# Patient Record
Sex: FEMALE | Race: BLACK OR AFRICAN AMERICAN | NOT HISPANIC OR LATINO | Employment: FULL TIME | URBAN - METROPOLITAN AREA
[De-identification: names, ages, dates, MRNs, and addresses within clinical notes are randomized per-mention and may not be internally consistent; named-entity substitution may affect disease eponyms.]

---

## 2017-02-13 ENCOUNTER — TELEPHONE (OUTPATIENT)
Dept: BARIATRICS | Facility: CLINIC | Age: 47
End: 2017-02-13

## 2017-02-13 NOTE — TELEPHONE ENCOUNTER
Left VM that we need to cancel Teresa's appt in [place] as booked in wrong dept.  The work number listed is disconnected per message.   I sent her an email to her email address as well.  Praveen StoneJhonatan salas had to cancel your appointment with Dr. Moore as its booked into the wrong clinic. Im sorry that there isnt an option on the website to book into the correct clinic but I hope to speak to you soon to get you here.   Please call me at [phone] so I can get you set up in the correct clinic.    To begin the program youll need to watch the bariatric seminar online or come to one in person which is next Thursday evening.  You can schedule the in person seminar by calling [phone].  To view the online seminar you should go to [email or web address]  Once you view online when you get to the last page of the seminar there is a code word which is numbers and letters and youll need to jot it down because youll need it to complete the form you need to fill in. On that same page is the link for the form as well.    Please call me tomorrow so I can assist you.  Thanks,  Diane

## 2017-03-10 DIAGNOSIS — N18.2 CHRONIC KIDNEY DISEASE, STAGE II (MILD): Primary | ICD-10-CM

## 2017-03-10 NOTE — TELEPHONE ENCOUNTER
----- Message from Evelyn Salazar sent at 3/7/2017  2:47 PM CST -----  Contact: pt  Pt called    Needs    Blood work for f/u sully  On 3/15       thanks    Labs are in computer for the 3/15/2017

## 2017-03-15 ENCOUNTER — LAB VISIT (OUTPATIENT)
Dept: LAB | Facility: HOSPITAL | Age: 47
End: 2017-03-15
Attending: INTERNAL MEDICINE
Payer: COMMERCIAL

## 2017-03-15 DIAGNOSIS — N18.30 CHRONIC KIDNEY DISEASE, STAGE III (MODERATE): ICD-10-CM

## 2017-03-15 LAB
BACTERIA #/AREA URNS AUTO: NORMAL /HPF
BILIRUB UR QL STRIP: NEGATIVE
CLARITY UR REFRACT.AUTO: ABNORMAL
COLOR UR AUTO: YELLOW
CREAT UR-MCNC: 110 MG/DL
GLUCOSE UR QL STRIP: NEGATIVE
HGB UR QL STRIP: NEGATIVE
HYALINE CASTS UR QL AUTO: 0 /LPF
KETONES UR QL STRIP: NEGATIVE
LEUKOCYTE ESTERASE UR QL STRIP: NEGATIVE
MICROSCOPIC COMMENT: NORMAL
NITRITE UR QL STRIP: NEGATIVE
PH UR STRIP: 5 [PH] (ref 5–8)
PROT UR QL STRIP: ABNORMAL
PROT UR-MCNC: 103 MG/DL
PROT/CREAT RATIO, UR: 0.94
RBC #/AREA URNS AUTO: 1 /HPF (ref 0–4)
SP GR UR STRIP: 1.01 (ref 1–1.03)
SQUAMOUS #/AREA URNS AUTO: 2 /HPF
URN SPEC COLLECT METH UR: ABNORMAL
UROBILINOGEN UR STRIP-ACNC: NEGATIVE EU/DL
WBC #/AREA URNS AUTO: 2 /HPF (ref 0–5)

## 2017-03-15 PROCEDURE — 82570 ASSAY OF URINE CREATININE: CPT

## 2017-03-15 PROCEDURE — 81001 URINALYSIS AUTO W/SCOPE: CPT

## 2017-05-30 ENCOUNTER — CLINICAL SUPPORT (OUTPATIENT)
Dept: FAMILY MEDICINE | Facility: CLINIC | Age: 47
End: 2017-05-30

## 2017-05-30 DIAGNOSIS — Z00.00 ROUTINE GENERAL MEDICAL EXAMINATION AT A HEALTH CARE FACILITY: Primary | ICD-10-CM

## 2017-05-30 PROCEDURE — 80305 DRUG TEST PRSMV DIR OPT OBS: CPT | Mod: S$GLB,,, | Performed by: FAMILY MEDICINE

## 2017-05-30 NOTE — PROGRESS NOTES
Demetri has presented today on behalf of [facility]. Demetri Fernandez has completed Non-DOT Drug Screen .    Total charge $35    Brendan Pavon

## 2017-07-07 DIAGNOSIS — N18.30 CHRONIC KIDNEY DISEASE, STAGE III (MODERATE): ICD-10-CM

## 2017-07-08 DIAGNOSIS — N18.30 CHRONIC KIDNEY DISEASE, STAGE III (MODERATE): ICD-10-CM

## 2017-07-10 RX ORDER — LOSARTAN POTASSIUM 100 MG/1
TABLET ORAL
Qty: 30 TABLET | Refills: 0 | Status: SHIPPED | OUTPATIENT
Start: 2017-07-10 | End: 2018-07-05 | Stop reason: SDUPTHER

## 2017-07-10 RX ORDER — TORSEMIDE 10 MG/1
TABLET ORAL
Qty: 30 TABLET | Refills: 0 | Status: SHIPPED | OUTPATIENT
Start: 2017-07-10 | End: 2017-12-29 | Stop reason: SDUPTHER

## 2017-10-27 DIAGNOSIS — N18.2 CHRONIC KIDNEY DISEASE, STAGE II (MILD): Primary | ICD-10-CM

## 2017-11-18 DIAGNOSIS — N18.30 CHRONIC KIDNEY DISEASE, STAGE III (MODERATE): ICD-10-CM

## 2017-11-20 ENCOUNTER — LAB VISIT (OUTPATIENT)
Dept: LAB | Facility: HOSPITAL | Age: 47
End: 2017-11-20
Attending: INTERNAL MEDICINE
Payer: COMMERCIAL

## 2017-11-20 DIAGNOSIS — Z00.00 ANNUAL PHYSICAL EXAM: Primary | ICD-10-CM

## 2017-11-20 DIAGNOSIS — N18.2 CHRONIC KIDNEY DISEASE, STAGE II (MILD): ICD-10-CM

## 2017-11-20 DIAGNOSIS — R06.2 WHEEZING: ICD-10-CM

## 2017-11-20 LAB
ALBUMIN SERPL BCP-MCNC: 3.4 G/DL
ANION GAP SERPL CALC-SCNC: 7 MMOL/L
BUN SERPL-MCNC: 18 MG/DL
CALCIUM SERPL-MCNC: 9.3 MG/DL
CHLORIDE SERPL-SCNC: 106 MMOL/L
CO2 SERPL-SCNC: 24 MMOL/L
CREAT SERPL-MCNC: 1.5 MG/DL
EST. GFR  (AFRICAN AMERICAN): 47.8 ML/MIN/1.73 M^2
EST. GFR  (NON AFRICAN AMERICAN): 41.5 ML/MIN/1.73 M^2
GLUCOSE SERPL-MCNC: 90 MG/DL
PHOSPHATE SERPL-MCNC: 2.5 MG/DL
POTASSIUM SERPL-SCNC: 3.8 MMOL/L
SODIUM SERPL-SCNC: 137 MMOL/L

## 2017-11-20 PROCEDURE — 80069 RENAL FUNCTION PANEL: CPT

## 2017-11-20 PROCEDURE — 36415 COLL VENOUS BLD VENIPUNCTURE: CPT | Mod: PO

## 2017-11-20 RX ORDER — CITALOPRAM 20 MG/1
20 TABLET, FILM COATED ORAL DAILY
Qty: 30 TABLET | Refills: 1 | Status: SHIPPED | OUTPATIENT
Start: 2017-11-20 | End: 2018-01-09

## 2017-11-20 RX ORDER — ALBUTEROL SULFATE 90 UG/1
2 AEROSOL, METERED RESPIRATORY (INHALATION) EVERY 4 HOURS PRN
Qty: 8.5 G | Refills: 0 | Status: SHIPPED | OUTPATIENT
Start: 2017-11-20 | End: 2018-03-08 | Stop reason: SDUPTHER

## 2017-11-20 RX ORDER — TRIAMCINOLONE ACETONIDE 1 MG/G
CREAM TOPICAL
Qty: 30 G | Refills: 0 | Status: SHIPPED | OUTPATIENT
Start: 2017-11-20 | End: 2018-01-04

## 2017-11-21 RX ORDER — CARVEDILOL 12.5 MG/1
TABLET ORAL
Qty: 60 TABLET | Refills: 11 | Status: SHIPPED | OUTPATIENT
Start: 2017-11-21 | End: 2019-06-11 | Stop reason: SDUPTHER

## 2017-11-28 ENCOUNTER — OFFICE VISIT (OUTPATIENT)
Dept: NEPHROLOGY | Facility: CLINIC | Age: 47
End: 2017-11-28
Payer: COMMERCIAL

## 2017-11-28 VITALS
WEIGHT: 224.44 LBS | SYSTOLIC BLOOD PRESSURE: 140 MMHG | HEART RATE: 66 BPM | BODY MASS INDEX: 33.24 KG/M2 | DIASTOLIC BLOOD PRESSURE: 98 MMHG | OXYGEN SATURATION: 98 % | HEIGHT: 69 IN

## 2017-11-28 DIAGNOSIS — I12.9 HYPERTENSIVE KIDNEY DISEASE WITH CHRONIC KIDNEY DISEASE, STAGE 1-4 OR UNSPECIFIED CHRONIC KIDNEY DISEASE: ICD-10-CM

## 2017-11-28 DIAGNOSIS — N18.30 CHRONIC KIDNEY DISEASE, STAGE III (MODERATE): Primary | ICD-10-CM

## 2017-11-28 DIAGNOSIS — N25.81 HYPERPARATHYROIDISM DUE TO RENAL INSUFFICIENCY: ICD-10-CM

## 2017-11-28 PROCEDURE — 99213 OFFICE O/P EST LOW 20 MIN: CPT | Mod: S$GLB,,, | Performed by: INTERNAL MEDICINE

## 2017-11-28 PROCEDURE — 99999 PR PBB SHADOW E&M-EST. PATIENT-LVL II: CPT | Mod: PBBFAC,,, | Performed by: INTERNAL MEDICINE

## 2017-12-01 NOTE — PROGRESS NOTES
Patient ID: Demetri Fernandez is a 47 y.o. Black or  female who presents for follow-up evaluation of Chronic Kidney Disease    HPI     Ms. Fernandez is a 47 year old woman with past medical history of hypertension, nephrotic syndrome presenting for follow up of antihypertensive management. Patient reports blood pressure have been variable.  She reports only moderate daily fluid intake, denies any NSAID use.  She otherwise denies any fever, chest pain, shortness of breath, abdominal pain, dysuria/hematuria.     Review of Systems   Constitutional: Negative for appetite change, fatigue and fever.   Respiratory: Negative for chest tightness and shortness of breath.    Cardiovascular: Negative for chest pain and leg swelling.   Gastrointestinal: Negative for abdominal pain, constipation, diarrhea, nausea and vomiting.   Genitourinary: Negative for difficulty urinating, dysuria, flank pain, frequency, hematuria and urgency.   Musculoskeletal: Negative for arthralgias, joint swelling and myalgias.   Skin: Negative for rash and wound.   Neurological: Negative for dizziness, weakness and light-headedness.   All other systems reviewed and are negative.      Objective:      Physical Exam   Constitutional: She appears well-developed and well-nourished.   Cardiovascular: Normal rate, regular rhythm and normal heart sounds.  Exam reveals no gallop and no friction rub.    No murmur heard.  Pulmonary/Chest: Effort normal and breath sounds normal. No respiratory distress. She has no wheezes. She has no rales.   Abdominal: Soft. Bowel sounds are normal. There is no tenderness.   Musculoskeletal: She exhibits no edema.   Neurological: She is alert.   Skin: Skin is warm and dry. No rash noted. No erythema.   Psychiatric: She has a normal mood and affect.       Assessment:       1. Chronic kidney disease, stage III (moderate)    2. Hypertensive kidney disease with chronic kidney disease, stage 1-4 or unspecified chronic  kidney disease    3. Hyperparathyroidism due to renal insufficiency        Plan:     Ms. Fernandez is a 47 year old woman with past medical history of hypertension, nephrotic syndrome presenting for follow up of antihypertensive management. Creatinine within baseline range, however, encouraged fluid intake, will continue to trend (along with proteinuria, currently elevated on ARB).  Encouraged salt restriction, excercise daily, and continued attempt to lose weight, properly and responsibly (h/o eating disorder), in order to help control blood pressure better, she voiced understanding.   - Hypertension: blood pressure labile, continue current meds as prescribed, patient to continue to record BP diary and bring into clinic next visit. Discussed low sodium diet and weight loss as above.  - Bone/mineral metabolism: patient with secondary hyperparathyroidism, VitD deficiency, on ergocalciferol, will continue to monitor     Return to clinic 4-6 months pending CMP 1.2.17, then with renal panel, urinalysis, urine protein/creatinine ratio, PTH/VitD prior to next visit

## 2017-12-29 DIAGNOSIS — N18.30 CHRONIC KIDNEY DISEASE, STAGE III (MODERATE): Primary | ICD-10-CM

## 2017-12-29 RX ORDER — TORSEMIDE 10 MG/1
10 TABLET ORAL DAILY
Qty: 30 TABLET | Refills: 3 | Status: SHIPPED | OUTPATIENT
Start: 2017-12-29 | End: 2018-05-27 | Stop reason: SDUPTHER

## 2018-01-02 ENCOUNTER — PATIENT MESSAGE (OUTPATIENT)
Dept: ADMINISTRATIVE | Facility: OTHER | Age: 48
End: 2018-01-02

## 2018-01-02 ENCOUNTER — LAB VISIT (OUTPATIENT)
Dept: LAB | Facility: HOSPITAL | Age: 48
End: 2018-01-02
Attending: INTERNAL MEDICINE
Payer: COMMERCIAL

## 2018-01-02 DIAGNOSIS — Z00.00 ANNUAL PHYSICAL EXAM: ICD-10-CM

## 2018-01-02 LAB
25(OH)D3+25(OH)D2 SERPL-MCNC: 12 NG/ML
ALBUMIN SERPL BCP-MCNC: 3.4 G/DL
ALP SERPL-CCNC: 72 U/L
ALT SERPL W/O P-5'-P-CCNC: 19 U/L
ANION GAP SERPL CALC-SCNC: 9 MMOL/L
AST SERPL-CCNC: 20 U/L
BASOPHILS # BLD AUTO: 0.04 K/UL
BASOPHILS NFR BLD: 0.4 %
BILIRUB SERPL-MCNC: 0.8 MG/DL
BUN SERPL-MCNC: 17 MG/DL
CALCIUM SERPL-MCNC: 9.2 MG/DL
CHLORIDE SERPL-SCNC: 106 MMOL/L
CHOLEST SERPL-MCNC: 184 MG/DL
CHOLEST/HDLC SERPL: 5.1 {RATIO}
CO2 SERPL-SCNC: 23 MMOL/L
CREAT SERPL-MCNC: 1.6 MG/DL
DIFFERENTIAL METHOD: ABNORMAL
EOSINOPHIL # BLD AUTO: 0.5 K/UL
EOSINOPHIL NFR BLD: 5.5 %
ERYTHROCYTE [DISTWIDTH] IN BLOOD BY AUTOMATED COUNT: 12.1 %
EST. GFR  (AFRICAN AMERICAN): 44.2 ML/MIN/1.73 M^2
EST. GFR  (NON AFRICAN AMERICAN): 38.4 ML/MIN/1.73 M^2
GLUCOSE SERPL-MCNC: 104 MG/DL
HCT VFR BLD AUTO: 40.9 %
HDLC SERPL-MCNC: 36 MG/DL
HDLC SERPL: 19.6 %
HGB BLD-MCNC: 13.6 G/DL
IMM GRANULOCYTES # BLD AUTO: 0.06 K/UL
IMM GRANULOCYTES NFR BLD AUTO: 0.6 %
LDLC SERPL CALC-MCNC: 116 MG/DL
LYMPHOCYTES # BLD AUTO: 3.4 K/UL
LYMPHOCYTES NFR BLD: 35.2 %
MCH RBC QN AUTO: 31.9 PG
MCHC RBC AUTO-ENTMCNC: 33.3 G/DL
MCV RBC AUTO: 96 FL
MONOCYTES # BLD AUTO: 0.5 K/UL
MONOCYTES NFR BLD: 5.5 %
NEUTROPHILS # BLD AUTO: 5 K/UL
NEUTROPHILS NFR BLD: 52.8 %
NONHDLC SERPL-MCNC: 148 MG/DL
NRBC BLD-RTO: 0 /100 WBC
PLATELET # BLD AUTO: 236 K/UL
PMV BLD AUTO: 10.8 FL
POTASSIUM SERPL-SCNC: 4.1 MMOL/L
PROT SERPL-MCNC: 7.5 G/DL
PTH-INTACT SERPL-MCNC: 117 PG/ML
RBC # BLD AUTO: 4.26 M/UL
SODIUM SERPL-SCNC: 138 MMOL/L
TRIGL SERPL-MCNC: 160 MG/DL
WBC # BLD AUTO: 9.51 K/UL

## 2018-01-02 PROCEDURE — 82306 VITAMIN D 25 HYDROXY: CPT

## 2018-01-02 PROCEDURE — 80053 COMPREHEN METABOLIC PANEL: CPT

## 2018-01-02 PROCEDURE — 85025 COMPLETE CBC W/AUTO DIFF WBC: CPT

## 2018-01-02 PROCEDURE — 80061 LIPID PANEL: CPT

## 2018-01-02 PROCEDURE — 36415 COLL VENOUS BLD VENIPUNCTURE: CPT | Mod: PO

## 2018-01-02 PROCEDURE — 83970 ASSAY OF PARATHORMONE: CPT

## 2018-01-04 ENCOUNTER — HOSPITAL ENCOUNTER (OUTPATIENT)
Dept: RADIOLOGY | Facility: HOSPITAL | Age: 48
Discharge: HOME | End: 2018-01-04
Attending: NURSE PRACTITIONER
Payer: COMMERCIAL

## 2018-01-04 ENCOUNTER — OFFICE VISIT (OUTPATIENT)
Dept: OBSTETRICS AND GYNECOLOGY | Facility: CLINIC | Age: 48
End: 2018-01-04
Payer: COMMERCIAL

## 2018-01-04 VITALS
WEIGHT: 227.94 LBS | HEIGHT: 65 IN | BODY MASS INDEX: 37.98 KG/M2 | DIASTOLIC BLOOD PRESSURE: 72 MMHG | SYSTOLIC BLOOD PRESSURE: 128 MMHG

## 2018-01-04 DIAGNOSIS — Z12.39 BREAST CANCER SCREENING: ICD-10-CM

## 2018-01-04 DIAGNOSIS — Z90.710 POST HYSTERECTOMY MENOPAUSE: ICD-10-CM

## 2018-01-04 DIAGNOSIS — N39.3 SUI (STRESS URINARY INCONTINENCE, FEMALE): ICD-10-CM

## 2018-01-04 DIAGNOSIS — N89.8 VAGINAL DISCHARGE: ICD-10-CM

## 2018-01-04 DIAGNOSIS — Z01.419 VISIT FOR GYNECOLOGIC EXAMINATION: Primary | ICD-10-CM

## 2018-01-04 DIAGNOSIS — E89.40 POST HYSTERECTOMY MENOPAUSE: ICD-10-CM

## 2018-01-04 PROCEDURE — 77067 SCR MAMMO BI INCL CAD: CPT | Mod: TC

## 2018-01-04 PROCEDURE — 77067 SCR MAMMO BI INCL CAD: CPT | Mod: 26,,, | Performed by: RADIOLOGY

## 2018-01-04 PROCEDURE — 99999 PR PBB SHADOW E&M-EST. PATIENT-LVL III: CPT | Mod: PBBFAC,,, | Performed by: NURSE PRACTITIONER

## 2018-01-04 PROCEDURE — 99396 PREV VISIT EST AGE 40-64: CPT | Mod: S$GLB,,, | Performed by: NURSE PRACTITIONER

## 2018-01-04 PROCEDURE — 87480 CANDIDA DNA DIR PROBE: CPT

## 2018-01-04 PROCEDURE — 77063 BREAST TOMOSYNTHESIS BI: CPT | Mod: 26,,, | Performed by: RADIOLOGY

## 2018-01-04 RX ORDER — TERCONAZOLE 8 MG/G
1 CREAM VAGINAL NIGHTLY
Qty: 20 G | Refills: 1 | Status: SHIPPED | OUTPATIENT
Start: 2018-01-04 | End: 2018-10-05

## 2018-01-04 NOTE — PROGRESS NOTES
HISTORY OF PRESENT ILLNESS:    Demetri Fernandez is a 47 y.o. female,   presents today for her routine visit andc/o vulvovaginal irritation.  -Unsure if from elevated phosphorus (is itching all over) or vaginal infection from shaving.  -Denies associated fever, pelvic pain, odor, UTI sx or bleeding and denies use of vulvovaginal irritants.  -Also over past year has developed LUÍS and pad use, without dysuria, frequency, urgency, hematuria, nocturia.  -Thinks it's from weight gain.     Past Medical History:   Diagnosis Date    Chronic kidney disease, stage II (mild) 2012    H/O nephrotic syndrome, . 2015    Hypertension     Obesity     Secondary hyperparathyroidism     Vitamin D deficiency disease        Past Surgical History:   Procedure Laterality Date    DILATION AND CURETTAGE OF UTERUS      ENDOMETRIAL ABLATION      HYSTERECTOMY      DLH/BS (AUB/Fibroids) KB    TUBAL LIGATION         MEDICATIONS AND ALLERGIES:      Current Outpatient Prescriptions:     albuterol 90 mcg/actuation inhaler, Inhale 2 puffs into the lungs every 4 (four) hours as needed for Wheezing., Disp: 8.5 g, Rfl: 0    carvedilol (COREG) 12.5 MG tablet, TAKE 1 TABLET BY MOUTH TWICE DAILY, Disp: 60 tablet, Rfl: 11    losartan (COZAAR) 100 MG tablet, TAKE 1 TABLET BY MOUTH EVERY DAY, Disp: 30 tablet, Rfl: 0    torsemide (DEMADEX) 10 MG Tab, Take 1 tablet (10 mg total) by mouth once daily., Disp: 30 tablet, Rfl: 3    VITAMIN D2 50,000 unit capsule, TAKE 1 CAPSULE BY MOUTH EVERY 30 DAYS, Disp: 3 capsule, Rfl: 3    azelastine (ASTELIN) 137 mcg nasal spray, 1 spray (137 mcg total) by Nasal route 2 (two) times daily., Disp: 30 mL, Rfl: 11    citalopram (CELEXA) 20 MG tablet, Take 1 tablet (20 mg total) by mouth once daily., Disp: 30 tablet, Rfl: 1    fluocinolone (SYNALAR) 0.025 % cream, Apply topically 2 (two) times daily., Disp: 60 g, Rfl: 3    terconazole (TERAZOL 3) 0.8 % vaginal cream, Place 1 applicator  vaginally every evening., Disp: 20 g, Rfl: 1    Review of patient's allergies indicates:   Allergen Reactions    No known allergies        Family History   Problem Relation Age of Onset    Diabetes Mother     Colon cancer Father     Diabetes Maternal Aunt     Diabetes Maternal Grandmother     Breast cancer Neg Hx     Ovarian cancer Neg Hx        Social History     Social History    Marital status:      Spouse name: N/A    Number of children: N/A    Years of education: N/A     Occupational History     [employer]     Social History Main Topics    Smoking status: Never Smoker    Smokeless tobacco: Never Used    Alcohol use Yes      Comment: Occasional    Drug use: No    Sexual activity: Yes     Partners: Male     Birth control/ protection: Surgical     Other Topics Concern    Not on file     Social History Narrative    No narrative on file       OBSTETRIC HISTORY: Number of vaginal deliveries: 2 and Number of spontaneous abortions: 1    COMPREHENSIVE GYN HISTORY:  PAP HISTORY: Denies abnormal Paps.  INFECTION HISTORY: Denies STDs. Denies PID.  BENIGN HISTORY: Reports uterine fibroids. Denies ovarian cysts. Denies endometriosis. Denies other conditions.  CANCER HISTORY: Denies cervical cancer. Denies uterine cancer or hyperplasia. Denies ovarian cancer. Denies vulvar cancer or pre-cancer. Denies vaginal cancer or pre-cancer. Denies breast cancer. Denies colon cancer.  SEXUAL ACTIVITY HISTORY: Reports being sexually active.  MENSTRUAL HISTORY: Denies menses. Pt has surgical menopause not on ERT.    ROS:  GENERAL: No weight changes. No swelling. No fatigue. No fever.  CARDIOVASCULAR: No chest pain. No shortness of breath. No leg cramps.   NEUROLOGICAL: No headaches. No vision changes.  BREASTS: No pain. No lumps. No discharge.  ABDOMEN: No pain. No nausea. No vomiting. No diarrhea. No constipation.  REPRODUCTIVE: No abnormal bleeding.  VULVA: No pain. No lesions.+ ITCHING,  "IRRITATION.  VAGINA: No relaxation. No itching. No odor. No discharge. No lesions.  URINARY: + LUÍS, PAD USE. No nocturia. No frequency. No dysuria.    /72   Ht 5' 5" (1.651 m)   Wt 103.4 kg (227 lb 15.3 oz)   BMI 37.93 kg/m²     PE:  APPEARANCE: Well nourished, well developed, in no acute distress.  AFFECT: WNL, alert and oriented x 3.  SKIN: No acne or hirsutism.  NECK: Neck symmetric without masses or thyromegaly.  NODES: No inguinal, cervical, axillary or femoral lymph node enlargement.  CHEST: Good respiratory effort.   ABDOMEN: Soft. No tenderness or masses. OBESE.  BREASTS: Symmetrical, no skin changes or visible lesions. No palpable masses, nipple discharge bilaterally.  PELVIC: ATROPHIC EXTERNAL FEMALE GENITALIA without lesions. Normal hair distribution. Adequate perineal body, normal urethral meatus. VAGINA with WHITE THICK ODORLESS D/C without lesions. No significant cystocele or rectocele. Bimanual exam shows CERVIX and UTERUS to be SURGICALLY ABSENT. Adnexa without masses or tenderness. EXAM DIFFICULT DUE TO BODY HABITUS.  EXTREMITIES: No edema.    DIAGNOSIS:  1. Visit for gynecologic examination    2. Post hysterectomy menopause    3. Vaginal discharge    4. LUÍS (stress urinary incontinence, female)    5. Breast cancer screening        Orders Placed This Encounter    Vaginosis Screen by DNA Probe    Mammo Digital Screening Bilat with Tomosynthesis_CAD    terconazole (TERAZOL 3) 0.8 % vaginal cream   Up to date on colonoscopy    COUNSELING:  The patient was counseled today on:  -Vaginitis prevention including :  a. avoiding feminine products such as deoderant soaps, body wash, bubble bath, douches, scented toilet paper, deoderant tampons or pads, baby or feminine wipes, chronic pad use, etc. and       b. avoiding other vulvovaginal irritants such as long hot baths, humidity, tight, synthetic clothing, chlorine and sitting around in wet bathing suits and   c. wearing cotton underwear, avoiding " thong underwear and no underwear to bed and      d. taking showers instead of baths and use a hair dryer on cool setting afterwards to dry and  e.wearing cotton to exercise and shower immediately after exercise and change clothes and  f. using polyurethane condoms without spermicide if sexually active and symptoms are triggered by intercourse;  -Terazol cream use and potential side effects;  -types of incontinence and management options including bladder training, timed voiding, Kegel exercises, and the avoidance of bladder irritants in managing mild symptoms conservatively as well as surgical options for correction of anatomic defects, the potential need for urodynamic testing by a Uro-gynecologist, which she declined;  -osteoporosis prevention, calcium supplementation, regular weight bearing exercise;  -ACS PAP guidelines (no paps), with recommendations for yearly pelvic exams as her uterus and cervix were removed for benign reasons and ovaries remain;  -recommendation for yearly mammogram;  -to see her primary care physician for all other health maintenance.    FOLLOW-UP with me for next routine visit.

## 2018-01-05 LAB
CANDIDA RRNA VAG QL PROBE: NEGATIVE
G VAGINALIS RRNA GENITAL QL PROBE: POSITIVE
T VAGINALIS RRNA GENITAL QL PROBE: NEGATIVE

## 2018-01-07 ENCOUNTER — PATIENT MESSAGE (OUTPATIENT)
Dept: OBSTETRICS AND GYNECOLOGY | Facility: CLINIC | Age: 48
End: 2018-01-07

## 2018-01-08 DIAGNOSIS — N76.0 BV (BACTERIAL VAGINOSIS): Primary | ICD-10-CM

## 2018-01-08 DIAGNOSIS — B96.89 BV (BACTERIAL VAGINOSIS): Primary | ICD-10-CM

## 2018-01-08 RX ORDER — METRONIDAZOLE 500 MG/1
500 TABLET ORAL 2 TIMES DAILY
Qty: 14 TABLET | Refills: 1 | Status: SHIPPED | OUTPATIENT
Start: 2018-01-08 | End: 2018-09-03 | Stop reason: SDUPTHER

## 2018-01-09 ENCOUNTER — OFFICE VISIT (OUTPATIENT)
Dept: INTERNAL MEDICINE | Facility: CLINIC | Age: 48
End: 2018-01-09
Payer: COMMERCIAL

## 2018-01-09 VITALS
BODY MASS INDEX: 33.74 KG/M2 | OXYGEN SATURATION: 99 % | DIASTOLIC BLOOD PRESSURE: 82 MMHG | HEIGHT: 69 IN | HEART RATE: 78 BPM | TEMPERATURE: 97 F | WEIGHT: 227.81 LBS | SYSTOLIC BLOOD PRESSURE: 128 MMHG

## 2018-01-09 DIAGNOSIS — G47.00 INSOMNIA, UNSPECIFIED TYPE: ICD-10-CM

## 2018-01-09 DIAGNOSIS — F32.9 REACTIVE DEPRESSION: ICD-10-CM

## 2018-01-09 DIAGNOSIS — E55.9 VITAMIN D DEFICIENCY: ICD-10-CM

## 2018-01-09 DIAGNOSIS — J31.0 OTHER CHRONIC RHINITIS: ICD-10-CM

## 2018-01-09 DIAGNOSIS — I15.9 SECONDARY HYPERTENSION: ICD-10-CM

## 2018-01-09 DIAGNOSIS — K59.00 CONSTIPATION, UNSPECIFIED CONSTIPATION TYPE: ICD-10-CM

## 2018-01-09 DIAGNOSIS — Z00.00 ANNUAL PHYSICAL EXAM: Primary | ICD-10-CM

## 2018-01-09 DIAGNOSIS — N18.2 CHRONIC KIDNEY DISEASE, STAGE II (MILD): ICD-10-CM

## 2018-01-09 PROCEDURE — 99396 PREV VISIT EST AGE 40-64: CPT | Mod: S$GLB,,, | Performed by: INTERNAL MEDICINE

## 2018-01-09 PROCEDURE — 99999 PR PBB SHADOW E&M-EST. PATIENT-LVL IV: CPT | Mod: PBBFAC,,, | Performed by: INTERNAL MEDICINE

## 2018-01-09 RX ORDER — BUPROPION HYDROCHLORIDE 150 MG/1
150 TABLET ORAL DAILY
Qty: 30 TABLET | Refills: 5 | Status: SHIPPED | OUTPATIENT
Start: 2018-01-09 | End: 2018-12-30 | Stop reason: SDUPTHER

## 2018-01-09 RX ORDER — FLUTICASONE PROPIONATE 50 MCG
2 SPRAY, SUSPENSION (ML) NASAL DAILY
Qty: 16 G | Refills: 11 | Status: SHIPPED | OUTPATIENT
Start: 2018-01-09 | End: 2018-02-08

## 2018-01-09 RX ORDER — TRAZODONE HYDROCHLORIDE 50 MG/1
TABLET ORAL
Qty: 60 TABLET | Refills: 5 | Status: SHIPPED | OUTPATIENT
Start: 2018-01-09 | End: 2018-10-05

## 2018-01-09 RX ORDER — CETIRIZINE HYDROCHLORIDE 10 MG/1
10 TABLET ORAL DAILY
Qty: 30 TABLET | Refills: 11 | COMMUNITY
Start: 2018-01-09 | End: 2018-12-31 | Stop reason: SDUPTHER

## 2018-01-09 NOTE — PROGRESS NOTES
Subjective:       Patient ID: Demetri Fernandez is a 47 y.o. female.    Chief Complaint: Annual Exam    HPI   Last nov 2016 I started celexa as made her very hungry and gained a lot of weight.     Sad most of the time.  Depressed most of the time.     Depr in response to death of 21 yo son from a seizure in her home.  She has never had therapy and has a lot of unresolved feelings.         She has just restarted Ergo calciferol.    Recently saw Dr Tobar.  Chronic rhinitis.  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  2nd job - [employer] work at a [employer].    Chronic constipation, - all her life.    Laxative dependendent.       Review of Systems   Constitutional: Positive for activity change and unexpected weight change.   HENT: Negative for hearing loss, rhinorrhea and trouble swallowing.    Eyes: Negative for discharge and visual disturbance.   Respiratory: Positive for shortness of breath (with walking). Negative for chest tightness and wheezing.    Cardiovascular: Negative for chest pain and palpitations.   Gastrointestinal: Positive for constipation. Negative for blood in stool, diarrhea and vomiting.   Endocrine: Positive for polydipsia. Negative for polyuria.   Genitourinary: Negative for difficulty urinating, dysuria, hematuria and menstrual problem.   Musculoskeletal: Negative for arthralgias, joint swelling and neck pain.   Neurological: Negative for weakness and headaches.   Psychiatric/Behavioral: Positive for confusion and dysphoric mood.       Objective:      Physical Exam   Constitutional: She is oriented to person, place, and time. She appears well-developed and well-nourished. No distress.   HENT:   Head: Normocephalic and atraumatic.   Right Ear: External ear normal.   Left Ear: External ear normal.   Nose: Nose normal.   Mouth/Throat: Oropharynx is clear and moist.   Eyes: Conjunctivae and EOM are normal. Pupils are equal, round, and reactive to light. Right eye exhibits no  discharge. Left eye exhibits no discharge. No scleral icterus.   Neck: Normal range of motion. Neck supple. No JVD present. No thyromegaly present.   Cardiovascular: Normal rate, regular rhythm and normal heart sounds.  Exam reveals no gallop.    No murmur heard.  Pulmonary/Chest: Effort normal and breath sounds normal. No respiratory distress. She has no wheezes. She has no rales.   Abdominal: Soft. Bowel sounds are normal. She exhibits no distension and no mass. There is no tenderness. There is no rebound and no guarding.   Musculoskeletal: Normal range of motion. She exhibits no edema or tenderness.   Lymphadenopathy:     She has no cervical adenopathy.   Neurological: She is alert and oriented to person, place, and time. No cranial nerve deficit. Coordination normal.   Skin: Skin is warm and dry. No rash noted.   Psychiatric: She has a normal mood and affect. Her behavior is normal. Judgment and thought content normal.       Lab Results   Component Value Date    WBC 9.51 01/02/2018    HGB 13.6 01/02/2018    HCT 40.9 01/02/2018     01/02/2018    CHOL 184 01/02/2018    TRIG 160 (H) 01/02/2018    HDL 36 (L) 01/02/2018    ALT 19 01/02/2018    AST 20 01/02/2018     01/02/2018    K 4.1 01/02/2018     01/02/2018    CREATININE 1.6 (H) 01/02/2018    BUN 17 01/02/2018    CO2 23 01/02/2018    TSH 1.5 03/15/2008     Results for orders placed or performed in visit on 01/04/18   Vaginosis Screen by DNA Probe   Result Value Ref Range    Trichomonas vaginalis Negative Negative    Gardnerella vaginalis Positive (A) Negative    Candida sp Negative Negative     Assessment:       1. Annual physical exam    2. Secondary hypertension    3. Reactive depression    4. Insomnia, unspecified type    5. Other chronic rhinitis    6. Chronic kidney disease, stage II (mild)    7. Vitamin D deficiency    8. Constipation, unspecified constipation type        Plan:       Demetri was seen today for annual exam.    Diagnoses and  all orders for this visit:    Annual physical exam    Secondary hypertension    Reactive depression    Insomnia, unspecified type    Other chronic rhinitis    Chronic kidney disease, stage II (mild)    Vitamin D deficiency    Other orders  -     RESTART cetirizine (ZYRTEC) 10 MG tablet; Take 1 tablet (10 mg total) by mouth once daily.  -     RESTART fluticasone (FLONASE) 50 mcg/actuation nasal spray; 2 sprays (100 mcg total) by Each Nare route once daily.  -  START   buPROPion (WELLBUTRIN XL) 150 MG TB24 tablet; Take 1 tablet (150 mg total) by mouth once daily.  -    START traZODone (DESYREL) 50 MG tablet; 1-2 tabs po q hs for sleep       See pt instructions  Exercise regularly.  Weight loss diet reviewed.    rtc 4-6 weeks    counseling

## 2018-01-09 NOTE — PATIENT INSTRUCTIONS
Ask work if Employee Assistance Program.     If not call psychiatry here for an appointment.    Call psychiatry to make an appointment [phone]    Try miralax for constipation.      Wellbutrin for depression - 1 a day    Trazadone - 50 mg 1-2 tabs at bedtime for sleep    Constipation (Adult)  Constipation means that you have bowel movements that are less frequent than usual. Stools often become very hard and difficult to pass.  Constipation is very common. At some point in life it affects almost everyone. Since everyone's bowel habits are different, what is constipation to one person may not be to another. Your healthcare provider may do tests to diagnose constipation. It depends on what he or she finds when evaluating you.    Symptoms of constipation include:  · Abdominal pain  · Bloating  · Vomiting  · Painful bowel movements  · Itching, swelling, bleeding, or pain around the anus  Causes  Constipation can have many causes. These include:  · Diet low in fiber  · Too much dairy  · Not drinking enough liquids  · Lack of exercise or physical activity. This is especially true for older adults.  · Changes in lifestyle or daily routine, including pregnancy, aging, work, and travel  · Frequent use or misuse of laxatives  · Ignoring the urge to have a bowel movement or delaying it until later  · Medicines, such as certain prescription pain medicines, iron supplements, antacids, certain antidepressants, and calcium supplements  · Diseases like irritable bowel syndrome, bowel obstructions, stroke, diabetes, thyroid disease, Parkinson disease, hemorrhoids, and colon cancer  Complications  Potential complications of constipation can include:  · Hemorrhoids  · Rectal bleeding from hemorrhoids or anal fissures (skin tears)  · Hernias  · Dependency on laxatives  · Chronic constipation  · Fecal impaction  · Bowel obstruction or perforation  Home care  All treatment should be done after talking with your healthcare provider. This  is especially true if you have another medical problems, are taking prescription medicines, or are an older adult. Treatment most often involves lifestyle changes. You may also need medicines. Your healthcare provider will tell you which will work best for you. Follow the advice below to help avoid this problem in the future.  Lifestyle changes  These lifestyle changes can help prevent constipation:  · Diet. Eat a high-fiber diet, with fresh fruit and vegetables, and reduce dairy intake, meats, and processed foods  · Fluids. It's important to get enough fluids each day. Drink plenty of water when you eat more fiber. If you are on diet that limits the amount of fluid you can have, talk about this with your healthcare provider.  · Regular exercise. Check with your healthcare provider first.  Medications  Take any medicines as directed. Some laxatives are safe to use only every now and then. Others can be taken on a regular basis. Talk with your doctor or pharmacist if you have questions.  Prescription pain medicines can cause constipation. If you are taking this kind of medicine, ask your healthcare provider if you should also take a stool softener.  Medicines you may take to treat constipation include:  · Fiber supplements  · Stool softeners  · Laxatives  · Enemas  · Rectal suppositories  Follow-up care  Follow up with your healthcare provider if symptoms don't get better in the next few days. You may need to have more tests or see a specialist.  Call 911  Call 911 if any of these occur:  · Trouble breathing  · Stiff, rigid abdomen that is severely painful to touch  · Confusion  · Fainting or loss of consciousness  · Rapid heart rate  · Chest pain  When to seek medical advice  Call your healthcare provider right away if any of these occur:  · Fever over 100.4°F (38°C)  · Failure to resume normal bowel movements  · Pain in your abdomen or back gets worse  · Nausea or vomiting  · Swelling in your abdomen  · Blood in  the stool  · Black, tarry stool  · Involuntary weight loss  · Weakness  Date Last Reviewed: 12/30/2015  © 0065-2747 [employer]. [address] [place] [address], [place] [address]. All rights reserved. This information is not intended as a substitute for professional medical care. Always follow your healthcare professional's instructions.

## 2018-02-04 RX ORDER — CITALOPRAM 20 MG/1
TABLET, FILM COATED ORAL
Qty: 30 TABLET | Refills: 11 | Status: SHIPPED | OUTPATIENT
Start: 2018-02-04 | End: 2018-10-05

## 2018-02-26 DIAGNOSIS — N18.30 CHRONIC KIDNEY DISEASE, STAGE III (MODERATE): ICD-10-CM

## 2018-02-26 DIAGNOSIS — N25.81 HYPERPARATHYROIDISM DUE TO RENAL INSUFFICIENCY: ICD-10-CM

## 2018-02-26 RX ORDER — CETIRIZINE HYDROCHLORIDE 10 MG/1
10 TABLET ORAL DAILY
Qty: 30 TABLET | Refills: 11 | Status: CANCELLED | COMMUNITY
Start: 2018-02-26 | End: 2019-02-26

## 2018-02-26 RX ORDER — ERGOCALCIFEROL 1.25 MG/1
CAPSULE ORAL
Qty: 3 CAPSULE | Refills: 3 | Status: CANCELLED | OUTPATIENT
Start: 2018-02-26

## 2018-03-08 DIAGNOSIS — R06.2 WHEEZING: ICD-10-CM

## 2018-03-08 RX ORDER — ALBUTEROL SULFATE 90 UG/1
2 AEROSOL, METERED RESPIRATORY (INHALATION) EVERY 4 HOURS PRN
Qty: 18 G | Refills: 0 | Status: SHIPPED | OUTPATIENT
Start: 2018-03-08 | End: 2018-03-08 | Stop reason: SDUPTHER

## 2018-03-08 RX ORDER — ALBUTEROL SULFATE 90 UG/1
2 AEROSOL, METERED RESPIRATORY (INHALATION) EVERY 4 HOURS PRN
Qty: 18 G | Refills: 5 | Status: SHIPPED | OUTPATIENT
Start: 2018-03-08 | End: 2018-03-12 | Stop reason: SDUPTHER

## 2018-03-12 DIAGNOSIS — R06.2 WHEEZING: ICD-10-CM

## 2018-03-12 RX ORDER — ALBUTEROL SULFATE 90 UG/1
2 AEROSOL, METERED RESPIRATORY (INHALATION) EVERY 4 HOURS PRN
Qty: 18 G | Refills: 5 | Status: SHIPPED | OUTPATIENT
Start: 2018-03-12 | End: 2019-09-11

## 2018-03-12 NOTE — TELEPHONE ENCOUNTER
Pharmacy states this drug is not covered by patients plans. Alternative: Aubrie Schaefer ventolinhfa

## 2018-05-24 ENCOUNTER — CLINICAL SUPPORT (OUTPATIENT)
Dept: FAMILY MEDICINE | Facility: CLINIC | Age: 48
End: 2018-05-24

## 2018-05-24 DIAGNOSIS — Z00.00 ROUTINE GENERAL MEDICAL EXAMINATION AT A HEALTH CARE FACILITY: ICD-10-CM

## 2018-05-24 PROCEDURE — 80305 DRUG TEST PRSMV DIR OPT OBS: CPT | Mod: S$GLB,,, | Performed by: FAMILY MEDICINE

## 2018-05-24 NOTE — PROGRESS NOTES
Demetri has presented today for Pre-Hire screening on behalf of [facility]. Demetri Fernandez has completed Non-DOT Drug Screen . $55.00    Tanna Canchola

## 2018-05-25 ENCOUNTER — TELEPHONE (OUTPATIENT)
Dept: NEPHROLOGY | Facility: CLINIC | Age: 48
End: 2018-05-25

## 2018-05-27 DIAGNOSIS — N18.30 CHRONIC KIDNEY DISEASE, STAGE III (MODERATE): ICD-10-CM

## 2018-05-29 RX ORDER — TORSEMIDE 10 MG/1
TABLET ORAL
Qty: 30 TABLET | Refills: 3 | Status: SHIPPED | OUTPATIENT
Start: 2018-05-29 | End: 2018-07-05 | Stop reason: SDUPTHER

## 2018-06-28 DIAGNOSIS — N25.81 HYPERPARATHYROIDISM DUE TO RENAL INSUFFICIENCY: ICD-10-CM

## 2018-06-28 DIAGNOSIS — N18.30 CHRONIC KIDNEY DISEASE, STAGE III (MODERATE): ICD-10-CM

## 2018-06-28 RX ORDER — TORSEMIDE 10 MG/1
TABLET ORAL
Qty: 30 TABLET | Refills: 3 | Status: CANCELLED | OUTPATIENT
Start: 2018-06-28

## 2018-06-28 RX ORDER — BUPROPION HYDROCHLORIDE 150 MG/1
150 TABLET ORAL DAILY
Qty: 30 TABLET | Refills: 5 | Status: CANCELLED | OUTPATIENT
Start: 2018-06-28 | End: 2019-06-28

## 2018-06-28 RX ORDER — ERGOCALCIFEROL 1.25 MG/1
CAPSULE ORAL
Qty: 3 CAPSULE | Refills: 3 | Status: CANCELLED | OUTPATIENT
Start: 2018-06-28

## 2018-07-05 DIAGNOSIS — N25.81 HYPERPARATHYROIDISM DUE TO RENAL INSUFFICIENCY: ICD-10-CM

## 2018-07-05 DIAGNOSIS — N18.30 CHRONIC KIDNEY DISEASE, STAGE III (MODERATE): ICD-10-CM

## 2018-07-06 RX ORDER — LOSARTAN POTASSIUM 100 MG/1
100 TABLET ORAL DAILY
Qty: 30 TABLET | Refills: 3 | Status: SHIPPED | OUTPATIENT
Start: 2018-07-06 | End: 2019-06-12 | Stop reason: SDUPTHER

## 2018-07-06 RX ORDER — TORSEMIDE 10 MG/1
TABLET ORAL
Qty: 30 TABLET | Refills: 3 | Status: SHIPPED | OUTPATIENT
Start: 2018-07-06 | End: 2019-06-11 | Stop reason: SDUPTHER

## 2018-07-06 RX ORDER — ERGOCALCIFEROL 1.25 MG/1
CAPSULE ORAL
Qty: 3 CAPSULE | Refills: 0 | Status: SHIPPED | OUTPATIENT
Start: 2018-07-06 | End: 2018-09-03 | Stop reason: SDUPTHER

## 2018-09-03 DIAGNOSIS — N76.0 BV (BACTERIAL VAGINOSIS): ICD-10-CM

## 2018-09-03 DIAGNOSIS — N18.30 CHRONIC KIDNEY DISEASE, STAGE III (MODERATE): ICD-10-CM

## 2018-09-03 DIAGNOSIS — N25.81 HYPERPARATHYROIDISM DUE TO RENAL INSUFFICIENCY: ICD-10-CM

## 2018-09-03 DIAGNOSIS — B96.89 BV (BACTERIAL VAGINOSIS): ICD-10-CM

## 2018-09-03 RX ORDER — TRIAMCINOLONE ACETONIDE 1 MG/G
CREAM TOPICAL
Qty: 30 G | Refills: 0 | Status: SHIPPED | OUTPATIENT
Start: 2018-09-03 | End: 2019-01-03 | Stop reason: SDUPTHER

## 2018-09-04 RX ORDER — METRONIDAZOLE 500 MG/1
TABLET ORAL
Qty: 14 TABLET | Refills: 0 | Status: SHIPPED | OUTPATIENT
Start: 2018-09-04 | End: 2018-11-28 | Stop reason: ALTCHOICE

## 2018-09-04 RX ORDER — ERGOCALCIFEROL 1.25 MG/1
CAPSULE ORAL
Qty: 3 CAPSULE | Refills: 0 | Status: SHIPPED | OUTPATIENT
Start: 2018-09-04 | End: 2019-10-21 | Stop reason: SDUPTHER

## 2018-10-05 ENCOUNTER — OFFICE VISIT (OUTPATIENT)
Dept: INTERNAL MEDICINE | Facility: CLINIC | Age: 48
End: 2018-10-05
Payer: COMMERCIAL

## 2018-10-05 VITALS
TEMPERATURE: 98 F | RESPIRATION RATE: 12 BRPM | SYSTOLIC BLOOD PRESSURE: 156 MMHG | HEART RATE: 64 BPM | WEIGHT: 224 LBS | DIASTOLIC BLOOD PRESSURE: 100 MMHG | HEIGHT: 69 IN | BODY MASS INDEX: 33.18 KG/M2

## 2018-10-05 DIAGNOSIS — J01.40 ACUTE PANSINUSITIS, RECURRENCE NOT SPECIFIED: Primary | ICD-10-CM

## 2018-10-05 PROCEDURE — 3077F SYST BP >= 140 MM HG: CPT | Mod: CPTII,S$GLB,, | Performed by: FAMILY MEDICINE

## 2018-10-05 PROCEDURE — 3080F DIAST BP >= 90 MM HG: CPT | Mod: CPTII,S$GLB,, | Performed by: FAMILY MEDICINE

## 2018-10-05 PROCEDURE — 99214 OFFICE O/P EST MOD 30 MIN: CPT | Mod: 25,S$GLB,, | Performed by: FAMILY MEDICINE

## 2018-10-05 PROCEDURE — 3008F BODY MASS INDEX DOCD: CPT | Mod: CPTII,S$GLB,, | Performed by: FAMILY MEDICINE

## 2018-10-05 PROCEDURE — 96372 THER/PROPH/DIAG INJ SC/IM: CPT | Mod: S$GLB,,, | Performed by: FAMILY MEDICINE

## 2018-10-05 PROCEDURE — 99999 PR PBB SHADOW E&M-EST. PATIENT-LVL III: CPT | Mod: PBBFAC,,, | Performed by: FAMILY MEDICINE

## 2018-10-05 RX ORDER — BENZONATATE 200 MG/1
200 CAPSULE ORAL 3 TIMES DAILY PRN
Qty: 30 CAPSULE | Refills: 0 | Status: SHIPPED | OUTPATIENT
Start: 2018-10-05 | End: 2019-01-03 | Stop reason: SDUPTHER

## 2018-10-05 RX ORDER — AMOXICILLIN AND CLAVULANATE POTASSIUM 875; 125 MG/1; MG/1
1 TABLET, FILM COATED ORAL EVERY 12 HOURS
Qty: 20 TABLET | Refills: 0 | Status: SHIPPED | OUTPATIENT
Start: 2018-10-05 | End: 2018-10-15

## 2018-10-05 RX ORDER — TRIAMCINOLONE ACETONIDE 40 MG/ML
40 INJECTION, SUSPENSION INTRA-ARTICULAR; INTRAMUSCULAR
Status: COMPLETED | OUTPATIENT
Start: 2018-10-05 | End: 2018-10-05

## 2018-10-05 RX ORDER — FLUTICASONE PROPIONATE 50 MCG
2 SPRAY, SUSPENSION (ML) NASAL DAILY
Qty: 16 G | Refills: 11 | Status: SHIPPED | OUTPATIENT
Start: 2018-10-05 | End: 2019-11-13

## 2018-10-05 RX ADMIN — TRIAMCINOLONE ACETONIDE 40 MG: 40 INJECTION, SUSPENSION INTRA-ARTICULAR; INTRAMUSCULAR at 04:10

## 2018-10-05 NOTE — PROGRESS NOTES
Subjective:       Patient ID: Demetri Fernandez is a 48 y.o. female.    Chief Complaint: Nasal Congestion; Sinusitis; and Headache    HPI 48-year-old  female presents to clinic today secondary to a complaint of worsening nasal congestion, postnasal drip, runny nose, sinus pressure, sore throat, hoarseness, chest congestion, cough productive of yellow sputum, and headaches worsening for the past 4 days.  She has been using Benadryl without relief.  Review of Systems   Constitutional: Negative for appetite change, chills, fatigue and fever.   HENT: Positive for congestion, postnasal drip, rhinorrhea, sinus pressure, sore throat and voice change (Hoarseness). Negative for ear pain, hearing loss and tinnitus.    Eyes: Negative for redness, itching and visual disturbance.   Respiratory: Positive for cough (Yellowish sputum) and chest tightness. Negative for shortness of breath.    Cardiovascular: Negative for chest pain and palpitations.   Gastrointestinal: Negative for abdominal pain, constipation, diarrhea, nausea and vomiting.   Genitourinary: Negative for decreased urine volume, difficulty urinating, dysuria, frequency, hematuria and urgency.   Musculoskeletal: Negative for back pain, myalgias, neck pain and neck stiffness.   Skin: Negative for rash.   Neurological: Positive for headaches. Negative for dizziness and light-headedness.   Psychiatric/Behavioral: Negative.        Objective:      Physical Exam   Constitutional: She is oriented to person, place, and time. She appears well-developed and well-nourished. No distress.   HENT:   Head: Normocephalic and atraumatic.   Right Ear: External ear normal. A middle ear effusion is present.   Left Ear: External ear normal. A middle ear effusion is present.   Nose: Mucosal edema and rhinorrhea present. No nose lacerations, sinus tenderness, nasal deformity, septal deviation or nasal septal hematoma. No epistaxis.  No foreign bodies. Right sinus exhibits  maxillary sinus tenderness and frontal sinus tenderness. Left sinus exhibits maxillary sinus tenderness and frontal sinus tenderness.   Mouth/Throat: Oropharynx is clear and moist. No oropharyngeal exudate.   Eyes: Conjunctivae and EOM are normal. Pupils are equal, round, and reactive to light. Right eye exhibits no discharge. Left eye exhibits no discharge. No scleral icterus.   Neck: Normal range of motion. Neck supple. No JVD present. No tracheal deviation present. No thyromegaly present.   Cardiovascular: Normal rate, regular rhythm, normal heart sounds and intact distal pulses. Exam reveals no gallop and no friction rub.   No murmur heard.  Pulmonary/Chest: Effort normal and breath sounds normal. No stridor. No respiratory distress. She has no wheezes. She has no rales.   Abdominal: Soft. Bowel sounds are normal. She exhibits no distension and no mass. There is no tenderness. There is no rebound and no guarding.   Musculoskeletal: Normal range of motion. She exhibits no edema or tenderness.   Lymphadenopathy:     She has no cervical adenopathy.   Neurological: She is alert and oriented to person, place, and time.   Skin: Skin is warm and dry. No rash noted. She is not diaphoretic. No erythema. No pallor.   Psychiatric: She has a normal mood and affect. Her behavior is normal. Judgment and thought content normal.   Nursing note and vitals reviewed.      Assessment:       1. Acute pansinusitis, recurrence not specified        Plan:       Acute pansinusitis, recurrence not specified  -     amoxicillin-clavulanate 875-125mg (AUGMENTIN) 875-125 mg per tablet; Take 1 tablet by mouth every 12 (twelve) hours. for 10 days  Dispense: 20 tablet; Refill: 0  -     benzonatate (TESSALON) 200 MG capsule; Take 1 capsule (200 mg total) by mouth 3 (three) times daily as needed for Cough.  Dispense: 30 capsule; Refill: 0  -     fluticasone (FLONASE) 50 mcg/actuation nasal spray; 2 sprays (100 mcg total) by Each Nare route once  daily.  Dispense: 16 g; Refill: 11  -     triamcinolone acetonide injection 40 mg; Inject 1 mL (40 mg total) into the muscle one time.      Tylenol and ibuprofen as needed for fever or pain.  Saltwater or Listerine gargle as needed for sore throat.  Zyrtec daily.  Return to clinic as needed if symptoms persist or worsen.

## 2018-11-25 ENCOUNTER — PATIENT MESSAGE (OUTPATIENT)
Dept: NEPHROLOGY | Facility: CLINIC | Age: 48
End: 2018-11-25

## 2018-11-27 ENCOUNTER — PATIENT MESSAGE (OUTPATIENT)
Dept: NEPHROLOGY | Facility: CLINIC | Age: 48
End: 2018-11-27

## 2018-11-28 ENCOUNTER — OFFICE VISIT (OUTPATIENT)
Dept: INTERNAL MEDICINE | Facility: CLINIC | Age: 48
End: 2018-11-28
Payer: COMMERCIAL

## 2018-11-28 VITALS
TEMPERATURE: 98 F | BODY MASS INDEX: 33.4 KG/M2 | HEIGHT: 69 IN | DIASTOLIC BLOOD PRESSURE: 86 MMHG | SYSTOLIC BLOOD PRESSURE: 138 MMHG | HEART RATE: 68 BPM | WEIGHT: 225.5 LBS

## 2018-11-28 DIAGNOSIS — I15.0 RENOVASCULAR HYPERTENSION: Primary | ICD-10-CM

## 2018-11-28 PROCEDURE — 3075F SYST BP GE 130 - 139MM HG: CPT | Mod: CPTII,S$GLB,, | Performed by: FAMILY MEDICINE

## 2018-11-28 PROCEDURE — 99999 PR PBB SHADOW E&M-EST. PATIENT-LVL III: CPT | Mod: PBBFAC,,, | Performed by: FAMILY MEDICINE

## 2018-11-28 PROCEDURE — 3079F DIAST BP 80-89 MM HG: CPT | Mod: CPTII,S$GLB,, | Performed by: FAMILY MEDICINE

## 2018-11-28 PROCEDURE — 99213 OFFICE O/P EST LOW 20 MIN: CPT | Mod: S$GLB,,, | Performed by: FAMILY MEDICINE

## 2018-11-28 PROCEDURE — 3008F BODY MASS INDEX DOCD: CPT | Mod: CPTII,S$GLB,, | Performed by: FAMILY MEDICINE

## 2018-11-28 NOTE — PATIENT INSTRUCTIONS
Message   Blood work looks okay  Verified Results  (1) CBC/PLT/DIFF [id number] 11:41AM Alverna Layer Order Number: [id number]     Test Name Result Flag Reference   WBC COUNT 7 56 Thousand/uL  4 31-10 16   RBC COUNT 4 77 Million/uL  3 88-5 62   HEMOGLOBIN 15 6 g/dL  12 0-17 0   HEMATOCRIT 45 4 %  36 5-49 3   MCV 95 fL  82-98   MCH 32 7 pg  26 8-34 3   MCHC 34 4 g/dL  31 4-37 4   RDW 13 3 %  11 6-15 1   MPV 10 2 fL  8 9-12 7   PLATELET COUNT 950 Thousands/uL  149-390   nRBC AUTOMATED 0 /100 WBCs     NEUTROPHILS RELATIVE PERCENT 66 %  43-75   LYMPHOCYTES RELATIVE PERCENT 24 %  14-44   MONOCYTES RELATIVE PERCENT 9 %  4-12   EOSINOPHILS RELATIVE PERCENT 1 %  0-6   BASOPHILS RELATIVE PERCENT 0 %  0-1   NEUTROPHILS ABSOLUTE COUNT 4 96 Thousands/?L  1 85-7 62   LYMPHOCYTES ABSOLUTE COUNT 1 82 Thousands/?L  0 60-4 47   MONOCYTES ABSOLUTE COUNT 0 65 Thousand/?L  0 17-1 22   EOSINOPHILS ABSOLUTE COUNT 0 09 Thousand/?L  0 00-0 61   BASOPHILS ABSOLUTE COUNT 0 02 Thousands/?L  0 00-0 10   - Patient Instructions: This bloodwork is non-fasting  Please drink two glasses of water morning of bloodwork  (1) COMPREHENSIVE METABOLIC PANEL [id number] 75:23RW Alverna Layer Order Number: [id number]     Test Name Result Flag Reference   GLUCOSE,RANDM 98 mg/dL     If the patient is fasting, the ADA then defines impaired fasting glucose as > 100 mg/dL and diabetes as > or equal to 123 mg/dL     SODIUM 138 mmol/L  136-145   POTASSIUM 4 7 mmol/L  3 5-5 3   CHLORIDE 101 mmol/L  100-108   CARBON DIOXIDE 29 mmol/L  21-32   ANION GAP (CALC) 8 mmol/L  4-13   BLOOD UREA NITROGEN 16 mg/dL  5-25   CREATININE 0 96 mg/dL  0 60-1 30   Standardized to IDMS reference method   CALCIUM 9 3 mg/dL  8 3-10 1   BILI, TOTAL 2 06 mg/dL H 0 20-1 00   ALK PHOSPHATAS 92 U/L     ALT (SGPT) 16 U/L  12-78   AST(SGOT) 20 U/L  5-45   ALBUMIN 4 3 g/dL  3 5-5 0   TOTAL PROTEIN 7 9 g/dL  6 4-8 2   eGFR Non- Restart Losartan.  Tylenol 500 mg (2 tablets) every 8 hours as needed for headache or pain.    >60 0 ml/min/1 73sq [facility] [employer] Disease Education Program recommendations are as follows:  GFR calculation is accurate only with a steady state creatinine  Chronic Kidney disease less than 60 ml/min/1 73 sq  meters  Kidney failure less than 15 ml/min/1 73 sq  meters  (1) LIPID PANEL, FASTING [id number] 11:41AM Ana M Canvita Order Number: [id number]     Test Name Result Flag Reference   CHOLESTEROL 201 mg/dL H    HDL,DIRECT 57 mg/dL  40-60   Specimen collection should occur prior to Metamizole administration due to the potential for falsely depressed results  LDL CHOLESTEROL CALCULATED 113 mg/dL H 0-100   - Patient Instructions: This is a fasting blood test  Water,black tea or black  coffee only after 9:00pm the night before test   Drink 2 glasses of water the morning of test       Triglyceride:         Normal              <150 mg/dl       Borderline High    150-199 mg/dl       High               200-499 mg/dl       Very High          >499 mg/dl  Cholesterol:         Desirable        <200 mg/dl      Borderline High  200-239 mg/dl      High             >239 mg/dl  HDL Cholesterol:        High    >59 mg/dL      Low     <41 mg/dL  LDL CALCULATED:    This screening LDL is a calculated result  It does not have the accuracy of the Direct Measured LDL in the monitoring of patients with hyperlipidemia and/or statin therapy  Direct Measure LDL (JOK013) must be ordered separately in these patients  TRIGLYCERIDES 155 mg/dL H <=150   Specimen collection should occur prior to N-Acetylcysteine or Metamizole administration due to the potential for falsely depressed results       (1) PSA (SCREEN) (Dx V76 44 Screen for Prostate Cancer) [id number] 11:41AM Ana M Shayne Order Number: [id number]     Test Name Result Flag Reference   PROSTATE SPECIFIC ANTIGEN 1 6 ng/mL  0 0-4 0   Amish Accepted  American Urological Association Guidelines define biochemical recurrence of prostate cancer as a detectable or rising PSA value post-radical prostatectomy that is greater than or equal to 0 2 ng/mL with a second confirmatory level of greater than or equal to 0 2 ng/mL  - Patient Instructions: This test is non-fasting  Please drink two glasses of water morning of bloodwork

## 2018-11-28 NOTE — PROGRESS NOTES
Subjective:       Patient ID: Demetri Fernandez is a 48 y.o. female.    Chief Complaint: Hypertension and Headache    HPI 48-year-old  female with hypertension and nephrotic syndrome followed by Nephrology presents to clinic today secondary to concerns of elevated blood pressure and headaches.  She reports that she has not been taking her losartan for approximately 2 weeks secondary to concern of medication recall.  She has attempted to contact her nephrologist but has not received a response.  She has begun to have frequent headaches and reports increased anxiety secondary to fear of the medication and fear of her blood pressure rising secondary to being off the medication.  She has been doubling the dose of carvedilol but still notes home blood pressure readings as high as 160/100.  Review of Systems   Constitutional: Negative for appetite change, chills, fatigue and fever.   HENT: Negative for congestion, ear pain, hearing loss, postnasal drip, rhinorrhea, sinus pressure, sore throat and tinnitus.    Eyes: Negative for redness, itching and visual disturbance.   Respiratory: Negative for cough, chest tightness and shortness of breath.    Cardiovascular: Negative for chest pain and palpitations.   Gastrointestinal: Negative for abdominal pain, constipation, diarrhea, nausea and vomiting.   Genitourinary: Negative for decreased urine volume, difficulty urinating, dysuria, frequency, hematuria and urgency.   Musculoskeletal: Negative for back pain, myalgias, neck pain and neck stiffness.   Skin: Negative for rash.   Neurological: Positive for headaches. Negative for dizziness and light-headedness.   Psychiatric/Behavioral: Negative.        Objective:     repeat manual blood pressure 132/78.  Physical Exam   Constitutional: She is oriented to person, place, and time. She appears well-developed and well-nourished. No distress.   HENT:   Head: Normocephalic and atraumatic.   Right Ear: External ear  normal.   Left Ear: External ear normal.   Nose: Nose normal.   Mouth/Throat: Oropharynx is clear and moist. No oropharyngeal exudate.   Eyes: Conjunctivae and EOM are normal. Pupils are equal, round, and reactive to light. Right eye exhibits no discharge. Left eye exhibits no discharge. No scleral icterus.   Neck: Normal range of motion. Neck supple. No JVD present. No tracheal deviation present. No thyromegaly present.   Cardiovascular: Normal rate, regular rhythm, normal heart sounds and intact distal pulses. Exam reveals no gallop and no friction rub.   No murmur heard.  Pulmonary/Chest: Effort normal and breath sounds normal. No stridor. No respiratory distress. She has no wheezes. She has no rales.   Abdominal: Soft. Bowel sounds are normal. She exhibits no distension and no mass. There is no tenderness. There is no rebound and no guarding.   Musculoskeletal: Normal range of motion. She exhibits no edema or tenderness.   Lymphadenopathy:     She has no cervical adenopathy.   Neurological: She is alert and oriented to person, place, and time.   Skin: Skin is warm and dry. No rash noted. She is not diaphoretic. No erythema. No pallor.   Psychiatric: She has a normal mood and affect. Her behavior is normal. Judgment and thought content normal.   Nursing note and vitals reviewed.      Assessment:       1. Renovascular hypertension        Plan:       1.  I have recommended that the patient restart losartan 100 mg daily as the losartan recall only affected losartan-hydrochlorothiazide and not the patient's current medication.  2.  Tylenol as needed for headaches.  3.  Return to clinic as needed or follow up with PCP and Nephrology as scheduled.

## 2018-12-30 DIAGNOSIS — N18.30 CHRONIC KIDNEY DISEASE, STAGE III (MODERATE): ICD-10-CM

## 2018-12-30 DIAGNOSIS — Z00.00 ANNUAL PHYSICAL EXAM: Primary | ICD-10-CM

## 2018-12-30 DIAGNOSIS — J01.40 ACUTE PANSINUSITIS, RECURRENCE NOT SPECIFIED: ICD-10-CM

## 2018-12-30 DIAGNOSIS — N25.81 HYPERPARATHYROIDISM DUE TO RENAL INSUFFICIENCY: ICD-10-CM

## 2018-12-30 RX ORDER — AMOXICILLIN AND CLAVULANATE POTASSIUM 875; 125 MG/1; MG/1
TABLET, FILM COATED ORAL
Qty: 20 TABLET | Refills: 0 | OUTPATIENT
Start: 2018-12-30

## 2018-12-31 RX ORDER — BUPROPION HYDROCHLORIDE 150 MG/1
TABLET ORAL
Qty: 30 TABLET | Refills: 0 | Status: SHIPPED | OUTPATIENT
Start: 2018-12-31 | End: 2019-02-02 | Stop reason: SDUPTHER

## 2018-12-31 RX ORDER — CETIRIZINE HYDROCHLORIDE 10 MG/1
10 TABLET ORAL DAILY
Qty: 90 TABLET | Refills: 3 | Status: SHIPPED | OUTPATIENT
Start: 2018-12-31 | End: 2020-03-17

## 2019-01-02 DIAGNOSIS — N18.30 CHRONIC KIDNEY DISEASE, STAGE III (MODERATE): ICD-10-CM

## 2019-01-02 DIAGNOSIS — N25.81 HYPERPARATHYROIDISM DUE TO RENAL INSUFFICIENCY: ICD-10-CM

## 2019-01-02 RX ORDER — ERGOCALCIFEROL 1.25 MG/1
CAPSULE ORAL
Qty: 3 CAPSULE | Refills: 0 | OUTPATIENT
Start: 2019-01-02

## 2019-01-03 DIAGNOSIS — B96.89 BV (BACTERIAL VAGINOSIS): ICD-10-CM

## 2019-01-03 DIAGNOSIS — J01.40 ACUTE PANSINUSITIS, RECURRENCE NOT SPECIFIED: ICD-10-CM

## 2019-01-03 DIAGNOSIS — N76.0 BV (BACTERIAL VAGINOSIS): ICD-10-CM

## 2019-01-03 RX ORDER — BENZONATATE 200 MG/1
CAPSULE ORAL
Qty: 30 CAPSULE | Refills: 0 | Status: SHIPPED | OUTPATIENT
Start: 2019-01-03 | End: 2019-10-21

## 2019-01-03 RX ORDER — TRIAMCINOLONE ACETONIDE 1 MG/G
CREAM TOPICAL
Qty: 30 G | Refills: 0 | Status: SHIPPED | OUTPATIENT
Start: 2019-01-03 | End: 2022-01-27

## 2019-01-07 DIAGNOSIS — B96.89 BV (BACTERIAL VAGINOSIS): ICD-10-CM

## 2019-01-07 DIAGNOSIS — N76.0 BV (BACTERIAL VAGINOSIS): ICD-10-CM

## 2019-01-11 DIAGNOSIS — N76.0 BV (BACTERIAL VAGINOSIS): ICD-10-CM

## 2019-01-11 DIAGNOSIS — B96.89 BV (BACTERIAL VAGINOSIS): ICD-10-CM

## 2019-02-02 DIAGNOSIS — Z00.00 ANNUAL PHYSICAL EXAM: ICD-10-CM

## 2019-02-03 RX ORDER — BUPROPION HYDROCHLORIDE 150 MG/1
TABLET ORAL
Qty: 30 TABLET | Refills: 0 | Status: SHIPPED | OUTPATIENT
Start: 2019-02-03 | End: 2019-10-21 | Stop reason: SDUPTHER

## 2019-02-10 RX ORDER — TERCONAZOLE 8 MG/G
CREAM VAGINAL
Qty: 20 G | Refills: 0 | Status: SHIPPED | OUTPATIENT
Start: 2019-02-10 | End: 2019-11-13

## 2019-02-10 RX ORDER — TERCONAZOLE 8 MG/G
CREAM VAGINAL
Qty: 20 G | Refills: 0 | OUTPATIENT
Start: 2019-02-10

## 2019-02-10 RX ORDER — METRONIDAZOLE 500 MG/1
TABLET ORAL
Qty: 14 TABLET | Refills: 0 | OUTPATIENT
Start: 2019-02-10

## 2019-03-27 DIAGNOSIS — N18.30 CHRONIC KIDNEY DISEASE, STAGE III (MODERATE): ICD-10-CM

## 2019-03-27 RX ORDER — TORSEMIDE 10 MG/1
TABLET ORAL
Qty: 30 TABLET | Refills: 0 | OUTPATIENT
Start: 2019-03-27

## 2019-05-28 ENCOUNTER — CLINICAL SUPPORT (OUTPATIENT)
Dept: FAMILY MEDICINE | Facility: CLINIC | Age: 49
End: 2019-05-28

## 2019-05-28 DIAGNOSIS — Z00.00 ROUTINE GENERAL MEDICAL EXAMINATION AT A HEALTH CARE FACILITY: ICD-10-CM

## 2019-05-28 PROCEDURE — 80305 DRUG TEST PRSMV DIR OPT OBS: CPT | Mod: S$GLB,,, | Performed by: FAMILY MEDICINE

## 2019-05-28 PROCEDURE — 80305 PR NON-DOT DRUG SCREENS: ICD-10-PCS | Mod: S$GLB,,, | Performed by: FAMILY MEDICINE

## 2019-05-28 NOTE — PROGRESS NOTES
Demetri has presented today on behalf of [facility]. Demetri Fernandez has completed Non-DOT Drug Screen .    Rosalia Deal

## 2019-06-11 DIAGNOSIS — N18.30 CHRONIC KIDNEY DISEASE, STAGE III (MODERATE): ICD-10-CM

## 2019-06-12 DIAGNOSIS — N18.30 CHRONIC KIDNEY DISEASE, STAGE III (MODERATE): ICD-10-CM

## 2019-06-14 RX ORDER — TORSEMIDE 10 MG/1
TABLET ORAL
Qty: 30 TABLET | Refills: 1 | Status: SHIPPED | OUTPATIENT
Start: 2019-06-14 | End: 2019-08-06 | Stop reason: SDUPTHER

## 2019-06-14 RX ORDER — LOSARTAN POTASSIUM 100 MG/1
100 TABLET ORAL DAILY
Qty: 30 TABLET | Refills: 3 | OUTPATIENT
Start: 2019-06-14

## 2019-06-14 RX ORDER — CARVEDILOL 12.5 MG/1
12.5 TABLET ORAL 2 TIMES DAILY
Qty: 60 TABLET | Refills: 0 | Status: SHIPPED | OUTPATIENT
Start: 2019-06-14 | End: 2019-07-06 | Stop reason: SDUPTHER

## 2019-06-14 RX ORDER — LOSARTAN POTASSIUM 100 MG/1
TABLET ORAL
Qty: 30 TABLET | Refills: 0 | Status: SHIPPED | OUTPATIENT
Start: 2019-06-14 | End: 2019-07-06 | Stop reason: SDUPTHER

## 2019-06-14 NOTE — TELEPHONE ENCOUNTER
Over due for PE with labs - pls schedule  Labs were ordered 12/31  I can't keep refilling meds if she doesn't make appt

## 2019-06-21 DIAGNOSIS — N18.30 CHRONIC KIDNEY DISEASE, STAGE III (MODERATE): ICD-10-CM

## 2019-07-06 DIAGNOSIS — N18.30 CHRONIC KIDNEY DISEASE, STAGE III (MODERATE): ICD-10-CM

## 2019-07-07 RX ORDER — CARVEDILOL 12.5 MG/1
TABLET ORAL
Qty: 60 TABLET | Refills: 0 | Status: SHIPPED | OUTPATIENT
Start: 2019-07-07 | End: 2019-08-06 | Stop reason: SDUPTHER

## 2019-07-07 RX ORDER — LOSARTAN POTASSIUM 100 MG/1
TABLET ORAL
Qty: 30 TABLET | Refills: 0 | Status: SHIPPED | OUTPATIENT
Start: 2019-07-07 | End: 2019-08-06 | Stop reason: SDUPTHER

## 2019-08-06 ENCOUNTER — TELEPHONE (OUTPATIENT)
Dept: INTERNAL MEDICINE | Facility: CLINIC | Age: 49
End: 2019-08-06

## 2019-08-06 DIAGNOSIS — N18.30 CHRONIC KIDNEY DISEASE, STAGE III (MODERATE): ICD-10-CM

## 2019-08-06 NOTE — TELEPHONE ENCOUNTER
She is asking for refills - she missed scheduled appts 2/8 and 7/30 -     When does she plan on coming in?     Once she makes appt I will refill prescriptions

## 2019-08-07 RX ORDER — CARVEDILOL 12.5 MG/1
TABLET ORAL
Qty: 60 TABLET | Refills: 1 | Status: SHIPPED | OUTPATIENT
Start: 2019-08-07 | End: 2019-10-21 | Stop reason: SDUPTHER

## 2019-08-07 RX ORDER — LOSARTAN POTASSIUM 100 MG/1
TABLET ORAL
Qty: 30 TABLET | Refills: 1 | Status: SHIPPED | OUTPATIENT
Start: 2019-08-07 | End: 2019-10-21

## 2019-08-07 RX ORDER — TORSEMIDE 10 MG/1
TABLET ORAL
Qty: 30 TABLET | Refills: 1 | Status: SHIPPED | OUTPATIENT
Start: 2019-08-07 | End: 2019-12-15 | Stop reason: SDUPTHER

## 2019-09-10 DIAGNOSIS — R06.2 WHEEZING: ICD-10-CM

## 2019-09-11 RX ORDER — ALBUTEROL SULFATE 90 UG/1
AEROSOL, METERED RESPIRATORY (INHALATION)
Qty: 18 G | Refills: 0 | Status: SHIPPED | OUTPATIENT
Start: 2019-09-11 | End: 2020-03-17 | Stop reason: SDUPTHER

## 2019-09-24 ENCOUNTER — TELEPHONE (OUTPATIENT)
Dept: INTERNAL MEDICINE | Facility: CLINIC | Age: 49
End: 2019-09-24

## 2019-09-24 DIAGNOSIS — Z12.31 ENCOUNTER FOR SCREENING MAMMOGRAM FOR MALIGNANT NEOPLASM OF BREAST: Primary | ICD-10-CM

## 2019-09-24 NOTE — TELEPHONE ENCOUNTER
----- Message from Daly Walker sent at 9/24/2019  6:25 AM CDT -----  Contact: Pt request via [facility]   Message     Appointment Request From: Demetri Fernandez    With Provider: Estela Crawford MD [[facility] - Internal Medicine]    Preferred Date Range: 10/21/2019 - 10/22/2019    Preferred Times: Any time    Reason for visit: Check up    Comments:  Check up

## 2019-10-17 ENCOUNTER — LAB VISIT (OUTPATIENT)
Dept: LAB | Facility: HOSPITAL | Age: 49
End: 2019-10-17
Attending: INTERNAL MEDICINE
Payer: COMMERCIAL

## 2019-10-17 DIAGNOSIS — Z00.00 ANNUAL PHYSICAL EXAM: ICD-10-CM

## 2019-10-17 LAB
25(OH)D3+25(OH)D2 SERPL-MCNC: 18 NG/ML (ref 30–96)
ALBUMIN SERPL BCP-MCNC: 3.7 G/DL (ref 3.5–5.2)
ALP SERPL-CCNC: 61 U/L (ref 55–135)
ALT SERPL W/O P-5'-P-CCNC: 21 U/L (ref 10–44)
ANION GAP SERPL CALC-SCNC: 9 MMOL/L (ref 8–16)
AST SERPL-CCNC: 22 U/L (ref 10–40)
BASOPHILS # BLD AUTO: 0.04 K/UL (ref 0–0.2)
BASOPHILS NFR BLD: 0.6 % (ref 0–1.9)
BILIRUB SERPL-MCNC: 0.8 MG/DL (ref 0.1–1)
BUN SERPL-MCNC: 19 MG/DL (ref 6–20)
CALCIUM SERPL-MCNC: 9.4 MG/DL (ref 8.7–10.5)
CHLORIDE SERPL-SCNC: 107 MMOL/L (ref 95–110)
CHOLEST SERPL-MCNC: 169 MG/DL (ref 120–199)
CHOLEST/HDLC SERPL: 4.4 {RATIO} (ref 2–5)
CO2 SERPL-SCNC: 25 MMOL/L (ref 23–29)
CREAT SERPL-MCNC: 1.6 MG/DL (ref 0.5–1.4)
DIFFERENTIAL METHOD: ABNORMAL
EOSINOPHIL # BLD AUTO: 0.4 K/UL (ref 0–0.5)
EOSINOPHIL NFR BLD: 5.6 % (ref 0–8)
ERYTHROCYTE [DISTWIDTH] IN BLOOD BY AUTOMATED COUNT: 11.9 % (ref 11.5–14.5)
EST. GFR  (AFRICAN AMERICAN): 43.6 ML/MIN/1.73 M^2
EST. GFR  (NON AFRICAN AMERICAN): 37.8 ML/MIN/1.73 M^2
GLUCOSE SERPL-MCNC: 96 MG/DL (ref 70–110)
HCT VFR BLD AUTO: 39.4 % (ref 37–48.5)
HDLC SERPL-MCNC: 38 MG/DL (ref 40–75)
HDLC SERPL: 22.5 % (ref 20–50)
HGB BLD-MCNC: 12.9 G/DL (ref 12–16)
IMM GRANULOCYTES # BLD AUTO: 0.01 K/UL (ref 0–0.04)
IMM GRANULOCYTES NFR BLD AUTO: 0.2 % (ref 0–0.5)
LDLC SERPL CALC-MCNC: 107.6 MG/DL (ref 63–159)
LYMPHOCYTES # BLD AUTO: 2.4 K/UL (ref 1–4.8)
LYMPHOCYTES NFR BLD: 37 % (ref 18–48)
MCH RBC QN AUTO: 32 PG (ref 27–31)
MCHC RBC AUTO-ENTMCNC: 32.7 G/DL (ref 32–36)
MCV RBC AUTO: 98 FL (ref 82–98)
MONOCYTES # BLD AUTO: 0.3 K/UL (ref 0.3–1)
MONOCYTES NFR BLD: 4.6 % (ref 4–15)
NEUTROPHILS # BLD AUTO: 3.4 K/UL (ref 1.8–7.7)
NEUTROPHILS NFR BLD: 52 % (ref 38–73)
NONHDLC SERPL-MCNC: 131 MG/DL
NRBC BLD-RTO: 0 /100 WBC
PLATELET # BLD AUTO: 187 K/UL (ref 150–350)
PMV BLD AUTO: 11.5 FL (ref 9.2–12.9)
POTASSIUM SERPL-SCNC: 3.9 MMOL/L (ref 3.5–5.1)
PROT SERPL-MCNC: 7.2 G/DL (ref 6–8.4)
PTH-INTACT SERPL-MCNC: 109 PG/ML (ref 9–77)
RBC # BLD AUTO: 4.03 M/UL (ref 4–5.4)
SODIUM SERPL-SCNC: 141 MMOL/L (ref 136–145)
TRIGL SERPL-MCNC: 117 MG/DL (ref 30–150)
WBC # BLD AUTO: 6.48 K/UL (ref 3.9–12.7)

## 2019-10-17 PROCEDURE — 83970 ASSAY OF PARATHORMONE: CPT

## 2019-10-17 PROCEDURE — 82306 VITAMIN D 25 HYDROXY: CPT

## 2019-10-17 PROCEDURE — 80053 COMPREHEN METABOLIC PANEL: CPT

## 2019-10-17 PROCEDURE — 80061 LIPID PANEL: CPT

## 2019-10-17 PROCEDURE — 36415 COLL VENOUS BLD VENIPUNCTURE: CPT | Mod: PO

## 2019-10-17 PROCEDURE — 85025 COMPLETE CBC W/AUTO DIFF WBC: CPT

## 2019-10-21 ENCOUNTER — OFFICE VISIT (OUTPATIENT)
Dept: INTERNAL MEDICINE | Facility: CLINIC | Age: 49
End: 2019-10-21
Payer: COMMERCIAL

## 2019-10-21 ENCOUNTER — HOSPITAL ENCOUNTER (OUTPATIENT)
Dept: RADIOLOGY | Facility: HOSPITAL | Age: 49
Discharge: HOME | End: 2019-10-21
Attending: INTERNAL MEDICINE
Payer: COMMERCIAL

## 2019-10-21 VITALS
SYSTOLIC BLOOD PRESSURE: 160 MMHG | TEMPERATURE: 98 F | HEART RATE: 65 BPM | DIASTOLIC BLOOD PRESSURE: 100 MMHG | HEIGHT: 69 IN | BODY MASS INDEX: 32.95 KG/M2 | WEIGHT: 222.44 LBS | OXYGEN SATURATION: 98 %

## 2019-10-21 VITALS — BODY MASS INDEX: 33.23 KG/M2 | WEIGHT: 225 LBS

## 2019-10-21 DIAGNOSIS — E66.9 CLASS 1 OBESITY WITH SERIOUS COMORBIDITY AND BODY MASS INDEX (BMI) OF 32.0 TO 32.9 IN ADULT, UNSPECIFIED OBESITY TYPE: ICD-10-CM

## 2019-10-21 DIAGNOSIS — E55.9 VITAMIN D DEFICIENCY: ICD-10-CM

## 2019-10-21 DIAGNOSIS — Z00.00 ANNUAL PHYSICAL EXAM: Primary | ICD-10-CM

## 2019-10-21 DIAGNOSIS — N25.81 HYPERPARATHYROIDISM DUE TO RENAL INSUFFICIENCY: ICD-10-CM

## 2019-10-21 DIAGNOSIS — N18.30 CHRONIC KIDNEY DISEASE, STAGE III (MODERATE): ICD-10-CM

## 2019-10-21 DIAGNOSIS — N04.9 NEPHROTIC SYNDROME: ICD-10-CM

## 2019-10-21 DIAGNOSIS — Z80.0 FAMILY HISTORY OF COLON CANCER: ICD-10-CM

## 2019-10-21 DIAGNOSIS — Z12.31 ENCOUNTER FOR SCREENING MAMMOGRAM FOR MALIGNANT NEOPLASM OF BREAST: ICD-10-CM

## 2019-10-21 DIAGNOSIS — F32.9 REACTIVE DEPRESSION: ICD-10-CM

## 2019-10-21 PROBLEM — E66.811 CLASS 1 OBESITY WITH SERIOUS COMORBIDITY AND BODY MASS INDEX (BMI) OF 32.0 TO 32.9 IN ADULT: Status: ACTIVE | Noted: 2019-10-21

## 2019-10-21 LAB
BACTERIA #/AREA URNS AUTO: ABNORMAL /HPF
BILIRUB UR QL STRIP: NEGATIVE
CLARITY UR REFRACT.AUTO: CLEAR
COLOR UR AUTO: YELLOW
CREAT UR-MCNC: 135 MG/DL (ref 15–325)
GLUCOSE UR QL STRIP: NEGATIVE
HGB UR QL STRIP: NEGATIVE
HYALINE CASTS UR QL AUTO: 5 /LPF
KETONES UR QL STRIP: NEGATIVE
LEUKOCYTE ESTERASE UR QL STRIP: NEGATIVE
MICROSCOPIC COMMENT: ABNORMAL
NITRITE UR QL STRIP: NEGATIVE
PH UR STRIP: 5 [PH] (ref 5–8)
PROT UR QL STRIP: ABNORMAL
PROT UR-MCNC: 124 MG/DL (ref 0–15)
PROT/CREAT UR: 0.92 MG/G{CREAT} (ref 0–0.2)
RBC #/AREA URNS AUTO: 0 /HPF (ref 0–4)
SP GR UR STRIP: 1.01 (ref 1–1.03)
SQUAMOUS #/AREA URNS AUTO: 1 /HPF
URN SPEC COLLECT METH UR: ABNORMAL
WBC #/AREA URNS AUTO: 0 /HPF (ref 0–5)

## 2019-10-21 PROCEDURE — 3077F SYST BP >= 140 MM HG: CPT | Mod: CPTII,S$GLB,, | Performed by: INTERNAL MEDICINE

## 2019-10-21 PROCEDURE — 99999 PR PBB SHADOW E&M-EST. PATIENT-LVL IV: ICD-10-PCS | Mod: PBBFAC,,, | Performed by: INTERNAL MEDICINE

## 2019-10-21 PROCEDURE — 3077F PR MOST RECENT SYSTOLIC BLOOD PRESSURE >= 140 MM HG: ICD-10-PCS | Mod: CPTII,S$GLB,, | Performed by: INTERNAL MEDICINE

## 2019-10-21 PROCEDURE — 3080F DIAST BP >= 90 MM HG: CPT | Mod: CPTII,S$GLB,, | Performed by: INTERNAL MEDICINE

## 2019-10-21 PROCEDURE — 77063 MAMMO DIGITAL SCREENING BILAT WITH TOMOSYNTHESIS_CAD: ICD-10-PCS | Mod: 26,,, | Performed by: RADIOLOGY

## 2019-10-21 PROCEDURE — 3080F PR MOST RECENT DIASTOLIC BLOOD PRESSURE >= 90 MM HG: ICD-10-PCS | Mod: CPTII,S$GLB,, | Performed by: INTERNAL MEDICINE

## 2019-10-21 PROCEDURE — 82570 ASSAY OF URINE CREATININE: CPT

## 2019-10-21 PROCEDURE — 81001 URINALYSIS AUTO W/SCOPE: CPT

## 2019-10-21 PROCEDURE — 77067 MAMMO DIGITAL SCREENING BILAT WITH TOMOSYNTHESIS_CAD: ICD-10-PCS | Mod: 26,,, | Performed by: RADIOLOGY

## 2019-10-21 PROCEDURE — 77067 SCR MAMMO BI INCL CAD: CPT | Mod: 26,,, | Performed by: RADIOLOGY

## 2019-10-21 PROCEDURE — 77063 BREAST TOMOSYNTHESIS BI: CPT | Mod: 26,,, | Performed by: RADIOLOGY

## 2019-10-21 PROCEDURE — 99396 PREV VISIT EST AGE 40-64: CPT | Mod: S$GLB,,, | Performed by: INTERNAL MEDICINE

## 2019-10-21 PROCEDURE — 99999 PR PBB SHADOW E&M-EST. PATIENT-LVL IV: CPT | Mod: PBBFAC,,, | Performed by: INTERNAL MEDICINE

## 2019-10-21 PROCEDURE — 99396 PR PREVENTIVE VISIT,EST,40-64: ICD-10-PCS | Mod: S$GLB,,, | Performed by: INTERNAL MEDICINE

## 2019-10-21 PROCEDURE — 77067 SCR MAMMO BI INCL CAD: CPT | Mod: TC

## 2019-10-21 RX ORDER — BUPROPION HYDROCHLORIDE 150 MG/1
300 TABLET ORAL DAILY
Qty: 30 TABLET | Refills: 11 | Status: SHIPPED | OUTPATIENT
Start: 2019-10-21 | End: 2020-05-13

## 2019-10-21 RX ORDER — CARVEDILOL 25 MG/1
25 TABLET ORAL 2 TIMES DAILY WITH MEALS
Qty: 60 TABLET | Refills: 11 | Status: SHIPPED | OUTPATIENT
Start: 2019-10-21 | End: 2021-01-24

## 2019-10-21 RX ORDER — ERGOCALCIFEROL 1.25 MG/1
CAPSULE ORAL
Qty: 3 CAPSULE | Refills: 0 | Status: SHIPPED | OUTPATIENT
Start: 2019-10-21 | End: 2020-01-21

## 2019-10-21 RX ORDER — VALSARTAN 320 MG/1
320 TABLET ORAL DAILY
Qty: 30 TABLET | Refills: 11 | Status: SHIPPED | OUTPATIENT
Start: 2019-10-21 | End: 2020-05-01

## 2019-10-21 NOTE — PROGRESS NOTES
Subjective:       Patient ID: Toi Fernandez is a 49 y.o. female.    Chief Complaint: Annual Exam    HPI   Returns after long absence.  Last seen 1/18.        Long h/o htn.  Didn't take bp meds today    BP at home runs 160-180/120-140.    Remains depressed.  Niece drowned.     She is taking wellbutrin daily, but mood not better.      Sleeping better at night.    She is out of vit D.    BMI 32.  Difficulty losing wt.      Vit d defic.  Out of Ergo for a while.    Review of Systems   Constitutional: Negative for fever and unexpected weight change.   HENT: Negative for congestion and postnasal drip.    Respiratory: Positive for shortness of breath (mild, when active.). Negative for chest tightness and wheezing.    Cardiovascular: Negative for chest pain and leg swelling.   Gastrointestinal: Negative for abdominal pain, anal bleeding, constipation, diarrhea, nausea and vomiting.   Genitourinary: Negative for dysuria and urgency.   Skin: Negative for rash.   Neurological: Negative for headaches.   Psychiatric/Behavioral: Positive for dysphoric mood. Negative for sleep disturbance. The patient is nervous/anxious.        Objective:      Physical Exam   Constitutional: She is oriented to person, place, and time. She appears well-developed and well-nourished. No distress.   HENT:   Head: Normocephalic and atraumatic.   Right Ear: External ear normal.   Left Ear: External ear normal.   Nose: Nose normal.   Mouth/Throat: Oropharynx is clear and moist.   Eyes: Pupils are equal, round, and reactive to light. Conjunctivae and EOM are normal. Right eye exhibits no discharge. Left eye exhibits no discharge. No scleral icterus.   Neck: Normal range of motion. Neck supple. No JVD present. No thyromegaly present.   Cardiovascular: Normal rate, regular rhythm and normal heart sounds. Exam reveals no gallop.   No murmur heard.  Pulmonary/Chest: Effort normal and breath sounds normal. No respiratory distress. She has no wheezes. She  has no rales. No breast tenderness, discharge or bleeding.   Abdominal: Soft. Bowel sounds are normal. She exhibits no distension and no mass. There is no tenderness. There is no rebound and no guarding.   Genitourinary: No breast tenderness, discharge or bleeding.   Musculoskeletal: Normal range of motion. She exhibits no edema or tenderness.   Lymphadenopathy:     She has no cervical adenopathy.   Neurological: She is alert and oriented to person, place, and time. No cranial nerve deficit. Coordination normal.   Skin: Skin is warm and dry. No rash noted.   Psychiatric: She has a normal mood and affect. Her behavior is normal. Judgment and thought content normal.       Results for orders placed or performed in visit on 10/17/19   CBC auto differential   Result Value Ref Range    WBC 6.48 3.90 - 12.70 K/uL    RBC 4.03 4.00 - 5.40 M/uL    Hemoglobin 12.9 12.0 - 16.0 g/dL    Hematocrit 39.4 37.0 - 48.5 %    Mean Corpuscular Volume 98 82 - 98 fL    Mean Corpuscular Hemoglobin 32.0 (H) 27.0 - 31.0 pg    Mean Corpuscular Hemoglobin Conc 32.7 32.0 - 36.0 g/dL    RDW 11.9 11.5 - 14.5 %    Platelets 187 150 - 350 K/uL    MPV 11.5 9.2 - 12.9 fL    Immature Granulocytes 0.2 0.0 - 0.5 %    Gran # (ANC) 3.4 1.8 - 7.7 K/uL    Immature Grans (Abs) 0.01 0.00 - 0.04 K/uL    Lymph # 2.4 1.0 - 4.8 K/uL    Mono # 0.3 0.3 - 1.0 K/uL    Eos # 0.4 0.0 - 0.5 K/uL    Baso # 0.04 0.00 - 0.20 K/uL    nRBC 0 0 /100 WBC    Gran% 52.0 38.0 - 73.0 %    Lymph% 37.0 18.0 - 48.0 %    Mono% 4.6 4.0 - 15.0 %    Eosinophil% 5.6 0.0 - 8.0 %    Basophil% 0.6 0.0 - 1.9 %    Differential Method Automated    Comprehensive metabolic panel   Result Value Ref Range    Sodium 141 136 - 145 mmol/L    Potassium 3.9 3.5 - 5.1 mmol/L    Chloride 107 95 - 110 mmol/L    CO2 25 23 - 29 mmol/L    Glucose 96 70 - 110 mg/dL    BUN, Bld 19 6 - 20 mg/dL    Creatinine 1.6 (H) 0.5 - 1.4 mg/dL    Calcium 9.4 8.7 - 10.5 mg/dL    Total Protein 7.2 6.0 - 8.4 g/dL    Albumin 3.7  3.5 - 5.2 g/dL    Total Bilirubin 0.8 0.1 - 1.0 mg/dL    Alkaline Phosphatase 61 55 - 135 U/L    AST 22 10 - 40 U/L    ALT 21 10 - 44 U/L    Anion Gap 9 8 - 16 mmol/L    eGFR if African American 43.6 (A) >60 mL/min/1.73 m^2    eGFR if non  37.8 (A) >60 mL/min/1.73 m^2   Lipid panel   Result Value Ref Range    Cholesterol 169 120 - 199 mg/dL    Triglycerides 117 30 - 150 mg/dL    HDL 38 (L) 40 - 75 mg/dL    LDL Cholesterol 107.6 63.0 - 159.0 mg/dL    Hdl/Cholesterol Ratio 22.5 20.0 - 50.0 %    Total Cholesterol/HDL Ratio 4.4 2.0 - 5.0    Non-HDL Cholesterol 131 mg/dL   Vitamin D   Result Value Ref Range    Vit D, 25-Hydroxy 18 (L) 30 - 96 ng/mL   PTH, intact   Result Value Ref Range    PTH, Intact 109.0 (H) 9.0 - 77.0 pg/mL     Assessment:       1. Annual physical exam    2. Chronic kidney disease, stage III (moderate)    3. Reactive depression    4. Hyperparathyroidism due to renal insufficiency    5. Class 1 obesity with serious comorbidity and body mass index (BMI) of 32.0 to 32.9 in adult, unspecified obesity type    6. Vitamin D deficiency    7. Family history of colon cancer        Plan:       Toi was seen today for annual exam.    Diagnoses and all orders for this visit:    Annual physical exam  -     buPROPion (WELLBUTRIN XL) 150 MG TB24 tablet; Take 2 tablets (300 mg total) by mouth once daily.    Chronic kidney disease, stage III (moderate)  -     carvedilol (COREG) 25 MG tablet; Take 1 tablet (25 mg total) by mouth 2 (two) times daily with meals.    Reactive depression    Hyperparathyroidism due to renal insufficiency    Class 1 obesity with serious comorbidity and body mass index (BMI) of 32.0 to 32.9 in adult, unspecified obesity type  -     ergocalciferol (ERGOCALCIFEROL) 50,000 unit Cap; TAKE ONE CAPSULE BY MOUTH EVERY 30 DAYS  -     Ambulatory consult to Bariatric Surgery    Vitamin D deficiency  -     ergocalciferol (ERGOCALCIFEROL) 50,000 unit Cap; TAKE ONE CAPSULE BY MOUTH EVERY  30 DAYS    Family history of colon cancer  -     Case request GI: COLONOSCOPY    Other orders  -     valsartan (DIOVAN) 320 MG tablet; Take 1 tablet (320 mg total) by mouth once daily.       see pt instructions    She declines flu vax

## 2019-10-21 NOTE — PATIENT INSTRUCTIONS
Stop Losartan.  Start Valsartan 320 mg daily.    Increase Carvedilol to 25 mg twice a day (take 2 tabs of 12.5 mg twice a day until you run out)    Increase Wellbutrin to 300 mg daily.

## 2019-10-24 ENCOUNTER — TELEPHONE (OUTPATIENT)
Dept: BARIATRICS | Facility: CLINIC | Age: 49
End: 2019-10-24

## 2019-10-24 NOTE — TELEPHONE ENCOUNTER
----- Message from Sherry Hylton sent at 10/23/2019 11:47 AM CDT -----  Contact: self @ [phone]  Pt says she is returning your call.

## 2019-10-29 ENCOUNTER — DOCUMENTATION ONLY (OUTPATIENT)
Dept: BARIATRICS | Facility: CLINIC | Age: 49
End: 2019-10-29

## 2019-10-29 NOTE — PROGRESS NOTES
Bariatric Surgery Online Course Form Submission  Someone has submitted a Bariatric Surgery Online Course Form Submission on this page.  Date: 10- 21:45:44  Patient's Name: Toi Fernandez   YOB: 1970 Email: [email or web address]  Phone: [phone]   Patient Address: [address] [place] [address]  Preferred Surgical Location: [facility] [place]   I certify that I am 18 years of age or older: Yes   Confirmation Code: [id number]  Verification of Bariatric Seminar: yes  Insurance Information  Insurance or Self Pay? Insurance - Fill out fields below  Insurance Company Name: [employer]   Type of Coverage/Coverage Plan (i.e. PPO, HMO):   Group Name: [id number]   Subscriber Name: Javan Fernandez   Member Name (patient's name): Toi Rebecca   Member ID/Policy #:[id number]   Plan Effective Date:   Card Issuance date:

## 2019-11-11 ENCOUNTER — PATIENT OUTREACH (OUTPATIENT)
Dept: ADMINISTRATIVE | Facility: HOSPITAL | Age: 49
End: 2019-11-11

## 2019-11-12 ENCOUNTER — PATIENT OUTREACH (OUTPATIENT)
Dept: ADMINISTRATIVE | Facility: OTHER | Age: 49
End: 2019-11-12

## 2019-11-13 ENCOUNTER — CLINICAL SUPPORT (OUTPATIENT)
Dept: BARIATRICS | Facility: CLINIC | Age: 49
End: 2019-11-13
Payer: COMMERCIAL

## 2019-11-13 ENCOUNTER — INITIAL CONSULT (OUTPATIENT)
Dept: BARIATRICS | Facility: CLINIC | Age: 49
End: 2019-11-13
Payer: COMMERCIAL

## 2019-11-13 VITALS
DIASTOLIC BLOOD PRESSURE: 90 MMHG | HEIGHT: 68 IN | WEIGHT: 227.31 LBS | BODY MASS INDEX: 34.45 KG/M2 | SYSTOLIC BLOOD PRESSURE: 183 MMHG | HEART RATE: 62 BPM

## 2019-11-13 DIAGNOSIS — Z87.441 H/O NEPHROTIC SYNDROME: ICD-10-CM

## 2019-11-13 DIAGNOSIS — R06.09 DOE (DYSPNEA ON EXERTION): ICD-10-CM

## 2019-11-13 DIAGNOSIS — E66.9 CLASS 1 OBESITY WITH SERIOUS COMORBIDITY AND BODY MASS INDEX (BMI) OF 32.0 TO 32.9 IN ADULT, UNSPECIFIED OBESITY TYPE: Primary | ICD-10-CM

## 2019-11-13 DIAGNOSIS — N18.2 CHRONIC KIDNEY DISEASE, STAGE II (MILD): ICD-10-CM

## 2019-11-13 DIAGNOSIS — F32.9 REACTIVE DEPRESSION: ICD-10-CM

## 2019-11-13 DIAGNOSIS — E55.9 VITAMIN D DEFICIENCY: ICD-10-CM

## 2019-11-13 DIAGNOSIS — I15.0 RENOVASCULAR HYPERTENSION: ICD-10-CM

## 2019-11-13 PROCEDURE — 3008F BODY MASS INDEX DOCD: CPT | Mod: CPTII,S$GLB,, | Performed by: NURSE PRACTITIONER

## 2019-11-13 PROCEDURE — 3008F PR BODY MASS INDEX (BMI) DOCUMENTED: ICD-10-PCS | Mod: CPTII,S$GLB,, | Performed by: NURSE PRACTITIONER

## 2019-11-13 PROCEDURE — 99499 NO LOS: ICD-10-PCS | Mod: S$GLB,,, | Performed by: DIETITIAN, REGISTERED

## 2019-11-13 PROCEDURE — 99499 UNLISTED E&M SERVICE: CPT | Mod: S$GLB,,, | Performed by: DIETITIAN, REGISTERED

## 2019-11-13 PROCEDURE — 99205 PR OFFICE/OUTPT VISIT, NEW, LEVL V, 60-74 MIN: ICD-10-PCS | Mod: S$GLB,,, | Performed by: NURSE PRACTITIONER

## 2019-11-13 PROCEDURE — 99999 PR PBB SHADOW E&M-EST. PATIENT-LVL IV: CPT | Mod: PBBFAC,,, | Performed by: NURSE PRACTITIONER

## 2019-11-13 PROCEDURE — 3080F DIAST BP >= 90 MM HG: CPT | Mod: CPTII,S$GLB,, | Performed by: NURSE PRACTITIONER

## 2019-11-13 PROCEDURE — 3077F SYST BP >= 140 MM HG: CPT | Mod: CPTII,S$GLB,, | Performed by: NURSE PRACTITIONER

## 2019-11-13 PROCEDURE — 99999 PR PBB SHADOW E&M-EST. PATIENT-LVL II: CPT | Mod: PBBFAC,,, | Performed by: DIETITIAN, REGISTERED

## 2019-11-13 PROCEDURE — 3077F PR MOST RECENT SYSTOLIC BLOOD PRESSURE >= 140 MM HG: ICD-10-PCS | Mod: CPTII,S$GLB,, | Performed by: NURSE PRACTITIONER

## 2019-11-13 PROCEDURE — 3080F PR MOST RECENT DIASTOLIC BLOOD PRESSURE >= 90 MM HG: ICD-10-PCS | Mod: CPTII,S$GLB,, | Performed by: NURSE PRACTITIONER

## 2019-11-13 PROCEDURE — 99999 PR PBB SHADOW E&M-EST. PATIENT-LVL II: ICD-10-PCS | Mod: PBBFAC,,, | Performed by: DIETITIAN, REGISTERED

## 2019-11-13 PROCEDURE — 99205 OFFICE O/P NEW HI 60 MIN: CPT | Mod: S$GLB,,, | Performed by: NURSE PRACTITIONER

## 2019-11-13 PROCEDURE — 99999 PR PBB SHADOW E&M-EST. PATIENT-LVL IV: ICD-10-PCS | Mod: PBBFAC,,, | Performed by: NURSE PRACTITIONER

## 2019-11-13 NOTE — PROGRESS NOTES
BARIATRIC NEW PATIENT EVALUATION    CHIEF COMPLAINT:   Morbid Obesity Body mass index is 35.07 kg/m². and difficulty with weight loss.     HISTORY OF PRESENT ILLNESS:  Toi Fernandez  is a 49 y.o. female presenting for morbid obesity Body mass index is 35.07 kg/m². and difficulty with weight loss. The patient has tried Slim Fast, Adipex, and the gym. Lost a couple pounds with Adipex, then regained. Wt difficulty stated with childbearing. Highest wt is 235-240 pounds.     States she was referred for uncontrolled BP by PCP. Bp is elevated with medications on board. She is home monitoring. Has a HA, no other symptoms at this time.  BP at home runs 160-180/120-140.    Psych- depression controlled. Denies additional history.      PAST MEDICAL HISTORY:  Past Medical History:   Diagnosis Date    Chronic kidney disease, stage II (mild) 9/21/2012    Depression     H/O nephrotic syndrome, 2001. 4/21/2015    Hypertension     Obesity     Secondary hyperparathyroidism     Vitamin D deficiency disease          PAST SURGICAL HISTORY:  Past Surgical History:   Procedure Laterality Date    DILATION AND CURETTAGE OF UTERUS      ENDOMETRIAL ABLATION  2004    HYSTERECTOMY  2011    DLH/BS (AUB/Fibroids) KB    TUBAL LIGATION         FAMILY HISTORY:  Family History   Problem Relation Age of Onset    Diabetes Mother         None    Hypertension Mother     Colon cancer Father     Hypertension Father     Diabetes Maternal Aunt         None    Diabetes Maternal Grandmother         None    Epilepsy Son     Hypertension Sister     Hypertension Sister     Breast cancer Neg Hx     Ovarian cancer Neg Hx        SOCIAL HISTORY:  Social History     Socioeconomic History    Marital status:      Spouse name: Not on file    Number of children: Not on file    Years of education: Not on file    Highest education level: Not on file   Occupational History    Occupation: [place]     Employer: [employer]  Needs    Financial resource strain: Somewhat hard    Food insecurity:     Worry: Often true     Inability: Sometimes true    Transportation needs:     Medical: Yes     Non-medical: Yes   Tobacco Use    Smoking status: Never Smoker    Smokeless tobacco: Never Used   Substance and Sexual Activity    Alcohol use: Not Currently     Frequency: Monthly or less     Drinks per session: 1 or 2     Binge frequency: Never     Comment: less than weekly.   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        Exercise:  [employer] after work twice a week.       MEDICATIONS:  Current Outpatient Medications   Medication Sig Dispense Refill    albuterol (PROVENTIL/VENTOLIN HFA) 90 mcg/actuation inhaler INHALE 2 PUFFS INTO THE LUNGS EVERY 4 HOURS AS NEEDED FOR WHEEZING 18 g 0    buPROPion (WELLBUTRIN XL) 150 MG TB24 tablet Take 2 tablets (300 mg total) by mouth once daily. 30 tablet 11    carvedilol (COREG) 25 MG tablet Take 1 tablet (25 mg total) by mouth 2 (two) times daily with meals. 60 tablet 11    cetirizine (ZYRTEC) 10 MG tablet Take 1 tablet (10 mg total) by mouth once daily. 90 tablet 3    ergocalciferol (ERGOCALCIFEROL) 50,000 unit Cap TAKE ONE CAPSULE BY MOUTH EVERY 30 DAYS 3 capsule 0    torsemide (DEMADEX) 10 MG Tab TAKE 1 TABLET(10 MG) BY MOUTH EVERY DAY 30 tablet 1    triamcinolone acetonide 0.1% (KENALOG) 0.1 % cream APPLY  "EXTERNALLY TO THE AFFECTED AREA TWICE DAILY 30 g 0    valsartan (DIOVAN) 320 MG tablet Take 1 tablet (320 mg total) by mouth once daily. 30 tablet 11     No current facility-administered medications for this visit.        ALLERGIES:  Review of patient's allergies indicates:   Allergen Reactions    No known allergies        ROS:  Review of Systems   Constitutional: Negative for chills, fever and malaise/fatigue.   Eyes: Negative for blurred vision and double vision.   Respiratory: Positive for shortness of breath (with stairs). Negative for cough and wheezing.    Cardiovascular: Negative for chest pain, palpitations and leg swelling.   Gastrointestinal: Negative for abdominal pain, blood in stool, constipation, diarrhea, heartburn, melena, nausea and vomiting.   Genitourinary: Negative for dysuria, frequency and urgency.   Musculoskeletal: Negative for back pain, joint pain, myalgias and neck pain.   Neurological: Positive for headaches. Negative for dizziness and tingling.   Psychiatric/Behavioral: Negative for depression, substance abuse and suicidal ideas. The patient is not nervous/anxious.        PE:  Vitals:    11/13/19 1458   BP: (!) 183/90   Pulse: 62   Weight: 103.1 kg (227 lb 4.7 oz)   Height: 5' 7.5" (1.715 m)       Physical Exam   Constitutional: She is oriented to person, place, and time. She appears well-developed and well-nourished. No distress.   Morbidly obese   HENT:   Head: Normocephalic and atraumatic.   Eyes: Conjunctivae are normal. Right eye exhibits no discharge. Left eye exhibits no discharge. No scleral icterus.   Neck: Neck supple.   Cardiovascular: Normal rate, regular rhythm, normal heart sounds and intact distal pulses. Exam reveals no gallop and no friction rub.   No murmur heard.  Pulmonary/Chest: Effort normal and breath sounds normal. No respiratory distress. She has no wheezes. She has no rales.   Abdominal: Soft. Bowel sounds are normal. She exhibits no distension and no mass. " There is no tenderness. There is no rebound and no guarding. No hernia.   Musculoskeletal: Normal range of motion. She exhibits no edema.   Neurological: She is alert and oriented to person, place, and time.   Skin: Skin is warm, dry and intact. Capillary refill takes less than 2 seconds. No rash noted. She is not diaphoretic. No erythema. No pallor.   Psychiatric: She has a normal mood and affect. Her speech is normal and behavior is normal. Judgment and thought content normal.   Nursing note and vitals reviewed.       DIAGNOSIS:  1. Morbid Obesity with Body mass index is 35.07 kg/m². and difficulty with weight loss.  2. Co-morbidities:   Patient Active Problem List   Diagnosis    Chronic kidney disease, stage II (mild)    H/O nephrotic syndrome, 2001.    HTN (hypertension)    Chronic rhinitis    Insomnia    Reactive depression    Vitamin D deficiency    Family history of colon cancer    Class 1 obesity with serious comorbidity and body mass index (BMI) of 32.0 to 32.9 in adult    Hyperparathyroidism due to renal insufficiency         PLAN:  The patient is a potential candidate for Bariatric Surgery. she is interested in sleeve gastrectomy with Dr. Burk. The surgery and post-op care were discussed in detail with the patient. All questions were answered.    she understands that bariatric surgery is a tool to aid in weight loss and that she needs to be committed to the diet and exercise post-operatively for successful weight loss.  Discussed expected weight loss outcomes after surgery which is 50% of the excess weight on her frame. Discussed with patient that bariatric surgery is not the easy way out and that it will take plenty of dedication on the patient's part to be successful. Also discussed the possibility of weight regain if the patient strays from the diet guidelines or exercise requirements. Patient verbalized understanding and wishes to proceed with the work-up.    ORDERS:  1. Chest X-Ray  and EKG and stress test  2. Psychological Consult, Bariatric Dietician Consult and PCP Clearance   Regarding PCP clearance, if BP remains uncontrolled please add Nephrology  Clearance.  3. Bariatric Labs:   4. Short MMPI.    Discussed symptoms of HTN emergency and when to present to ER. Continue home BP monitoring per PCP.    Primary Physician: Estela Crawford MD  RTC: As scheduled.    60 minute visit, over 50% of time spent counseling patient face to face on surgical options, risks, benefits, expected diet, recommended exercise regimen, and expected weight loss.

## 2019-11-13 NOTE — PATIENT INSTRUCTIONS
Prior to surgery you will need to complete:  - Dietitian consult and follow up appointments as needed  - PCP clearance, possibly nephrology  - Labs  - Chest X-ray  - EKG  - Psychological evaluation, Please call psychiatry [phone] to schedule  - Stress test    In preparation for bariatric surgery, please complete the following:   · Discuss your current medications with your primary care provider, remember your medications will need to be crushed, chewable, or in liquid form for the first 2-4 weeks after a gastric bypass or sleeve.  For a gastric band, your medications will need to be crushed indefinitely.    · If you take a blood thinner such as: Coumadin (warfarin), Pradaxa (dabigatran), or Plavix (clopidogrel), you will need to speak with your prescribing provider on how or if this medication can be stopped before surgery.   · If you take a medication for depression or anxiety, you will need to begin crushing or opening the capsule 1-3 months prior to surgery.  Remember to discuss this with the psychologist or psychiatrist that you see.   · If you take medication for arthritis on a daily basis that is considered a non-steroidal anti-inflammatory (NSAID), please discuss with your prescribing physician an alternative medication.  After having gastric bypass or gastric sleeve, this group of medications is not appropriate to take due to increased risk of bleeding stomach ulcers.      DEFINITIONS  Appointments: Pre-scheduled meetings or consultations with any physician, advanced practice provider, dietitian, or psychologist, and labs, imaging studies, sleep studies, etc.   Late cancellation: Cancelling an appointment 24-48 hours prior to scheduled time.  No-Show appointment:  is when    You do NOT arrive to your appointment at the time its scheduled.   You call to cancel or cancel via [facility] less than 24 hours in advance of your scheduled appointment   You show up 15 minutes AFTER your scheduled appointment  time without any notification of being late.     POLICY  1. You are allowed up to 3 cancellations for appointments.    After 3 cancellations your case will be placed on hold for 2 months and after that time you can resume the program.   2. You are allowed only 1 no-show for an appointment.    You will be re-scheduled one time and if there is a 2nd no-show at any point, your case will be placed on hold for 3 months.  After 3 months you can resume the program.     3. Upon resuming the program after being placed on hold for either above mentioned reasons, if you have a late cancel or no show for any appointment, the bariatric team will review if youre an appropriate candidate for surgery at the monthly meeting.

## 2019-11-13 NOTE — LETTER
[facility] - B[facility]ric [facility]  [address] [facility] [address] [facility]  [facility] [address]  Phone: [phone]  Fax: [phone] November 13, 2019      Estela Crawford MD  [address]  [facility] [address]    Patient: Toi Fernandez   MR Number: [id number]   YOB: 1970   Date of Visit: 11/13/2019     Dear Dr. Estela Crawford:    Thank you for referring Toi Fernandez to me for evaluation. Attached you will find relevant portions of my assessment and plan of care.    DIAGNOSIS:  1. Morbid Obesity with Body mass index is 35.07 kg/m². and difficulty with weight loss.  2. Co-morbidities:    Chronic kidney disease, stage II (mild)    H/O nephrotic syndrome, 2001.    HTN (hypertension)    Chronic rhinitis    Insomnia    Reactive depression    Vitamin D deficiency    Family history of colon cancer    Class 1 obesity with serious comorbidity and body mass index (BMI) of 32.0 to 32.9 in adult    Hyperparathyroidism due to renal insufficiency        PLAN:  The patient is a potential candidate for Bariatric Surgery. She is interested in sleeve gastrectomy with Dr. Burk. The surgery and post-op care were discussed in detail with the patient. All questions were answered.     She understands that bariatric surgery is a tool to aid in weight loss and that she needs to be committed to the diet and exercise post-operatively for successful weight loss.  We discussed expected weight loss outcomes after surgery which is 50% of the excess weight on her frame. It was discussed with the patient that bariatric surgery is not the easy way out and that it will take plenty of dedication on the patient's part to be successful. We also discussed the possibility of weight regain if the patient strays from the diet guidelines or exercise requirements. The patient verbalized understanding and wishes to proceed with the work-up.     ORDERS:  1. Chest X-Ray and EKG and stress test  2. Psychological Consult, Bariatric  Dietician Consult and PCP Clearance   Regarding PCP clearance, if BP remains uncontrolled please add Nephrology  Clearance.  3. Bariatric Labs:   4. Short MMPI.     Discussed symptoms of HTN emergency and when to present to ER. Continue home BP monitoring per PCP.     60 minute visit, over 50% of time spent counseling patient face to face on surgical options, risks, benefits, expected diet, recommended exercise regimen, and expected weight loss.    If you have questions, please do not hesitate to call me. I look forward to following Toi Fernandez along with you.    Sincerely,    DIANA Velasco  Section of Bariatric Surgery  Department of Surgery  [facility]    NF/afw

## 2019-11-13 NOTE — PROGRESS NOTES
"NUTRITIONAL CONSULT    Referring Physician: Leodan Burk M.D.  Reason for MNT Referral: Initial assessment for sleeve gastrectomy work-up    PAST MEDICAL HISTORY:   49 y.o. female  There is no height or weight on file to calculate BMI..    The patient has tried Slim Fast, Adipex, and the gym. Lost a couple pounds with Adipex, then regained. Wt difficulty stated with childbearing. Highest wt is 235-240 pounds.     Past Medical History:   Diagnosis Date    Chronic kidney disease, stage II (mild) 2012    Depression     H/O nephrotic syndrome, . 2015    Hypertension     Obesity     Secondary hyperparathyroidism     Vitamin D deficiency disease        CLINICAL DATA:  49 y.o.-year-old Black or  female.  Height: 5'7.5"  Weight: 227 lbs  IBW: 147 lbs  BMI: 35.07  The patient's goal weight (50% EBW): 187 lbs  Personal goal weight: < 200 lbs    Goal for Bariatric Surgery: to improve health, to improve quality of life and to lose weight    NUTRITIONAL NEEDS:  3148-3168 Calories (Per bariatric sx candidate )   Grams Protein (Per bariatric sx candidate )    NUTRITION & HEALTH HISTORY:  Greatest challenge: starchy CHO    Current diet recall:   Breakfast: Muffin or oatmeal,  Frosted flakes with 2 % milk  Lunch: skips  Dinner: pork chop or fish, red beans and rice    Current Diet:  Meal pattern: 2  Protein supplements: has tried slim fast shakes  Snackin / day  Vegetables: Likes a variety. Eats almost daily.  Fruits: Likes a variety. Eats daily.  Beverages: water, sugary beverages, sugar-free beverages, 2% milk and lemonade  Dining out: Monthly. Mostly fast food, restaurants and take-out.  Cooking at home: Daily. Mostly baked, grilled and air fryer, pressure cooker meat, fish, starchy CHO and vegetables.    Exercise:  Past exercise: Fair    Current exercise:  jo[employer]  Restrictions to exercise: none    Vitamins / Minerals / Herbs:   Vit D      Labs:    no recent, " none available at this time    Food Allergies:   none    Social:  Works regular daytime shifts.  Lives with 17 year old  Child,  and 26 year year old nephew .  Grocery shopping and food prep  self.  Patient believes the household will be supportive after surgery.  Alcohol: Socially.  Smoking: None.    ASSESSMENT:  · Patient reports attempts at weight loss, only to regain lost weight.  · Patient demonstrated knowledge of healthy eating behaviors and exercise patterns; admits to not eating healthy and not exercising at this point.  · Patient states willingness and demonstrates willingness to change lifestyle and make behavior modifications as evidenced by dietary changes and joining gym     Insurance  Does not require medically supervised diet prior to consideration for bariatric surgery.    Barriers to Education: none    Stage of change: determination    NUTRITION DIAGNOSIS:    Obesity related to Excessive carbohydrate intake as evidence by BMI.    BARIATRIC DIET DISCUSSION/PLAN:  Discussed diet after surgery and related to patient's food record.  Reviewed nutrition guidelines for before and after surgery.  Answered all questions.  Resume work-up for surgery.  Continue to review Bariatric Nutrition Guidebook at home and call with any questions.  Work on Bariatric Nutrition Checklist.  Work on expanding variety of vegetables.  Work on gradually cutting back on starchy CHO in the diet.  Start including protein supplements in the diet plan daily.  More grocery shopping and meal preparation at home.  Increase exercise.  Start shopping for bariatric vitamins & minerals.  Eliminate sugary drinks - change to sugar free drinks or water  Change 2 % milk to 1 % or skim  No skipping lunch  High protein  Breakfast     RECOMMENDATIONS:  Patient is a potential candidate for bariatric surgery.    Needs additional visit(s) with RD to work on dietary and lifestyle changes  In preparation for bariatric surgery.    Patient  verbalized understanding.    Expect fair  compliance after surgery at this time.    Communicated nutrition plan with bariatric team.    SESSION TIME:  60 minutes

## 2019-11-13 NOTE — PATIENT INSTRUCTIONS
1200- 1500 calorie Sample meal plan  80-120g protein per day.   Protein drinks and bars: 0-4 grams sugar  Drink lots of water throughout the day and exercise!  MENU # 1  Breakfast: 2 eggs, 1 turkey sausage garth, 1 apple  Snack: Atkins bar  Lunch: 2 roll-ups (sliced turkey, low-fat sliced cheese, mustard), 12 baby carrots dipped in 1 Tbsp natural peanut butter  Mid-Day Snack: ¼ cup unsalted almonds, ½ cup fruit  Dinner: 1 chicken thigh simmered in tomato sauce + 2 Tbsp mozzarella cheese, ½ cup black beans, 1/2 cup steamed carrots  Evening Snack: 1/2 cup grapes with 4 cubes low-fat cheddar cheese   ___________________________________________________  MENU # 2  Breakfast: 200 calorie protein drink  Mid-morning snack : ¼ cup unsalted nuts, medium banana  Lunch: 3oz tuna or chicken salad made with 2 tbsp light maurer, over salad with tomatoes and cucumbers.   Snack: low-fat cheese stick  Dinner: 3oz hamburger garth, slice of low-fat cheese, 1 cup boiled yellow squash and zucchini.   Snack: 6oz light yogurt  _______________________________________________________  Menu #3  Breakfast: 6oz plain Greek yogurt + fruit (½ banana, ½ cup fruit - pineapple, blueberries, strawberries, peach), may add Splenda to renetta.  Lunch: Grilled  chicken breast w/ slice low-fat pepper roberto cheese, 1/2 cup grilled/baked asparagus and small salad with Salad Spritzer.    Mid-Day snack: 200 calorie protein drink   Dinner: 4oz Grilled fish, ½ cup white beans, ½ cup cooked spinach  Evening Snack: fudgsicle no-sugar-added    Menu # 4  Breakfast: 1 scoop vanilla protein powder + 4oz skim milk + 4oz coffee   Snack: Pure protein bar  Lunch: 2 Lettuce tacos: 3oz seasoned ground turkey wrapped in a Cresencio lettuce leaves with 1 Tbsp shredded cheese and dollop low-fat sour cream  Snack: ½ cup cottage cheese, ½ cup pineapple chunks  Dinner: Shrimp omelet: 2 eggs, ½ cup shrimp, green onions, and shredded  cheese  ______________________________________________________  Menu #5  Breakfast: 1 cup low-fat cottage cheese, ½ cup peaches (no sugar added)  Snack: 1 apple, 4 cubes cheese  Lunch: 3oz baked pork chop, 1 cup okra and tomato stew  Snack: 1/2 cup black beans + salsa + dollop light sour cream  Dinner: Caprese chicken salad: 3 oz chicken breast, 1oz fresh mozzarella, sliced tomato, 1 Tbsp olive oil, basil  Snack: sugar-free popsicle    Menu #6  Breakfast: ½ cup part-skim ricotta cheese, 2 Tbsp sugar-free strawberry preserves, sprinkle of slivered almonds  Snack: 1 orange  Lunch: 3 oz canned chicken, 1oz shredded cheddar cheese, ½  cup black beans, 2 Tbsp salsa  Snack: 200 calorie Protein drink  Dinner: 4 oz grilled salmon steak, over mixed green salad with 2 tbsp light dressing    Bariatric Diet Grocery List      High in Protein:   ? Canned tuna or chicken (packed in water)  ? Lean ground turkey breast or ground round  ? Turkey or chicken (no skin)  ? Lean pork or beef   ? Scrambled, poached, or boiled eggs  ? Baked or broiled fish and seafood (not fried!)  ? Beans, edamame and lentils  ? Low fat deli meats: turkey, chicken, ham, roast beef  ? 1% or Skim Milk, Lactaid, or Soymilk  ? Low-fat cheese, cottage cheese, mozzarella string cheese, ricotta  ? Light yogurt, FF/SF frozen yogurt, custard, SF pudding  ? Protein drinks and protein bars with 0-4 grams sugar     Fruits, Vegetables and Snacks   - Green beans, broccoli, cauliflower, spinach, asparagus, carrots, lima beans, yellow squash, zucchini, etc.  - Apple, pineapple, peach, grapes, banana, watermelon, oranges, etc.  - Fruit canned in its own juice or in water (not in syrup)  - Raw veggies  - Lettuce: dark greens like spinach and Cresencio  - Unsalted Nuts  - Jackson Links beef jerky     Fluids:   Skim/1% milk, Lactaid, Soymilk  Sugar-free beverages  (decaf and non-carbonated)  Decaf coffee & decaf tea   Water     Snacks: (100-200 calories; >5g protein)    - 1  "low-fat cheese stick with 8 cherry tomatoes or 1 serving fresh fruit  - 4 thin slices fat-free turkey breast and 1 slice low-fat cheese  - 4 thin slices fat-free honey ham with wedge of melon  - 2 slices of turkey medina  - Boiled eggs (can buy at [employer] already boiled w/ shell removed)  - for convenience,  [place] read, snack, go (deli meat and cheese rolls)  - P3 packets (Protein packs w/ cheese, nuts, lean deli meat)  - MHP Fit and Lean Protein Pudding (find at [employer] - per 1 cup serving = 100 calories, 15 g protein, 0 g sugar)  - 6-8 edamame pods (equivalent to about 1/4 cup edamame without pods).   - 1/4 cup unsalted nuts with ½ cup fruit  - 6-oz container Dannon Light n Fit vanilla yogurt, topped with 1oz unsalted nuts         - apple, celery or baby carrots spread with 2 Tbsp PB2  - apple slices with 1 oz slice low-fat cheese  - Apple slices dipped in 2 Tbsp of PB2  - 2 Tbsp PB2 mixed in light or greek yogurt or sugar-free pudding  - celery, cucumber, bell pepper or baby carrots dipped in ¼ cup hummus bean spread   - celery, cucumber, baby carrots dipped in high protein greek yogurt (Mix 16 oz plain greek yogurt + 1 packet of hidden valley ranch dip mix)  - Jackson Links Beef Steak - 14g protein! (similar to beef jerky but very lean)  - 2 wedges Laughing Cow - Light Herb & Garlic Cheese with sliced cucumber or green bell pepper  - 1/2 cup low-fat cottage cheese with ¼ cup fruit or ¼ cup salsa  - 1/2 cup low fat cottage cheese with 10-15 cherry tomatoes  - 8 oz glass of FAIRLIFE fat free milk (13 g protein)  - 8 oz glass of FAIRLIFE fat free milk + 1 packet of sugar-free hot cocoa  - Add Atkins advantage Cafe Caramel shake to decaf coffee. Serve hot or blend with ice for "frappaccino" like drink  - RTD Protein drinks: Atkins, Low Carb Slim Fast, EAS light, Muscle Milk Light, etc.  - Homemade Protein drinks: GNC Soy95, Isopure, Nectar, UNJURY, Whey Gourmet, etc. Mix 1 scoop powder with 8oz skim/1% " milk or light soymilk.  - Protein bars: Atkins, EAS, Pure Protein,  Quest, Think Thin, Detour, etc. Must have 0-4 grams sugar - Read the label.    ** Be CREATIVE. You can always snack on bites of grilled chicken or tuna salad made with low fat maurer, if needed!

## 2019-11-15 ENCOUNTER — HOSPITAL ENCOUNTER (OUTPATIENT)
Dept: CARDIOLOGY | Facility: CLINIC | Age: 49
Discharge: HOME | End: 2019-11-15
Payer: COMMERCIAL

## 2019-11-15 ENCOUNTER — HOSPITAL ENCOUNTER (OUTPATIENT)
Dept: CARDIOLOGY | Facility: CLINIC | Age: 49
Discharge: HOME | End: 2019-11-15
Attending: NURSE PRACTITIONER
Payer: COMMERCIAL

## 2019-11-15 VITALS — HEIGHT: 67 IN | WEIGHT: 227 LBS | BODY MASS INDEX: 35.63 KG/M2

## 2019-11-15 DIAGNOSIS — I15.0 RENOVASCULAR HYPERTENSION: ICD-10-CM

## 2019-11-15 DIAGNOSIS — Z87.441 H/O NEPHROTIC SYNDROME: ICD-10-CM

## 2019-11-15 DIAGNOSIS — F32.9 REACTIVE DEPRESSION: ICD-10-CM

## 2019-11-15 DIAGNOSIS — E55.9 VITAMIN D DEFICIENCY: ICD-10-CM

## 2019-11-15 DIAGNOSIS — N18.2 CHRONIC KIDNEY DISEASE, STAGE II (MILD): ICD-10-CM

## 2019-11-15 DIAGNOSIS — E66.9 CLASS 1 OBESITY WITH SERIOUS COMORBIDITY AND BODY MASS INDEX (BMI) OF 32.0 TO 32.9 IN ADULT, UNSPECIFIED OBESITY TYPE: ICD-10-CM

## 2019-11-15 DIAGNOSIS — R06.09 DOE (DYSPNEA ON EXERTION): ICD-10-CM

## 2019-11-15 LAB
ASCENDING AORTA: 2.99 CM
BSA FOR ECHO PROCEDURE: 2.21 M2
CV ECHO LV RWT: 0.32 CM
CV STRESS BASE HR: 55 BPM
DIASTOLIC BLOOD PRESSURE: 89 MMHG
DOP CALC LVOT AREA: 3.5 CM2
DOP CALC LVOT DIAMETER: 2.12 CM
DOP CALC LVOT PEAK VEL: 0.92 M/S
DOP CALC LVOT STROKE VOLUME: 83.19 CM3
DOP CALCLVOT PEAK VEL VTI: 23.58 CM
E WAVE DECELERATION TIME: 185.28 MSEC
E/A RATIO: 1.35
E/E' RATIO: 10.94 M/S
ECHO LV POSTERIOR WALL: 0.83 CM (ref 0.6–1.1)
FRACTIONAL SHORTENING: 35 % (ref 28–44)
INTERVENTRICULAR SEPTUM: 0.68 CM (ref 0.6–1.1)
IVRT: 0.12 MSEC
LA MAJOR: 5.91 CM
LA MINOR: 5.93 CM
LA WIDTH: 4.44 CM
LEFT ATRIUM SIZE: 3.61 CM
LEFT ATRIUM VOLUME INDEX: 37.8 ML/M2
LEFT ATRIUM VOLUME: 80.65 CM3
LEFT INTERNAL DIMENSION IN SYSTOLE: 3.34 CM (ref 2.1–4)
LEFT VENTRICLE DIASTOLIC VOLUME INDEX: 59.1 ML/M2
LEFT VENTRICLE DIASTOLIC VOLUME: 126.12 ML
LEFT VENTRICLE MASS INDEX: 62 G/M2
LEFT VENTRICLE SYSTOLIC VOLUME INDEX: 21.3 ML/M2
LEFT VENTRICLE SYSTOLIC VOLUME: 45.54 ML
LEFT VENTRICULAR INTERNAL DIMENSION IN DIASTOLE: 5.14 CM (ref 3.5–6)
LEFT VENTRICULAR MASS: 132.29 G
LV LATERAL E/E' RATIO: 9.3 M/S
LV SEPTAL E/E' RATIO: 13.29 M/S
MV PEAK A VEL: 0.69 M/S
MV PEAK E VEL: 0.93 M/S
OHS CV CPX 1 MINUTE RECOVERY HEART RATE: 99 BPM
OHS CV CPX 85 PERCENT MAX PREDICTED HEART RATE MALE: 139
OHS CV CPX ESTIMATED METS: 12
OHS CV CPX MAX PREDICTED HEART RATE: 164
OHS CV CPX PATIENT IS FEMALE: 1
OHS CV CPX PATIENT IS MALE: 0
OHS CV CPX PEAK DIASTOLIC BLOOD PRESSURE: 85 MMHG
OHS CV CPX PEAK HEAR RATE: 126 BPM
OHS CV CPX PEAK RATE PRESSURE PRODUCT: NORMAL
OHS CV CPX PEAK SYSTOLIC BLOOD PRESSURE: 171 MMHG
OHS CV CPX PERCENT MAX PREDICTED HEART RATE ACHIEVED: 77
OHS CV CPX RATE PRESSURE PRODUCT PRESENTING: 8635
PULM VEIN S/D RATIO: 1.61
PV PEAK D VEL: 0.28 M/S
PV PEAK S VEL: 0.45 M/S
RA MAJOR: 5.3 CM
RA PRESSURE: 3 MMHG
RA WIDTH: 4.2 CM
RIGHT VENTRICULAR END-DIASTOLIC DIMENSION: 3.58 CM
RV TISSUE DOPPLER FREE WALL SYSTOLIC VELOCITY 1 (APICAL 4 CHAMBER VIEW): 9.92 CM/S
SINUS: 3.01 CM
STJ: 2.52 CM
STRESS ECHO POST EXERCISE DUR MIN: 7 MINUTES
STRESS ECHO POST EXERCISE DUR SEC: 14 SECONDS
SYSTOLIC BLOOD PRESSURE: 157 MMHG
TDI LATERAL: 0.1 M/S
TDI SEPTAL: 0.07 M/S
TDI: 0.09 M/S
TRICUSPID ANNULAR PLANE SYSTOLIC EXCURSION: 2.5 CM

## 2019-11-15 PROCEDURE — 93351 STRESS ECHO (CUPID ONLY): ICD-10-PCS | Mod: 26,,, | Performed by: INTERNAL MEDICINE

## 2019-11-15 PROCEDURE — 93351 STRESS TTE COMPLETE: CPT | Mod: 26,,, | Performed by: INTERNAL MEDICINE

## 2019-11-15 PROCEDURE — 93351 STRESS TTE COMPLETE: CPT

## 2019-11-15 PROCEDURE — 93000 ELECTROCARDIOGRAM COMPLETE: CPT | Mod: S$GLB,ICN,, | Performed by: INTERNAL MEDICINE

## 2019-11-15 PROCEDURE — 93000 EKG 12-LEAD: ICD-10-PCS | Mod: S$GLB,ICN,, | Performed by: INTERNAL MEDICINE

## 2019-11-18 ENCOUNTER — DOCUMENTATION ONLY (OUTPATIENT)
Dept: BARIATRICS | Facility: CLINIC | Age: 49
End: 2019-11-18

## 2019-11-25 ENCOUNTER — OFFICE VISIT (OUTPATIENT)
Dept: INTERNAL MEDICINE | Facility: CLINIC | Age: 49
End: 2019-11-25
Payer: COMMERCIAL

## 2019-11-25 ENCOUNTER — HOSPITAL ENCOUNTER (OUTPATIENT)
Dept: RADIOLOGY | Facility: HOSPITAL | Age: 49
Discharge: HOME | End: 2019-11-25
Attending: NURSE PRACTITIONER
Payer: COMMERCIAL

## 2019-11-25 VITALS
TEMPERATURE: 98 F | HEART RATE: 66 BPM | HEIGHT: 67 IN | OXYGEN SATURATION: 99 % | BODY MASS INDEX: 35.39 KG/M2 | DIASTOLIC BLOOD PRESSURE: 82 MMHG | WEIGHT: 225.5 LBS | SYSTOLIC BLOOD PRESSURE: 126 MMHG

## 2019-11-25 DIAGNOSIS — J31.0 RHINITIS, UNSPECIFIED TYPE: ICD-10-CM

## 2019-11-25 DIAGNOSIS — F32.9 REACTIVE DEPRESSION: ICD-10-CM

## 2019-11-25 DIAGNOSIS — E66.9 CLASS 1 OBESITY WITH SERIOUS COMORBIDITY AND BODY MASS INDEX (BMI) OF 32.0 TO 32.9 IN ADULT, UNSPECIFIED OBESITY TYPE: ICD-10-CM

## 2019-11-25 DIAGNOSIS — Z87.441 H/O NEPHROTIC SYNDROME: ICD-10-CM

## 2019-11-25 DIAGNOSIS — N18.2 CHRONIC KIDNEY DISEASE, STAGE II (MILD): ICD-10-CM

## 2019-11-25 DIAGNOSIS — I15.0 RENOVASCULAR HYPERTENSION: Primary | ICD-10-CM

## 2019-11-25 DIAGNOSIS — J30.89 ALLERGIC RHINITIS DUE TO OTHER ALLERGIC TRIGGER, UNSPECIFIED SEASONALITY: ICD-10-CM

## 2019-11-25 DIAGNOSIS — I15.0 RENOVASCULAR HYPERTENSION: ICD-10-CM

## 2019-11-25 DIAGNOSIS — E55.9 VITAMIN D DEFICIENCY: ICD-10-CM

## 2019-11-25 PROCEDURE — 71046 X-RAY EXAM CHEST 2 VIEWS: CPT | Mod: TC

## 2019-11-25 PROCEDURE — 99999 PR PBB SHADOW E&M-EST. PATIENT-LVL III: ICD-10-PCS | Mod: PBBFAC,,, | Performed by: INTERNAL MEDICINE

## 2019-11-25 PROCEDURE — 3008F PR BODY MASS INDEX (BMI) DOCUMENTED: ICD-10-PCS | Mod: CPTII,S$GLB,, | Performed by: INTERNAL MEDICINE

## 2019-11-25 PROCEDURE — 99214 PR OFFICE/OUTPT VISIT, EST, LEVL IV, 30-39 MIN: ICD-10-PCS | Mod: 25,S$GLB,, | Performed by: INTERNAL MEDICINE

## 2019-11-25 PROCEDURE — 3008F BODY MASS INDEX DOCD: CPT | Mod: CPTII,S$GLB,, | Performed by: INTERNAL MEDICINE

## 2019-11-25 PROCEDURE — 71046 X-RAY EXAM CHEST 2 VIEWS: CPT | Mod: 26,,, | Performed by: RADIOLOGY

## 2019-11-25 PROCEDURE — 3074F PR MOST RECENT SYSTOLIC BLOOD PRESSURE < 130 MM HG: ICD-10-PCS | Mod: CPTII,S$GLB,, | Performed by: INTERNAL MEDICINE

## 2019-11-25 PROCEDURE — 71046 XR CHEST PA AND LATERAL: ICD-10-PCS | Mod: 26,,, | Performed by: RADIOLOGY

## 2019-11-25 PROCEDURE — 96372 THER/PROPH/DIAG INJ SC/IM: CPT | Mod: S$GLB,,, | Performed by: INTERNAL MEDICINE

## 2019-11-25 PROCEDURE — 99214 OFFICE O/P EST MOD 30 MIN: CPT | Mod: 25,S$GLB,, | Performed by: INTERNAL MEDICINE

## 2019-11-25 PROCEDURE — 99999 PR PBB SHADOW E&M-EST. PATIENT-LVL III: CPT | Mod: PBBFAC,,, | Performed by: INTERNAL MEDICINE

## 2019-11-25 PROCEDURE — 96372 PR INJECTION,THERAP/PROPH/DIAG2ST, IM OR SUBCUT: ICD-10-PCS | Mod: S$GLB,,, | Performed by: INTERNAL MEDICINE

## 2019-11-25 PROCEDURE — 3079F PR MOST RECENT DIASTOLIC BLOOD PRESSURE 80-89 MM HG: ICD-10-PCS | Mod: CPTII,S$GLB,, | Performed by: INTERNAL MEDICINE

## 2019-11-25 PROCEDURE — 3079F DIAST BP 80-89 MM HG: CPT | Mod: CPTII,S$GLB,, | Performed by: INTERNAL MEDICINE

## 2019-11-25 PROCEDURE — 3074F SYST BP LT 130 MM HG: CPT | Mod: CPTII,S$GLB,, | Performed by: INTERNAL MEDICINE

## 2019-11-25 RX ORDER — FLUTICASONE PROPIONATE 50 MCG
2 SPRAY, SUSPENSION (ML) NASAL DAILY
Qty: 16 G | Refills: 12 | Status: SHIPPED | OUTPATIENT
Start: 2019-11-25 | End: 2019-12-25

## 2019-11-25 RX ORDER — BETAMETHASONE SODIUM PHOSPHATE AND BETAMETHASONE ACETATE 3; 3 MG/ML; MG/ML
6 INJECTION, SUSPENSION INTRA-ARTICULAR; INTRALESIONAL; INTRAMUSCULAR; SOFT TISSUE
Status: COMPLETED | OUTPATIENT
Start: 2019-11-25 | End: 2019-11-25

## 2019-11-25 RX ADMIN — BETAMETHASONE SODIUM PHOSPHATE AND BETAMETHASONE ACETATE 6 MG: 3; 3 INJECTION, SUSPENSION INTRA-ARTICULAR; INTRALESIONAL; INTRAMUSCULAR; SOFT TISSUE at 10:11

## 2019-11-25 NOTE — PROGRESS NOTES
Subjective:       Patient ID: Toi Fernandez is a 49 y.o. female.    Chief Complaint: Follow-up             Htn, depression  HPI     Depression better and BP controlled with changes we made last visit.      She is considering gastric sleeve.        C/o nasal congestion about 2 weeks ago.  Clear nasal congestion.  Ears itch.  This is a recurrent problem.  She is out of flonase.  Son had flu last week.  Review of Systems   Constitutional: Positive for chills. Negative for fever.   HENT: Positive for ear pain, postnasal drip and rhinorrhea.    Respiratory: Positive for cough. Negative for shortness of breath.    Cardiovascular: Negative for chest pain.   Allergic/Immunologic: Negative for environmental allergies.       Objective:      Physical Exam   Constitutional: She is oriented to person, place, and time. She appears well-developed and well-nourished. No distress.   HENT:   Head: Normocephalic and atraumatic.   Mouth/Throat: Oropharynx is clear and moist. No oropharyngeal exudate.   Marked nasal congestion   Neck: Neck supple.   Cardiovascular: Normal rate and regular rhythm.   Pulmonary/Chest: Effort normal and breath sounds normal. No respiratory distress. She has no wheezes. She has no rales.   Lymphadenopathy:     She has no cervical adenopathy.   Neurological: She is alert and oriented to person, place, and time.   Psychiatric: She has a normal mood and affect. Her behavior is normal.       Assessment:       1. Renovascular hypertension    2. Allergic rhinitis due to other allergic trigger, unspecified seasonality    3. Rhinitis, unspecified type        Plan:       Toi was seen today for follow-up.    Diagnoses and all orders for this visit:    Renovascular hypertension    Allergic rhinitis due to other allergic trigger, unspecified seasonality    Rhinitis, unspecified type  -     betamethasone acetate-betamethasone sodium phosphate injection 6 mg       Addendum: she is cleared for anesthesia and  surgery

## 2019-12-11 ENCOUNTER — PATIENT OUTREACH (OUTPATIENT)
Dept: ADMINISTRATIVE | Facility: OTHER | Age: 49
End: 2019-12-11

## 2019-12-15 DIAGNOSIS — N18.30 CHRONIC KIDNEY DISEASE, STAGE III (MODERATE): ICD-10-CM

## 2019-12-16 ENCOUNTER — CLINICAL SUPPORT (OUTPATIENT)
Dept: BARIATRICS | Facility: CLINIC | Age: 49
End: 2019-12-16
Payer: COMMERCIAL

## 2019-12-16 ENCOUNTER — OFFICE VISIT (OUTPATIENT)
Dept: PSYCHIATRY | Facility: CLINIC | Age: 49
End: 2019-12-16
Payer: COMMERCIAL

## 2019-12-16 VITALS — WEIGHT: 224.63 LBS | BODY MASS INDEX: 35.26 KG/M2 | HEIGHT: 67 IN

## 2019-12-16 DIAGNOSIS — I10 HYPERTENSION, UNSPECIFIED TYPE: ICD-10-CM

## 2019-12-16 DIAGNOSIS — E66.9 OBESITY (BMI 30-39.9): ICD-10-CM

## 2019-12-16 DIAGNOSIS — Z01.818 PREOPERATIVE EVALUATION TO RULE OUT SURGICAL CONTRAINDICATION: Primary | ICD-10-CM

## 2019-12-16 DIAGNOSIS — Z71.3 DIETARY COUNSELING: ICD-10-CM

## 2019-12-16 DIAGNOSIS — N18.2 CHRONIC KIDNEY DISEASE, STAGE II (MILD): ICD-10-CM

## 2019-12-16 DIAGNOSIS — E66.09 CLASS 1 OBESITY DUE TO EXCESS CALORIES WITH SERIOUS COMORBIDITY AND BODY MASS INDEX (BMI) OF 32.0 TO 32.9 IN ADULT: ICD-10-CM

## 2019-12-16 DIAGNOSIS — I10 ESSENTIAL HYPERTENSION: ICD-10-CM

## 2019-12-16 PROCEDURE — 97803 PR MED NUTR THER, SUBSQ, INDIV, EA 15 MIN: ICD-10-PCS | Mod: S$GLB,,, | Performed by: DIETITIAN, REGISTERED

## 2019-12-16 PROCEDURE — 90791 PR PSYCHIATRIC DIAGNOSTIC EVALUATION: ICD-10-PCS | Mod: S$GLB,,, | Performed by: PSYCHOLOGIST

## 2019-12-16 PROCEDURE — 99999 PR PBB SHADOW E&M-EST. PATIENT-LVL I: CPT | Mod: PBBFAC,,, | Performed by: DIETITIAN, REGISTERED

## 2019-12-16 PROCEDURE — 97803 MED NUTRITION INDIV SUBSEQ: CPT | Mod: S$GLB,,, | Performed by: DIETITIAN, REGISTERED

## 2019-12-16 PROCEDURE — 99999 PR PBB SHADOW E&M-EST. PATIENT-LVL I: ICD-10-PCS | Mod: PBBFAC,,, | Performed by: DIETITIAN, REGISTERED

## 2019-12-16 PROCEDURE — 90791 PSYCH DIAGNOSTIC EVALUATION: CPT | Mod: S$GLB,,, | Performed by: PSYCHOLOGIST

## 2019-12-16 NOTE — PROGRESS NOTES
Psychiatry Initial Visit (PhD/LCSW)   Diagnostic Interview - CPT 14690     Date: 12/16/2019    Site: [facility]     Referral source: Leodan Burk M.D.     Clinical status of patient: Outpatient     Toi Fernandez, a 49 y.o. female, presented for initial evaluation visit. Before this evaluation was initiated, the purposes and process of the assessment and the limits of confidentiality were discussed with the patient who expressed understanding of these issues and orally consented to proceed with the evaluation.     Chief complaint/reason for encounter: Routine psychological evaluation prior to bariatric surgery.     Type of surgery sought: Sleeve    History of present illness: Ms. Fernandez is a 49-year-old  female who is pursuing bariatric surgery to improve her health and quality of life. She has no history of significant psychological difficulties, though she has been taking antidepressant medication since the death of her son 4 years ago. She has never been hospitalized for psychiatric reasons and denied any history of suicidality. She has begun making positive lifestyle changes in anticipation for surgery. She has a Body Mass Index of 35 as documented by the referring provider.    Ms. Fernandez has struggled with weight since the birth of her children, stating that her weight would fluctuate over the years and it became harder to lose weight as she got older. Factors that have contributed to her weight gain over the years include: eating larger than average quantities of starches and fattening foods for dinner, snacking on crackers and popcorn, and drinking sugary juice especially late at night. In addition, Ms. Fernandez acknowledges that she is an emotional eater, with stress (especially regarding taking care of her son and household) as her primary emotional trigger to overeat. She has tried many weight loss methods on her own including Weight Watchers, Slimfast, and Keto diets with  "little success, stating she would stay on them for several months but would barely lose 5 pounds so she would give up. Her motivation for seeking surgery now is to treat/control her blood pressure in order to prevent future stroke or heart attack. Her postsurgical goals include: being more active, living a long life to be present for her remaining child.      Ms. Fernandez has met with Ms. Luz Pfeiffer RD, bariatric dietician, and reports that she has made the following lifestyle changes since beginning the bariatric program: she is drinking more water, she has substituted Crystal Light for fruit punch, and she is adding in protein shakes (either in the morning or as a snack).  She must continue meeting with Ms. Pfeiffer to demonstrate the implementation of lifestyle changes prior to clearance for bariatric surgery.    Co-morbidities: HTN     Knowledge of surgery information:  - Basics of procedure: Y - "they will take out a portion of my stomach".  - Risks: Y - "bleeding".  - Basics of diet: Y - "focus on proteins and vitamins, staying away from butter rich foods, carbonation, and alcohol".    Pain: 0/10    Psychiatric Symptoms:   Depression - denied significant symptoms of depression; reports continued grieving of  son especially as holidays approach, but denies any problems with adjustment or functioning.   Kelle/Hypomania - denied significant symptoms of kelle/hypomania.  Anxiety - denied significant symptoms of anxiety.   Thoughts - denied delusions, hallucinations.  Suicidal thoughts/behaviors - denied.  Substance abuse - denied abuse or dependence.   Sleep - 5-6 hours per night.  Self-injury - denied.    Current psychiatric treatment:  Medications: Wellbutrin - reports to take occasionally ("probably 2-3 times per week nowadays") as it relates to her sadness/grief around loss of son.    Psychotherapy: None.    Treating clinicians: Dr. Crawford (PCP) prescribes Wellbutrin.    Health behaviors: Reported " "adherence to pharmacologic regimen.      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 She denies any history of psychiatric problems prior to her son's death. No reported history of mood, anxiety, or thought disorder.    Previous hospitalizations: Denies.     History of outpatient treatment: She received a prescription for Wellbutrin after her son's death by her PCP and has been taking it "off and on" since then. She has no other history of mental health treatment and has never seen a mental health professional.    Previous suicide attempt: Denies.      Trauma history:  - Her son  4 years ago at the age of 22 of a major seizure. He had epilepsy and was apparently not taking his medication consistently. He  at home while Ms. Fernandez was at work.      History of eating disorders:  History of bulimia: Denies.    History of binge-eating episodes: Denies.      Family history of psychiatric illness: None reported.     Social history (marriage, employment, etc.): Ms. Fernandez was born and raised in [place] by her biological parents. She is one of 6 daughters. Her parents  when she was 14 years old, and she moved in with her sisters and mother to her grandmother's home, but remained close with her father as well. She describes her childhood as "good" and denies a history of childhood abuse or trauma. Ms. 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 She has been  to her  for 20 years with no prior marriages. She has 2 sons, her living son is 18 and her  son was 22 when he  4 years ago. She lives in [place] with her  and son. She identifies as Sikh.     Current psychosocial stressors: The loss of her son. She reports that the " "acuity/intensity of the grief has faded but she continues to see this as a the only stress in her life. "Nothing else even compares".    Report of coping skills: Prayer, shopping (but without spending too much money), spending time with her family, watching sports games.    Support system:  and son are both very supportive of her having surgery. She states that her , who is a diabetic, is also invested in making their household a healthier one. Ms. Fernandez has also told one of her sisters, as her  had the sleeve surgery, and both her sister and brother in law have been supportive and helpful.    Substance use:   Alcohol: Denied current use; denied history of abuse or dependency.   Drugs: Denied current use; denied history of abuse or dependency.  Tobacco: None.   Caffeine: Working on cutting back in anticipation for surgery - 1 cup of coffee per day but switching to decaf.    Current medications and drug reactions (include OTC, herbal): see medication list     Strengths and liabilities: Strength: Patient accepts guidance/feedback, Strength: Patient is expressive/articulate., Strength: Patient is intelligent., Strength: Patient is motivated for change., Strength: Patient has positive support network., Strength: Patient has reasonable judgment., Strength: Patient is stable.     Current Evaluation:    Mental Status Exam:   General Appearance:  age appropriate, well dressed, neatly groomed, overweight    Speech:  normal tone, normal rate, normal pitch, normal volume    Level of Cooperation:  cooperative    Thought Processes:  normal and logical    Mood:  euthymic    Thought Content:  normal, no suicidality, no homicidality, delusions, or paranoia    Affect:  congruent and appropriate    Orientation:  oriented x3    Memory:  recent memory intact; immediate and delayed word recall 3/3  remote memory intact; able to recall remote personal events   Attention Span & Concentration:  spelled "WORLD" " forwards and backwards without errors   Fund of General Knowledge:  appropriate for education    Abstract Reasoning:  interpretation of proverbs was abstract; interpretation of similarities was abstract   Judgment & Insight:  good    Language  intact        Diagnostic Impression - Plan:      Diagnostic Impression:  Z01.818 Pre-operative examination   E66.9     Obesity  I10          Hypertension  Z68.35    Body Mass Index of 35    Summary/Conclusion:   There are no overt psychological contraindications for proceeding with bariatric surgery. Ms. Fernandez has no significant psychiatric history, and reports no current psychiatric problems or major adjustment issues. She is of course still grieving the death of her eldest child from a seizure 4 years ago; however, she has sought appropriate support from her doctor and her family and she denies any major psychiatric or functional problems at this time related to her grief.  Ms. Fernandez has reasonable expectations for surgery, good social support, and has already begun making appropriate lifestyle changes in anticipation for surgery. She has verbalized appropriate awareness and commitment to the necessary behavioral changes associated with bariatric surgery and appears willing to comply with long-term lifestyle changes.     Recommendations:  -This patient is psychologically cleared to proceed with bariatric surgery.  -There are no recommendations for psychological treatment at this time. Encouraged/advised her to stay on her current psychotropic medication regimen if it is working well for her. The patient is aware of resources available should her needs change in the future.  -Spoke with Ms. Fernandez about the importance of retraining her brain to cope with stress without overeating, and discussed ideas for how this could be worked on. Encouraged her to contact the bariatric team if she is struggling with this after surgery.

## 2019-12-16 NOTE — PROGRESS NOTES
NUTRITION NOTE    Referring Physician: Leodan Burk M.D.  Reason for MNT Referral: Follow up  Pending Gastric Sleeve    Patient presents for 2nd visit  with weight loss  Of 3 lbs over the past month; total weight loss by making numerous dietary and lifestyle changes. Patient has cut out carbs, included protein shakes daily and is no longer skipping lunch. Also  Has cut out all sugary drinks  And consuming adequate protein  by eating 4  Mini meals/ day.     CLINICAL DATA:  49 y.o. female.    Current Weight: 224 lbs  Initial Weight: 227 lbs  Weight Change Since Initial Visit: - 3 lbs  Ideal Body Weight: 147 lbs  BMI: 35.18    NUTRITIONAL NEEDS:  2004-2889 Calories (Per bariatric sx candidate )   Grams Protein (Per bariatric sx candidate )     NUTRITION & HEALTH HISTORY:  Greatest challenge: starchy CHO     Current diet recall: IMPROVED   Breakfast: premier or slim fast high protein  Lunch: salad with grilled chicken   Dinner: pork chop or fish, vegetable  Snacks: cashews      Current Diet:  Meal pattern: 3  Protein supplements: has tried slim fast shakes  Snackin / day  Vegetables: Likes a variety. Eats almost daily.  Fruits: Likes a variety. Eats daily.  Beverages: water,SF bevs, CUT OUT ALL SUGARY DRINKS   Dining out: Monthly. Mostly fast food, restaurants and take-out.  Cooking at home: Daily. Mostly baked, grilled and air fryer, pressure cooker meat, fish, starchy CHO and vegetables.     Exercise:     Current exercise:  joined [employer]  fitness -  hasn't gone much  Restrictions to exercise: none     Vitamins / Minerals / Herbs:   Vit D      Labs:    reviewed      Food Allergies:   none     Social:  Works regular daytime shifts.  Lives with 17 year old  Child,  and 26 year year old nephew .  Grocery shopping and food prep  self.  Patient believes the household will be supportive after surgery.  Alcohol: Socially.  Smoking: None.    ASSESSMENT:  Patient demonstrates all willingness to change  lifestyle habits as evidenced by weight loss, dietary changes, including protein drinks and cutting out sugary drinks and carbs .    Doing well with working on greatest challenges (starchy CHO).    Barriers to Education:  none  Stage of Change:  action    NUTRITION DIAGNOSIS:  Obesity related to Excessive carbohydrate intake as evidence by BMI.  Status: Improved    PLAN:    Pt is good candidate for surgery.    Diet: Maintain diet plan.  Continue to review Bariatric Nutrition Guidebook at home and call with any questions.  Work on Bariatric Nutrition Checklist.  Increase exercise.  Start shopping for bariatric vitamins & minerals.    Exercise: Increase.    Behavior Modification: Continue to document food & activity logs daily.          Communicated nutrition plan with bariatric team.    SESSION TIME:  30 minutes

## 2019-12-17 RX ORDER — TORSEMIDE 10 MG/1
TABLET ORAL
Qty: 90 TABLET | Refills: 1 | Status: SHIPPED | OUTPATIENT
Start: 2019-12-17 | End: 2020-08-30

## 2019-12-17 NOTE — PROGRESS NOTES
Refill Authorization Note     is requesting a refill authorization.    Brief assessment and rationale for refill: REVIEW: abnormal labs          Medication Therapy Plan: SCr>1.4        Pharmacist Review Requested: Yes                     Comments: .  Requested Prescriptions   Pending Prescriptions Disp Refills    torsemide (DEMADEX) 10 MG Tab [Pharmacy Med Name: TORSEMIDE 10MG TABLETS] 90 tablet 0     Sig: TAKE 1 TABLET(10 MG) BY MOUTH EVERY DAY     Blood Pressure Readings: <139/89mmHg 12 months   BP Readings from Last 3 Encounters:   11/25/19 126/82   11/13/19 (!) 183/90   10/21/19 (!) 160/100         Last Kidney  Lab Results   Component Value Date    CREATININE 1.6 (H) 10/17/2019    CREATININE 1.6 (H) 01/02/2018    CREATININE 1.5 (H) 11/20/2017     Lab Results   Component Value Date    K 3.9 10/17/2019    K 4.1 01/02/2018    K 3.8 11/20/2017     Lab Results   Component Value Date     10/17/2019     01/02/2018     11/20/2017      Lab Results   Component Value Date    BUN 19 10/17/2019    BUN 17 01/02/2018    BUN 18 11/20/2017     Lab Results   Component Value Date    CO2 25 10/17/2019    CO2 23 01/02/2018    CO2 24 11/20/2017

## 2020-01-07 ENCOUNTER — DOCUMENTATION ONLY (OUTPATIENT)
Dept: BARIATRICS | Facility: CLINIC | Age: 50
End: 2020-01-07

## 2020-01-07 RX ORDER — CARVEDILOL 12.5 MG/1
TABLET ORAL
Qty: 60 TABLET | Refills: 0 | OUTPATIENT
Start: 2020-01-07

## 2020-01-07 RX ORDER — TORSEMIDE 10 MG/1
TABLET ORAL
Qty: 30 TABLET | Refills: 1 | OUTPATIENT
Start: 2020-01-07

## 2020-01-07 RX ORDER — ERGOCALCIFEROL 1.25 MG/1
CAPSULE ORAL
Qty: 3 CAPSULE | Refills: 0 | OUTPATIENT
Start: 2020-01-07

## 2020-01-08 ENCOUNTER — TELEPHONE (OUTPATIENT)
Dept: INTERNAL MEDICINE | Facility: CLINIC | Age: 50
End: 2020-01-08

## 2020-01-08 NOTE — TELEPHONE ENCOUNTER
Yes the main campus and she said she does not have a form to be completed. The staff just told her that you need to clear her for surgery

## 2020-01-08 NOTE — TELEPHONE ENCOUNTER
----- Message from Sarkis West sent at 1/8/2020  2:48 PM CST -----  Contact: self   Patient called in regards to getting medical clearance for bariatric surgery she is requesting from dr Crawford please advise

## 2020-01-16 ENCOUNTER — TELEPHONE (OUTPATIENT)
Dept: BARIATRICS | Facility: CLINIC | Age: 50
End: 2020-01-16

## 2020-01-19 DIAGNOSIS — E66.9 CLASS 1 OBESITY WITH SERIOUS COMORBIDITY AND BODY MASS INDEX (BMI) OF 32.0 TO 32.9 IN ADULT, UNSPECIFIED OBESITY TYPE: ICD-10-CM

## 2020-01-19 DIAGNOSIS — E55.9 VITAMIN D DEFICIENCY: ICD-10-CM

## 2020-01-21 ENCOUNTER — TELEPHONE (OUTPATIENT)
Dept: BARIATRICS | Facility: CLINIC | Age: 50
End: 2020-01-21

## 2020-01-21 DIAGNOSIS — N18.2 CHRONIC KIDNEY DISEASE, STAGE II (MILD): ICD-10-CM

## 2020-01-21 DIAGNOSIS — Z71.3 DIETARY COUNSELING: ICD-10-CM

## 2020-01-21 DIAGNOSIS — E66.9 OBESITY WITH SERIOUS COMORBIDITY, UNSPECIFIED CLASSIFICATION, UNSPECIFIED OBESITY TYPE: Primary | ICD-10-CM

## 2020-01-21 DIAGNOSIS — E55.9 VITAMIN D DEFICIENCY: ICD-10-CM

## 2020-01-21 DIAGNOSIS — I10 ESSENTIAL HYPERTENSION: ICD-10-CM

## 2020-01-21 DIAGNOSIS — R06.09 DOE (DYSPNEA ON EXERTION): ICD-10-CM

## 2020-01-21 DIAGNOSIS — Z87.441 H/O NEPHROTIC SYNDROME: ICD-10-CM

## 2020-01-21 DIAGNOSIS — I15.0 RENOVASCULAR HYPERTENSION: ICD-10-CM

## 2020-01-21 RX ORDER — ERGOCALCIFEROL 1.25 MG/1
CAPSULE ORAL
Qty: 3 CAPSULE | Refills: 3 | Status: SHIPPED | OUTPATIENT
Start: 2020-01-21 | End: 2021-01-24

## 2020-01-21 NOTE — PROGRESS NOTES
Refill Authorization Note     is requesting a refill authorization.    Brief assessment and rationale for refill: APPROVE: prr          Medication Therapy Plan: approve 12 more    Medication reconciliation completed: No                         Comments:   Requested Prescriptions   Pending Prescriptions Disp Refills    ergocalciferol (ERGOCALCIFEROL) 50,000 unit Cap [Pharmacy Med Name: VITAMIN D2 50,000IU (ERGO) CAP RX] 3 capsule 3     Sig: TAKE ONE CAPSULE BY MOUTH EVERY 30 DAYS       Endocrinology:  Vitamins - Vitamin D Supplementation Failed - 1/19/2020  5:33 PM        Failed - Vit D is 20 or above and within 360 days     Vit D, 25-Hydroxy   Date Value Ref Range Status   10/17/2019 18 (L) 30 - 96 ng/mL Final     Comment:     Vitamin D deficiency.........<10 ng/mL                              Vitamin D insufficiency......10-29 ng/mL       Vitamin D sufficiency........> or equal to 30 ng/mL  Vitamin D toxicity............>100 ng/mL     01/02/2018 12 (L) 30 - 96 ng/mL Final     Comment:     Vitamin D deficiency.........<10 ng/mL                              Vitamin D insufficiency......10-29 ng/mL       Vitamin D sufficiency........> or equal to 30 ng/mL  Vitamin D toxicity............>100 ng/mL     06/10/2016 20 (L) 30 - 96 ng/mL Final     Comment:     Vitamin D deficiency.........<10 ng/mL                              Vitamin D insufficiency......10-29 ng/mL       Vitamin D sufficiency........> or equal to 30 ng/mL  Vitamin D toxicity............>100 ng/mL                Passed - Patient is at least 18 years old        Passed - Negative Pregnancy Status Check        Passed - Hypercalcemia is not present on problem list        Passed - Office visit in past 12 months or future 90 days     Recent Outpatient Visits            1 month ago Preoperative evaluation to rule out surgical contraindication    [facility] Sharon Samson, PhD    1 month ago Renovascular hypertension    [facility] Internal  Medicine Estela Crawford MD    3 months ago Annual physical exam    [facility] - Internal Medicine Estela Crawford MD    1 year ago Renovascular hypertension    [facility] - Internal Medicine                                                                                                                                                      Jerrell Michael MD    1 year ago Acute pansinusitis, recurrence not specified    [facility] - Internal Medicine                                                                                                                                                      Jerrell Michael MD          Future Appointments              In 1 month Luz Pfeiffer RD [facility] - Bariatric Surgery, [address]    In 3 months LAB, APPOINTMENT [place] [facility]    In 3 months Leodan Burk MD [facility] - Bariatric Surgery, [address]    In 4 months LAB, APPOINTMENT [place] [facility]    In 4 months Armida Buckley RD [facility] - Bariatric Surgery, [address]    In 6 months LAB, APPOINTMENT [place] [facility]    In 6 months Armida Buckley RD [facility] - Bariatric Surgery, [address]                Passed - Ca in normal range and within 360 days     Calcium   Date Value Ref Range Status   10/17/2019 9.4 8.7 - 10.5 mg/dL Final   01/02/2018 9.2 8.7 - 10.5 mg/dL Final   11/20/2017 9.3 8.7 - 10.5 mg/dL Final               Appointments  past 12m or future 3m with PCP    Date Provider   Last Visit   11/25/2019 Estela Crawford MD   Next Visit   Visit date not found Estela Crawford MD

## 2020-01-21 NOTE — TELEPHONE ENCOUNTER
LVM with direct call back to schedule Sleeve with Dr. Burk. Next available dates would be 2/26/2020 or 2/28/2020.

## 2020-01-21 NOTE — TELEPHONE ENCOUNTER
Spoke with patient and scheduled preop appts/ surgery date/ post op appts. All dates and times agreed upon. Pt aware that they must have PCP clearance within 60 days of surgery- it should be dated, signed and in chart for preop appointment. All medications have been reviewed regarding the necessity to be crushed or broken into pieces smaller that the tip of a pencil eraser for the required period of time following surgical procedures.  Pt aware that protein liquid diet start date is 5/20/20.  Screened patient for history of UTI per protocol.   Discussed with patient to avoid antibiotics and elective procedures involving sedation/anesthesia within 30 days of surgery.  Patient instructed to call the bariatric clinic post op for any s/s of UTI.  Patient's mailing address confirmed with patient.  Pt aware that all appts can be seen in my [facility] patient portal at this time and appt reminders mailed to patient's mailing address today by RN.  Office phone and fax number given to patient for any future questions/concerns.

## 2020-01-21 NOTE — TELEPHONE ENCOUNTER
----- Message from Penelope Thurston sent at 1/21/2020 10:29 AM CST -----  Contact: self  Pt is calling stating that she had a miss call shell Rossi and would like a phone call back [phone]

## 2020-02-19 ENCOUNTER — PATIENT OUTREACH (OUTPATIENT)
Dept: ADMINISTRATIVE | Facility: OTHER | Age: 50
End: 2020-02-19

## 2020-02-24 ENCOUNTER — CLINICAL SUPPORT (OUTPATIENT)
Dept: BARIATRICS | Facility: CLINIC | Age: 50
End: 2020-02-24
Payer: COMMERCIAL

## 2020-02-24 VITALS — HEIGHT: 67 IN | BODY MASS INDEX: 35.16 KG/M2 | WEIGHT: 224 LBS

## 2020-02-24 DIAGNOSIS — N18.2 CHRONIC KIDNEY DISEASE, STAGE II (MILD): ICD-10-CM

## 2020-02-24 DIAGNOSIS — E66.09 CLASS 1 OBESITY DUE TO EXCESS CALORIES WITH SERIOUS COMORBIDITY AND BODY MASS INDEX (BMI) OF 32.0 TO 32.9 IN ADULT: ICD-10-CM

## 2020-02-24 DIAGNOSIS — Z71.3 DIETARY COUNSELING: ICD-10-CM

## 2020-02-24 DIAGNOSIS — I10 ESSENTIAL HYPERTENSION: ICD-10-CM

## 2020-02-24 PROCEDURE — 97803 PR MED NUTR THER, SUBSQ, INDIV, EA 15 MIN: ICD-10-PCS | Mod: S$GLB,,, | Performed by: DIETITIAN, REGISTERED

## 2020-02-24 PROCEDURE — 99999 PR PBB SHADOW E&M-EST. PATIENT-LVL I: ICD-10-PCS | Mod: PBBFAC,,, | Performed by: DIETITIAN, REGISTERED

## 2020-02-24 PROCEDURE — 99999 PR PBB SHADOW E&M-EST. PATIENT-LVL I: CPT | Mod: PBBFAC,,, | Performed by: DIETITIAN, REGISTERED

## 2020-02-24 PROCEDURE — 97803 MED NUTRITION INDIV SUBSEQ: CPT | Mod: S$GLB,,, | Performed by: DIETITIAN, REGISTERED

## 2020-02-24 NOTE — PROGRESS NOTES
NUTRITION NOTE     Referring Physician: Leodan Burk M.D.  Reason for MNT Referral: Follow up  Pending Gastric Sleeve     Patient presents for 3rd visit  with stable weight over the past month. Total weight loss of 3 lbs. Patient has cut out carbs, included protein shakes daily and is no longer skipping lunch. Also  Has cut out all sugary drinks  And consuming adequate protein  by eating 4  Mini meals/ day.      CLINICAL DATA:  49 y.o. female.     Current Weight:   Last Weight: 224 lbs  Initial Weight: 227 lbs  Weight Change Since Initial Visit: - 3 lbs  Ideal Body Weight: 147 lbs  BMI: 35.18     NUTRITIONAL NEEDS:  8565-4916 Calories (Per bariatric sx candidate )   Grams Protein (Per bariatric sx candidate )     NUTRITION & HEALTH HISTORY:  Greatest challenge: starchy CHO     Current diet recall: IMPROVED   Breakfast: premier or slim fast high protein  Lunch: salad with grilled chicken   Dinner: pork chop or fish, vegetable   Snacks: cashews      Current Diet:  Meal pattern: 3  Protein supplements: has tried slim fast shakes  Snackin / day  Vegetables: Likes a variety. Eats almost daily.  Fruits: Likes a variety. Eats daily.  Beverages: water,SF bevs, CUT OUT ALL SUGARY DRINKS   Dining out: Monthly. Mostly fast food, restaurants and take-out.  Cooking at home: Daily. Mostly baked, grilled and air fryer, pressure cooker meat, fish, starchy CHO and vegetables.     Exercise:     Current exercise:  joined [employer]  and goes  2 - 3 X/ week   Restrictions to exercise: none     Vitamins / Minerals / Herbs:   Vit D     Labs:    reviewed      Food Allergies:   none     Social:  Works regular daytime shifts.  Lives with 17 year old  Child,  and 26 year year old nephew .  Grocery shopping and food prep  self.  Patient believes the household will be supportive after surgery.  Alcohol: Socially.  Smoking: None.     ASSESSMENT:  Patient demonstrates all willingness to change lifestyle habits as  evidenced by weight loss, dietary changes, including protein drinks and cutting out sugary drinks and carbs .     Doing well with working on greatest challenges (starchy CHO).     Barriers to Education:  none  Stage of Change:  action     NUTRITION DIAGNOSIS:  Obesity related to Excessive carbohydrate intake as evidence by BMI.  Status: Improved     PLAN:     Pt is good candidate for surgery.     Diet: Maintain diet plan.  Continue to review Bariatric Nutrition Guidebook at home and call with any questions.  Work on Bariatric Nutrition Checklist.  Increase exercise.  Start shopping for bariatric vitamins & minerals.     Exercise: Maintain     Behavior Modification: Continue to document food & activity logs daily.      Will call  Monthly to make sure patient stays on track with diet plan.         Communicated nutrition plan with bariatric team.     SESSION TIME:  30 minutes

## 2020-03-10 ENCOUNTER — TELEPHONE (OUTPATIENT)
Dept: INTERNAL MEDICINE | Facility: CLINIC | Age: 50
End: 2020-03-10

## 2020-03-10 NOTE — TELEPHONE ENCOUNTER
----- Message from Ezekiel Anguiano sent at 3/10/2020  3:08 PM CDT -----  Contact: Haim STANLEY [phone]  Haim with [facility] will like request the last office visit  Note which include height, weight, and B/P, fax number [phone]

## 2020-03-14 DIAGNOSIS — J31.0 CHRONIC RHINITIS: Primary | ICD-10-CM

## 2020-03-14 DIAGNOSIS — R06.2 WHEEZING: ICD-10-CM

## 2020-03-15 ENCOUNTER — PATIENT MESSAGE (OUTPATIENT)
Dept: INTERNAL MEDICINE | Facility: CLINIC | Age: 50
End: 2020-03-15

## 2020-03-16 ENCOUNTER — PATIENT MESSAGE (OUTPATIENT)
Dept: INTERNAL MEDICINE | Facility: CLINIC | Age: 50
End: 2020-03-16

## 2020-03-17 RX ORDER — ALBUTEROL SULFATE 90 UG/1
2 AEROSOL, METERED RESPIRATORY (INHALATION) EVERY 4 HOURS PRN
Qty: 54 G | Refills: 2 | Status: SHIPPED | OUTPATIENT
Start: 2020-03-17 | End: 2020-05-07 | Stop reason: SDUPTHER

## 2020-03-17 RX ORDER — CETIRIZINE HYDROCHLORIDE 10 MG/1
TABLET ORAL
Qty: 90 TABLET | Refills: 2 | Status: SHIPPED | OUTPATIENT
Start: 2020-03-17 | End: 2020-05-07 | Stop reason: DRUGHIGH

## 2020-03-17 RX ORDER — ALBUTEROL SULFATE 90 UG/1
AEROSOL, METERED RESPIRATORY (INHALATION)
Qty: 18 G | Refills: 0 | OUTPATIENT
Start: 2020-03-17

## 2020-03-17 NOTE — PROGRESS NOTES
Refill Authorization Note     is requesting a refill authorization.    Brief assessment and rationale for refill: APPROVE: prr          Medication Therapy Plan: pt has chronic rhinitis; approve 9 more each    Medication reconciliation completed: No                         Comments:   Refill Center Care Gap Closure protocols temporarily suspended.   Requested Prescriptions   Pending Prescriptions Disp Refills    cetirizine (ZYRTEC) 10 MG tablet [Pharmacy Med Name: CETIRIZINE 10MG TABLETS] 90 tablet 2     Sig: TAKE 1 TABLET(10 MG) BY MOUTH EVERY DAY       Ear, Nose, and Throat:  Antihistamines Failed - 3/17/2020 12:28 PM        Failed - An appropriate indication is on the problem list     Allergic Rhinitis  Sinusitis  Seasonal Allergies              Passed - Patient is at least 18 years old        Passed - Negative Pregnancy Status Check        Passed - Office visit in past 12 months or future 90 days.     Recent Outpatient Visits            3 months ago Preoperative evaluation to rule out surgical contraindication    [facility] Sharon Samson, PhD    3 months ago Renovascular hypertension    [facility] Estela Crawford MD    4 months ago Annual physical exam    [facility] Estela Crawford MD    1 year ago Renovascular hypertension    [place] - Internal Medicine                                                                                                                                                      Jerrell Michael MD    1 year ago Acute pansinusitis, recurrence not specified    [place] - Internal Medicine                                                                                                                                                      Jerrell Michael MD          Future Appointments              In 1 month LAB, APPOINT[place] [facility]    In 1 month MD [facility]  - Bariatric Surgery, [facility]    In 2 months LAB, APPOINT[facility] [place] [facility]    In 2 months [facility] - [facility], [facility]    In 4 months LAB, [facility] [place] [facility]    In 4 months [facility] - [facility], [facility]               albuterol (PROVENTIL/VENTOLIN HFA) 90 mcg/actuation inhaler 54 g 2     Sig: Inhale 2 puffs into the lungs every 4 (four) hours as needed. Rescue       Pulmonology:  Beta Agonists Failed - 3/17/2020 12:28 PM        Failed - Manual Review: If patient with asthma requests more than 1 inhaler every 3 months, assess for uncontrolled symptoms and/or notify provider.        Passed - Patient is at least 18 years old        Passed - Negative Pregnancy Status Check        Passed - Last Heart Rate in normal range within 360 days.     Pulse Readings from Last 3 Encounters:   11/25/19 66   11/13/19 62   10/21/19 65             Passed - Office visit in past 12 months or future 90 days.     Recent Outpatient Visits            3 months ago Preoperative evaluation to rule out surgical contraindication    [facility] Sharon Samson, PhD    3 months ago Renovascular hypertension    [facility] Estela Crawford MD    4 months ago Annual physical exam    [facility] Estela Crawford MD    1 year ago Renovascular hypertension    [place] - Internal Medicine                                                                                                                                                      Jerrell Michael MD    1 year ago Acute pansinusitis, recurrence not specified    [place] - Internal Medicine                                                                                                                                                      Jerrell Michael MD          Future Appointments              In 1  month LAB, [facility]POINT[facility] [place] [facility]    In 1 month MD [facility] - [facility]ric Surgery, [facility]    In 2 months LAB, [facility]POINT[facility] [place] [facility]    In 2 months [facility] - Bariatric Surgery, [facility]    In 4 months LAB, APPOINTMENT [place] [facility]    In 4 months Armida Buckley RD [facility] - [facility], [facility]                 Appointments  past 12m or future 3m with PCP    Date Provider   Last Visit   11/25/2019 Estela Crawford MD   Next Visit   Visit date not found Estela Crawford MD   .  ED visits in past 90 days: 0       Note composed:12:35 PM 03/17/2020

## 2020-03-17 NOTE — PROGRESS NOTES
Quick DC. Duplicate Request    Refill Authorization Note     is requesting a refill authorization.    Brief assessment and rationale for refill: QUICK DC: Duplicate                                         Comments:       Duplicate refill request created, medication is already pended in previous encounter, will add any additional notes to previous encounter and close out this duplicate request.       Pended Medication(s)   Requested Prescriptions     Pending Prescriptions Disp Refills    albuterol (PROVENTIL/VENTOLIN HFA) 90 mcg/actuation inhaler [Pharmacy Med Name: ALBUTEROL HFA INH (200 PUFFS) 18GM] 18 g 0     Sig: INHALE 2 PUFFS INTO THE LUNGS EVERY 4 HOURS AS NEEDED FOR WHEEZING          Duplicate Pended Encounter(s) Details:    Associated Reports   View Encounter          Note composed:12:29 PM 03/17/2020

## 2020-04-03 ENCOUNTER — PATIENT MESSAGE (OUTPATIENT)
Dept: BARIATRICS | Facility: CLINIC | Age: 50
End: 2020-04-03

## 2020-04-28 ENCOUNTER — PATIENT MESSAGE (OUTPATIENT)
Dept: BARIATRICS | Facility: CLINIC | Age: 50
End: 2020-04-28

## 2020-04-28 DIAGNOSIS — N18.30 CHRONIC KIDNEY DISEASE, STAGE III (MODERATE): ICD-10-CM

## 2020-05-01 ENCOUNTER — PATIENT MESSAGE (OUTPATIENT)
Dept: INTERNAL MEDICINE | Facility: CLINIC | Age: 50
End: 2020-05-01

## 2020-05-01 RX ORDER — LOSARTAN POTASSIUM 100 MG/1
TABLET ORAL
Qty: 30 TABLET | Refills: 1 | OUTPATIENT
Start: 2020-05-01

## 2020-05-01 RX ORDER — IRBESARTAN 300 MG/1
300 TABLET ORAL NIGHTLY
Qty: 90 TABLET | Refills: 3 | Status: SHIPPED | OUTPATIENT
Start: 2020-05-01 | End: 2020-05-03 | Stop reason: SDUPTHER

## 2020-05-01 NOTE — TELEPHONE ENCOUNTER
Refill Authorization Note     is requesting a refill authorization.    Brief assessment and rationale for refill: QUICK DC: last commented as no longer taking          Medication Therapy Plan: Losartan discontinued in favor of valsartan in 10/19 by Dr. Crawford; Tea ROLLE    Medication reconciliation completed: No                         Comments:     Pended Medication(s)   Requested Prescriptions     Refused Prescriptions Disp Refills    losartan (COZAAR) 100 MG tablet [Pharmacy Med Name: LOSARTAN 100MG TABLETS] 30 tablet 1     Sig: TAKE 1 TABLET(100 MG) BY MOUTH EVERY DAY     Refused By: BRETT MITTAL     Reason for Refusal: Refill not appropriate          Duplicate Pended Encounter(s)/ Last Prescribed Details:    losartan (COZAAR) 100 MG tablet [[id number]]  DISCONTINUED     Order Details   Dose, Route, Frequency: As Directed    Dispense Quantity: 30 tablet Refills: 1 Fills remaining: --           Sig: TAKE 1 TABLET(100 MG) BY MOUTH EVERY DAY          Discontinue Date:  10/21/2019 15:30 Discontinue User:  Estela Crawford MD Discontinue Reason:  --   Written Date: 08/07/19 Expiration Date: 08/06/20     Start Date: 08/07/19 End Date: 10/21/19

## 2020-05-01 NOTE — TELEPHONE ENCOUNTER
Let her know I sent in irbesartan in replacement of valsartan. Let me know if a problem getting it.

## 2020-05-02 RX ORDER — IRBESARTAN 300 MG/1
300 TABLET ORAL NIGHTLY
Qty: 90 TABLET | Refills: 3 | Status: CANCELLED | OUTPATIENT
Start: 2020-05-02 | End: 2021-05-02

## 2020-05-03 RX ORDER — IRBESARTAN 300 MG/1
300 TABLET ORAL NIGHTLY
Qty: 90 TABLET | Refills: 3 | Status: SHIPPED | OUTPATIENT
Start: 2020-05-03 | End: 2021-08-17

## 2020-05-04 ENCOUNTER — PATIENT MESSAGE (OUTPATIENT)
Dept: INTERNAL MEDICINE | Facility: CLINIC | Age: 50
End: 2020-05-04

## 2020-05-05 ENCOUNTER — TELEPHONE (OUTPATIENT)
Dept: BARIATRICS | Facility: CLINIC | Age: 50
End: 2020-05-05

## 2020-05-05 ENCOUNTER — PATIENT MESSAGE (OUTPATIENT)
Dept: BARIATRICS | Facility: CLINIC | Age: 50
End: 2020-05-05

## 2020-05-05 NOTE — TELEPHONE ENCOUNTER
----- Message from Deloris Bolanos sent at 5/5/2020 12:07 PM CDT -----  Contact: pt called [phone]  Calling to speak with Marissa in regards to upcoming surgery.

## 2020-05-07 ENCOUNTER — OFFICE VISIT (OUTPATIENT)
Dept: ORTHOPEDICS | Facility: CLINIC | Age: 50
End: 2020-05-07
Payer: COMMERCIAL

## 2020-05-07 ENCOUNTER — TELEPHONE (OUTPATIENT)
Dept: ORTHOPEDICS | Facility: CLINIC | Age: 50
End: 2020-05-07

## 2020-05-07 ENCOUNTER — HOSPITAL ENCOUNTER (OUTPATIENT)
Dept: RADIOLOGY | Facility: HOSPITAL | Age: 50
Discharge: HOME | End: 2020-05-07
Attending: INTERNAL MEDICINE
Payer: COMMERCIAL

## 2020-05-07 ENCOUNTER — OFFICE VISIT (OUTPATIENT)
Dept: INTERNAL MEDICINE | Facility: CLINIC | Age: 50
End: 2020-05-07
Payer: COMMERCIAL

## 2020-05-07 ENCOUNTER — OFFICE VISIT (OUTPATIENT)
Dept: ALLERGY | Facility: CLINIC | Age: 50
End: 2020-05-07
Payer: COMMERCIAL

## 2020-05-07 ENCOUNTER — HOSPITAL ENCOUNTER (OUTPATIENT)
Dept: RADIOLOGY | Facility: HOSPITAL | Age: 50
Discharge: HOME | End: 2020-05-07
Attending: ORTHOPAEDIC SURGERY
Payer: COMMERCIAL

## 2020-05-07 VITALS
BODY MASS INDEX: 36.27 KG/M2 | SYSTOLIC BLOOD PRESSURE: 132 MMHG | WEIGHT: 231.06 LBS | HEART RATE: 77 BPM | OXYGEN SATURATION: 99 % | HEIGHT: 67 IN | DIASTOLIC BLOOD PRESSURE: 82 MMHG

## 2020-05-07 VITALS — HEIGHT: 67 IN | WEIGHT: 231 LBS | BODY MASS INDEX: 36.26 KG/M2

## 2020-05-07 DIAGNOSIS — M18.12 PRIMARY OSTEOARTHRITIS OF FIRST CARPOMETACARPAL JOINT OF LEFT HAND: ICD-10-CM

## 2020-05-07 DIAGNOSIS — J31.0 CHRONIC RHINITIS: ICD-10-CM

## 2020-05-07 DIAGNOSIS — M79.641 BILATERAL HAND PAIN: Primary | ICD-10-CM

## 2020-05-07 DIAGNOSIS — M79.641 BILATERAL HAND PAIN: ICD-10-CM

## 2020-05-07 DIAGNOSIS — M79.642 BILATERAL HAND PAIN: Primary | ICD-10-CM

## 2020-05-07 DIAGNOSIS — M79.642 BILATERAL HAND PAIN: ICD-10-CM

## 2020-05-07 DIAGNOSIS — L20.82 FLEXURAL ECZEMA: Primary | ICD-10-CM

## 2020-05-07 DIAGNOSIS — I10 ESSENTIAL HYPERTENSION: Primary | ICD-10-CM

## 2020-05-07 DIAGNOSIS — K59.00 CONSTIPATION, UNSPECIFIED CONSTIPATION TYPE: ICD-10-CM

## 2020-05-07 DIAGNOSIS — L85.8 KERATOSIS PILARIS: ICD-10-CM

## 2020-05-07 DIAGNOSIS — Z87.898 HX OF WHEEZING: ICD-10-CM

## 2020-05-07 DIAGNOSIS — L29.9 ITCHING: ICD-10-CM

## 2020-05-07 DIAGNOSIS — N18.30 CHRONIC KIDNEY DISEASE, STAGE III (MODERATE): ICD-10-CM

## 2020-05-07 DIAGNOSIS — R51.9 FACE PAIN: ICD-10-CM

## 2020-05-07 PROCEDURE — 3008F BODY MASS INDEX DOCD: CPT | Mod: CPTII,S$GLB,, | Performed by: INTERNAL MEDICINE

## 2020-05-07 PROCEDURE — 3075F SYST BP GE 130 - 139MM HG: CPT | Mod: CPTII,S$GLB,, | Performed by: INTERNAL MEDICINE

## 2020-05-07 PROCEDURE — 99204 OFFICE O/P NEW MOD 45 MIN: CPT | Mod: S$GLB,,, | Performed by: STUDENT IN AN ORGANIZED HEALTH CARE EDUCATION/TRAINING PROGRAM

## 2020-05-07 PROCEDURE — 99203 PR OFFICE/OUTPT VISIT, NEW, LEVL III, 30-44 MIN: ICD-10-PCS | Mod: 25,S$GLB,, | Performed by: ORTHOPAEDIC SURGERY

## 2020-05-07 PROCEDURE — 3075F PR MOST RECENT SYSTOLIC BLOOD PRESS GE 130-139MM HG: ICD-10-PCS | Mod: CPTII,S$GLB,, | Performed by: INTERNAL MEDICINE

## 2020-05-07 PROCEDURE — 73120 X-RAY EXAM OF HAND: CPT | Mod: 26,,, | Performed by: RADIOLOGY

## 2020-05-07 PROCEDURE — 3078F DIAST BP <80 MM HG: CPT | Mod: CPTII,S$GLB,, | Performed by: STUDENT IN AN ORGANIZED HEALTH CARE EDUCATION/TRAINING PROGRAM

## 2020-05-07 PROCEDURE — 3074F PR MOST RECENT SYSTOLIC BLOOD PRESSURE < 130 MM HG: ICD-10-PCS | Mod: CPTII,S$GLB,, | Performed by: STUDENT IN AN ORGANIZED HEALTH CARE EDUCATION/TRAINING PROGRAM

## 2020-05-07 PROCEDURE — 99999 PR PBB SHADOW E&M-EST. PATIENT-LVL III: CPT | Mod: PBBFAC,,, | Performed by: STUDENT IN AN ORGANIZED HEALTH CARE EDUCATION/TRAINING PROGRAM

## 2020-05-07 PROCEDURE — 3008F PR BODY MASS INDEX (BMI) DOCUMENTED: ICD-10-PCS | Mod: CPTII,S$GLB,, | Performed by: ORTHOPAEDIC SURGERY

## 2020-05-07 PROCEDURE — 3008F PR BODY MASS INDEX (BMI) DOCUMENTED: ICD-10-PCS | Mod: CPTII,S$GLB,, | Performed by: INTERNAL MEDICINE

## 2020-05-07 PROCEDURE — 99999 PR PBB SHADOW E&M-EST. PATIENT-LVL III: ICD-10-PCS | Mod: PBBFAC,,, | Performed by: ORTHOPAEDIC SURGERY

## 2020-05-07 PROCEDURE — 99999 PR PBB SHADOW E&M-EST. PATIENT-LVL III: CPT | Mod: PBBFAC,,, | Performed by: INTERNAL MEDICINE

## 2020-05-07 PROCEDURE — 99999 PR PBB SHADOW E&M-EST. PATIENT-LVL III: ICD-10-PCS | Mod: PBBFAC,,, | Performed by: INTERNAL MEDICINE

## 2020-05-07 PROCEDURE — 3078F DIAST BP <80 MM HG: CPT | Mod: CPTII,S$GLB,, | Performed by: ORTHOPAEDIC SURGERY

## 2020-05-07 PROCEDURE — 99214 PR OFFICE/OUTPT VISIT, EST, LEVL IV, 30-39 MIN: ICD-10-PCS | Mod: S$GLB,,, | Performed by: INTERNAL MEDICINE

## 2020-05-07 PROCEDURE — 20600 PR DRAIN/INJECT SMALL JOINT/BURSA: ICD-10-PCS | Mod: LT,S$GLB,, | Performed by: ORTHOPAEDIC SURGERY

## 2020-05-07 PROCEDURE — 73120 X-RAY EXAM OF HAND: CPT | Mod: TC,50,PN

## 2020-05-07 PROCEDURE — 3078F PR MOST RECENT DIASTOLIC BLOOD PRESSURE < 80 MM HG: ICD-10-PCS | Mod: CPTII,S$GLB,, | Performed by: ORTHOPAEDIC SURGERY

## 2020-05-07 PROCEDURE — 74018 RADEX ABDOMEN 1 VIEW: CPT | Mod: 26,,, | Performed by: RADIOLOGY

## 2020-05-07 PROCEDURE — 20600 DRAIN/INJ JOINT/BURSA W/O US: CPT | Mod: LT,S$GLB,, | Performed by: ORTHOPAEDIC SURGERY

## 2020-05-07 PROCEDURE — 3079F DIAST BP 80-89 MM HG: CPT | Mod: CPTII,S$GLB,, | Performed by: INTERNAL MEDICINE

## 2020-05-07 PROCEDURE — 99999 PR PBB SHADOW E&M-EST. PATIENT-LVL III: ICD-10-PCS | Mod: PBBFAC,,, | Performed by: STUDENT IN AN ORGANIZED HEALTH CARE EDUCATION/TRAINING PROGRAM

## 2020-05-07 PROCEDURE — 99203 OFFICE O/P NEW LOW 30 MIN: CPT | Mod: 25,S$GLB,, | Performed by: ORTHOPAEDIC SURGERY

## 2020-05-07 PROCEDURE — 74018 RADEX ABDOMEN 1 VIEW: CPT | Mod: TC

## 2020-05-07 PROCEDURE — 3008F BODY MASS INDEX DOCD: CPT | Mod: CPTII,S$GLB,, | Performed by: ORTHOPAEDIC SURGERY

## 2020-05-07 PROCEDURE — 3074F SYST BP LT 130 MM HG: CPT | Mod: CPTII,S$GLB,, | Performed by: STUDENT IN AN ORGANIZED HEALTH CARE EDUCATION/TRAINING PROGRAM

## 2020-05-07 PROCEDURE — 99999 PR PBB SHADOW E&M-EST. PATIENT-LVL III: CPT | Mod: PBBFAC,,, | Performed by: ORTHOPAEDIC SURGERY

## 2020-05-07 PROCEDURE — 99214 OFFICE O/P EST MOD 30 MIN: CPT | Mod: S$GLB,,, | Performed by: INTERNAL MEDICINE

## 2020-05-07 PROCEDURE — 3074F SYST BP LT 130 MM HG: CPT | Mod: CPTII,S$GLB,, | Performed by: ORTHOPAEDIC SURGERY

## 2020-05-07 PROCEDURE — 3079F PR MOST RECENT DIASTOLIC BLOOD PRESSURE 80-89 MM HG: ICD-10-PCS | Mod: CPTII,S$GLB,, | Performed by: INTERNAL MEDICINE

## 2020-05-07 PROCEDURE — 74018 XR ABDOMEN AP 1 VIEW: ICD-10-PCS | Mod: 26,,, | Performed by: RADIOLOGY

## 2020-05-07 PROCEDURE — 3078F PR MOST RECENT DIASTOLIC BLOOD PRESSURE < 80 MM HG: ICD-10-PCS | Mod: CPTII,S$GLB,, | Performed by: STUDENT IN AN ORGANIZED HEALTH CARE EDUCATION/TRAINING PROGRAM

## 2020-05-07 PROCEDURE — 73120 XR HAND 2 VIEW BILAT: ICD-10-PCS | Mod: 26,,, | Performed by: RADIOLOGY

## 2020-05-07 PROCEDURE — 3074F PR MOST RECENT SYSTOLIC BLOOD PRESSURE < 130 MM HG: ICD-10-PCS | Mod: CPTII,S$GLB,, | Performed by: ORTHOPAEDIC SURGERY

## 2020-05-07 PROCEDURE — 99204 PR OFFICE/OUTPT VISIT, NEW, LEVL IV, 45-59 MIN: ICD-10-PCS | Mod: S$GLB,,, | Performed by: STUDENT IN AN ORGANIZED HEALTH CARE EDUCATION/TRAINING PROGRAM

## 2020-05-07 RX ORDER — ALBUTEROL SULFATE 90 UG/1
2 AEROSOL, METERED RESPIRATORY (INHALATION) EVERY 4 HOURS PRN
Qty: 54 G | Refills: 2 | Status: SHIPPED | OUTPATIENT
Start: 2020-05-07 | End: 2022-03-08 | Stop reason: SDUPTHER

## 2020-05-07 RX ORDER — CYCLOBENZAPRINE HCL 10 MG
TABLET ORAL
Qty: 20 TABLET | Refills: 1 | Status: SHIPPED | OUTPATIENT
Start: 2020-05-07 | End: 2020-10-26

## 2020-05-07 RX ORDER — CETIRIZINE HYDROCHLORIDE 10 MG/1
TABLET ORAL
Qty: 100 TABLET | Refills: 1 | Status: SHIPPED | OUTPATIENT
Start: 2020-05-07 | End: 2021-08-17

## 2020-05-07 RX ORDER — FLUTICASONE PROPIONATE 50 MCG
2 SPRAY, SUSPENSION (ML) NASAL 2 TIMES DAILY
Qty: 31.6 ML | Refills: 5 | Status: SHIPPED | OUTPATIENT
Start: 2020-05-07 | End: 2021-09-13

## 2020-05-07 RX ORDER — HYDROCORTISONE 25 MG/G
CREAM TOPICAL
Qty: 453.6 G | Refills: 1 | Status: SHIPPED | OUTPATIENT
Start: 2020-05-07 | End: 2021-09-13

## 2020-05-07 RX ORDER — TRIAMCINOLONE ACETONIDE 40 MG/ML
20 INJECTION, SUSPENSION INTRA-ARTICULAR; INTRAMUSCULAR
Status: COMPLETED | OUTPATIENT
Start: 2020-05-07 | End: 2020-05-07

## 2020-05-07 RX ORDER — PREDNISONE 20 MG/1
40 TABLET ORAL DAILY
Qty: 8 TABLET | Refills: 0 | Status: SHIPPED | OUTPATIENT
Start: 2020-05-07 | End: 2020-05-11

## 2020-05-07 RX ADMIN — TRIAMCINOLONE ACETONIDE 20 MG: 40 INJECTION, SUSPENSION INTRA-ARTICULAR; INTRAMUSCULAR at 02:05

## 2020-05-07 NOTE — PROGRESS NOTES
Subjective:      Patient ID: Toi Fernandez is a 49 y.o. female.    Chief Complaint: Pain and Swelling of the Left Hand      HPI: Toi Fernandez is here with complaints of left hand swelling and pain.  Patient locates the pain to the base of the thumb.  Pain is achy and sharp in nature.  Pain radiates proximally into the forearm.  Associated symptoms include decreased  strength, dropping items, and clicking of the thumb.  She denies any locking. She denies any numbnes and tingling.  Symptoms have been present for approximately 1 year.  She has tried nothing for these symptoms.    Past Medical History:   Diagnosis Date    Chronic kidney disease, stage II (mild) 9/21/2012    Depression     H/O nephrotic syndrome, 2001. 4/21/2015    Hypertension     Obesity     Secondary hyperparathyroidism     Vitamin D deficiency disease        Current Outpatient Medications:     albuterol (PROVENTIL/VENTOLIN HFA) 90 mcg/actuation inhaler, Inhale 2 puffs into the lungs every 4 (four) hours as needed. Rescue, Disp: 54 g, Rfl: 2    buPROPion (WELLBUTRIN XL) 150 MG TB24 tablet, Take 2 tablets (300 mg total) by mouth once daily., Disp: 30 tablet, Rfl: 11    carvedilol (COREG) 25 MG tablet, Take 1 tablet (25 mg total) by mouth 2 (two) times daily with meals., Disp: 60 tablet, Rfl: 11    cetirizine (ZYRTEC) 10 MG tablet, 1-2 tablets daily as needed for itching, Disp: 100 tablet, Rfl: 1    cyclobenzaprine (FLEXERIL) 10 MG tablet, Half-1 tab po q bid for back pain, Disp: 20 tablet, Rfl: 1    ergocalciferol (ERGOCALCIFEROL) 50,000 unit Cap, TAKE ONE CAPSULE BY MOUTH EVERY 30 DAYS, Disp: 3 capsule, Rfl: 3    fluticasone propionate (FLONASE) 50 mcg/actuation nasal spray, 2 sprays (100 mcg total) by Each Nostril route 2 (two) times daily., Disp: 31.6 mL, Rfl: 5    hydrocortisone 2.5 % cream, Apply to affected areas twice a day when eczema is moderate., Disp: 453.6 g, Rfl: 1    irbesartan (AVAPRO) 300 MG tablet, Take 1  "tablet (300 mg total) by mouth every evening., Disp: 90 tablet, Rfl: 3    predniSONE (DELTASONE) 20 MG tablet, Take 2 tablets (40 mg total) by mouth once daily. for 4 doses, Disp: 8 tablet, Rfl: 0    torsemide (DEMADEX) 10 MG Tab, TAKE 1 TABLET(10 MG) BY MOUTH EVERY DAY, Disp: 90 tablet, Rfl: 1    triamcinolone acetonide 0.1% (KENALOG) 0.1 % cream, APPLY EXTERNALLY TO THE AFFECTED AREA TWICE DAILY, Disp: 30 g, Rfl: 0  No current facility-administered medications for this visit.   Review of patient's allergies indicates:   Allergen Reactions    No known allergies        Ht 5' 7" (1.702 m)   Wt 104.8 kg (231 lb)   BMI 36.18 kg/m²     Review of Systems   Constitution: Negative for chills and fever.   Cardiovascular: Negative for chest pain and palpitations.   Respiratory: Negative for shortness of breath and wheezing.    Skin: Negative for poor wound healing and rash.   Musculoskeletal: Positive for joint pain, joint swelling and stiffness.   Gastrointestinal: Negative for nausea and vomiting.   Genitourinary: Negative for dysuria and hematuria.   Neurological: Negative for numbness, paresthesias, seizures and tremors.   Psychiatric/Behavioral: Negative for altered mental status.   Allergic/Immunologic: Negative for environmental allergies and persistent infections.         Objective:    Ortho Exam       Left upper extremity significant for swelling and tenderness about the base of the left thumb.  Positive grind.  No significant tenderness to palpation over the A1 pulley.  Palpable clicking noted.  Range of motion wrist and fingers full.  Negative Finkelstein's.  Sensation intact.  Pulses present.  Cap refill brisk.  GEN: Well developed, well nourished female. AAOX3. No acute distress.   Normocephalic, atraumatic.   STARR  Breathing unlabored.  Mood and affect appropriate.   Assessment:     Imaging:  Hand radiographs from today significant for mild haziness about the trapezium no advanced degenerative changes " noted. No fractures.          1. Bilateral hand pain    2. Primary osteoarthritis of first carpometacarpal joint of left hand          Plan:       I explained my clinical impression to include trigger finger vs early CMC arthritis/inflammation.  Given her clinical exam, I feel her symptoms are likely secondary to CMC.  For this reason, I have recommended form CMC injection.  If trigger finger symptoms persists would recommend trigger finger injection in the future.  Patient is CKD - recommend topical anti-inflammatory.  Thumb wrap fitted and provided with usage instructions.    Orders Placed This Encounter    X-Ray Hand 2 View Bilat    triamcinolone acetonide injection 20 mg     Follow up in about 6 weeks (around 6/18/2020).

## 2020-05-07 NOTE — PROCEDURES
Procedures   PROCEDURE NOTE:  I have explained the risks, benefits, and alternatives of the procedure in detail.  The patient voices understanding and all questions have been answered.  The patient agrees to proceed as planned, consents to injection. Pause for timeout. After a sterile prep of the skin performed in the normal fashion, the left 1st carpometacarpal joint is injected from the lateral approach using a 25 gauge needle with a combination of 0.5cc 1% lidocaine and 20 mg of kenalog. The patient is cautioned and immediate relief of pain is secondary to the local anesthetic and will be temporary.  After the anesthetic wears off there may be a increase in pain that may last for a few hours or a few days and they should use ice to help alleviate this flair up of pain.

## 2020-05-07 NOTE — PROGRESS NOTES
Subjective:       Patient ID: Toi Fernandez is a 49 y.o. female.    Chief Complaint: Constipation.  HPI   C/o lower back pain x 1 week.    She blames it on constipation.  Using miralax, citrucel, smooth move tea, probiotics.  Diet hasn't changed.  Last bm was 1 week ago.  No abd pain.    She has applied otc salves to lower back.      Taking bupropion regularly for depression    She had trouble getting BP meds. Started Avapro few days ago.  Was unable to get her previous     ARB for several months. Takes demadex qod.    Review of Systems   Constitutional: Negative for fever.   Cardiovascular: Negative for chest pain.   Gastrointestinal: Negative for abdominal pain.   Genitourinary: Negative for dysuria, hematuria and pelvic pain.   Musculoskeletal: Positive for back pain.   Neurological: Negative for weakness, numbness and headaches.       Objective:      Physical Exam   Constitutional: She is oriented to person, place, and time. She appears well-developed and well-nourished. No distress.   Abdominal: Soft. Bowel sounds are normal. She exhibits no distension and no mass. There is no tenderness. There is no guarding.   Neurological: She is alert and oriented to person, place, and time.   Psychiatric: She has a normal mood and affect. Her behavior is normal.       Assessment:       1. Essential hypertension    2. Constipation, unspecified constipation type    3. Chronic kidney disease, stage III (moderate)        Plan:       Diagnoses and all orders for this visit:    Essential hypertension    Constipation, unspecified constipation type  -     X-Ray Abdomen AP 1 View; Future    Chronic kidney disease, stage III (moderate)    Other orders  -     cyclobenzaprine (FLEXERIL) 10 MG tablet; Half-1 tab po q bid for back pain       tylenol.     Call if back pain doesn't improve.  She is not able to take nsaids due to ckd.      Miralax, Citrucel.    Addendum:  She is cleared for anesthesia and surgery

## 2020-05-07 NOTE — TELEPHONE ENCOUNTER
----- Message from Bouchra Best sent at 5/7/2020  2:38 PM CDT -----  Contact: [phone]/Self   Patient is requesting to speak with nurse regarding medication. Please advise.

## 2020-05-07 NOTE — PATIENT INSTRUCTIONS
Testing  Blood work for allergy testing today       [facility] in one week for results or call [phone]       Contact me with questions or concerns       I will contact you if anything needs immediate attention.        Treatment    Pill steroids = prednisone 10m tablets a day until gone (4 days)    Flonase (= fluticasone) nasal spray:  2 squirts each nostril twice a day   Remember to aim out toward your ear.   Needs to be used regularly 5-14 days for full effect.    Cetirizine 1-2 tablets as needed for itching  Can also use before outdoor activities      Skin care:    Recommend body wash without soap, like Cetaphil.    Mosiurize all over twice a day.    Examples:  Moisturel, Lubriderm, Eucerin    Hydrocortisone 2.5% cream up to three times daily on affected areas.

## 2020-05-07 NOTE — PROGRESS NOTES
Allergy Clinic Note  [facility]    Subjective:      Patient ID: Toi Fernandez is a 49 y.o. female.    Chief Complaint: No chief complaint on file.      Referring Provider: none    History of Present Illness:  49-year-old female presents complaining of lifetime rhinitis and itchy rash.  She is here alone, and she is a fair historian.    related medications  Benadryl at HS    Her chief complaint is nasal congestion for her entire life.  It is severe and prevents her from sitting outside.  It also disturbs her sleep quality.  Associated symptoms include postnasal drip, sneezing, red/runny eyes, ear itching, and headache across her forehead.  She denies coughing, wheezing, shortness of breath, chest tightness, hives, or heartburn.  Her symptoms are perennial with late spring worsening.  Precipitants include being outside, dusting, cold weather and wind.  Currently she is taking Benadryl hs with inadequate relief.  She has been treated with cetirizine and p.r.n. Flonase in the past without significant benefit.    Her history is significant for sinusitis episodes 2 to 3 times a year.  She states these usually have in the allergic precipitant.    She also has occasional wheezing, but only during infections she has albuterol for p.r.n. use.  She does denies any exertional symptoms and states she could walk 2 flights of stairs without difficulty.    Additionally she has diffuse itching, with flare ups of a rash particularly on her flexor arms.    Client may be interested in allergen immunotherapy.    Additional History:   Past medical history is significant for hypertension and history of kidney disease.  No Hx of ENT surgery.  Family history is negative for allergies and asthma.  Family history includes colon cancer in her father and diabetes in multiple relatives.  Client  reports that she has never smoked. She has never used smokeless tobacco.  Exposures are unremarkable.  No exposure to smoke, pets,  mold, or unusual substances.  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  She would like to garden but is unable to due to her allergy symptoms.    Patient Active Problem List   Diagnosis    Chronic kidney disease, stage II (mild)    H/O nephrotic syndrome, 2001.    HTN (hypertension)    Chronic rhinitis    Insomnia    Reactive depression    Vitamin D deficiency    Family history of colon cancer    Class 1 obesity with serious comorbidity and body mass index (BMI) of 32.0 to 32.9 in adult    Hyperparathyroidism due to renal insufficiency     Current Outpatient Medications on File Prior to Visit   Medication Sig Dispense Refill    buPROPion (WELLBUTRIN XL) 150 MG TB24 tablet Take 2 tablets (300 mg total) by mouth once daily. 30 tablet 11    carvedilol (COREG) 25 MG tablet Take 1 tablet (25 mg total) by mouth 2 (two) times daily with meals. 60 tablet 11    cyclobenzaprine (FLEXERIL) 10 MG tablet Half-1 tab po q bid for back pain 20 tablet 1    ergocalciferol (ERGOCALCIFEROL) 50,000 unit Cap TAKE ONE CAPSULE BY MOUTH EVERY 30 DAYS 3 capsule 3    irbesartan (AVAPRO) 300 MG tablet Take 1 tablet (300 mg total) by mouth every evening. 90 tablet 3    torsemide (DEMADEX) 10 MG Tab TAKE 1 TABLET(10 MG) BY MOUTH EVERY DAY 90 tablet 1    triamcinolone acetonide 0.1% (KENALOG) 0.1 % cream APPLY EXTERNALLY TO THE AFFECTED AREA TWICE DAILY 30 g 0    [DISCONTINUED] albuterol (PROVENTIL/VENTOLIN HFA) 90 mcg/actuation inhaler Inhale 2 puffs into the lungs every 4 (four) hours as needed. Rescue 54 g 2    [DISCONTINUED] cetirizine (ZYRTEC) 10 MG tablet TAKE 1 TABLET(10 MG) BY MOUTH EVERY DAY 90 tablet 2     No current facility-administered medications on file prior to visit.          Review of Systems   Constitutional: Negative for chills and fever.   HENT: Positive for sinus pain. Negative for ear discharge, nosebleeds and sore throat.    Eyes: Negative for discharge and  redness.   Respiratory: Negative for cough, hemoptysis, sputum production, shortness of breath, wheezing and stridor.    Cardiovascular: Negative for chest pain and palpitations.   Gastrointestinal: Negative for blood in stool, heartburn, melena and vomiting.   Genitourinary: Negative for dysuria and hematuria.   Musculoskeletal: Negative for joint pain and myalgias.   Skin: Positive for itching and rash.   Neurological: Negative for seizures and loss of consciousness.       Objective:   There were no vitals taken for this visit.      Physical Exam   Constitutional: She is well-developed, well-nourished, and in no distress.   HENT:   Head: Normocephalic and atraumatic.   Nose: Nose normal.   Mouth/Throat: No oropharyngeal exudate.   Nares pale pink with moderate turbinates swelling on the right and severe on the left.  Oropharynx benign without exudate.  Tongue is not coated.   Eyes: Conjunctivae are normal. Right eye exhibits no discharge. Left eye exhibits no discharge.   Neck: Neck supple.   Cardiovascular: Normal rate, regular rhythm, normal heart sounds and intact distal pulses.   Pulmonary/Chest: Effort normal and breath sounds normal. No stridor. No respiratory distress. She has no wheezes.   Abdominal: Soft. She exhibits no distension. There is no tenderness.   Musculoskeletal: She exhibits no edema or deformity.   Lymphadenopathy:     She has no cervical adenopathy.   Neurological: She is alert. GCS score is 15.   Skin: No rash noted. No erythema.   Psychiatric: Memory and affect normal.       Data:       Assessment:     1. Flexural eczema    2. Chronic rhinitis    3. Itching    4. Keratosis pilaris    5. Face pain    6. Hx of wheezing (infection, sinusitis trigger)        Plan:     Medical decision making:  Patient is presenting with lifetime rhinitis which is probably allergic in nature.  There also appears to be a mild vasomotor component.  Additional symptoms include frequent sinusitis, occasional  wheezing (only during infections) and flexor itching/dermatitis.  Her rash looks like a blotchy form of atopic dermatitis superimposed on keratosis pilaris.  Diagnostically, I recommend screening allergy testing by the Immunocap method.  Since she is interested in immunotherapy, if her blood test results suggest she is a candidate, will follow-up with aeroallergen skin testing.  Therapeutically, I am recommending high dose nasal steroids as the mainstay of therapy.  To provide further relief we agreed to a brief course of oral steroids, following a discussion of the risks and benefits.  I recommend she continue her Zyrtec on a p.r.n. basis and also use it before outdoor activities.  For her history of wheezing I would continue albuterol as needed during infections.    Diagnoses and all orders for this visit:    Flexural eczema  -     hydrocortisone 2.5 % cream; Apply to affected areas twice a day when eczema is moderate.  -     predniSONE (DELTASONE) 20 MG tablet; Take 2 tablets (40 mg total) by mouth once daily. for 4 doses    Chronic rhinitis  -     IgE; Future  -     Dermatophagoides [place]; Future  -     Dermatophagoides Pteronyssinus; Future  -     [place]; Future  -     Mc; Future  -     [place]; Future  -     English Plantain; Future  -     Oak Pecan; Future  -     Pecan; Future  -     Marsh Elder; Future  -     Ragweed; Future  -     Alternaria; Future  -     Aspergillus; Future  -     Cat; Future  -     Cockroach; Future  -     Dog; Future  -     fluticasone propionate (FLONASE) 50 mcg/actuation nasal spray; 2 sprays (100 mcg total) by Each Nostril route 2 (two) times daily.  -     predniSONE (DELTASONE) 20 MG tablet; Take 2 tablets (40 mg total) by mouth once daily. for 4 doses    Itching  -     cetirizine (ZYRTEC) 10 MG tablet; 1-2 tablets daily as needed for itching    Keratosis pilaris    Face pain    Hx of wheezing (infection, sinusitis trigger)  -     albuterol (PROVENTIL/VENTOLIN HFA) 90  mcg/actuation inhaler; Inhale 2 puffs into the lungs every 4 (four) hours as needed. Rescue        Patient Instructions   Testing  Blood work for allergy testing today       Check [facility] in one week for results or call [phone]       Contact me with questions or concerns       I will contact you if anything needs immediate attention.        Treatment    Pill steroids = prednisone 10m tablets a day until gone (4 days)    Flonase (= fluticasone) nasal spray:  2 squirts each nostril twice a day   Remember to aim out toward your ear.   Needs to be used regularly 5-14 days for full effect.    Cetirizine 1-2 tablets as needed for itching  Can also use before outdoor activities      Skin care:    Recommend body wash without soap, like Cetaphil.    Mosiurize all over twice a day.    Examples:  Moisturel, Lubriderm, Eucerin    Hydrocortisone 2.5% cream up to three times daily on affected areas.            Follow up in about 2 weeks (around 2020).    Evelyn Hua MD

## 2020-05-11 ENCOUNTER — TELEPHONE (OUTPATIENT)
Dept: BARIATRICS | Facility: CLINIC | Age: 50
End: 2020-05-11

## 2020-05-11 ENCOUNTER — PATIENT OUTREACH (OUTPATIENT)
Dept: ADMINISTRATIVE | Facility: OTHER | Age: 50
End: 2020-05-11

## 2020-05-11 NOTE — TELEPHONE ENCOUNTER
Patient returned call. Confirmed pre op appointment for tomorrow. Patient aware of COVID protocols.

## 2020-05-11 NOTE — TELEPHONE ENCOUNTER
Phoned patient in regards to pre op appointment scheduled for tomorrow. No answer. LVM requesting call back.

## 2020-05-12 ENCOUNTER — OFFICE VISIT (OUTPATIENT)
Dept: BARIATRICS | Facility: CLINIC | Age: 50
End: 2020-05-12
Payer: COMMERCIAL

## 2020-05-12 ENCOUNTER — LAB VISIT (OUTPATIENT)
Dept: LAB | Facility: HOSPITAL | Age: 50
End: 2020-05-12
Payer: COMMERCIAL

## 2020-05-12 ENCOUNTER — DOCUMENTATION ONLY (OUTPATIENT)
Dept: BARIATRICS | Facility: CLINIC | Age: 50
End: 2020-05-12

## 2020-05-12 VITALS
SYSTOLIC BLOOD PRESSURE: 186 MMHG | HEART RATE: 69 BPM | WEIGHT: 231.5 LBS | RESPIRATION RATE: 18 BRPM | HEIGHT: 67 IN | BODY MASS INDEX: 36.34 KG/M2 | DIASTOLIC BLOOD PRESSURE: 99 MMHG

## 2020-05-12 DIAGNOSIS — Z71.3 DIETARY COUNSELING: ICD-10-CM

## 2020-05-12 DIAGNOSIS — Z01.818 PRE-OP TESTING: Primary | ICD-10-CM

## 2020-05-12 DIAGNOSIS — I15.0 RENOVASCULAR HYPERTENSION: ICD-10-CM

## 2020-05-12 DIAGNOSIS — R06.09 DOE (DYSPNEA ON EXERTION): ICD-10-CM

## 2020-05-12 DIAGNOSIS — E66.01 MORBID OBESITY: ICD-10-CM

## 2020-05-12 DIAGNOSIS — E55.9 VITAMIN D DEFICIENCY: ICD-10-CM

## 2020-05-12 DIAGNOSIS — Z87.441 H/O NEPHROTIC SYNDROME: ICD-10-CM

## 2020-05-12 DIAGNOSIS — I10 ESSENTIAL HYPERTENSION: ICD-10-CM

## 2020-05-12 DIAGNOSIS — E66.9 OBESITY WITH SERIOUS COMORBIDITY, UNSPECIFIED CLASSIFICATION, UNSPECIFIED OBESITY TYPE: ICD-10-CM

## 2020-05-12 DIAGNOSIS — N18.2 CHRONIC KIDNEY DISEASE, STAGE II (MILD): ICD-10-CM

## 2020-05-12 LAB
ALBUMIN SERPL BCP-MCNC: 3.6 G/DL (ref 3.5–5.2)
ALP SERPL-CCNC: 58 U/L (ref 55–135)
ALT SERPL W/O P-5'-P-CCNC: 10 U/L (ref 10–44)
ANION GAP SERPL CALC-SCNC: 7 MMOL/L (ref 8–16)
AST SERPL-CCNC: 11 U/L (ref 10–40)
BASOPHILS # BLD AUTO: 0.05 K/UL (ref 0–0.2)
BASOPHILS NFR BLD: 0.4 % (ref 0–1.9)
BILIRUB SERPL-MCNC: 0.7 MG/DL (ref 0.1–1)
BUN SERPL-MCNC: 19 MG/DL (ref 6–20)
CALCIUM SERPL-MCNC: 9 MG/DL (ref 8.7–10.5)
CHLORIDE SERPL-SCNC: 108 MMOL/L (ref 95–110)
CO2 SERPL-SCNC: 24 MMOL/L (ref 23–29)
CREAT SERPL-MCNC: 1.5 MG/DL (ref 0.5–1.4)
DIFFERENTIAL METHOD: ABNORMAL
EOSINOPHIL # BLD AUTO: 0.3 K/UL (ref 0–0.5)
EOSINOPHIL NFR BLD: 2.2 % (ref 0–8)
ERYTHROCYTE [DISTWIDTH] IN BLOOD BY AUTOMATED COUNT: 12.1 % (ref 11.5–14.5)
EST. GFR  (AFRICAN AMERICAN): 47.2 ML/MIN/1.73 M^2
EST. GFR  (NON AFRICAN AMERICAN): 40.9 ML/MIN/1.73 M^2
ESTIMATED AVG GLUCOSE: 100 MG/DL (ref 68–131)
GLUCOSE SERPL-MCNC: 93 MG/DL (ref 70–110)
HBA1C MFR BLD HPLC: 5.1 % (ref 4–5.6)
HCT VFR BLD AUTO: 39.8 % (ref 37–48.5)
HGB BLD-MCNC: 12.6 G/DL (ref 12–16)
IMM GRANULOCYTES # BLD AUTO: 0.1 K/UL (ref 0–0.04)
IMM GRANULOCYTES NFR BLD AUTO: 0.8 % (ref 0–0.5)
LYMPHOCYTES # BLD AUTO: 3.8 K/UL (ref 1–4.8)
LYMPHOCYTES NFR BLD: 28.5 % (ref 18–48)
MCH RBC QN AUTO: 32.3 PG (ref 27–31)
MCHC RBC AUTO-ENTMCNC: 31.7 G/DL (ref 32–36)
MCV RBC AUTO: 102 FL (ref 82–98)
MONOCYTES # BLD AUTO: 0.7 K/UL (ref 0.3–1)
MONOCYTES NFR BLD: 5.5 % (ref 4–15)
NEUTROPHILS # BLD AUTO: 8.2 K/UL (ref 1.8–7.7)
NEUTROPHILS NFR BLD: 62.6 % (ref 38–73)
NRBC BLD-RTO: 0 /100 WBC
PLATELET # BLD AUTO: 230 K/UL (ref 150–350)
PMV BLD AUTO: 10.8 FL (ref 9.2–12.9)
POTASSIUM SERPL-SCNC: 3.7 MMOL/L (ref 3.5–5.1)
PROT SERPL-MCNC: 7.2 G/DL (ref 6–8.4)
RBC # BLD AUTO: 3.9 M/UL (ref 4–5.4)
SODIUM SERPL-SCNC: 139 MMOL/L (ref 136–145)
WBC # BLD AUTO: 13.16 K/UL (ref 3.9–12.7)

## 2020-05-12 PROCEDURE — 3080F PR MOST RECENT DIASTOLIC BLOOD PRESSURE >= 90 MM HG: ICD-10-PCS | Mod: CPTII,S$GLB,, | Performed by: SURGERY

## 2020-05-12 PROCEDURE — 83036 HEMOGLOBIN GLYCOSYLATED A1C: CPT

## 2020-05-12 PROCEDURE — 3008F BODY MASS INDEX DOCD: CPT | Mod: CPTII,S$GLB,, | Performed by: SURGERY

## 2020-05-12 PROCEDURE — 3008F PR BODY MASS INDEX (BMI) DOCUMENTED: ICD-10-PCS | Mod: CPTII,S$GLB,, | Performed by: SURGERY

## 2020-05-12 PROCEDURE — 99203 PR OFFICE/OUTPT VISIT, NEW, LEVL III, 30-44 MIN: ICD-10-PCS | Mod: S$GLB,,, | Performed by: SURGERY

## 2020-05-12 PROCEDURE — 85025 COMPLETE CBC W/AUTO DIFF WBC: CPT

## 2020-05-12 PROCEDURE — 3077F SYST BP >= 140 MM HG: CPT | Mod: CPTII,S$GLB,, | Performed by: SURGERY

## 2020-05-12 PROCEDURE — 3080F DIAST BP >= 90 MM HG: CPT | Mod: CPTII,S$GLB,, | Performed by: SURGERY

## 2020-05-12 PROCEDURE — 99999 PR PBB SHADOW E&M-EST. PATIENT-LVL III: CPT | Mod: PBBFAC,,, | Performed by: SURGERY

## 2020-05-12 PROCEDURE — 80053 COMPREHEN METABOLIC PANEL: CPT

## 2020-05-12 PROCEDURE — 3077F PR MOST RECENT SYSTOLIC BLOOD PRESSURE >= 140 MM HG: ICD-10-PCS | Mod: CPTII,S$GLB,, | Performed by: SURGERY

## 2020-05-12 PROCEDURE — 36415 COLL VENOUS BLD VENIPUNCTURE: CPT

## 2020-05-12 PROCEDURE — 99999 PR PBB SHADOW E&M-EST. PATIENT-LVL III: ICD-10-PCS | Mod: PBBFAC,,, | Performed by: SURGERY

## 2020-05-12 PROCEDURE — 99203 OFFICE O/P NEW LOW 30 MIN: CPT | Mod: S$GLB,,, | Performed by: SURGERY

## 2020-05-12 RX ORDER — SODIUM CITRATE AND CITRIC ACID MONOHYDRATE 334; 500 MG/5ML; MG/5ML
15 SOLUTION ORAL ONCE
Status: CANCELLED | OUTPATIENT
Start: 2020-05-12 | End: 2020-05-12

## 2020-05-12 RX ORDER — HYDROCODONE BITARTRATE AND ACETAMINOPHEN 7.5; 325 MG/15ML; MG/15ML
15 SOLUTION ORAL 4 TIMES DAILY PRN
Qty: 473 ML | Refills: 0 | Status: ON HOLD | OUTPATIENT
Start: 2020-05-12 | End: 2020-05-28 | Stop reason: HOSPADM

## 2020-05-12 RX ORDER — FAMOTIDINE 10 MG/ML
20 INJECTION INTRAVENOUS ONCE
Status: CANCELLED | OUTPATIENT
Start: 2020-05-12 | End: 2020-05-12

## 2020-05-12 RX ORDER — OMEPRAZOLE 40 MG/1
40 CAPSULE, DELAYED RELEASE ORAL EVERY MORNING
Qty: 30 CAPSULE | Refills: 2 | Status: SHIPPED | OUTPATIENT
Start: 2020-05-12 | End: 2020-07-06

## 2020-05-12 RX ORDER — URSODIOL 500 MG/1
TABLET, FILM COATED ORAL
Qty: 90 TABLET | Refills: 1 | Status: SHIPPED | OUTPATIENT
Start: 2020-05-12 | End: 2022-01-27

## 2020-05-12 RX ORDER — METOCLOPRAMIDE HYDROCHLORIDE 5 MG/ML
10 INJECTION INTRAMUSCULAR; INTRAVENOUS ONCE
Status: CANCELLED | OUTPATIENT
Start: 2020-05-12 | End: 2020-05-12

## 2020-05-12 RX ORDER — HEPARIN SODIUM 5000 [USP'U]/ML
5000 INJECTION, SOLUTION INTRAVENOUS; SUBCUTANEOUS ONCE
Status: CANCELLED | OUTPATIENT
Start: 2020-05-12 | End: 2020-05-12

## 2020-05-12 RX ORDER — ONDANSETRON 8 MG/1
8 TABLET, ORALLY DISINTEGRATING ORAL EVERY 6 HOURS PRN
Qty: 30 TABLET | Refills: 0 | Status: SHIPPED | OUTPATIENT
Start: 2020-05-12 | End: 2022-12-01

## 2020-05-12 NOTE — H&P (VIEW-ONLY)
History & Physical    SUBJECTIVE:     History of Present Illness:  Toi Fernandez is a 49 y.o. female who presents for pre-operative exam for weight loss surgery.  she has completed her pre-operative evaluation.  she has failed medical treatment for obesity.  1. Morbid Obesity with Body mass index is 35.07 kg/m². and difficulty with weight loss.  2. Co-morbidities: Essential htn, ckd 3, low iron, low hld    Chief Complaint   Patient presents with    Pre-op Exam       Review of patient's allergies indicates:   Allergen Reactions    No known allergies        Current Outpatient Medications   Medication Sig    albuterol (PROVENTIL/VENTOLIN HFA) 90 mcg/actuation inhaler Inhale 2 puffs into the lungs every 4 (four) hours as needed. Rescue    buPROPion (WELLBUTRIN XL) 150 MG TB24 tablet Take 2 tablets (300 mg total) by mouth once daily.    carvedilol (COREG) 25 MG tablet Take 1 tablet (25 mg total) by mouth 2 (two) times daily with meals.    cetirizine (ZYRTEC) 10 MG tablet 1-2 tablets daily as needed for itching    cyclobenzaprine (FLEXERIL) 10 MG tablet Half-1 tab po q bid for back pain    ergocalciferol (ERGOCALCIFEROL) 50,000 unit Cap TAKE ONE CAPSULE BY MOUTH EVERY 30 DAYS    fluticasone propionate (FLONASE) 50 mcg/actuation nasal spray 2 sprays (100 mcg total) by Each Nostril route 2 (two) times daily.    hydrocortisone 2.5 % cream Apply to affected areas twice a day when eczema is moderate.    irbesartan (AVAPRO) 300 MG tablet Take 1 tablet (300 mg total) by mouth every evening.    torsemide (DEMADEX) 10 MG Tab TAKE 1 TABLET(10 MG) BY MOUTH EVERY DAY    triamcinolone acetonide 0.1% (KENALOG) 0.1 % cream APPLY EXTERNALLY TO THE AFFECTED AREA TWICE DAILY     No current facility-administered medications for this visit.        Past Medical History:   Diagnosis Date    Chronic kidney disease, stage II (mild) 9/21/2012    Depression     H/O nephrotic syndrome, 2001. 4/21/2015    Hypertension      "Obesity     Secondary hyperparathyroidism     Vitamin D deficiency disease        Past Surgical History:   Procedure Laterality Date    DILATION AND CURETTAGE OF UTERUS      ENDOMETRIAL ABLATION  2004    HYSTERECTOMY  2011    DLH/BS (AUB/Fibroids) KB    TUBAL LIGATION         ROS   Constitutional: Negative for fever.   Cardiovascular: Negative for chest pain.   Gastrointestinal: Negative for abdominal pain. Denies heartburn and reflux.  Genitourinary: Negative for dysuria, hematuria and pelvic pain.   Musculoskeletal: Positive for back pain. This continues and she is taking a muscle relaxer.  Neurological: Negative for weakness, numbness and headaches.     OBJECTIVE:     Vitals:    05/12/20 1446   Resp: 18   Weight: 105 kg (231 lb 7.7 oz)   Height: 5' 7" (1.702 m)       Physical Exam   Constitutional: She is oriented to person, place, and time and well-developed, well-nourished, and in no distress.   Abdominal:       Neurological: She is alert and oriented to person, place, and time.   Skin: Skin is warm and dry.   Psychiatric: Mood, memory, affect and judgment normal.   Vitals reviewed.       Laboratory  CBC: Reviewed  CMP: Reviewed    Diagnostic Results:  Stress test, cxr: Reviewed     Dietitian: Patient has participated in pre-operative nutritional program with understanding of necessary lifelong dietary changes required with surgery.    Psych: No overt contraindications to bariatric surgery, patient has completed psychological evaluation and is able to give informed consent.    Clearance: Me    ASSESSMENT/PLAN:     Morbid obesity with failure of medical conservative therapy.    Co Morbid Conditions:   Patient Active Problem List   Diagnosis    Chronic kidney disease, stage II (mild)    H/O nephrotic syndrome, 2001.    HTN (hypertension)    Chronic rhinitis    Insomnia    Reactive depression    Vitamin D deficiency    Family history of colon cancer    Class 1 obesity with serious comorbidity and " body mass index (BMI) of 32.0 to 32.9 in adult    Hyperparathyroidism due to renal insufficiency       Patient wishes to undergo sleeve gastrectomy.      The patient was informed that risks include, but are not limited to: death, leak, obstruction, bleeding, and sepsis. Any of these could require further surgery. Other risks include DVT, PE, pneumonia, wound dehiscence, hernia, wound infection, the need for dilatations and the inability to lose appropriate weight and keep it off.     We discussed that our goal is to ameliorate her medical problems and not to obtain a specific body mass index. she understands the risks and benefits and wishes to proceed with the procedure.  she has signed a consent form.     30 minutes more than half the time used for counseling the patient

## 2020-05-12 NOTE — PROGRESS NOTES
Pt here today for pre-op with Dr. Burk.  Pt had [place] B-12 sublingual 1200 mcg take every other day  [place] Super B Complex with 100 mg thiamin take every other day  Alive Womens' Over 50 contains no iron recommended Flintstones chewable and recommended   Nature's way Liquid Calcium citrate with vitamin D.  Pt v/u.

## 2020-05-13 ENCOUNTER — TELEPHONE (OUTPATIENT)
Dept: INTERNAL MEDICINE | Facility: CLINIC | Age: 50
End: 2020-05-13

## 2020-05-13 ENCOUNTER — TELEPHONE (OUTPATIENT)
Dept: BARIATRICS | Facility: CLINIC | Age: 50
End: 2020-05-13

## 2020-05-13 RX ORDER — BUPROPION HYDROCHLORIDE 100 MG/1
100 TABLET ORAL 3 TIMES DAILY
Qty: 90 TABLET | Refills: 11 | Status: ON HOLD | OUTPATIENT
Start: 2020-05-13 | End: 2020-05-27

## 2020-05-13 NOTE — TELEPHONE ENCOUNTER
pls call- I sent rx for a form of wellbutrin (buproprion) which cn be crushed - 100 m g 3 times a day - to take after gastric surgery..

## 2020-05-19 ENCOUNTER — TELEPHONE (OUTPATIENT)
Dept: ALLERGY | Facility: CLINIC | Age: 50
End: 2020-05-19

## 2020-05-19 NOTE — TELEPHONE ENCOUNTER
----- Message from Evelyn Hua MD sent at 5/15/2020  1:33 PM CDT -----  Regarding: results  Please relay:  Blood work shows that sure allergic mainly to dust mites.  Also allergic to dog and cockroach.    Dr. Hua will talk to you more about treatment options at her appointment May 21st.

## 2020-05-20 ENCOUNTER — PATIENT OUTREACH (OUTPATIENT)
Dept: ADMINISTRATIVE | Facility: OTHER | Age: 50
End: 2020-05-20

## 2020-05-20 ENCOUNTER — TELEPHONE (OUTPATIENT)
Dept: BARIATRICS | Facility: CLINIC | Age: 50
End: 2020-05-20

## 2020-05-20 NOTE — TELEPHONE ENCOUNTER
Called patient to discuss liquid diet and vitamins.    Pt using premier RTD and also has Ready Protein water (15 gms of protein) and a whey protein powder    Discussion:  -  gms of protein per day  - 600-800 calories per day   - Less than 4gm sugar per shake  - SF, Decaf, non-carbonated Fluids  - No Fruits, juices, yogurt or pudding on liquid diet  - No vitamins, fish oils or herbal supplements for 1 week prior to surgery  Has following vitamins and minerals for post-sx  [place] complete  Calcium citrate  Super B complex  Liquid B-12      Remind pt per nursing and medical team to inform our department if taking antibiotics within the 30 days post bariatric surgery or it any other surgeries/procedures are scheduled within 30 days after bariatric surgery.    Reminded pt of pre-op and surgery dates.    Pt to call back with any questions.  ----- Message from Marissa Purvis RN sent at 1/21/2020 11:19 AM CST -----  Regarding: liquid diet  1 week liquid diet starts 5/20/20  sleeve surgery scheduled 5/27/20  preop appt scheduled 5/12/20

## 2020-05-20 NOTE — PROGRESS NOTES
Allergy Clinic Note  [facility]    Subjective:      Patient ID: Toi Fernandez is a 49 y.o. female.    Chief Complaint: Follow-up (review labs)      Allergy problem list:   Flexural atopic dermatitis  Itching  Chronic rhinitis  Face pain  History of wheezing (infection, sinusitis trigger)    History of Present Illness:  49-year-old female presents complaining of lifetime rhinitis and itchy rash.  She is here alone, and she is a fair historian.    related medications  Status post Prednisone (40 mg daily for 5 days)  Flonase 2 squirts each nostril twice a day- using regularly  Zyrtec 10-20 mg daily as needed -- using one daily  Hydrocortisone 2.5% cream    Today she reports significant improvement in her nasal symptoms.  No side effects from any of the medications.  She denies fever, purulence, or need for her albuterol inhaler.  She still has a significant amount of nasal burning.    She also reports significant improvement in her flexor atopic dermatitis on hydrocortisone cream.    She expresses concern about the nasal (or nasopharyngeal) swab needed prior to surgery.  She expected to be very painful.    No other problems or complaints.    At initial visit she reported that her chief complaint was nasal congestion for her entire life.  It is severe and prevents her from sitting outside.  It also disturbs her sleep quality.  Associated symptoms include postnasal drip, sneezing, red/runny eyes, ear itching, and headache across her forehead.  She denies coughing, wheezing, shortness of breath, chest tightness, hives, or heartburn.  Her symptoms are perennial with late spring worsening.  Precipitants include being outside, dusting, cold weather and wind.  Currently she is taking Benadryl hs with inadequate relief.  She has been treated with cetirizine and p.r.n. Flonase in the past without significant benefit.    Her history is significant for sinusitis episodes 2 to 3 times a year.  She states these  usually have in the allergic precipitant.    She also has occasional wheezing, but only during infections she has albuterol for p.r.n. use.  She does denies any exertional symptoms and states she could walk 2 flights of stairs without difficulty.    Client may be interested in allergen immunotherapy.    Additional History:   Interval history is significant for being preop for bariatric surgery on May 27th, with plans for pre surgery COVID antigen testing.  Past medical history is significant for hypertension and history of kidney disease.  No Hx of ENT surgery.  Family history is negative for allergies and asthma.  Family history includes colon cancer in her father and diabetes in multiple relatives.  Client  reports that she has never smoked. She has never used smokeless tobacco.  Exposures are unremarkable.  No exposure to smoke, pets, mold, or unusual substances.  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  She would like to garden but is unable to due to her allergy symptoms.    Patient Active Problem List   Diagnosis    Chronic kidney disease, stage II (mild)    H/O nephrotic syndrome, 2001.    HTN (hypertension)    Chronic rhinitis    Insomnia    Reactive depression    Vitamin D deficiency    Family history of colon cancer    Class 1 obesity with serious comorbidity and body mass index (BMI) of 32.0 to 32.9 in adult    Hyperparathyroidism due to renal insufficiency     Current Outpatient Medications on File Prior to Visit   Medication Sig Dispense Refill    albuterol (PROVENTIL/VENTOLIN HFA) 90 mcg/actuation inhaler Inhale 2 puffs into the lungs every 4 (four) hours as needed. Rescue 54 g 2    carvedilol (COREG) 25 MG tablet Take 1 tablet (25 mg total) by mouth 2 (two) times daily with meals. 60 tablet 11    cetirizine (ZYRTEC) 10 MG tablet 1-2 tablets daily as needed for itching 100 tablet 1    cyclobenzaprine (FLEXERIL) 10 MG tablet Half-1 tab po q bid  for back pain 20 tablet 1    ergocalciferol (ERGOCALCIFEROL) 50,000 unit Cap TAKE ONE CAPSULE BY MOUTH EVERY 30 DAYS 3 capsule 3    fluticasone propionate (FLONASE) 50 mcg/actuation nasal spray 2 sprays (100 mcg total) by Each Nostril route 2 (two) times daily. 31.6 mL 5    hydrocortisone 2.5 % cream Apply to affected areas twice a day when eczema is moderate. 453.6 g 1    irbesartan (AVAPRO) 300 MG tablet Take 1 tablet (300 mg total) by mouth every evening. 90 tablet 3    torsemide (DEMADEX) 10 MG Tab TAKE 1 TABLET(10 MG) BY MOUTH EVERY DAY (Patient taking differently: every other day. TAKE 1 TABLET(10 MG) BY MOUTH EVERY DAY) 90 tablet 1    buPROPion (WELLBUTRIN) 100 MG tablet Take 1 tablet (100 mg total) by mouth 3 (three) times daily. (Patient not taking: Reported on 5/21/2020) 90 tablet 11    hydrocodone-acetaminophen (HYCET) solution 7.5-325 mg/15mL Take 15 mLs by mouth 4 (four) times daily as needed for Pain. (Patient not taking: Reported on 5/21/2020) 473 mL 0    omeprazole (PRILOSEC) 40 MG capsule Take 1 capsule (40 mg total) by mouth every morning. Open capsule and take with apple sauce (Patient not taking: Reported on 5/21/2020) 30 capsule 2    ondansetron (ZOFRAN-ODT) 8 MG TbDL Take 1 tablet (8 mg total) by mouth every 6 (six) hours as needed. (Patient not taking: Reported on 5/21/2020) 30 tablet 0    triamcinolone acetonide 0.1% (KENALOG) 0.1 % cream APPLY EXTERNALLY TO THE AFFECTED AREA TWICE DAILY (Patient not taking: Reported on 5/12/2020) 30 g 0    ursodioL (ЕЛЕНА FORTE) 500 MG tablet Crush one tablet daily for gall bladder. Please compound into liquid and supply for 90 days if insurance approves (Patient not taking: Reported on 5/21/2020) 90 tablet 1     No current facility-administered medications on file prior to visit.          Review of Systems   Constitutional: Negative for chills and fever.   HENT: Negative for ear discharge and nosebleeds.    Eyes: Negative for discharge and  "redness.   Respiratory: Negative for hemoptysis, sputum production and stridor.    Cardiovascular: Negative for chest pain and palpitations.   Gastrointestinal: Negative for blood in stool, melena and vomiting.   Genitourinary: Negative for dysuria and hematuria.   Skin: Negative for itching and rash.   Neurological: Negative for seizures and loss of consciousness.   Endo/Heme/Allergies: Positive for environmental allergies. Does not bruise/bleed easily.       Objective:   Ht 5' 7" (1.702 m)   Wt 104.8 kg (231 lb 0.7 oz)   BMI 36.19 kg/m²       Physical Exam   Constitutional: She is well-developed, well-nourished, and in no distress.   HENT:   Head: Normocephalic and atraumatic.   Nose: Nose normal.   Eyes: Conjunctivae are normal. Right eye exhibits no discharge. Left eye exhibits no discharge.   Neck: Neck supple.   Cardiovascular: Normal rate, regular rhythm and intact distal pulses.   Pulmonary/Chest: Effort normal. No stridor. No respiratory distress.   Abdominal: She exhibits no distension.   Musculoskeletal: She exhibits no edema or deformity.   Neurological: She is alert. GCS score is 15.   Skin: No rash noted. No erythema.   Psychiatric: Memory and affect normal.       Data:   Aeroallergen testing by the Immunocap method (05/07/2020) showed positive reactions for dust mite, cockroach, and dog.   Class IV  dust mite (Df)   Class II   Dust mite (Dp)   Cl;ass II  Dog, cockroach    Assessment:     1. Allergic rhinitis due to house dust mite    2. Allergic rhinitis due to dog hair and dander    3. Nasal burning        Plan:     Medical decision making:  Ms. Fernandez is presenting with chronic dust allergy manifest as rhinitis with a component of vasomotor rhinitis as well.  She appears to be responding fairly well to high dose nasal steroids, although her nose remains very sensitive.  I recommend she continue b.i.d. dosing and discussed dust avoidance measures in detail.        Toi was seen today for " follow-up.    Diagnoses and all orders for this visit:    Allergic rhinitis due to house dust mite    Allergic rhinitis due to dog hair and dander    Nasal burning        Patient Instructions   Your main allergy is to dust      Recommend these dust avoidance measures:  No stuffed animals on the bed at night.  No feathers or down on the bed  Dust proof covers on all pillows that stay on the bed at night.  Take a pillow cover (or your pillow) for vacations  Dust proof cover on mattress.    Continue Flonase 2 squirts twice a day    Continue Zyrtec as needed.        Follow up in about 1 year (around 5/21/2021).    Evelyn Hua MD       Sent this addendum via [employer] around 12 pm:    This morning I neglected to talk about allergy shots.  You are a good candidate if interested, as an alternative to long-term medications    Basically allergies can be treated any combination of 3 methods  1.  Avoiding what you are allergic to (the dust avoidance measures we talked about this morning)  2.  Medications to control symptoms  3.  Allergy shots to make you less allergic       Also available as daily under the tongue tablets for dust mite (not for dog or cockroach)    A full course of allergy shots takes 3-5 years.  Shots are given weekly for the first year, then spaced out to monthly.  The shots are made of what you are allergic to, and you get a little higher dose with each weekly shot during the first year.  At the end of the first year, most people no longer need daily medications.    The risks of allergy shots are two.  1.  Everyone gets an itchy lump at the shot site.  2.  There is an approximately 30% risk of having allergy symptoms after one or more injections.  Most of these reactions are mild, but there is small risk of severe reaction or death.    For the last couple years, there is a tablet form of dust mite on the market.  My initial impression is that they are not as effective as allergy shots, but certainly much  easier in terms of time and effort.

## 2020-05-21 ENCOUNTER — OFFICE VISIT (OUTPATIENT)
Dept: ALLERGY | Facility: CLINIC | Age: 50
End: 2020-05-21
Payer: COMMERCIAL

## 2020-05-21 ENCOUNTER — PATIENT MESSAGE (OUTPATIENT)
Dept: ALLERGY | Facility: CLINIC | Age: 50
End: 2020-05-21

## 2020-05-21 VITALS — WEIGHT: 231.06 LBS | BODY MASS INDEX: 36.27 KG/M2 | HEIGHT: 67 IN

## 2020-05-21 DIAGNOSIS — R20.8 NASAL BURNING: ICD-10-CM

## 2020-05-21 DIAGNOSIS — J30.89 ALLERGIC RHINITIS DUE TO HOUSE DUST MITE: Primary | ICD-10-CM

## 2020-05-21 DIAGNOSIS — J30.81 ALLERGIC RHINITIS DUE TO ANIMAL (CAT) (DOG) HAIR AND DANDER: ICD-10-CM

## 2020-05-21 PROCEDURE — 99999 PR PBB SHADOW E&M-EST. PATIENT-LVL III: ICD-10-PCS | Mod: PBBFAC,,, | Performed by: STUDENT IN AN ORGANIZED HEALTH CARE EDUCATION/TRAINING PROGRAM

## 2020-05-21 PROCEDURE — 99999 PR PBB SHADOW E&M-EST. PATIENT-LVL III: CPT | Mod: PBBFAC,,, | Performed by: STUDENT IN AN ORGANIZED HEALTH CARE EDUCATION/TRAINING PROGRAM

## 2020-05-21 PROCEDURE — 99214 PR OFFICE/OUTPT VISIT, EST, LEVL IV, 30-39 MIN: ICD-10-PCS | Mod: S$GLB,,, | Performed by: STUDENT IN AN ORGANIZED HEALTH CARE EDUCATION/TRAINING PROGRAM

## 2020-05-21 PROCEDURE — 99214 OFFICE O/P EST MOD 30 MIN: CPT | Mod: S$GLB,,, | Performed by: STUDENT IN AN ORGANIZED HEALTH CARE EDUCATION/TRAINING PROGRAM

## 2020-05-21 PROCEDURE — 3008F PR BODY MASS INDEX (BMI) DOCUMENTED: ICD-10-PCS | Mod: CPTII,S$GLB,, | Performed by: STUDENT IN AN ORGANIZED HEALTH CARE EDUCATION/TRAINING PROGRAM

## 2020-05-21 PROCEDURE — 3008F BODY MASS INDEX DOCD: CPT | Mod: CPTII,S$GLB,, | Performed by: STUDENT IN AN ORGANIZED HEALTH CARE EDUCATION/TRAINING PROGRAM

## 2020-05-21 NOTE — PATIENT INSTRUCTIONS
Your main allergy is to dust      Recommend these dust avoidance measures:  No stuffed animals on the bed at night.  No feathers or down on the bed  Dust proof covers on all pillows that stay on the bed at night.  Take a pillow cover (or your pillow) for vacations  Dust proof cover on mattress.    Continue Flonase 2 squirts twice a day    Continue Zyrtec as needed.

## 2020-05-25 ENCOUNTER — TELEPHONE (OUTPATIENT)
Dept: ALLERGY | Facility: CLINIC | Age: 50
End: 2020-05-25

## 2020-05-25 NOTE — TELEPHONE ENCOUNTER
----- Message from Evelyn Hua MD sent at 5/25/2020 12:47 PM CDT -----  Regarding: skin testing apt.  Please call to schedule 60 minute skin testing apt.  [facility], but she will probably be OK with [facility] if that's sooner.      Will need to be off Zyrtec for 7 days.    I sent her an email just now recommending skin testing prior to IT, but she may not have read it yet.

## 2020-05-26 ENCOUNTER — TELEPHONE (OUTPATIENT)
Dept: BARIATRICS | Facility: CLINIC | Age: 50
End: 2020-05-26

## 2020-05-26 ENCOUNTER — ANESTHESIA EVENT (OUTPATIENT)
Dept: SURGERY | Facility: HOSPITAL | Age: 50
DRG: 621 | End: 2020-05-26
Payer: COMMERCIAL

## 2020-05-26 ENCOUNTER — LAB VISIT (OUTPATIENT)
Dept: INTERNAL MEDICINE | Facility: CLINIC | Age: 50
DRG: 621 | End: 2020-05-26
Payer: COMMERCIAL

## 2020-05-26 DIAGNOSIS — Z01.818 PRE-OP TESTING: ICD-10-CM

## 2020-05-26 DIAGNOSIS — E66.01 MORBID OBESITY: ICD-10-CM

## 2020-05-26 LAB — SARS-COV-2 RNA RESP QL NAA+PROBE: NOT DETECTED

## 2020-05-26 PROCEDURE — U0003 INFECTIOUS AGENT DETECTION BY NUCLEIC ACID (DNA OR RNA); SEVERE ACUTE RESPIRATORY SYNDROME CORONAVIRUS 2 (SARS-COV-2) (CORONAVIRUS DISEASE [COVID-19]), AMPLIFIED PROBE TECHNIQUE, MAKING USE OF HIGH THROUGHPUT TECHNOLOGIES AS DESCRIBED BY CMS-2020-01-R: HCPCS

## 2020-05-26 NOTE — TELEPHONE ENCOUNTER
Notified patient of arrival time to the [facility] at 1100 with expected surgery start time 1310 on 5/27/2020.   Instructed patient regarding pre-op instructions including no protein shakes or sugar free clear liquids after midnight but can have a rare sip of water for comfort, showering and preop medications to hold/take per anesthesia/preop.  Instructed patient on the s/s of dehydration and for patient to call at the first sign of dehydration.  Informed patient that someone from bariatrics will call them 1 week post op to review diet/fluid intake and to ensure adequate hydration.    Reminded patient not to take antibiotics for 30 days following surgery or schedule elective procedures involving anesthesia/sedation for 30 days following surgery unless checking with the bariatric clinic first.  Pt verbalized understanding. Pt given office phone number for any additional questions/concerns.

## 2020-05-26 NOTE — PRE-PROCEDURE INSTRUCTIONS
PRE-OP INSTRUCTIONS:Instructed patient to follow dietary guidelines provided by  and/or his staff.It is ok to take AM medications with a few sips of water.Patient instructed to shower the night before surgery as well as the morning of surgery with an antibacterial soap like dial or hibiclens from the neck down.Encouraged patient to wear loose fitting, comfortable clothing .Medication instructions for pm prior to and am of surgery reviewed.Instructed patient to avoid taking vitamins,supplements,aspirin or ibuprofen the am of surgery.Patient instructed to take temperature the night before surgery as well as the morning of surgery and to notify [facility] at [phone] if it is 100.4 or above.(Patient reported that she has no thermometer)Patient also informed of the current visitor policy and advised patient that one visitor may accompany them into the hospital and wait (socially distanced) in the waiting room.When they enter the hospital both patient and visitor will have their temperature checked,provided a mask and provided assistance to their destination.No visitors are allowed in the inpatient areas of the hospital.Patient stated an understanding.    Covid  Testing will be completed today-results pending    Patient denies any side effects or issues with anesthesia or sedation.    Patient does not know arrival time.Explained that this information comes from the surgeon's office and if they haven't heard from them by 2 or 3 pm to call the office.Patient stated an understanding.

## 2020-05-27 ENCOUNTER — HOSPITAL ENCOUNTER (INPATIENT)
Facility: HOSPITAL | Age: 50
LOS: 1 days | Discharge: HOME | DRG: 621 | End: 2020-05-28
Attending: SURGERY | Admitting: SURGERY
Payer: COMMERCIAL

## 2020-05-27 ENCOUNTER — ANESTHESIA (OUTPATIENT)
Dept: SURGERY | Facility: HOSPITAL | Age: 50
DRG: 621 | End: 2020-05-27
Payer: COMMERCIAL

## 2020-05-27 DIAGNOSIS — E66.01 MORBID OBESITY: Primary | ICD-10-CM

## 2020-05-27 PROCEDURE — 63600175 PHARM REV CODE 636 W HCPCS: Performed by: ANESTHESIOLOGY

## 2020-05-27 PROCEDURE — D9220A PRA ANESTHESIA: ICD-10-PCS | Mod: CRNA,,, | Performed by: NURSE ANESTHETIST, CERTIFIED REGISTERED

## 2020-05-27 PROCEDURE — 43775 PR LAP, GAST RESTRICT PROC, LONGITUDINAL GASTRECTOMY: ICD-10-PCS | Mod: ,,, | Performed by: SURGERY

## 2020-05-27 PROCEDURE — 37000008 HC ANESTHESIA 1ST 15 MINUTES: Performed by: SURGERY

## 2020-05-27 PROCEDURE — C9113 INJ PANTOPRAZOLE SODIUM, VIA: HCPCS | Performed by: STUDENT IN AN ORGANIZED HEALTH CARE EDUCATION/TRAINING PROGRAM

## 2020-05-27 PROCEDURE — 71000015 HC POSTOP RECOV 1ST HR: Performed by: SURGERY

## 2020-05-27 PROCEDURE — 94761 N-INVAS EAR/PLS OXIMETRY MLT: CPT

## 2020-05-27 PROCEDURE — D9220A PRA ANESTHESIA: Mod: ANES,,, | Performed by: ANESTHESIOLOGY

## 2020-05-27 PROCEDURE — 25000003 PHARM REV CODE 250: Performed by: STUDENT IN AN ORGANIZED HEALTH CARE EDUCATION/TRAINING PROGRAM

## 2020-05-27 PROCEDURE — S0028 INJECTION, FAMOTIDINE, 20 MG: HCPCS | Performed by: SURGERY

## 2020-05-27 PROCEDURE — 88305 TISSUE EXAM BY PATHOLOGIST: ICD-10-PCS | Mod: 26,,, | Performed by: PATHOLOGY

## 2020-05-27 PROCEDURE — 36000710: Performed by: SURGERY

## 2020-05-27 PROCEDURE — 43775 LAP SLEEVE GASTRECTOMY: CPT | Mod: ,,, | Performed by: SURGERY

## 2020-05-27 PROCEDURE — D9220A PRA ANESTHESIA: ICD-10-PCS | Mod: ANES,,, | Performed by: ANESTHESIOLOGY

## 2020-05-27 PROCEDURE — 63600175 PHARM REV CODE 636 W HCPCS: Performed by: SURGERY

## 2020-05-27 PROCEDURE — 71000033 HC RECOVERY, INTIAL HOUR: Performed by: SURGERY

## 2020-05-27 PROCEDURE — 11000001 HC ACUTE MED/SURG PRIVATE ROOM

## 2020-05-27 PROCEDURE — 88305 TISSUE EXAM BY PATHOLOGIST: CPT | Mod: 26,,, | Performed by: PATHOLOGY

## 2020-05-27 PROCEDURE — 25000003 PHARM REV CODE 250: Performed by: NURSE ANESTHETIST, CERTIFIED REGISTERED

## 2020-05-27 PROCEDURE — 63600175 PHARM REV CODE 636 W HCPCS: Performed by: STUDENT IN AN ORGANIZED HEALTH CARE EDUCATION/TRAINING PROGRAM

## 2020-05-27 PROCEDURE — 27201423 OPTIME MED/SURG SUP & DEVICES STERILE SUPPLY: Performed by: SURGERY

## 2020-05-27 PROCEDURE — 71000016 HC POSTOP RECOV ADDL HR: Performed by: SURGERY

## 2020-05-27 PROCEDURE — 25000003 PHARM REV CODE 250: Performed by: SURGERY

## 2020-05-27 PROCEDURE — 36000711: Performed by: SURGERY

## 2020-05-27 PROCEDURE — 37000009 HC ANESTHESIA EA ADD 15 MINS: Performed by: SURGERY

## 2020-05-27 PROCEDURE — 88305 TISSUE EXAM BY PATHOLOGIST: CPT | Performed by: PATHOLOGY

## 2020-05-27 PROCEDURE — D9220A PRA ANESTHESIA: Mod: CRNA,,, | Performed by: NURSE ANESTHETIST, CERTIFIED REGISTERED

## 2020-05-27 PROCEDURE — 63600175 PHARM REV CODE 636 W HCPCS: Performed by: NURSE ANESTHETIST, CERTIFIED REGISTERED

## 2020-05-27 RX ORDER — FAMOTIDINE 10 MG/ML
20 INJECTION INTRAVENOUS ONCE
Status: COMPLETED | OUTPATIENT
Start: 2020-05-27 | End: 2020-05-27

## 2020-05-27 RX ORDER — PHENYLEPHRINE HYDROCHLORIDE 10 MG/ML
INJECTION INTRAVENOUS
Status: DISCONTINUED | OUTPATIENT
Start: 2020-05-27 | End: 2020-05-28

## 2020-05-27 RX ORDER — NALOXONE HCL 0.4 MG/ML
0.02 VIAL (ML) INJECTION
Status: DISCONTINUED | OUTPATIENT
Start: 2020-05-27 | End: 2020-05-27

## 2020-05-27 RX ORDER — ONDANSETRON 2 MG/ML
4 INJECTION INTRAMUSCULAR; INTRAVENOUS ONCE AS NEEDED
Status: DISCONTINUED | OUTPATIENT
Start: 2020-05-27 | End: 2020-05-27 | Stop reason: HOSPADM

## 2020-05-27 RX ORDER — METOPROLOL TARTRATE 1 MG/ML
5 INJECTION, SOLUTION INTRAVENOUS EVERY 6 HOURS
Status: DISCONTINUED | OUTPATIENT
Start: 2020-05-27 | End: 2020-05-27

## 2020-05-27 RX ORDER — FENTANYL CITRATE 50 UG/ML
INJECTION, SOLUTION INTRAMUSCULAR; INTRAVENOUS
Status: DISCONTINUED | OUTPATIENT
Start: 2020-05-27 | End: 2020-05-28

## 2020-05-27 RX ORDER — LIDOCAINE HYDROCHLORIDE 20 MG/ML
INJECTION INTRAVENOUS
Status: DISCONTINUED | OUTPATIENT
Start: 2020-05-27 | End: 2020-05-28

## 2020-05-27 RX ORDER — HYDROCODONE BITARTRATE AND ACETAMINOPHEN 7.5; 325 MG/15ML; MG/15ML
15 SOLUTION ORAL EVERY 4 HOURS PRN
Status: DISCONTINUED | OUTPATIENT
Start: 2020-05-28 | End: 2020-05-28 | Stop reason: HOSPADM

## 2020-05-27 RX ORDER — FENTANYL CITRATE 50 UG/ML
25 INJECTION, SOLUTION INTRAMUSCULAR; INTRAVENOUS EVERY 5 MIN PRN
Status: DISCONTINUED | OUTPATIENT
Start: 2020-05-27 | End: 2020-05-27 | Stop reason: HOSPADM

## 2020-05-27 RX ORDER — HYDROMORPHONE HCL IN 0.9% NACL 6 MG/30 ML
PATIENT CONTROLLED ANALGESIA SYRINGE INTRAVENOUS CONTINUOUS
Status: DISCONTINUED | OUTPATIENT
Start: 2020-05-27 | End: 2020-05-28

## 2020-05-27 RX ORDER — HYDROMORPHONE HCL IN 0.9% NACL 6 MG/30 ML
PATIENT CONTROLLED ANALGESIA SYRINGE INTRAVENOUS CONTINUOUS
Status: DISCONTINUED | OUTPATIENT
Start: 2020-05-27 | End: 2020-05-27

## 2020-05-27 RX ORDER — PROPOFOL 10 MG/ML
VIAL (ML) INTRAVENOUS
Status: DISCONTINUED | OUTPATIENT
Start: 2020-05-27 | End: 2020-05-28

## 2020-05-27 RX ORDER — SODIUM CHLORIDE 9 MG/ML
INJECTION, SOLUTION INTRAVENOUS CONTINUOUS
Status: DISCONTINUED | OUTPATIENT
Start: 2020-05-27 | End: 2020-05-28

## 2020-05-27 RX ORDER — METOPROLOL TARTRATE 1 MG/ML
5 INJECTION, SOLUTION INTRAVENOUS EVERY 6 HOURS
Status: DISCONTINUED | OUTPATIENT
Start: 2020-05-27 | End: 2020-05-28

## 2020-05-27 RX ORDER — ACETAMINOPHEN 10 MG/ML
1000 INJECTION, SOLUTION INTRAVENOUS ONCE
Status: COMPLETED | OUTPATIENT
Start: 2020-05-27 | End: 2020-05-27

## 2020-05-27 RX ORDER — ROCURONIUM BROMIDE 10 MG/ML
INJECTION, SOLUTION INTRAVENOUS
Status: DISCONTINUED | OUTPATIENT
Start: 2020-05-27 | End: 2020-05-28

## 2020-05-27 RX ORDER — ALBUTEROL SULFATE 90 UG/1
2 AEROSOL, METERED RESPIRATORY (INHALATION) EVERY 4 HOURS PRN
Status: DISCONTINUED | OUTPATIENT
Start: 2020-05-27 | End: 2020-05-28 | Stop reason: HOSPADM

## 2020-05-27 RX ORDER — SODIUM CHLORIDE 0.9 % (FLUSH) 0.9 %
2 SYRINGE (ML) INJECTION
Status: DISCONTINUED | OUTPATIENT
Start: 2020-05-27 | End: 2020-05-27 | Stop reason: HOSPADM

## 2020-05-27 RX ORDER — BUPIVACAINE HYDROCHLORIDE 2.5 MG/ML
INJECTION, SOLUTION EPIDURAL; INFILTRATION; INTRACAUDAL
Status: DISCONTINUED | OUTPATIENT
Start: 2020-05-27 | End: 2020-05-27 | Stop reason: HOSPADM

## 2020-05-27 RX ORDER — SODIUM CITRATE AND CITRIC ACID MONOHYDRATE 334; 500 MG/5ML; MG/5ML
15 SOLUTION ORAL ONCE
Status: COMPLETED | OUTPATIENT
Start: 2020-05-27 | End: 2020-05-27

## 2020-05-27 RX ORDER — NEOSTIGMINE METHYLSULFATE 0.5 MG/ML
INJECTION, SOLUTION INTRAVENOUS
Status: DISCONTINUED | OUTPATIENT
Start: 2020-05-27 | End: 2020-05-28

## 2020-05-27 RX ORDER — GLYCOPYRROLATE 0.2 MG/ML
INJECTION INTRAMUSCULAR; INTRAVENOUS
Status: DISCONTINUED | OUTPATIENT
Start: 2020-05-27 | End: 2020-05-28

## 2020-05-27 RX ORDER — METOCLOPRAMIDE HYDROCHLORIDE 5 MG/ML
10 INJECTION INTRAMUSCULAR; INTRAVENOUS ONCE
Status: COMPLETED | OUTPATIENT
Start: 2020-05-27 | End: 2020-05-27

## 2020-05-27 RX ORDER — HEPARIN SODIUM 5000 [USP'U]/ML
5000 INJECTION, SOLUTION INTRAVENOUS; SUBCUTANEOUS ONCE
Status: COMPLETED | OUTPATIENT
Start: 2020-05-27 | End: 2020-05-27

## 2020-05-27 RX ORDER — PANTOPRAZOLE SODIUM 40 MG/10ML
40 INJECTION, POWDER, LYOPHILIZED, FOR SOLUTION INTRAVENOUS 2 TIMES DAILY
Status: DISCONTINUED | OUTPATIENT
Start: 2020-05-27 | End: 2020-05-28 | Stop reason: HOSPADM

## 2020-05-27 RX ORDER — SODIUM CHLORIDE 9 MG/ML
INJECTION, SOLUTION INTRAVENOUS CONTINUOUS PRN
Status: DISCONTINUED | OUTPATIENT
Start: 2020-05-27 | End: 2020-05-28

## 2020-05-27 RX ORDER — ONDANSETRON 2 MG/ML
4 INJECTION INTRAMUSCULAR; INTRAVENOUS EVERY 6 HOURS PRN
Status: DISCONTINUED | OUTPATIENT
Start: 2020-05-27 | End: 2020-05-28

## 2020-05-27 RX ORDER — NALOXONE HCL 0.4 MG/ML
0.02 VIAL (ML) INJECTION
Status: DISCONTINUED | OUTPATIENT
Start: 2020-05-27 | End: 2020-05-28 | Stop reason: HOSPADM

## 2020-05-27 RX ORDER — MIDAZOLAM HYDROCHLORIDE 1 MG/ML
INJECTION, SOLUTION INTRAMUSCULAR; INTRAVENOUS
Status: DISCONTINUED | OUTPATIENT
Start: 2020-05-27 | End: 2020-05-28

## 2020-05-27 RX ADMIN — MIDAZOLAM HYDROCHLORIDE 2 MG: 1 INJECTION, SOLUTION INTRAMUSCULAR; INTRAVENOUS at 12:05

## 2020-05-27 RX ADMIN — GLYCOPYRROLATE 0.4 MG: 0.2 INJECTION, SOLUTION INTRAMUSCULAR; INTRAVENOUS at 01:05

## 2020-05-27 RX ADMIN — PROPOFOL 180 MG: 10 INJECTION, EMULSION INTRAVENOUS at 12:05

## 2020-05-27 RX ADMIN — NEOSTIGMINE METHYLSULFATE 3 MG: 0.5 INJECTION INTRAVENOUS at 01:05

## 2020-05-27 RX ADMIN — SODIUM CITRATE AND CITRIC ACID MONOHYDRATE 15 ML: 500; 334 SOLUTION ORAL at 11:05

## 2020-05-27 RX ADMIN — PHENYLEPHRINE HYDROCHLORIDE 200 MCG: 10 INJECTION INTRAVENOUS at 01:05

## 2020-05-27 RX ADMIN — Medication: at 02:05

## 2020-05-27 RX ADMIN — ACETAMINOPHEN 1000 MG: 10 INJECTION, SOLUTION INTRAVENOUS at 02:05

## 2020-05-27 RX ADMIN — CEFAZOLIN 30 ML: 1 INJECTION, POWDER, FOR SOLUTION INTRAVENOUS at 12:05

## 2020-05-27 RX ADMIN — FAMOTIDINE 20 MG: 10 INJECTION INTRAVENOUS at 11:05

## 2020-05-27 RX ADMIN — HEPARIN SODIUM 5000 UNITS: 5000 INJECTION INTRAVENOUS; SUBCUTANEOUS at 11:05

## 2020-05-27 RX ADMIN — SODIUM CHLORIDE: 0.9 INJECTION, SOLUTION INTRAVENOUS at 12:05

## 2020-05-27 RX ADMIN — FENTANYL CITRATE 50 MCG: 50 INJECTION, SOLUTION INTRAMUSCULAR; INTRAVENOUS at 12:05

## 2020-05-27 RX ADMIN — FENTANYL CITRATE 25 MCG: 50 INJECTION INTRAMUSCULAR; INTRAVENOUS at 02:05

## 2020-05-27 RX ADMIN — METOPROLOL TARTRATE 5 MG: 5 INJECTION INTRAVENOUS at 06:05

## 2020-05-27 RX ADMIN — FENTANYL CITRATE 50 MCG: 50 INJECTION, SOLUTION INTRAMUSCULAR; INTRAVENOUS at 01:05

## 2020-05-27 RX ADMIN — SODIUM CHLORIDE: 0.9 INJECTION, SOLUTION INTRAVENOUS at 02:05

## 2020-05-27 RX ADMIN — ROCURONIUM BROMIDE 50 MG: 10 INJECTION, SOLUTION INTRAVENOUS at 12:05

## 2020-05-27 RX ADMIN — SODIUM CHLORIDE: 0.9 INJECTION, SOLUTION INTRAVENOUS at 01:05

## 2020-05-27 RX ADMIN — METOCLOPRAMIDE 10 MG: 5 INJECTION, SOLUTION INTRAMUSCULAR; INTRAVENOUS at 11:05

## 2020-05-27 RX ADMIN — PHENYLEPHRINE HYDROCHLORIDE 100 MCG: 10 INJECTION INTRAVENOUS at 01:05

## 2020-05-27 RX ADMIN — LIDOCAINE HYDROCHLORIDE 100 MG: 20 INJECTION, SOLUTION INTRAVENOUS at 12:05

## 2020-05-27 RX ADMIN — PANTOPRAZOLE SODIUM 40 MG: 40 INJECTION, POWDER, LYOPHILIZED, FOR SOLUTION INTRAVENOUS at 09:05

## 2020-05-27 NOTE — BRIEF OP NOTE
[facility]  Brief Operative Note    SUMMARY     Surgery Date: 5/27/2020     Surgeon(s) and Role:     * Leodan Burk MD - Primary     * Nick Manuel MD - Resident - Assisting    Pre-op Diagnosis:  Obesity with serious comorbidity, unspecified classification, unspecified obesity type [E66.9]  BMI 37.0-37.9, adult [Z68.37]    Post-op Diagnosis:  * Obesity with serious comorbidity, unspecified classification, unspecified obesity type [E66.9]     * BMI 37.0-37.9, adult [Z68.37]    Procedure(s) (LRB):  GASTRECTOMY, SLEEVE, LAPAROSCOPIC, with intraop EGD (N/A)    Anesthesia: General    Description of Procedure: Laparoscopic sleeve gastrectomy    Estimated Blood Loss: Minimal         Specimens:   Specimen (12h ago, onward)    None        Disposition: PACU in stable condition

## 2020-05-27 NOTE — ANESTHESIA PROCEDURE NOTES
Intubation  Performed by: Janelle Atwood CRNA  Authorized by: Calvin Montes MD     Intubation:     Induction:  Intravenous    Intubated:  Postinduction    Mask Ventilation:  Easy mask    Attempts:  1    Attempted By:  CRNA    Method of Intubation:  Direct    Blade:  [place] 2    Laryngeal View Grade: Grade I - full view of chords      Difficult Airway Encountered?: No      Complications:  None    Airway Device:  Oral endotracheal tube    Airway Device Size:  7.0    Style/Cuff Inflation:  Cuffed    Inflation Amount (mL):  6    Tube secured:  22    Secured at:  The lips    Placement Verified By:  Capnometry    Complicating Factors:  None    Findings Post-Intubation:  BS equal bilateral and atraumatic/condition of teeth unchanged

## 2020-05-27 NOTE — BRIEF OP NOTE
Operative Note       Surgery Date: 5/27/2020     Surgeon(s) and Role:     * Leodan Burk MD - Primary     * Nick Manuel MD - Resident - Assisting    Pre-op Diagnosis:  Chronic kidney disease, stage II (mild) [N18.2]  Essential hypertension [I10]  Dietary counseling [Z71.3]  Renovascular hypertension [I15.0]  RUIZ (dyspnea on exertion) [R06.09]  Vitamin D deficiency [E55.9]  H/O nephrotic syndrome [Z87.441]  Obesity with serious comorbidity, unspecified classification, unspecified obesity type [E66.9]  BMI 37.0-37.9, adult [Z68.37]    Post-op Diagnosis:  Chronic kidney disease, stage II (mild) [N18.2]  Essential hypertension [I10]  Dietary counseling [Z71.3]  Renovascular hypertension [I15.0]  RUIZ (dyspnea on exertion) [R06.09]  Vitamin D deficiency [E55.9]  H/O nephrotic syndrome [Z87.441]  Obesity with serious comorbidity, unspecified classification, unspecified obesity type [E66.9]  BMI 37.0-37.9, adult [Z68.37]    Procedure(s) (LRB):  GASTRECTOMY, SLEEVE, LAPAROSCOPIC, with intraop EGD (N/A)    Anesthesia: General    Procedure in Detail/Findings:  Sleeve without apparent complication.    Estimated Blood Loss: Minimal           Specimens (From admission, onward)     Start     Ordered    05/27/20 1330  Specimen to Pathology, Surgery General Surgery  Once     Question Answer Comment   Procedure Type: General Surgery    Specimen Class: Routine/Screening        05/27/20 1349              Implants: * No implants in log *           Disposition: PACU - hemodynamically stable.           Condition: Good    Attestation:  I was present and scrubbed for the entire procedure.

## 2020-05-27 NOTE — PROGRESS NOTES
Pt arrived to room from PACU via bed. VSS. Currently rates pain at an 8. PCA intact and pt education provided on use.Pt NPO. SCD's applied. IS teaching done, pt drowsy but will arouse to voice. Lap sites to abdomen clean, dry, intact. Call light within reach. Will admit pt and resume care.

## 2020-05-27 NOTE — OP NOTE
DATE OF PROCEDURE: 5/27/2020    PRE OP DIAGNOSIS: Chronic kidney disease, stage II (mild) [N18.2]  Essential hypertension [I10]  Dietary counseling [Z71.3]  Renovascular hypertension [I15.0]  RUIZ (dyspnea on exertion) [R06.09]  Vitamin D deficiency [E55.9]  H/O nephrotic syndrome [Z87.441]  Obesity with serious comorbidity, unspecified classification, unspecified obesity type [E66.9]  BMI 37.0-37.9, adult [Z68.37]    POST OP DIAGNOSIS: Chronic kidney disease, stage II (mild) [N18.2]  Essential hypertension [I10]  Dietary counseling [Z71.3]  Renovascular hypertension [I15.0]  RUIZ (dyspnea on exertion) [R06.09]  Vitamin D deficiency [E55.9]  H/O nephrotic syndrome [Z87.441]  Obesity with serious comorbidity, unspecified classification, unspecified obesity type [E66.9]  BMI 37.0-37.9, adult [Z68.37]    PROCEDURE: Procedure(s) (LRB):  GASTRECTOMY, SLEEVE, LAPAROSCOPIC, with intraop EGD (N/A)    Surgeon(s) and Role:     * Leodan Burk MD - Primary     * Nick Manuel MD - Resident - AssistingANESTHESIA: General.   INDICATIONS: A 49 y.o. with 1. Morbid Obesity with Body mass index is 35.07 kg/m². and difficulty with weight loss.  2. Co-morbidities: Essential htn, ckd 3, low iron, low hld.   DESCRIPTION OF PROCEDURE: The patient was placed under general anesthesia. The   abdomen was prepped and draped in usual manner. Access to peritoneum was   gained through the umbilicus using Optiview trocar under direct vision.   Pneumoperitoneum to 15 mmHg with CO2 gas was obtained. Four 5 mm trocars were   placed medially, subcostally at the midclavicular and anterior axial lines   bilaterally. A 10 mm trocar was placed 1 handbreadth caudad to the right   midclavicular trocar. The liver retractor was placed. The greater curve was   taken down starting 6 cm from the pylorus going all the way to the base of the   left guillermo taking all posterior gastric attachments with the Harmonic scalpel. A   42-Azeri bougie was passed  towards the pylorus and the stomach was resected  along the bougie starting 6 cm from the pylorus and coming out just a   little bit at the angle of His. The staple line was oversewn with a running   Quill stitch. The bougie was removed. Endoscopy was   performed. The sleeve appeared appropriate size and configuration and there   were no air leaks seen. The air was aspirated from the endoscope and the   endoscope was withdrawn. Chio was placed over the surgical areas.  Pneumoperitoneum was released for one minute and re-inflated to check for areas of bleeding which were controlled. The liver retractor was removed. The gastrectomy was   removed through the primary trochar site. That incision was then closed with 0 Vicryl. The trocars removed under direct vision. Prior to   removing the last trocar, the pneumoperitoneum was allowed to escape. The skin   incisions were infiltrated with Marcaine solution, closed with 4-0 plain catgut,   and reinforced with Mastisol, Steri-Strips, and Band-Aids. The patient   tolerated procedure well and was brought to Recovery Room in stable condition.   Sponge and needle counts were correct at the end of the case.    Blood loss was min, complications are none, consent was obtained and pathology was gastrectomy.

## 2020-05-27 NOTE — ANESTHESIA PREPROCEDURE EVALUATION
05/27/2020  Toi Fernandez is a 49 y.o., female.    Anesthesia Evaluation    I have reviewed the Patient Summary Reports.    I have reviewed the Nursing Notes.   I have reviewed the Medications.     Review of Systems  Anesthesia Hx:  No problems with previous Anesthesia   Denies Personal Hx of Anesthesia complications.   Social:  Non-Smoker  Denies Tobacco Use.   Cardiovascular:   Hypertension, well controlled Denies MI.  Denies CAD.    Denies CABG/stent.  Denies Dysrhythmias.  no hyperlipidemia  Denies Coronary Artery Disease.  Hypertension , Pt in Pre-HTN range, systolic 130 - 139 or diastolic 85 - 89, Well Controlled on Rx    Pulmonary:   Denies COPD.  Denies Asthma.  Denies Shortness of breath.  Denies Recent URI.  Denies Asthma.  Denies Chronic Obstructive Pulmonary Disease (COPD).    Renal/:   Chronic Renal Disease, CRI  Kidney Function/Disease, Chronic Kidney Disease (CKD) , CKD Stage III (GFR 30-59)    Hepatic/GI:   GERD, well controlled Denies Liver Disease.  Esophageal / Stomach Disorders Gerd Controlled by chronic antireflux medication.  Denies Liver Disease    Neurological:   Denies TIA. Denies CVA. Denies Seizures.  Denies Seizure Disorder  Denies CVA - Cerebrovasular Accident  Denies TIA - Transient Ischemic Attack    Endocrine:   Denies Diabetes. Denies Hypothyroidism.  Denies Diabetes  Denies Thyroid Disease  Metabolic Disorders, Obesity / BMI > 30  Psych:   Denies Psychiatric History. depression  Depression and Past Hx of Depression.          Physical Exam  General:  Well nourished    Airway/Jaw/Neck:  Airway Findings: Mouth Opening: Normal Tongue: Normal  General Airway Assessment: Adult  Mallampati: III  Improves to II with phonation.  TM Distance: Normal, at least 6 cm      Dental:  Dental Findings: In tact   Chest/Lungs:  Chest/Lungs Findings: Clear to auscultation, Normal Respiratory  Rate     Heart/Vascular:  Heart Findings: Rate: Normal  Rhythm: Regular Rhythm  Sounds: Normal  Heart murmur: negative       Mental Status:  Mental Status Findings:  Cooperative, Alert and Oriented         Anesthesia Plan  Type of Anesthesia, risks & benefits discussed:  Anesthesia Type:  general  Patient's Preference:   Intra-op Monitoring Plan: standard ASA monitors  Intra-op Monitoring Plan Comments:   Post Op Pain Control Plan: per primary service following discharge from PACU, IV/PO Opioids PRN and multimodal analgesia  Post Op Pain Control Plan Comments:   Induction:   IV  Beta Blocker:  Patient is not currently on a Beta-Blocker (No further documentation required).       Informed Consent: Patient understands risks and agrees with Anesthesia plan.  Questions answered. Anesthesia consent signed with patient.  ASA Score: 3     Day of Surgery Review of History & Physical:    H&P update referred to the surgeon.         Ready For Surgery From Anesthesia Perspective.

## 2020-05-27 NOTE — INTERVAL H&P NOTE
The patient has been examined and the H&P has been reviewed:    I concur with the findings and no changes have occurred since H&P was written.   To OR For sleeve gastrectomy. Consent in chart.     Anesthesia/Surgery risks, benefits and alternative options discussed and understood by patient/family.          Active Hospital Problems    Diagnosis  POA    Morbid obesity [E66.01]  Yes      Resolved Hospital Problems   No resolved problems to display.

## 2020-05-27 NOTE — NURSING TRANSFER
Nursing Transfer Note      5/27/2020     Transfer From: PACU to 503    Transfer via bed    Transported by transport    Medicines sent: none    Chart send with patient: Yes    Notified: spouse

## 2020-05-28 ENCOUNTER — TELEPHONE (OUTPATIENT)
Dept: BARIATRICS | Facility: CLINIC | Age: 50
End: 2020-05-28

## 2020-05-28 VITALS
OXYGEN SATURATION: 94 % | WEIGHT: 231 LBS | RESPIRATION RATE: 18 BRPM | HEIGHT: 67 IN | HEART RATE: 60 BPM | BODY MASS INDEX: 36.26 KG/M2 | DIASTOLIC BLOOD PRESSURE: 82 MMHG | SYSTOLIC BLOOD PRESSURE: 145 MMHG | TEMPERATURE: 97 F

## 2020-05-28 LAB
ANION GAP SERPL CALC-SCNC: 8 MMOL/L (ref 8–16)
BASOPHILS # BLD AUTO: 0.02 K/UL (ref 0–0.2)
BASOPHILS NFR BLD: 0.2 % (ref 0–1.9)
BUN SERPL-MCNC: 25 MG/DL (ref 6–20)
CALCIUM SERPL-MCNC: 8.3 MG/DL (ref 8.7–10.5)
CHLORIDE SERPL-SCNC: 109 MMOL/L (ref 95–110)
CO2 SERPL-SCNC: 20 MMOL/L (ref 23–29)
CREAT SERPL-MCNC: 1.4 MG/DL (ref 0.5–1.4)
DIFFERENTIAL METHOD: ABNORMAL
EOSINOPHIL # BLD AUTO: 0 K/UL (ref 0–0.5)
EOSINOPHIL NFR BLD: 0 % (ref 0–8)
ERYTHROCYTE [DISTWIDTH] IN BLOOD BY AUTOMATED COUNT: 11.9 % (ref 11.5–14.5)
EST. GFR  (AFRICAN AMERICAN): 51.3 ML/MIN/1.73 M^2
EST. GFR  (NON AFRICAN AMERICAN): 44.5 ML/MIN/1.73 M^2
GLUCOSE SERPL-MCNC: 119 MG/DL (ref 70–110)
HCT VFR BLD AUTO: 37.1 % (ref 37–48.5)
HGB BLD-MCNC: 11.8 G/DL (ref 12–16)
IMM GRANULOCYTES # BLD AUTO: 0.04 K/UL (ref 0–0.04)
IMM GRANULOCYTES NFR BLD AUTO: 0.4 % (ref 0–0.5)
LYMPHOCYTES # BLD AUTO: 2 K/UL (ref 1–4.8)
LYMPHOCYTES NFR BLD: 17.6 % (ref 18–48)
MAGNESIUM SERPL-MCNC: 2.5 MG/DL (ref 1.6–2.6)
MCH RBC QN AUTO: 32.3 PG (ref 27–31)
MCHC RBC AUTO-ENTMCNC: 31.8 G/DL (ref 32–36)
MCV RBC AUTO: 102 FL (ref 82–98)
MONOCYTES # BLD AUTO: 0.7 K/UL (ref 0.3–1)
MONOCYTES NFR BLD: 6.2 % (ref 4–15)
NEUTROPHILS # BLD AUTO: 8.5 K/UL (ref 1.8–7.7)
NEUTROPHILS NFR BLD: 75.6 % (ref 38–73)
NRBC BLD-RTO: 0 /100 WBC
PHOSPHATE SERPL-MCNC: 2.4 MG/DL (ref 2.7–4.5)
PLATELET # BLD AUTO: 224 K/UL (ref 150–350)
PMV BLD AUTO: 10.8 FL (ref 9.2–12.9)
POTASSIUM SERPL-SCNC: 4.7 MMOL/L (ref 3.5–5.1)
RBC # BLD AUTO: 3.65 M/UL (ref 4–5.4)
SODIUM SERPL-SCNC: 137 MMOL/L (ref 136–145)
WBC # BLD AUTO: 11.19 K/UL (ref 3.9–12.7)

## 2020-05-28 PROCEDURE — 94761 N-INVAS EAR/PLS OXIMETRY MLT: CPT

## 2020-05-28 PROCEDURE — 85025 COMPLETE CBC W/AUTO DIFF WBC: CPT

## 2020-05-28 PROCEDURE — 83735 ASSAY OF MAGNESIUM: CPT

## 2020-05-28 PROCEDURE — 84100 ASSAY OF PHOSPHORUS: CPT

## 2020-05-28 PROCEDURE — 36415 COLL VENOUS BLD VENIPUNCTURE: CPT

## 2020-05-28 PROCEDURE — C9113 INJ PANTOPRAZOLE SODIUM, VIA: HCPCS | Performed by: STUDENT IN AN ORGANIZED HEALTH CARE EDUCATION/TRAINING PROGRAM

## 2020-05-28 PROCEDURE — 25000003 PHARM REV CODE 250: Performed by: STUDENT IN AN ORGANIZED HEALTH CARE EDUCATION/TRAINING PROGRAM

## 2020-05-28 PROCEDURE — 94770 HC EXHALED C02 TEST: CPT

## 2020-05-28 PROCEDURE — 80048 BASIC METABOLIC PNL TOTAL CA: CPT

## 2020-05-28 PROCEDURE — 63600175 PHARM REV CODE 636 W HCPCS: Performed by: STUDENT IN AN ORGANIZED HEALTH CARE EDUCATION/TRAINING PROGRAM

## 2020-05-28 PROCEDURE — 99900035 HC TECH TIME PER 15 MIN (STAT)

## 2020-05-28 RX ORDER — PROMETHAZINE HYDROCHLORIDE 6.25 MG/5ML
12.5 SYRUP ORAL EVERY 6 HOURS PRN
Status: DISCONTINUED | OUTPATIENT
Start: 2020-05-28 | End: 2020-05-28 | Stop reason: HOSPADM

## 2020-05-28 RX ORDER — HYDROCODONE BITARTRATE AND ACETAMINOPHEN 7.5; 325 MG/15ML; MG/15ML
15 SOLUTION ORAL EVERY 4 HOURS PRN
Qty: 473 ML | Refills: 0 | Status: SHIPPED | OUTPATIENT
Start: 2020-05-28 | End: 2020-06-04

## 2020-05-28 RX ORDER — ONDANSETRON HYDROCHLORIDE 4 MG/5ML
4 SOLUTION ORAL EVERY 6 HOURS PRN
Status: DISCONTINUED | OUTPATIENT
Start: 2020-05-28 | End: 2020-05-28 | Stop reason: HOSPADM

## 2020-05-28 RX ORDER — CARVEDILOL 25 MG/1
25 TABLET ORAL 2 TIMES DAILY WITH MEALS
Status: DISCONTINUED | OUTPATIENT
Start: 2020-05-28 | End: 2020-05-28 | Stop reason: HOSPADM

## 2020-05-28 RX ORDER — IRBESARTAN 75 MG/1
300 TABLET ORAL NIGHTLY
Status: DISCONTINUED | OUTPATIENT
Start: 2020-05-28 | End: 2020-05-28 | Stop reason: HOSPADM

## 2020-05-28 RX ADMIN — ONDANSETRON 4 MG: 2 INJECTION INTRAMUSCULAR; INTRAVENOUS at 06:05

## 2020-05-28 RX ADMIN — CARVEDILOL 25 MG: 25 TABLET, FILM COATED ORAL at 08:05

## 2020-05-28 RX ADMIN — HYDROCODONE BITARTRATE AND ACETAMINOPHEN 15 ML: 7.5; 325 SOLUTION ORAL at 08:05

## 2020-05-28 RX ADMIN — METOPROLOL TARTRATE 5 MG: 5 INJECTION INTRAVENOUS at 12:05

## 2020-05-28 RX ADMIN — METOPROLOL TARTRATE 5 MG: 5 INJECTION INTRAVENOUS at 06:05

## 2020-05-28 RX ADMIN — PANTOPRAZOLE SODIUM 40 MG: 40 INJECTION, POWDER, LYOPHILIZED, FOR SOLUTION INTRAVENOUS at 08:05

## 2020-05-28 NOTE — TELEPHONE ENCOUNTER
Phoned patient and she stated that she only go the liquid pain medication delivered to her bedside.  She is still in the hospital so I informed her that I would call the pharmacy and investigate.  The pharmacist, Gisela, stated that they were ordered on May 12 and they only keep them on the shelf for 10 days and that they had been put back.  Told her that we typically put them in at the pre op appt but they are for bedside delivery when the patient had the surgery.  She said she would put the orders in again and get them up the room 503.  Phoned patient and let her know the medications are on their way.  She verbalized understanding.

## 2020-05-28 NOTE — DISCHARGE SUMMARY
[facility]  General Surgery  Discharge Summary      Patient Name: Toi Fernandez  MRN: [id number]  Admission Date: 5/27/2020  Hospital Length of Stay: 1 days  Discharge Date and Time:  05/28/2020  Attending Physician: Leodan Burk MD   Discharging Provider: Nick Manuel MD  Primary Care Provider: Estela Crawford MD     HPI: Toi Fernandez is a 49 y.o. female who presents for pre-operative exam for weight loss surgery.  she has completed her pre-operative evaluation.  she has failed medical treatment for obesity.  1. Morbid Obesity with Body mass index is 35.07 kg/m². and difficulty with weight loss.  2. Co-morbidities: Essential htn, ckd 3, low iron, low hld    Procedure(s) (LRB):  GASTRECTOMY, SLEEVE, LAPAROSCOPIC, with intraop EGD (N/A)     Hospital Course: Patient went to the OR on 5/27/2020 for a laparoscopic sleeve gastrectomy secondary to morbid obesity with comorbidities.  The patient tolerated the procedure well and was transferred to the PACU in stable condition.  Their post op course was uncomplicated.  The patients pain was controlled with PO medications.  Ambulating without issue.  Voiding without issue with adequate urine output.  Tolerating liquid intake.  Incision site is clean, dry, and intact.  Discharged on POD 1 in stable condition.    Consults:     Physical Exam   Constitutional: She is oriented to person, place, and time. She appears well-developed and well-nourished.   HENT:   Head: Atraumatic.   Eyes: EOM are normal.   Neck: Normal range of motion.   Cardiovascular: Normal rate, regular rhythm and intact distal pulses.   Pulmonary/Chest: Effort normal. No respiratory distress.   Abdominal:   Abdomen soft and nondistended.  Appropriately tender to palpation.  Incision sites all clean, dry, and intact   Musculoskeletal: Normal range of motion.   Neurological: She is alert and oriented to person, place, and time.   Skin: Skin is warm and dry.   Psychiatric: She  has a normal mood and affect.       Significant Diagnostic Studies: Labs:   BMP: No results for input(s): GLU, NA, K, CL, CO2, BUN, CREATININE, CALCIUM, MG in the last 48 hours., CMP No results for input(s): NA, K, CL, CO2, GLU, BUN, CREATININE, CALCIUM, PROT, ALBUMIN, BILITOT, ALKPHOS, AST, ALT, ANIONGAP, ESTGFRAFRICA, EGFRNONAA in the last 48 hours. and CBC   Recent Labs   Lab 05/28/20 0621   WBC 11.19   HGB 11.8*   HCT 37.1          Pending Diagnostic Studies:     Procedure Component Value Units Date/Time    Basic metabolic panel [[id number]] Collected:  05/28/20 0621    Order Status:  Sent Lab Status:  In process Updated:  05/28/20 0651    Specimen:  Blood     Magnesium [[id number]] Collected:  05/28/20 0621    Order Status:  Sent Lab Status:  In process Updated:  05/28/20 0651    Specimen:  Blood     Phosphorus [[id number]] Collected:  05/28/20 0621    Order Status:  Sent Lab Status:  In process Updated:  05/28/20 0651    Specimen:  Blood     Specimen to Pathology, Surgery General Surgery [[id number]] Collected:  05/27/20 1350    Order Status:  Sent Lab Status:  In process Updated:  05/27/20 1453        Final Active Diagnoses:    Diagnosis Date Noted POA    PRINCIPAL PROBLEM:  Morbid obesity [E66.01] 05/27/2020 Yes      Problems Resolved During this Admission:      Discharged Condition: good    Disposition: Home or Self Care    Follow Up:  Follow-up Information     Leodan Burk MD In 2 weeks.    Specialties:  General Surgery, Bariatrics  Why:  For wound re-check.  For post op follow up.  Contact information:  [address] [place] [address] [id number] [phone]                 Patient Instructions:      Diet clear liquid   Order Comments: Bariatric clears and protein shakes for 2 weeks.  Follow up with bariatric clinic for adjustment in diet recommendations at 2 weeks.     Lifting restrictions   Order Comments: Don't lift more than 10lbs for at least 6 weeks.     No driving until:   Order  Comments: No driving while using narcotics.     Notify your health care provider if you experience any of the following:  temperature >100.4     Notify your health care provider if you experience any of the following:  persistent nausea and vomiting or diarrhea     Notify your health care provider if you experience any of the following:  severe uncontrolled pain     Notify your health care provider if you experience any of the following:  redness, tenderness, or signs of infection (pain, swelling, redness, odor or green/yellow discharge around incision site)     Notify your health care provider if you experience any of the following:  difficulty breathing or increased cough     Notify your health care provider if you experience any of the following:  severe persistent headache     Notify your health care provider if you experience any of the following:  worsening rash     Notify your health care provider if you experience any of the following:  persistent dizziness, light-headedness, or visual disturbances     Notify your health care provider if you experience any of the following:  increased confusion or weakness     No dressing needed     Activity as tolerated     Shower on day dressing removed (No bath)   Order Comments: May shower tomorrow.  Do not soak incision in bath, pool, lake, etc for at least 2 weeks.     Medications:  Reconciled Home Medications:      Medication List      CHANGE how you take these medications    hydrocodone-apap 7.5-325 MG/15 ML oral solution  Commonly known as:  HYCET  Take 15 mLs by mouth every 4 (four) hours as needed.  What changed:    · when to take this  · reasons to take this     irbesartan 300 MG tablet  Commonly known as:  AVAPRO  Take 1 tablet (300 mg total) by mouth every evening.  What changed:  when to take this     torsemide 10 MG Tab  Commonly known as:  DEMADEX  TAKE 1 TABLET(10 MG) BY MOUTH EVERY DAY  What changed:  See the new instructions.        CONTINUE taking these  medications    albuterol 90 mcg/actuation inhaler  Commonly known as:  PROVENTIL/VENTOLIN HFA  Inhale 2 puffs into the lungs every 4 (four) hours as needed. Rescue     carvediloL 25 MG tablet  Commonly known as:  COREG  Take 1 tablet (25 mg total) by mouth 2 (two) times daily with meals.     cetirizine 10 MG tablet  Commonly known as:  ZYRTEC  1-2 tablets daily as needed for itching     cyclobenzaprine 10 MG tablet  Commonly known as:  FLEXERIL  Half-1 tab po q bid for back pain     ergocalciferol 50,000 unit Cap  Commonly known as:  ERGOCALCIFEROL  TAKE ONE CAPSULE BY MOUTH EVERY 30 DAYS     fluticasone propionate 50 mcg/actuation nasal spray  Commonly known as:  FLONASE  2 sprays (100 mcg total) by Each Nostril route 2 (two) times daily.     hydrocortisone 2.5 % cream  Apply to affected areas twice a day when eczema is moderate.     omeprazole 40 MG capsule  Commonly known as:  PRILOSEC  Take 1 capsule (40 mg total) by mouth every morning. Open capsule and take with apple sauce     ondansetron 8 MG Tbdl  Commonly known as:  ZOFRAN-ODT  Take 1 tablet (8 mg total) by mouth every 6 (six) hours as needed.     triamcinolone acetonide 0.1% 0.1 % cream  Commonly known as:  KENALOG  APPLY EXTERNALLY TO THE AFFECTED AREA TWICE DAILY     ursodioL 500 MG tablet  Commonly known as:  ЕЛЕНА FORTE  Crush one tablet daily for gall bladder. Please compound into liquid and supply for 90 days if insurance approves            Nick Manuel MD  General Surgery  [facility]

## 2020-05-28 NOTE — TELEPHONE ENCOUNTER
----- Message from Marsha Mckeon sent at 5/28/2020  9:54 AM CDT -----  Contact: Pt  Reason: Pt calling to speak with Melinda regard to her medication. She stated she only received 1 of the medicines which was Hydrocodone. Please call      Communication: [phone]

## 2020-05-28 NOTE — PLAN OF CARE
Patient discharged home to care of self on 5/28/20.     05/28/20 1543   Final Note   Assessment Type Final Discharge Note   Anticipated Discharge Disposition Home   What phone number can be called within the next 1-3 days to see how you are doing after discharge?   ([phone])   Hospital Follow Up  Appt(s) scheduled? Yes   Discharge plans and expectations educations in teach back method with documentation complete? Yes   Right Care Referral Info   Post Acute Recommendation No Care

## 2020-05-28 NOTE — PLAN OF CARE
Problem: Adult Inpatient Plan of Care  Goal: Plan of Care Review  Outcome: Ongoing, Progressing     Problem: Adult Inpatient Plan of Care  Goal: Optimal Comfort and Wellbeing  Outcome: Ongoing, Progressing     Problem: Fall Injury Risk  Goal: Absence of Fall and Fall-Related Injury  Outcome: Ongoing, Progressing     Problem: Pain Acute  Goal: Optimal Pain Control  Outcome: Ongoing, Progressing      Pt resting in bed with no acute distress noted.  Respirations even and unlabored.  Lap sites to abdomen clean with no redness or drainage noted and closed with dermabond.  PCA being used for pain control and states is working well for her pain control.  IS at bedside and in reach.  Pt demonstrates appropriate use and frequency.  SCDs on.  IV to left hand 18 gauge infusing NS @ 125cc/hr without difficulty.  IV with no redness or swelling noted.  Pt is currently NPO.  Water protocol explained to the pt and will begin in the morning at 5 am.  She verbalizes understanding.  Pt able to stand and ambulate to the restroom with stand by assistance.  Gait is good and balance is well.  No c/o nausea or dizziness.  Call light within reach of the pt.  Side rails up x 2 for safety.  Bed locked and lowered.

## 2020-05-28 NOTE — RESPIRATORY THERAPY
Rapid Response Respiratory Therapy ETCO2 Check     Time of visit: 729     Code Status: No Order   : 1970  Bed: 503/503 A:   MRN: [id number]    SITUATION     Evaluated patient for: ETCO2 Compliance     BACKGROUND     Patient has a past medical history of Chronic kidney disease, stage II (mild), Depression, H/O nephrotic syndrome, 2001., Hypertension, Obesity, Secondary hyperparathyroidism, and Vitamin D deficiency disease.    ASSESSMENT     Is the ETCO2 monitor on? (Yes/No)  Yes   Is the patient wearing a cannula? (Yes/No) Yes  Are ETCO2 orders placed? (Yes/No) Yes  Is the patient on a PCA pump? (Yes/No) Yes  ETCO2 monitored: ETCO2 (mmHg): 33 mmHg  O2 Device/Concentration: Room Air  Pulse: 69 Respiratory rate: 19 Temperature: Temp: 97.9 °F (36.6 °C) BP: BP: 132/83 SpO2: 95%  Level of Consciousness: Level of Consciousness (AVPU): alert  All Lung Field Breath Sounds:    Ambu at bedside: Ambu bag with the patient?: Yes, Adult Ambu    INTERVENTIONS/RECOMMENDATIONS     Keep patient on EtCO2 cannula as long as PCA pump orders are in place.  Call respiratory with any questions or concerns.    FOLLOW-UP     Please call back the Rapid Response RT, Park Bird, RRT at x [phone] for any questions or concerns.

## 2020-05-28 NOTE — PLAN OF CARE
Spoke with patient to complete d/c planning assessment. Patient lives with spouse who will be her ride home at discharge. D/c orders in [employer]. PCP and Pharmacy verified. No needs noted.     05/28/20 1036   Discharge Assessment   Assessment Type Discharge Planning Assessment   Confirmed/corrected address and phone number on facesheet? Yes   Assessment information obtained from? Patient   Expected Length of Stay (days)   (1)   Communicated expected length of stay with patient/caregiver yes   Prior to hospitilization cognitive status: Alert/Oriented   Prior to hospitalization functional status: Independent   Current cognitive status: Alert/Oriented   Current Functional Status: Independent   Facility Arrived From: Home   Lives With spouse   Able to Return to Prior Arrangements yes   Is patient able to care for self after discharge? Yes   Who are your caregiver(s) and their phone number(s)?   (Dee Dee Heredia (Mother) [phone])   Patient's perception of discharge disposition home or selfcare   Readmission Within the Last 30 Days no previous admission in last 30 days   Patient currently being followed by outpatient case management? No   Patient currently receives any other outside agency services? No   Equipment Currently Used at Home none   Do you have any problems affording any of your prescribed medications? No   Is the patient taking medications as prescribed? yes   Does the patient have transportation home? Yes   Transportation Anticipated family or friend will provide   Does the patient receive services at the Coumadin Clinic? No   Discharge Plan A Home with family   Discharge Plan B Home with family   DME Needed Upon Discharge  none   Patient/Family in Agreement with Plan yes

## 2020-05-29 ENCOUNTER — PATIENT MESSAGE (OUTPATIENT)
Dept: BARIATRICS | Facility: CLINIC | Age: 50
End: 2020-05-29

## 2020-05-29 NOTE — ANESTHESIA POSTPROCEDURE EVALUATION
Anesthesia Post Evaluation    Patient: Toi Fernandez    Procedure(s) Performed: Procedure(s) (LRB):  GASTRECTOMY, SLEEVE, LAPAROSCOPIC, with intraop EGD (N/A)    Final Anesthesia Type: general    Patient location during evaluation: PACU  Patient participation: Yes- Able to Participate  Level of consciousness: awake and alert and oriented  Post-procedure vital signs: reviewed and stable  Pain management: adequate  Airway patency: patent    PONV status at discharge: No PONV  Anesthetic complications: no      Cardiovascular status: blood pressure returned to baseline and hemodynamically stable  Respiratory status: room air, spontaneous ventilation and unassisted  Hydration status: euvolemic  Follow-up not needed.          Vitals Value Taken Time   /82 5/28/2020  8:00 AM   Temp 36.1 °C (97 °F) 5/28/2020  8:00 AM   Pulse 60 5/28/2020  8:00 AM   Resp 18 5/28/2020  8:00 AM   SpO2 94 % 5/28/2020  8:00 AM         Event Time     Out of Recovery 15:00:00          Pain/Nallely Score: Pain Rating Prior to Med Admin: 6 (5/28/2020  8:43 AM)  Nallely Score: 10 (5/27/2020  4:15 PM)

## 2020-06-01 LAB
FINAL PATHOLOGIC DIAGNOSIS: NORMAL
GROSS: NORMAL

## 2020-06-03 ENCOUNTER — TELEPHONE (OUTPATIENT)
Dept: BARIATRICS | Facility: CLINIC | Age: 50
End: 2020-06-03

## 2020-06-03 NOTE — TELEPHONE ENCOUNTER
Called to check in 1 week post op from bariatric surgery.    Water protocol began at 7 am and completed while in hospital/ medicine cups given to you by nursing to take home (y/n):    Dehydration assessment:  Urine output/color: light yellow  Chest pain:n  Persistent increased heart rate:n  Fatigue:n  N/V: n  Dizzy/weak: n  BM: y  Protein and fluid intake assessment: (food diary)  Fluid intake: 30 ounces  Protein supplements: 1 premier/   Protein intake yesterday: 30 grms  Vitamins  -What vitamins are you taking? all  -Are you tolerating well? y  Medications  Omeprazole: y   Hycet: sometimes at night  How are you tolerating pain at this time?  4(rate on a scale from 1 to 10; >7 notify PA/MD)  Are you having any problems? n(f/u with PCP, cardiologist, endocrinologist)  How is your support system at home? good  Exercise reminder (light exercise at this time, no lifting above 10 lbs)     Questions for nurse/MA/PA:   n     Assessment:  Doing well.     Discussion:  Continue to work on fluid and protein intake.     Confirmed date and time for 2 week po labs and clinic visit

## 2020-06-05 ENCOUNTER — PATIENT OUTREACH (OUTPATIENT)
Dept: ADMINISTRATIVE | Facility: OTHER | Age: 50
End: 2020-06-05

## 2020-06-09 ENCOUNTER — OFFICE VISIT (OUTPATIENT)
Dept: BARIATRICS | Facility: CLINIC | Age: 50
End: 2020-06-09
Payer: COMMERCIAL

## 2020-06-09 VITALS — BODY MASS INDEX: 32.96 KG/M2 | WEIGHT: 210 LBS | HEIGHT: 67 IN

## 2020-06-09 DIAGNOSIS — Z98.84 S/P LAPAROSCOPIC SLEEVE GASTRECTOMY: ICD-10-CM

## 2020-06-09 DIAGNOSIS — I10 ESSENTIAL HYPERTENSION: ICD-10-CM

## 2020-06-09 DIAGNOSIS — E66.09 CLASS 1 OBESITY DUE TO EXCESS CALORIES WITH SERIOUS COMORBIDITY AND BODY MASS INDEX (BMI) OF 32.0 TO 32.9 IN ADULT: ICD-10-CM

## 2020-06-09 DIAGNOSIS — R63.4 WEIGHT LOSS: ICD-10-CM

## 2020-06-09 DIAGNOSIS — Z98.890 POST-OPERATIVE STATE: Primary | ICD-10-CM

## 2020-06-09 PROCEDURE — 99024 PR POST-OP FOLLOW-UP VISIT: ICD-10-PCS | Mod: 95,,, | Performed by: PHYSICIAN ASSISTANT

## 2020-06-09 PROCEDURE — 99024 POSTOP FOLLOW-UP VISIT: CPT | Mod: 95,,, | Performed by: PHYSICIAN ASSISTANT

## 2020-06-09 NOTE — PATIENT INSTRUCTIONS
High Protein Pureed Diet    2 weeks after gastric bypass and sleeve you may be ready to add pureed food to your diet.  All food should be the consistency of baby food, or thinner.  Follow pureed diet for the next 2 weeks.    Protein - It is very important to pay attention to protein intake during this time.      Inadequate protein intake can cause:  ? Delayed Wound Healing  ? Hair Loss  ? Muscle Breakdown    Meal Plan - Eat 3-4 meals per day (2-4 tbsp each), with protein supplements in between to meet protein needs.  Meeting protein needs daily will help increase healing, decrease muscle loss, and increase weight loss.  Your goal is  grams of protein a day.    Protein First - Always eat the foods with the highest protein first.  Foods high in protein include milk, yogurt, cheese, egg whites, and blenderized meat, seafood, and beans.    Fluids - Keep track in your journal of how much you are drinking; you should try to drink at least 64oz of fluids every day.      Foods allowed: Portion size Protein (g)   ? Sugar-free clear liquids As desired 0   ? Skim or 1% milk ½ cup 4   ? Sugar free pudding, light yogurt, custard (use skim or 1% milk in preparation) 3 oz 2.5   ? Strained baby food meats, or home-made pureed lean meats and shrimp 1 oz 7   ? Beans (red, white, black, lima, hung, fat free refried, hummus) and lentils ¼ cup 4   ? Low-fat/fat free cheese.(cottage cheese, mozzarella string cheese, ricotta cheese, Laughing Cow, Baby Bell, cheddar, etc) ¼ cup 7-8   ? Scrambled eggs or Egg Beaters 1 or ¼ cup 6   ? Edamame or Tofu, mashed ¼ cup 5   ? Unflavored protein powder (add to 1 scoop to  98% fat free soups or SF pudding) 3 Tbsp 9   ? *PB2: peanut powder (45 calories) 2 Tbsp 5     *PB2 powdered peanut butter: 45 calories vs. 190 calories in 2 tbsp of regular peanut butter. Purchase online at [employer], or  at various [employer], [employer], [employer] and [employer].      Bariatric Liquid/Pureed Sample  Menu    3-4 small meals plus 2-3 protein drinks per day.    8am 1 egg or ¼ cup Egg Beaters   9am 1 cup water, or decaf coffee or tea   10am Protein drink, 30g protein   11am 2 tbsp low-fat cottage cheese, and 1 tbsp pureed peaches   12pm 1 cup water, or sugar-free lemonade    1pm 2 tbsp pureed chicken, and 1 tbsp pureed carrots    2pm 1 cup water, or sugar-free lemonade   3pm Protein drink, 30g protein   5pm 1 cup water    6pm 1 cup hi-protein creamy chicken soup 14g protein (see Recipe below)   7pm 1 cup water, or sugar-free fruit punch    8pm 1 cup water     This sample menu provides approx. 80g protein and 64oz fluids.  Liquid protein supplements should contain 20-30g protein and less than 4 grams of sugar each.    ? Sip fluids continuously in between meals.  Drink at least ¼ cup every 15 minutes.  ? For fluids: ¼ cup = 2 oz = 4 tbsp       RECIPE IDEAS for Bariatric Pureed Diet:    Hi-Protein Creamy Chicken Soup: (10g protein per 1 cup serving)  Empty 1 can of 98% fat free cream of chicken soup into saucepan. Then  blend 1 scoop of unflavored protein powder with 1 can of skim milk until smooth.  Add protein milk to saucepan and heat to warm. (Note: Do NOT boil. Protein powder may clump if heated too hot).     Hi-Protein Pudding: (14g protein per ½ cup serving)  Add 2 scoops protein powder to 2 cups cold skim milk and mix well.  Stir in dry Jell-O Sugar-Free Instant Pudding mix.  Chill and Enjoy!    Tuna Mousse (12g protein per ¼ cup serving) Page 135 in book Eating Well After Weight Loss Surgery.  In a  or , combine all ingredients and pulse until smooth.  2 6-ounce cans tuna packed in water, drained  2 tbsp low-fat mayonnaise  2 tbsp fat-free sour cream  2 tbsp fat-free cream cheese, softened  ½ cup shallots, finely chopped  1 tbsp lemon juice  ¼ tsp ground pepper  ½ tsp celery seed    Chocolate Peanut Butter Mousse  (28g protein total)  6oz plain Greek yogurt  4 tbsp chocolate  PB2      Bariatric Soft Diet           - Start Soft Diet 2 weeks after gastric banding  -   Start Soft Diet 4 weeks after gastric bypass and sleeve    As your stomach heals, your doctor will progress your diet to soft foods.  This diet usually lasts for 2-3 months, but can last longer depending on each individual. Soft foods are those which can be easily mashed with a fork.    Remember these principles:   No liquids with meals. Do no drink 30 minutes before meals and wait 30 minutes to 1 hour after meals to start drinking.   Sip on water, sugar-free beverages or non-fat milk throughout the day.  You will need to continue drinking at least 1 protein drink daily to meet protein needs.   100% fruit juice (no sugar added) is allowed, but limit to 4oz a day because it is high in calories and does not contain any protein.   Chew foods slowly; one meal should take 20-30 minutes.   Eat 3-5 meals per day, without any additional snacking.   Stop eating as soon as you feel full.   Avoid using table sugar and foods made with refined sugar, which can trigger dumping syndrome.   Marinating meats with a low sugar marinade, adding low-fat salad dressing, or adding low calorie gravy (made from powder and water) can help meats to digest easier.     Adding Vegetables and Fruits:    As long as you are consuming >80g total protein daily from combination of foods and protein drinks, you may start adding small bites of fruits and vegetables to your meals. Cooked, tender vegetables and ripe fruits without the peel are tolerated best.    Avoid fruit canned in syrup, sugary fruit juices, and vegetables cooked with oil, butter or median.  Bariatric SOFT Diet    EAT THESE FOODS AVOID THESE FOODS   High in Protein: High in Fat/Sugar:   ? Canned tuna or chicken (packed in water)  ? Lean ground turkey breast or ground round  ? Turkey or chicken (no skin); cooked tender and cut in small pieces  ? Lean pork or beef (cook in crock pot until  very tender; cut in small pieces  ? Scrambled, poached, or boiled eggs  ? Baked, broiled, grilled or boiled fish and seafood (not fried!)  ? Silken tofu, Edamame (soybeans)  ? Beans, hummus and lentils  ? Lean deli meats (turkey and chicken breast, ham, roast beef)  ? 1% or Skim Milk, Lactaid, or Soymilk  ? Low-fat or fat-free cottage cheese, soft cheese, mozzarella string cheese, or ricotta  ? Light yogurt, Greek yogurt, SF pudding High fat milk (whole, 2%)  Butter, margarine, oil, mayonnaise  Sour cream, cream cheese, salad dressing  Ice Cream  Cakes, cookies, pies, desserts  Candy  Luncheon meats (bologna, salami, chopped ham)  Sausage, Rabago  Gravy  Fried Foods  ___________________________________  Tough/Crunchy--------------------------------  Tough or dry meats  Corn   Granola/cereal with nuts  Shredded Coconut    May add after 3 months:  Raw veggies  Lettuce  Plain, Unsalted Nuts and Seeds  Protein bars with 0-4 grams of sugar   As long as you are getting >80g PRO: Starchy Carbohydrates. At goal weight, some may include whole grains in small amounts.   Cooked tender vegetables without peel  Ripe fruits without peel  Frozen fruits with no added sugar  Fruit canned in its own juice or in water  Fat free, sugar free, frozen yogurt White and wheat Bread, Rice, Pasta   Cereals (including grits, oatmeal)   Crackers, Pretzels, Chips, Granola  Corn, Popcorn, Peas  White Potatoes, Sweet potatoes  Flour and corn tortillas     Fluids: Always Avoid:   Skim/1% milk, Lactaid, Soymilk  Water and Sugar-free beverages  (decaf and non-carbonated)  Decaf coffee & decaf tea  Sugary drinks  Carbonated drinks  Alcohol  Drinking through straws     Protein Content of Foods Recommended after                   Weight Loss Surgery    Food Name Portion Calories Protein (gms)   Almonds (unsalted) 1/4 cup 160 6   [place] milk, unsweetened 1 cup 30  1   Beef, Roast 1 oz 46 8   Beef, Steak, sirloin, trimmed 1 oz 55 9   Catfish, broiled or  baked 1 oz 30 5   Cheese, American FF 1 oz 40 6   Cheese, Cottage 1% fat ¼ cup 41 7   Cheese, Parmesan, grated ¼ cup 128 12   Cheese, Mozzarella, part skim 1 oz 78 8   Cheese, part skim Ricotta ¼ cup 90 8   Chicken, white breast w/o skin 1 oz 46 9   Chicken, leg w/o skin 1 oz 54 7   Crab, steamed ¼ cup  40 9   Crawfish tails, boiled ¼ cup 35 8   Edamame, shelled ¼ cup 50 4   Egg 1 78 6   Ham, lean 5% 1 oz 44 7   Hamburger, lean 1 oz 56 7   Hummus ¼ cup 100 5   Lobster, steamed 1 oz 26 5   Milk, skim or 1%, soy  1 cup 90 8   Pork Tenderloin 1 oz 46 7   Pudding, SF 1 serv 60 2   Red beans ¼ cup 56 4   Refried beans, fat free ¼ cup 65 4   [place], baked 1 oz 52 7   Shrimp, steamed 1 oz 28 6   Soymilk, plain ½ cup 40 3   [place]ia, white fish, cooked 1 oz 36 8   Tofu ¼ cup 47 5   Trout 1 oz 48 7   Tuna, canned in water 1 oz 37 8   Turkey, white meat 1 oz 35 7   Veal Loin 1 oz 50 7   Yogurt, SF, frozen vanilla 3 oz 72 3.5   Yogurt, Fruit, FF, light 3 oz 40 2.5   Yogurt, Greek 3 oz 70 8     *Abbreviations: SF=sugar free, LF=low fat, FF= fat free, gms=grams  *3oz of cooked meat/protein = size of deck of cards or ladies palm   *1oz cheese = 1inch cube or 1 slice American cheese    Sample Menu for Bariatric Soft Diet  For Gastric Bypass and Sleeve            3 meals + 2 protein drinks  Remember: No drinking with meals.    Time of Day Day 1 Day 2   7am:    1 egg (or ¼ cup Egg Beaters) ¼ cup low-fat cottage cheese, 1 tbsp berries   8am: 1 cup water/SF beverage     9am: 1 cup water/SF beverage     10am:  Protein drink  Protein drink   11am: 1 cup water/SF beverage     12pm:    1-2 oz grilled shrimp, ¼ cup green beans   1-2oz canned chicken, shredded cheese, 1 tbsp salsa   1pm: 1 cup water/SF beverage     3pm:  Protein drink   Protein drink   4pm: 1 cup water/SF beverage     6pm:  ½ cup low fat chili, 1oz low-fat cheese, ¼ cup broccoli 2 oz grilled fish,  ¼  cup lima beans   7pm: 1 cup water/SF beverage       This sample menu  provides approx. 80g protein total, including about 40g protein from foods and at least 40g protein from protein drinks.  Drinking protein drinks daily helps decrease muscle loss, increase weight loss, and prevent hair loss.    ? Sip fluids continuously in between meals.    ? For fluids: 1 cup = 8 oz   ? For food: ¼ cup = 4 tablespoons = 1oz  ? No drinking from 30 minutes before meals to 30 minutes after meals.  ? 3oz meat is approx. the size of a deck of cards.    ? A food scale will help you determine portion size (Can be purchased at [employer])

## 2020-06-09 NOTE — PROGRESS NOTES
The patient location is: home  The chief complaint leading to consultation is: 2 week post op   Visit type: audiovisual    Face to Face time with patient: 18 minutes  of total time spent on the encounter, which includes face to face time and non-face to face time preparing to see the patient (eg, review of tests), Obtaining and/or reviewing separately obtained history, Documenting clinical information in the electronic or other health record, Independently interpreting results (not separately reported) and communicating results to the patient/family/caregiver, or Care coordination (not separately reported).         Each patient to whom he or she provides medical services by telemedicine is:  (1) informed of the relationship between the physician and patient and the respective role of any other health care provider with respect to management of the patient; and (2) notified that he or she may decline to receive medical services by telemedicine and may withdraw from such care at any time.    Notes:       BARIATRIC POST-OPERATIVE VISIT:  HPI:  Toi Fernandez is a 49 y.o. year old female presents for 2 week post op visit following s/p sleeve gastrectomy.  she is doing well and tolerating the diet without difficulty.  she has no complaints.  Pt states that she is doing well. Believes she may be constipated has not had a BM since four days after surgery. Currently on liquid diet, does not feel the need to defecate, no straining, abdominal bloating/discomfort. Passing gas.    Pt states that she is hungry and ready to eat.    Had an episode of weakness, but did not get enough protein,  Has since resolved.    Denies: nausea, vomiting, abdominal pain, changes in bowel movement pattern, fever, chills, dysphagia, chest pain, and shortness of breath.    Review of Systems   Constitutional: Negative for fatigue and fever.   HENT: Negative for trouble swallowing.    Eyes: Negative for visual disturbance.   Respiratory: Negative  for cough, choking, chest tightness and shortness of breath.    Cardiovascular: Negative for chest pain, palpitations and leg swelling.   Gastrointestinal: Positive for constipation. Negative for abdominal pain, diarrhea, nausea and vomiting.   Genitourinary: Negative for decreased urine volume and dysuria.   Musculoskeletal: Negative for back pain, myalgias and neck pain.   Skin: Negative for rash.   Neurological: Positive for weakness (resolved). Negative for dizziness, light-headedness and headaches.   Psychiatric/Behavioral: Negative for dysphoric mood. The patient is not nervous/anxious.        EXERCISE & VITAMINS:  See Bariatric Assessment  No exercising  Adherent to vitamin regimen   MEDICATIONS/ALLERGIES:  Have been reviewed.    DIET: Liquid Bariatric Diet.  1 protein shakes daily, ~30 grams protein.  32 fl oz SF clear beverage.     Sick of protie shakes and ready to eat      See Dietician note from today for a more detailed assessment.      Physical Exam   Constitutional: She is oriented to person, place, and time. She appears well-developed and well-nourished.   HENT:   Head: Normocephalic and atraumatic.   Eyes: Conjunctivae and EOM are normal.   Neck: Normal range of motion. Neck supple.   Pulmonary/Chest: Effort normal. No respiratory distress.   Abdominal: Soft. There is no tenderness.   Abdomen visualized via video, non tender to pt palpation no signs of infections, incisions look to be healing well with no edema or discharge noted    Neurological: She is alert and oriented to person, place, and time.   Psychiatric: She has a normal mood and affect. Her behavior is normal. Judgment and thought content normal.       ASSESSMENT:  - Morbid obesity s/p sleeve gastrectomy on 5/27/2020  - Co-morbidities: depression, hypertension and CKD  - Good Weight loss, 21#'s and 25% EWL  - No Exercise routine  - Poor Diet ( blow in hydration and protein  - Good Vitamin regimen    PLAN:  - Advance diet per dietician   -  Increase protein and hydration  - Can use colace if needed   - Ursodiol 300 mg twice daily for 6 months  - Anti-Acid medication, PP daily for 3 months  - No lifting more than 10 lbs for 6 weeks  - Miralax daily for constipation  - Emphasized the importance of regular exercise and adherence to bariatric diet to achieve maximum weight loss.  - Encouraged patient to start/continue regular exercise.  - Follow-up with dietician to advance diet.  - Continue daily vitamins and medications.  - RTC per post op schedule  - Call the office for any issues.  - Check labs

## 2020-06-10 ENCOUNTER — CLINICAL SUPPORT (OUTPATIENT)
Dept: BARIATRICS | Facility: CLINIC | Age: 50
End: 2020-06-10
Payer: COMMERCIAL

## 2020-06-10 ENCOUNTER — LAB VISIT (OUTPATIENT)
Dept: LAB | Facility: HOSPITAL | Age: 50
End: 2020-06-10
Payer: COMMERCIAL

## 2020-06-10 VITALS — BODY MASS INDEX: 32.66 KG/M2 | WEIGHT: 208.56 LBS

## 2020-06-10 DIAGNOSIS — E55.9 VITAMIN D DEFICIENCY: ICD-10-CM

## 2020-06-10 DIAGNOSIS — I15.0 RENOVASCULAR HYPERTENSION: ICD-10-CM

## 2020-06-10 DIAGNOSIS — N18.2 CHRONIC KIDNEY DISEASE, STAGE II (MILD): ICD-10-CM

## 2020-06-10 DIAGNOSIS — Z71.3 DIETARY COUNSELING: ICD-10-CM

## 2020-06-10 DIAGNOSIS — E66.9 OBESITY WITH SERIOUS COMORBIDITY, UNSPECIFIED CLASSIFICATION, UNSPECIFIED OBESITY TYPE: ICD-10-CM

## 2020-06-10 DIAGNOSIS — I10 ESSENTIAL HYPERTENSION: ICD-10-CM

## 2020-06-10 DIAGNOSIS — Z87.441 H/O NEPHROTIC SYNDROME: ICD-10-CM

## 2020-06-10 DIAGNOSIS — R06.09 DOE (DYSPNEA ON EXERTION): ICD-10-CM

## 2020-06-10 LAB
ALBUMIN SERPL BCP-MCNC: 4.1 G/DL (ref 3.5–5.2)
ALP SERPL-CCNC: 48 U/L (ref 55–135)
ALT SERPL W/O P-5'-P-CCNC: 13 U/L (ref 10–44)
ANION GAP SERPL CALC-SCNC: 10 MMOL/L (ref 8–16)
AST SERPL-CCNC: 15 U/L (ref 10–40)
BASOPHILS # BLD AUTO: 0.05 K/UL (ref 0–0.2)
BASOPHILS NFR BLD: 0.7 % (ref 0–1.9)
BILIRUB SERPL-MCNC: 1.1 MG/DL (ref 0.1–1)
BUN SERPL-MCNC: 22 MG/DL (ref 6–20)
CALCIUM SERPL-MCNC: 10.1 MG/DL (ref 8.7–10.5)
CHLORIDE SERPL-SCNC: 106 MMOL/L (ref 95–110)
CO2 SERPL-SCNC: 24 MMOL/L (ref 23–29)
CREAT SERPL-MCNC: 1.7 MG/DL (ref 0.5–1.4)
DIFFERENTIAL METHOD: ABNORMAL
EOSINOPHIL # BLD AUTO: 0.3 K/UL (ref 0–0.5)
EOSINOPHIL NFR BLD: 3.8 % (ref 0–8)
ERYTHROCYTE [DISTWIDTH] IN BLOOD BY AUTOMATED COUNT: 12 % (ref 11.5–14.5)
EST. GFR  (AFRICAN AMERICAN): 40.5 ML/MIN/1.73 M^2
EST. GFR  (NON AFRICAN AMERICAN): 35.2 ML/MIN/1.73 M^2
GLUCOSE SERPL-MCNC: 82 MG/DL (ref 70–110)
HCT VFR BLD AUTO: 42.4 % (ref 37–48.5)
HGB BLD-MCNC: 13.5 G/DL (ref 12–16)
IMM GRANULOCYTES # BLD AUTO: 0.02 K/UL (ref 0–0.04)
IMM GRANULOCYTES NFR BLD AUTO: 0.3 % (ref 0–0.5)
LYMPHOCYTES # BLD AUTO: 2.4 K/UL (ref 1–4.8)
LYMPHOCYTES NFR BLD: 32 % (ref 18–48)
MCH RBC QN AUTO: 31.9 PG (ref 27–31)
MCHC RBC AUTO-ENTMCNC: 31.8 G/DL (ref 32–36)
MCV RBC AUTO: 100 FL (ref 82–98)
MONOCYTES # BLD AUTO: 0.6 K/UL (ref 0.3–1)
MONOCYTES NFR BLD: 8 % (ref 4–15)
NEUTROPHILS # BLD AUTO: 4.1 K/UL (ref 1.8–7.7)
NEUTROPHILS NFR BLD: 55.2 % (ref 38–73)
NRBC BLD-RTO: 0 /100 WBC
PLATELET # BLD AUTO: 276 K/UL (ref 150–350)
PMV BLD AUTO: 10.5 FL (ref 9.2–12.9)
POTASSIUM SERPL-SCNC: 4.4 MMOL/L (ref 3.5–5.1)
PROT SERPL-MCNC: 8.2 G/DL (ref 6–8.4)
RBC # BLD AUTO: 4.23 M/UL (ref 4–5.4)
SODIUM SERPL-SCNC: 140 MMOL/L (ref 136–145)
VIT B12 SERPL-MCNC: 1884 PG/ML (ref 210–950)
WBC # BLD AUTO: 7.4 K/UL (ref 3.9–12.7)

## 2020-06-10 PROCEDURE — 84425 ASSAY OF VITAMIN B-1: CPT

## 2020-06-10 PROCEDURE — 85025 COMPLETE CBC W/AUTO DIFF WBC: CPT

## 2020-06-10 PROCEDURE — 99499 NO LOS: ICD-10-PCS | Mod: S$GLB,,, | Performed by: DIETITIAN, REGISTERED

## 2020-06-10 PROCEDURE — 99999 PR PBB SHADOW E&M-EST. PATIENT-LVL I: CPT | Mod: PBBFAC,,, | Performed by: DIETITIAN, REGISTERED

## 2020-06-10 PROCEDURE — 80053 COMPREHEN METABOLIC PANEL: CPT

## 2020-06-10 PROCEDURE — 82607 VITAMIN B-12: CPT

## 2020-06-10 PROCEDURE — 36415 COLL VENOUS BLD VENIPUNCTURE: CPT

## 2020-06-10 PROCEDURE — 99499 UNLISTED E&M SERVICE: CPT | Mod: S$GLB,,, | Performed by: DIETITIAN, REGISTERED

## 2020-06-10 PROCEDURE — 99999 PR PBB SHADOW E&M-EST. PATIENT-LVL I: ICD-10-PCS | Mod: PBBFAC,,, | Performed by: DIETITIAN, REGISTERED

## 2020-06-10 NOTE — PROGRESS NOTES
NUTRITION NOTE    Referring Physician: Leodan Burk M.D.  Reason for MNT Referral: Follow-up 2 Weeks s/p Gastric Sleeve    PAST MEDICAL HISTORY:  Denies vomiting and diarrhea.  Reports problems, including occasional nausea and constipation.yesterday had nausea used zofran , one bowel movement since surgery    Past Medical History:   Diagnosis Date    Chronic kidney disease, stage II (mild) 9/21/2012    Depression     H/O nephrotic syndrome, 2001. 4/21/2015    Hypertension     Obesity     Secondary hyperparathyroidism     Vitamin D deficiency disease        CLINICAL DATA:  49 y.o. female.    There were no vitals filed for this visit.    Current Weight: 208.5 lbs  BMI: 32.66  Total Weight Loss: 23 lbs  Excess Weight Loss: 27%    LABS:  Not available at time of visit    CURRENT DIET:  Bariatric Liquid Diet    Diet Recall: 40 grams of protein/day; 24 oz of fluids/day    Diet Includes: crystal light and water -1 , chicken broth sf popsicles, sf jello  Protein Supplements: premier protein 1.3 protein shakes    EXERCISE:  Adequate light exercise.    Restrictions to Exercise: None.    VITAMINS/MINERALS:  Multivitamins: [place] chewable twice a day  B-Complex: 100 mg super b complex take 1/2 per day or 1 whole every other day  Calcium Citrate + Vitamin D: equate petites calcitrate with D3 [place] 400 mg in 2 tablets- take 2 tablets three times per day  Vitamin B12: Sublingual. 1200 mcg- take every other day  Biotin 5000 mcg    ASSESSMENT:  Doing poorly overall.  Inadequate protein intake.  Inadequate fluid intake.    BARIATRIC DIET DISCUSSION:  Instructed and provided written materials on bariatric pureed diet plan.  Reinforced post-op nutrition guidelines.    PLAN/RECOMMONDATIONS:  Advance to bariatric pureed diet.  Increase protein intake.  Increase fluid intake.  Continue light exercise.  Begin appropriate vitamins & minerals.  Adjust vitamins & minerals by: start taking them today    Return to  clinic in 6 weeks.    SESSION TIME: 15 minutes

## 2020-06-15 LAB — VIT B1 BLD-MCNC: 66 UG/L (ref 38–122)

## 2020-06-24 ENCOUNTER — TELEPHONE (OUTPATIENT)
Dept: BARIATRICS | Facility: CLINIC | Age: 50
End: 2020-06-24

## 2020-06-24 NOTE — TELEPHONE ENCOUNTER
Patient requested a call from ROHITH.  Patient  Wanted  a review of soft diet.   Verbally reviewed soft diet.  Will send patient  soft diet guidelines via portal per request.

## 2020-07-07 ENCOUNTER — PATIENT OUTREACH (OUTPATIENT)
Dept: ADMINISTRATIVE | Facility: OTHER | Age: 50
End: 2020-07-07

## 2020-07-07 NOTE — PROGRESS NOTES
Care Everywhere:   Immunization: no profile in links  Health Maintenance:   Media Review:   Legacy Review:   Order placed:   Upcoming appts:

## 2020-07-20 ENCOUNTER — PATIENT MESSAGE (OUTPATIENT)
Dept: INTERNAL MEDICINE | Facility: CLINIC | Age: 50
End: 2020-07-20

## 2020-07-20 ENCOUNTER — CLINICAL SUPPORT (OUTPATIENT)
Dept: BARIATRICS | Facility: CLINIC | Age: 50
End: 2020-07-20
Payer: COMMERCIAL

## 2020-07-20 ENCOUNTER — OFFICE VISIT (OUTPATIENT)
Dept: BARIATRICS | Facility: CLINIC | Age: 50
End: 2020-07-20
Payer: COMMERCIAL

## 2020-07-20 ENCOUNTER — PATIENT OUTREACH (OUTPATIENT)
Dept: ADMINISTRATIVE | Facility: OTHER | Age: 50
End: 2020-07-20

## 2020-07-20 ENCOUNTER — LAB VISIT (OUTPATIENT)
Dept: LAB | Facility: HOSPITAL | Age: 50
End: 2020-07-20
Attending: NURSE PRACTITIONER
Payer: COMMERCIAL

## 2020-07-20 VITALS — BODY MASS INDEX: 30.61 KG/M2 | HEIGHT: 67 IN | WEIGHT: 195 LBS

## 2020-07-20 DIAGNOSIS — I15.0 RENOVASCULAR HYPERTENSION: ICD-10-CM

## 2020-07-20 DIAGNOSIS — R06.09 DOE (DYSPNEA ON EXERTION): ICD-10-CM

## 2020-07-20 DIAGNOSIS — Z98.84 S/P LAPAROSCOPIC SLEEVE GASTRECTOMY: ICD-10-CM

## 2020-07-20 DIAGNOSIS — I10 ESSENTIAL HYPERTENSION: ICD-10-CM

## 2020-07-20 DIAGNOSIS — E55.9 VITAMIN D DEFICIENCY: ICD-10-CM

## 2020-07-20 DIAGNOSIS — R63.4 WEIGHT LOSS: Primary | ICD-10-CM

## 2020-07-20 DIAGNOSIS — Z87.441 H/O NEPHROTIC SYNDROME: ICD-10-CM

## 2020-07-20 DIAGNOSIS — N18.2 CHRONIC KIDNEY DISEASE, STAGE II (MILD): ICD-10-CM

## 2020-07-20 DIAGNOSIS — Z71.3 DIETARY COUNSELING: ICD-10-CM

## 2020-07-20 DIAGNOSIS — E66.9 OBESITY WITH SERIOUS COMORBIDITY, UNSPECIFIED CLASSIFICATION, UNSPECIFIED OBESITY TYPE: ICD-10-CM

## 2020-07-20 DIAGNOSIS — Z98.890 POST-OPERATIVE STATE: ICD-10-CM

## 2020-07-20 LAB
25(OH)D3+25(OH)D2 SERPL-MCNC: 26 NG/ML (ref 30–96)
ALBUMIN SERPL BCP-MCNC: 3.7 G/DL (ref 3.5–5.2)
ALP SERPL-CCNC: 60 U/L (ref 55–135)
ALT SERPL W/O P-5'-P-CCNC: 40 U/L (ref 10–44)
ANION GAP SERPL CALC-SCNC: 10 MMOL/L (ref 8–16)
AST SERPL-CCNC: 23 U/L (ref 10–40)
BASOPHILS # BLD AUTO: 0.02 K/UL (ref 0–0.2)
BASOPHILS NFR BLD: 0.4 % (ref 0–1.9)
BILIRUB SERPL-MCNC: 0.9 MG/DL (ref 0.1–1)
BUN SERPL-MCNC: 13 MG/DL (ref 6–20)
CALCIUM SERPL-MCNC: 9.7 MG/DL (ref 8.7–10.5)
CHLORIDE SERPL-SCNC: 108 MMOL/L (ref 95–110)
CHOLEST SERPL-MCNC: 159 MG/DL (ref 120–199)
CHOLEST/HDLC SERPL: 4.1 {RATIO} (ref 2–5)
CO2 SERPL-SCNC: 24 MMOL/L (ref 23–29)
CREAT SERPL-MCNC: 1.4 MG/DL (ref 0.5–1.4)
DIFFERENTIAL METHOD: ABNORMAL
EOSINOPHIL # BLD AUTO: 0.3 K/UL (ref 0–0.5)
EOSINOPHIL NFR BLD: 5.8 % (ref 0–8)
ERYTHROCYTE [DISTWIDTH] IN BLOOD BY AUTOMATED COUNT: 12.6 % (ref 11.5–14.5)
EST. GFR  (AFRICAN AMERICAN): 51 ML/MIN/1.73 M^2
EST. GFR  (NON AFRICAN AMERICAN): 44 ML/MIN/1.73 M^2
GLUCOSE SERPL-MCNC: 77 MG/DL (ref 70–110)
HCT VFR BLD AUTO: 37.2 % (ref 37–48.5)
HDLC SERPL-MCNC: 39 MG/DL (ref 40–75)
HDLC SERPL: 24.5 % (ref 20–50)
HGB BLD-MCNC: 11.9 G/DL (ref 12–16)
IMM GRANULOCYTES # BLD AUTO: 0.01 K/UL (ref 0–0.04)
IMM GRANULOCYTES NFR BLD AUTO: 0.2 % (ref 0–0.5)
IRON SERPL-MCNC: 72 UG/DL (ref 30–160)
LDLC SERPL CALC-MCNC: 101 MG/DL (ref 63–159)
LYMPHOCYTES # BLD AUTO: 1.9 K/UL (ref 1–4.8)
LYMPHOCYTES NFR BLD: 40.7 % (ref 18–48)
MCH RBC QN AUTO: 32.3 PG (ref 27–31)
MCHC RBC AUTO-ENTMCNC: 32 G/DL (ref 32–36)
MCV RBC AUTO: 101 FL (ref 82–98)
MONOCYTES # BLD AUTO: 0.4 K/UL (ref 0.3–1)
MONOCYTES NFR BLD: 7.6 % (ref 4–15)
NEUTROPHILS # BLD AUTO: 2.1 K/UL (ref 1.8–7.7)
NEUTROPHILS NFR BLD: 45.3 % (ref 38–73)
NONHDLC SERPL-MCNC: 120 MG/DL
NRBC BLD-RTO: 0 /100 WBC
PLATELET # BLD AUTO: 206 K/UL (ref 150–350)
PMV BLD AUTO: 11.3 FL (ref 9.2–12.9)
POTASSIUM SERPL-SCNC: 3.8 MMOL/L (ref 3.5–5.1)
PROT SERPL-MCNC: 7.3 G/DL (ref 6–8.4)
RBC # BLD AUTO: 3.68 M/UL (ref 4–5.4)
SATURATED IRON: 35 % (ref 20–50)
SODIUM SERPL-SCNC: 142 MMOL/L (ref 136–145)
TOTAL IRON BINDING CAPACITY: 206 UG/DL (ref 250–450)
TRANSFERRIN SERPL-MCNC: 139 MG/DL (ref 200–375)
TRIGL SERPL-MCNC: 95 MG/DL (ref 30–150)
VIT B12 SERPL-MCNC: 1181 PG/ML (ref 210–950)
WBC # BLD AUTO: 4.62 K/UL (ref 3.9–12.7)

## 2020-07-20 PROCEDURE — 80053 COMPREHEN METABOLIC PANEL: CPT

## 2020-07-20 PROCEDURE — 82306 VITAMIN D 25 HYDROXY: CPT

## 2020-07-20 PROCEDURE — 99024 POSTOP FOLLOW-UP VISIT: CPT | Mod: 95,,, | Performed by: PHYSICIAN ASSISTANT

## 2020-07-20 PROCEDURE — 82607 VITAMIN B-12: CPT

## 2020-07-20 PROCEDURE — 99499 NO LOS: ICD-10-PCS | Mod: 95,,, | Performed by: DIETITIAN, REGISTERED

## 2020-07-20 PROCEDURE — 36415 COLL VENOUS BLD VENIPUNCTURE: CPT

## 2020-07-20 PROCEDURE — 99024 PR POST-OP FOLLOW-UP VISIT: ICD-10-PCS | Mod: 95,,, | Performed by: PHYSICIAN ASSISTANT

## 2020-07-20 PROCEDURE — 80061 LIPID PANEL: CPT

## 2020-07-20 PROCEDURE — 99499 UNLISTED E&M SERVICE: CPT | Mod: 95,,, | Performed by: DIETITIAN, REGISTERED

## 2020-07-20 PROCEDURE — 83540 ASSAY OF IRON: CPT

## 2020-07-20 PROCEDURE — 85025 COMPLETE CBC W/AUTO DIFF WBC: CPT

## 2020-07-20 PROCEDURE — 84425 ASSAY OF VITAMIN B-1: CPT

## 2020-07-20 NOTE — PROGRESS NOTES
The patient location is:  Patient Home   The chief complaint leading to consultation is: bariatric post op care  Visit type: Virtual visit with synchronous audio and video  Total time spent with patient: 15 minutes  Each patient to whom he or she provides medical services by telemedicine is:  (1) informed of the relationship between the physician and patient and the respective role of any other health care provider with respect to management of the patient; and (2) notified that he or she may decline to receive medical services by telemedicine and may withdraw from such care at any time.  Nutrition Handout located in AVS section of pt's [facility] and/or sent as a message via [facility] portal.  Pt informed of handout and how to access this information in [facility].  NUTRITION NOTE    Referring Physician: Leodan Burk M.D.  Reason for MNT Referral: Follow-up 8 Weeks s/p Gastric Sleeve    PAST MEDICAL HISTORY:  Denies nausea and diarrhea.  Reports problems, including constipation taking miralax and will start colace.    Past Medical History:   Diagnosis Date    Chronic kidney disease, stage II (mild) 9/21/2012    Depression     H/O nephrotic syndrome, 2001. 4/21/2015    Hypertension     Obesity     Secondary hyperparathyroidism     Vitamin D deficiency disease        CLINICAL DATA:  49 y.o. female.    There were no vitals filed for this visit.    Current Weight: 195 lbs  BMI: 30.54  Total Weight Loss: 36 lbs  Excess Weight Loss: 43%    LABS:  Reviewed.   Discussed in detail in phone call on 7/22/2020    CURRENT DIET:  Bariatric PureedDiet  Chicken and fish  drumettte and another   Eggs with cheese    Diet Recall: 60 grams of protein/day; 32-48 oz of fluids/day    Diet Includes: 3-4 high protein meals with 1.5 Fairlife Core (30 gms each bottl  Meal Pattern:3-4 meal(s) + 0 snack(s) + 1.5 protein supplement(s) Fairlife 1.5 per day  Inadequate protein supplement intake.  Adequate dairy intake.  Adequate  vegetable intake.  Adequate fruit intake.  Starchy CHO: none reported  Other: 4 8 ounce bottles water body armor light  32 ounces    EXERCISE:  Adequate light exercise. 15 minutes walking    Restrictions to Exercise: None.    VITAMINS/MINERALS:  Adherent to vitamin and mineral however would like to change.  Pt can now swallow pills and discussed other options    ASSESSMENT:  Doing fairly well overall.  Inadequate protein intake.  Inadequate fluid intake.  Advancing diet appropriately.  Exercising.  Adequate vitamins & minerals.    BARIATRIC DIET DISCUSSION:  Instructed and provided written materials on bariatric soft diet plan.  Reinforced post-op nutrition guidelines.    PLAN / RECOMMENDATIONS:  Advance to bariatric soft diet.  Increase protein intake.  Increase fluid intake.  Continue light exercise.  Continue appropriate vitamins & minerals.  Adjust vitamins & minerals by may swallow .    Return to clinic in 4 months.    SESSION TIME: 15 minutes

## 2020-07-20 NOTE — PROGRESS NOTES
The patient location is: home  The chief complaint leading to consultation is: 8 week post op   Visit type: audiovisual    Face to Face time with patient: 18 minutes  of total time spent on the encounter, which includes face to face time and non-face to face time preparing to see the patient (eg, review of tests), Obtaining and/or reviewing separately obtained history, Documenting clinical information in the electronic or other health record, Independently interpreting results (not separately reported) and communicating results to the patient/family/caregiver, or Care coordination (not separately reported).         Each patient to whom he or she provides medical services by telemedicine is:  (1) informed of the relationship between the physician and patient and the respective role of any other health care provider with respect to management of the patient; and (2) notified that he or she may decline to receive medical services by telemedicine and may withdraw from such care at any time.    Notes:       BARIATRIC POST-OPERATIVE VISIT:  HPI:  Toi Fernandez is a 49 y.o. year old female presents for 8 week post op visit following s/p sleeve gastrectomy.  she is doing well and tolerating the diet without difficulty.  she has no complaints.    Doing well, having some trouble with vitamins and would like to know if she could change but overall no complaints.     Denies: nausea, vomiting, abdominal pain, changes in bowel movement pattern, fever, chills, dysphagia, chest pain, and shortness of breath.    Review of Systems   Constitutional: Negative for fatigue and fever.   HENT: Negative for trouble swallowing.    Eyes: Negative for visual disturbance.   Respiratory: Negative for cough, choking, chest tightness and shortness of breath.    Cardiovascular: Negative for chest pain, palpitations and leg swelling.   Gastrointestinal: Positive for constipation. Negative for abdominal pain, diarrhea, nausea and vomiting.    Genitourinary: Negative for decreased urine volume and dysuria.   Musculoskeletal: Negative for back pain, myalgias and neck pain.   Skin: Negative for rash.   Neurological: Negative for dizziness, weakness, light-headedness and headaches.   Psychiatric/Behavioral: Negative for dysphoric mood. The patient is not nervous/anxious.        EXERCISE & VITAMINS:  See Bariatric Assessment  No exercising  Adherent to vitamin regimen   MEDICATIONS/ALLERGIES:  Have been reviewed.    DIET: soft Bariatric Diet.  1 protein shakes daily, ~75-80 grams protein.  32 fl oz SF clear beverage.     Fish  Chicken  Beans  Cheese   eggs    ! Shake daily      See Dietician note from today for a more detailed assessment.      Physical Exam  Constitutional:       Appearance: She is well-developed.   HENT:      Head: Normocephalic and atraumatic.   Eyes:      Conjunctiva/sclera: Conjunctivae normal.   Neck:      Musculoskeletal: Normal range of motion and neck supple.   Pulmonary:      Effort: Pulmonary effort is normal. No respiratory distress.   Abdominal:      Palpations: Abdomen is soft.      Tenderness: There is no abdominal tenderness.      Comments: Abdomen visualized via video, non tender to pt palpation no signs of infections, incisions look to be healing well with no edema or discharge noted    Neurological:      Mental Status: She is alert and oriented to person, place, and time.   Psychiatric:         Behavior: Behavior normal.         Thought Content: Thought content normal.         Judgment: Judgment normal.         ASSESSMENT:  - Morbid obesity s/p sleeve gastrectomy on 5/27/2020  - Co-morbidities: depression, hypertension and CKD  - Good Weight loss,36 #'s and 43% EWL  - No Exercise routine  - Good Diet  - Good Vitamin regimen    PLAN:    - Can use colace if needed   - Check BP prior to taking medications ( discuss with PCP)  - Ursodiol 300 mg twice daily for 6 months  - Anti-Acid medication, PP daily for 3 months  - No  lifting more than 10 lbs for 6 weeks  - Miralax daily for constipation  - Emphasized the importance of regular exercise and adherence to bariatric diet to achieve maximum weight loss.  - Encouraged patient to start/continue regular exercise.  - Follow-up with dietician to advance diet.  - Continue daily vitamins and medications.  - RTC per post op schedule  - Call the office for any issues.  - Check labs

## 2020-07-20 NOTE — PATIENT INSTRUCTIONS
Bariatric Soft Diet           - Start Soft Diet 2 weeks after gastric banding  -   Start Soft Diet 4 weeks after gastric bypass and sleeve    As your stomach heals, your doctor will progress your diet to soft foods.  This diet usually lasts for 2-3 months, but can last longer depending on each individual. Soft foods are those which can be easily mashed with a fork.    Remember these principles:   No liquids with meals. Do no drink 30 minutes before meals and wait 30 minutes to 1 hour after meals to start drinking.   Sip on water, sugar-free beverages or non-fat milk throughout the day.  You will need to continue drinking at least 1 protein drink daily to meet protein needs.   100% fruit juice (no sugar added) is allowed, but limit to 4oz a day because it is high in calories and does not contain any protein.   Chew foods slowly; one meal should take 20-30 minutes.   Eat 3-5 meals per day, without any additional snacking.   Stop eating as soon as you feel full.   Avoid using table sugar and foods made with refined sugar, which can trigger dumping syndrome.   Marinating meats with a low sugar marinade, adding low-fat salad dressing, or adding low calorie gravy (made from powder and water) can help meats to digest easier.     Adding Vegetables and Fruits:    As long as you are consuming >80g total protein daily from combination of foods and protein drinks, you may start adding small bites of fruits and vegetables to your meals. Cooked, tender vegetables and ripe fruits without the peel are tolerated best.    Avoid fruit canned in syrup, sugary fruit juices, and vegetables cooked with oil, butter or [place].  Bariatric SOFT Diet    EAT THESE FOODS AVOID THESE FOODS   High in Protein: High in Fat/Sugar:   ? Canned tuna or chicken (packed in water)  ? Lean ground turkey breast or ground round  ? Turkey or chicken (no skin); cooked tender and cut in small pieces  ? Lean pork or beef (cook in crock pot until very  tender; cut in small pieces  ? Scrambled, poached, or boiled eggs  ? Baked, broiled, grilled or boiled fish and seafood (not fried!)  ? Silken tofu, Edamame (soybeans)  ? Beans, hummus and lentils  ? Lean deli meats (turkey and chicken breast, ham, roast beef)  ? 1% or Skim Milk, Lactaid, or Soymilk  ? Low-fat or fat-free cottage cheese, soft cheese, mozzarella string cheese, or ricotta  ? Light yogurt, Greek yogurt, SF pudding High fat milk (whole, 2%)  Butter, margarine, oil, mayonnaise  Sour cream, cream cheese, salad dressing  Ice Cream  Cakes, cookies, pies, desserts  Candy  Luncheon meats (bologna, salami, chopped ham)  Sausage, Rabago  Gravy  Fried Foods  ___________________________________  Tough/Crunchy--------------------------------  Tough or dry meats  Corn   Granola/cereal with nuts  Shredded Coconut    May add after 3 months:  Raw veggies  Lettuce  Plain, Unsalted Nuts and Seeds  Protein bars with 0-4 grams of sugar   As long as you are getting >80g PRO: Starchy Carbohydrates. At goal weight, some may include whole grains in small amounts.   Cooked tender vegetables without peel  Ripe fruits without peel  Frozen fruits with no added sugar  Fruit canned in its own juice or in water  Fat free, sugar free, frozen yogurt White and wheat Bread, Rice, Pasta   Cereals (including grits, oatmeal)   Crackers, Pretzels, Chips, Granola  Corn, Popcorn, Peas  White Potatoes, Sweet potatoes  Flour and corn tortillas     Fluids: Always Avoid:   Skim/1% milk, Lactaid, Soymilk  Water and Sugar-free beverages  (decaf and non-carbonated)  Decaf coffee & decaf tea  Sugary drinks  Carbonated drinks  Alcohol  Drinking through straws     Protein Content of Foods Recommended after                   Weight Loss Surgery    Food Name Portion Calories Protein (gms)   Almonds (unsalted) 1/4 cup 160 6   [place] milk, unsweetened 1 cup 30  1   Beef, Roast 1 oz 46 8   Beef, Steak, sirloin, trimmed 1 oz 55 9   Catfish, broiled or baked 1  oz 30 5   Cheese, American FF 1 oz 40 6   Cheese, Cottage 1% fat ¼ cup 41 7   Cheese, Parmesan, grated ¼ cup 128 12   Cheese, Mozzarella, part skim 1 oz 78 8   Cheese, part skim Ricotta ¼ cup 90 8   Chicken, white breast w/o skin 1 oz 46 9   Chicken, leg w/o skin 1 oz 54 7   Crab, steamed ¼ cup  40 9   Crawfish tails, boiled ¼ cup 35 8   Edamame, shelled ¼ cup 50 4   Egg 1 78 6   Ham, lean 5% 1 oz 44 7   Hamburger, lean 1 oz 56 7   Hummus ¼ cup 100 5   Lobster, steamed 1 oz 26 5   Milk, skim or 1%, soy  1 cup 90 8   Pork Tenderloin 1 oz 46 7   Pudding, SF 1 serv 60 2   Red beans ¼ cup 56 4   Refried beans, fat free ¼ cup 65 4   [place], baked 1 oz 52 7   Shrimp, steamed 1 oz 28 6   Soymilk, plain ½ cup 40 3   T[place]apia, white fish, cooked 1 oz 36 8   Tofu ¼ cup 47 5   Trout 1 oz 48 7   Tuna, canned in water 1 oz 37 8   Tur[place], white meat 1 oz 35 7   Veal Loin 1 oz 50 7   Yogurt, SF, frozen vanilla 3 oz 72 3.5   Yogurt, Fruit, FF, light 3 oz 40 2.5   Yogurt, Greek 3 oz 70 8     *Abbreviations: SF=sugar free, LF=low fat, FF= fat free, gms=grams  *3oz of cooked meat/protein = size of deck of cards or ladies palm   *1oz cheese = 1inch cube or 1 slice American cheese    Sample Menu for Bariatric Soft Diet  For Gastric Bypass and Sleeve            3 meals + 2 protein drinks  Remember: No drinking with meals.    Time of Day Day 1 Day 2   7am:    1 egg (or ¼ cup Egg Beaters) ¼ cup low-fat cottage cheese, 1 tbsp berries   8am: 1 cup water/SF beverage     9am: 1 cup water/SF beverage     10am:  Protein drink  Protein drink   11am: 1 cup water/SF beverage     12pm:    1-2 oz grilled shrimp, ¼ cup green beans   1-2oz canned chicken, shredded cheese, 1 tbsp salsa   1pm: 1 cup water/SF beverage     3pm:  Protein drink   Protein drink   4pm: 1 cup water/SF beverage     6pm:  ½ cup low fat chili, 1oz low-fat cheese, ¼ cup broccoli 2 oz grilled fish,  ¼  cup lima beans   7pm: 1 cup water/SF beverage       This sample menu provides  approx. 80g protein total, including about 40g protein from foods and at least 40g protein from protein drinks.  Drinking protein drinks daily helps decrease muscle loss, increase weight loss, and prevent hair loss.    ? Sip fluids continuously in between meals.    ? For fluids: 1 cup = 8 oz   ? For food: ¼ cup = 4 tablespoons = 1oz  ? No drinking from 30 minutes before meals to 30 minutes after meals.  ? 3oz meat is approx. the size of a deck of cards.    ? A food scale will help you determine portion size (Can be purchased at [employer])      How to taper off of Omeprazole: Start at 12 weeks  - Take 1 Tablet every other day for 2 weeks.  If you do not experience any heartburn, indigestion, nausea symptoms, the following week, take 1 tablet every 3 days.  Again if you remain without the above symptoms, you may completely discontinue the medication.  If symptoms return at any point, please restart the medication.

## 2020-07-21 ENCOUNTER — PATIENT MESSAGE (OUTPATIENT)
Dept: INTERNAL MEDICINE | Facility: CLINIC | Age: 50
End: 2020-07-21

## 2020-07-22 ENCOUNTER — TELEPHONE (OUTPATIENT)
Dept: BARIATRICS | Facility: CLINIC | Age: 50
End: 2020-07-22

## 2020-07-22 NOTE — TELEPHONE ENCOUNTER
Called pt.  Pt sent email concerning lab values.  Discussed her Take B12 being slightly above average.  Pt is taking B-12 daily.  Recommended that she take her B-12 every other day.  Her vitamin D was low.  Pt is taking 50,000 IU taken monthly prescribed by Dr. Ree Crawford.  Discussed adding 2000 IU daily to assist with getting vitamin D in normal range.  Pt v/u.

## 2020-07-24 LAB — VIT B1 BLD-MCNC: 62 UG/L (ref 38–122)

## 2020-08-28 DIAGNOSIS — N18.30 CHRONIC KIDNEY DISEASE, STAGE III (MODERATE): ICD-10-CM

## 2020-08-29 NOTE — TELEPHONE ENCOUNTER
No new care gaps identified.  Powered by [employer]. Reference number: [id number]. 8/28/2020 7:28:54 PM CDT

## 2020-08-30 ENCOUNTER — PATIENT MESSAGE (OUTPATIENT)
Dept: INTERNAL MEDICINE | Facility: CLINIC | Age: 50
End: 2020-08-30

## 2020-08-31 RX ORDER — TORSEMIDE 10 MG/1
TABLET ORAL
Qty: 90 TABLET | Refills: 1 | Status: SHIPPED | OUTPATIENT
Start: 2020-08-31 | End: 2020-12-07

## 2020-08-31 NOTE — PROGRESS NOTES
Refill Routing Note   Medication(s) are not appropriate for processing by [facility]:       - Required laboratory values are abnormal        Medication Therapy Plan: CDMR: SCr elevated; defer to you  Medication reconciliation completed: No      Automatic Epic Generated Protocol Data:        Requested Prescriptions   Pending Prescriptions Disp Refills    torsemide (DEMADEX) 10 MG Tab [Pharmacy Med Name: TORSEMIDE 10MG TABLETS] 90 tablet 1     Sig: TAKE 1 TABLET(10 MG) BY MOUTH EVERY DAY       Cardiovascular:  Diuretics - Loop Failed - 8/28/2020  7:28 PM        Failed - Last BP in normal range within 360 days.     BP Readings from Last 3 Encounters:   05/28/20 (!) 145/82   05/12/20 (!) 186/99   05/07/20 132/82              Failed - Cr is 1.3 or below and within 180 days     Creatinine   Date Value Ref Range Status   07/20/2020 1.4 0.5 - 1.4 mg/dL Final   06/10/2020 1.7 (H) 0.5 - 1.4 mg/dL Final   05/28/2020 1.4 0.5 - 1.4 mg/dL Final              Failed - eGFR in normal range and within 180 days     eGFR if non    Date Value Ref Range Status   07/20/2020 44 (A) >60 mL/min/1.73 m^2 Final     Comment:     Calculation used to obtain the estimated glomerular filtration  rate (eGFR) is the CKD-EPI equation.      06/10/2020 35.2 (A) >60 mL/min/1.73 m^2 Final     Comment:     Calculation used to obtain the estimated glomerular filtration  rate (eGFR) is the CKD-EPI equation.      05/28/2020 44.5 (A) >60 mL/min/1.73 m^2 Final     Comment:     Calculation used to obtain the estimated glomerular filtration  rate (eGFR) is the CKD-EPI equation.        eGFR if    Date Value Ref Range Status   07/20/2020 51 (A) >60 mL/min/1.73 m^2 Final   06/10/2020 40.5 (A) >60 mL/min/1.73 m^2 Final   05/28/2020 51.3 (A) >60 mL/min/1.73 m^2 Final              Passed - Patient is at least 18 years old        Passed - Negative Pregnancy Status Check        Passed - Office visit in past 12 months or future  90 days.     Recent Outpatient Visits            1 month ago Weight loss    [facility] 2nd Fl Rehana Kwan PA-C    2 months ago Post-operative state    [facility] - [facility] 2nd Fl Rehana Kwan PA-C    3 months ago Allergic rhinitis due to house dust mite    [facility] Evelyn Hua MD    3 months ago Pre-op testing    [facility] - B[facility] 2nd Fl Leodan Burk MD    3 months ago Bilateral hand pain    [facility] Orthopedics Magy Rooney PA-C                    Passed - K in normal range and within 180 days     Potassium   Date Value Ref Range Status   07/20/2020 3.8 3.5 - 5.1 mmol/L Final   06/10/2020 4.4 3.5 - 5.1 mmol/L Final   05/28/2020 4.7 3.5 - 5.1 mmol/L Final              Passed - Na is between 130 and 148 and within 180 days     Sodium   Date Value Ref Range Status   07/20/2020 142 136 - 145 mmol/L Final   06/10/2020 140 136 - 145 mmol/L Final   05/28/2020 137 136 - 145 mmol/L Final                    Appointments  past 12m or future 3m with PCP    Date Provider   Last Visit   5/7/2020 Estela Crawford MD   Next Visit   Visit date not found Estela Crawford MD   ED visits in past 90 days: 0     Note composed:7:09 PM 08/30/2020

## 2020-10-26 RX ORDER — CYCLOBENZAPRINE HCL 10 MG
TABLET ORAL
Qty: 20 TABLET | Refills: 1 | Status: SHIPPED | OUTPATIENT
Start: 2020-10-26 | End: 2020-11-12

## 2020-10-26 NOTE — PROGRESS NOTES
Refill Routing Note   Medication(s) are not appropriate for processing by [facility] for the following reason(s):     - Outside of protocol    [facility] actions taken in this encounter: Route          Medication reconciliation completed: No   Automatic Epic Generated Protocol Data:        Requested Prescriptions   Pending Prescriptions Disp Refills    cyclobenzaprine (FLEXERIL) 10 MG tablet [Pharmacy Med Name: CYCLOBENZAPRINE 10MG TABLETS] 20 tablet 1     Sig: TAKE 1/2 TO 1 TABLET BY MOUTH TWICE DAILY FOR BACK PAIN       There is no refill protocol information for this order           Appointments  past 12m or future 3m with PCP    Date Provider   Last Visit   5/7/2020 Estela Crawford MD   Next Visit   Visit date not found Estela Crawford MD   ED visits in past 90 days: 0        Note composed:11:51 AM 10/26/2020

## 2020-11-12 RX ORDER — CYCLOBENZAPRINE HCL 10 MG
TABLET ORAL
Qty: 20 TABLET | Refills: 1 | Status: SHIPPED | OUTPATIENT
Start: 2020-11-12 | End: 2022-02-04 | Stop reason: SDUPTHER

## 2020-11-12 NOTE — PROGRESS NOTES
Refill Routing Note   Medication(s) are not appropriate for processing by [facility] for the following reason(s):     - Outside of protocol    [facility] actions taken in this encounter: Route          Medication reconciliation completed: No   Automatic Epic Generated Protocol Data:        Requested Prescriptions   Pending Prescriptions Disp Refills    cyclobenzaprine (FLEXERIL) 10 MG tablet [Pharmacy Med Name: CYCLOBENZAPRINE 10MG TABLETS] 20 tablet 1     Sig: TAKE 1/2 TO 1 TABLET BY MOUTH TWICE DAILY FOR BACK PAIN       There is no refill protocol information for this order           Appointments  past 12m or future 3m with PCP    Date Provider   Last Visit   5/7/2020 Estela Crawford MD   Next Visit   Visit date not found Estela Crawford MD   ED visits in past 90 days: 0        Note composed:11:59 AM 11/12/2020

## 2020-11-19 ENCOUNTER — PATIENT MESSAGE (OUTPATIENT)
Dept: INTERNAL MEDICINE | Facility: CLINIC | Age: 50
End: 2020-11-19

## 2020-11-19 DIAGNOSIS — Z12.39 BREAST SCREENING: Primary | ICD-10-CM

## 2020-12-02 ENCOUNTER — PATIENT MESSAGE (OUTPATIENT)
Dept: INTERNAL MEDICINE | Facility: CLINIC | Age: 50
End: 2020-12-02

## 2020-12-02 ENCOUNTER — PATIENT MESSAGE (OUTPATIENT)
Dept: BARIATRICS | Facility: CLINIC | Age: 50
End: 2020-12-02

## 2020-12-02 DIAGNOSIS — R53.81 MALAISE: Primary | ICD-10-CM

## 2020-12-04 ENCOUNTER — PATIENT MESSAGE (OUTPATIENT)
Dept: INTERNAL MEDICINE | Facility: CLINIC | Age: 50
End: 2020-12-04

## 2020-12-04 ENCOUNTER — LAB VISIT (OUTPATIENT)
Dept: LAB | Facility: HOSPITAL | Age: 50
End: 2020-12-04
Attending: INTERNAL MEDICINE
Payer: COMMERCIAL

## 2020-12-04 DIAGNOSIS — R53.81 MALAISE: ICD-10-CM

## 2020-12-04 LAB
ALBUMIN SERPL BCP-MCNC: 3.8 G/DL (ref 3.5–5.2)
ALP SERPL-CCNC: 71 U/L (ref 55–135)
ALT SERPL W/O P-5'-P-CCNC: 22 U/L (ref 10–44)
ANION GAP SERPL CALC-SCNC: 9 MMOL/L (ref 8–16)
AST SERPL-CCNC: 18 U/L (ref 10–40)
BASOPHILS # BLD AUTO: 0.03 K/UL (ref 0–0.2)
BASOPHILS NFR BLD: 0.8 % (ref 0–1.9)
BILIRUB SERPL-MCNC: 1.1 MG/DL (ref 0.1–1)
BUN SERPL-MCNC: 16 MG/DL (ref 6–20)
CALCIUM SERPL-MCNC: 9 MG/DL (ref 8.7–10.5)
CHLORIDE SERPL-SCNC: 108 MMOL/L (ref 95–110)
CO2 SERPL-SCNC: 24 MMOL/L (ref 23–29)
CREAT SERPL-MCNC: 1.4 MG/DL (ref 0.5–1.4)
DIFFERENTIAL METHOD: ABNORMAL
EOSINOPHIL # BLD AUTO: 0.3 K/UL (ref 0–0.5)
EOSINOPHIL NFR BLD: 6.8 % (ref 0–8)
ERYTHROCYTE [DISTWIDTH] IN BLOOD BY AUTOMATED COUNT: 12 % (ref 11.5–14.5)
EST. GFR  (AFRICAN AMERICAN): 50.9 ML/MIN/1.73 M^2
EST. GFR  (NON AFRICAN AMERICAN): 44.1 ML/MIN/1.73 M^2
GLUCOSE SERPL-MCNC: 77 MG/DL (ref 70–110)
HCT VFR BLD AUTO: 40.1 % (ref 37–48.5)
HGB BLD-MCNC: 12.4 G/DL (ref 12–16)
IMM GRANULOCYTES # BLD AUTO: 0.01 K/UL (ref 0–0.04)
IMM GRANULOCYTES NFR BLD AUTO: 0.3 % (ref 0–0.5)
LYMPHOCYTES # BLD AUTO: 1.8 K/UL (ref 1–4.8)
LYMPHOCYTES NFR BLD: 45.5 % (ref 18–48)
MCH RBC QN AUTO: 32 PG (ref 27–31)
MCHC RBC AUTO-ENTMCNC: 30.9 G/DL (ref 32–36)
MCV RBC AUTO: 104 FL (ref 82–98)
MONOCYTES # BLD AUTO: 0.2 K/UL (ref 0.3–1)
MONOCYTES NFR BLD: 5.3 % (ref 4–15)
NEUTROPHILS # BLD AUTO: 1.6 K/UL (ref 1.8–7.7)
NEUTROPHILS NFR BLD: 41.3 % (ref 38–73)
NRBC BLD-RTO: 0 /100 WBC
PLATELET # BLD AUTO: 166 K/UL (ref 150–350)
PMV BLD AUTO: 11.9 FL (ref 9.2–12.9)
POTASSIUM SERPL-SCNC: 3.7 MMOL/L (ref 3.5–5.1)
PROT SERPL-MCNC: 7.3 G/DL (ref 6–8.4)
RBC # BLD AUTO: 3.87 M/UL (ref 4–5.4)
SODIUM SERPL-SCNC: 141 MMOL/L (ref 136–145)
TSH SERPL DL<=0.005 MIU/L-ACNC: 2 UIU/ML (ref 0.4–4)
WBC # BLD AUTO: 3.96 K/UL (ref 3.9–12.7)

## 2020-12-04 PROCEDURE — 84443 ASSAY THYROID STIM HORMONE: CPT

## 2020-12-04 PROCEDURE — 80053 COMPREHEN METABOLIC PANEL: CPT

## 2020-12-04 PROCEDURE — 36415 COLL VENOUS BLD VENIPUNCTURE: CPT | Mod: PO

## 2020-12-04 PROCEDURE — 85025 COMPLETE CBC W/AUTO DIFF WBC: CPT

## 2020-12-07 ENCOUNTER — PATIENT MESSAGE (OUTPATIENT)
Dept: INTERNAL MEDICINE | Facility: CLINIC | Age: 50
End: 2020-12-07

## 2020-12-07 ENCOUNTER — HOSPITAL ENCOUNTER (OUTPATIENT)
Dept: RADIOLOGY | Facility: HOSPITAL | Age: 50
Discharge: HOME | End: 2020-12-07
Attending: INTERNAL MEDICINE
Payer: COMMERCIAL

## 2020-12-07 ENCOUNTER — OFFICE VISIT (OUTPATIENT)
Dept: INTERNAL MEDICINE | Facility: CLINIC | Age: 50
End: 2020-12-07
Payer: COMMERCIAL

## 2020-12-07 VITALS
DIASTOLIC BLOOD PRESSURE: 98 MMHG | WEIGHT: 180.75 LBS | OXYGEN SATURATION: 99 % | HEIGHT: 67 IN | SYSTOLIC BLOOD PRESSURE: 170 MMHG | BODY MASS INDEX: 28.37 KG/M2 | HEART RATE: 51 BPM

## 2020-12-07 DIAGNOSIS — G47.00 INSOMNIA, UNSPECIFIED TYPE: ICD-10-CM

## 2020-12-07 DIAGNOSIS — R51.9 ACUTE INTRACTABLE HEADACHE, UNSPECIFIED HEADACHE TYPE: ICD-10-CM

## 2020-12-07 DIAGNOSIS — R06.00 DYSPNEA, UNSPECIFIED TYPE: ICD-10-CM

## 2020-12-07 DIAGNOSIS — Z98.84 BARIATRIC SURGERY STATUS: ICD-10-CM

## 2020-12-07 DIAGNOSIS — M94.0 COSTOCHONDRITIS: ICD-10-CM

## 2020-12-07 DIAGNOSIS — N18.31 STAGE 3A CHRONIC KIDNEY DISEASE: ICD-10-CM

## 2020-12-07 DIAGNOSIS — I10 ESSENTIAL HYPERTENSION: ICD-10-CM

## 2020-12-07 DIAGNOSIS — R07.89 ATYPICAL CHEST PAIN: Primary | ICD-10-CM

## 2020-12-07 PROCEDURE — 1125F AMNT PAIN NOTED PAIN PRSNT: CPT | Mod: S$GLB,,, | Performed by: INTERNAL MEDICINE

## 2020-12-07 PROCEDURE — 99999 PR PBB SHADOW E&M-EST. PATIENT-LVL IV: ICD-10-PCS | Mod: PBBFAC,,, | Performed by: INTERNAL MEDICINE

## 2020-12-07 PROCEDURE — 71046 XR CHEST PA AND LATERAL: ICD-10-PCS | Mod: 26,,, | Performed by: RADIOLOGY

## 2020-12-07 PROCEDURE — 99999 PR PBB SHADOW E&M-EST. PATIENT-LVL IV: CPT | Mod: PBBFAC,,, | Performed by: INTERNAL MEDICINE

## 2020-12-07 PROCEDURE — 3008F BODY MASS INDEX DOCD: CPT | Mod: CPTII,S$GLB,, | Performed by: INTERNAL MEDICINE

## 2020-12-07 PROCEDURE — 3080F DIAST BP >= 90 MM HG: CPT | Mod: CPTII,S$GLB,, | Performed by: INTERNAL MEDICINE

## 2020-12-07 PROCEDURE — 71046 X-RAY EXAM CHEST 2 VIEWS: CPT | Mod: 26,,, | Performed by: RADIOLOGY

## 2020-12-07 PROCEDURE — 71046 X-RAY EXAM CHEST 2 VIEWS: CPT | Mod: TC

## 2020-12-07 PROCEDURE — 3077F PR MOST RECENT SYSTOLIC BLOOD PRESSURE >= 140 MM HG: ICD-10-PCS | Mod: CPTII,S$GLB,, | Performed by: INTERNAL MEDICINE

## 2020-12-07 PROCEDURE — 93005 ELECTROCARDIOGRAM TRACING: CPT | Mod: S$GLB,,, | Performed by: INTERNAL MEDICINE

## 2020-12-07 PROCEDURE — 93010 EKG 12-LEAD: ICD-10-PCS | Mod: S$GLB,,, | Performed by: INTERNAL MEDICINE

## 2020-12-07 PROCEDURE — 3077F SYST BP >= 140 MM HG: CPT | Mod: CPTII,S$GLB,, | Performed by: INTERNAL MEDICINE

## 2020-12-07 PROCEDURE — 1125F PR PAIN SEVERITY QUANTIFIED, PAIN PRESENT: ICD-10-PCS | Mod: S$GLB,,, | Performed by: INTERNAL MEDICINE

## 2020-12-07 PROCEDURE — 99215 OFFICE O/P EST HI 40 MIN: CPT | Mod: S$GLB,,, | Performed by: INTERNAL MEDICINE

## 2020-12-07 PROCEDURE — 3008F PR BODY MASS INDEX (BMI) DOCUMENTED: ICD-10-PCS | Mod: CPTII,S$GLB,, | Performed by: INTERNAL MEDICINE

## 2020-12-07 PROCEDURE — 3080F PR MOST RECENT DIASTOLIC BLOOD PRESSURE >= 90 MM HG: ICD-10-PCS | Mod: CPTII,S$GLB,, | Performed by: INTERNAL MEDICINE

## 2020-12-07 PROCEDURE — 99215 PR OFFICE/OUTPT VISIT, EST, LEVL V, 40-54 MIN: ICD-10-PCS | Mod: S$GLB,,, | Performed by: INTERNAL MEDICINE

## 2020-12-07 PROCEDURE — 93010 ELECTROCARDIOGRAM REPORT: CPT | Mod: S$GLB,,, | Performed by: INTERNAL MEDICINE

## 2020-12-07 PROCEDURE — 93005 EKG 12-LEAD: ICD-10-PCS | Mod: S$GLB,,, | Performed by: INTERNAL MEDICINE

## 2020-12-07 RX ORDER — AMLODIPINE BESYLATE 5 MG/1
5 TABLET ORAL DAILY
Qty: 30 TABLET | Refills: 5 | Status: SHIPPED | OUTPATIENT
Start: 2020-12-07 | End: 2021-09-14 | Stop reason: SDUPTHER

## 2020-12-07 RX ORDER — TRAMADOL HYDROCHLORIDE 50 MG/1
50 TABLET ORAL EVERY 8 HOURS PRN
Qty: 30 EACH | Refills: 0 | Status: SHIPPED | OUTPATIENT
Start: 2020-12-07 | End: 2022-03-08

## 2020-12-07 RX ORDER — NORTRIPTYLINE HYDROCHLORIDE 10 MG/1
CAPSULE ORAL
Qty: 60 CAPSULE | Refills: 2 | Status: SHIPPED | OUTPATIENT
Start: 2020-12-07 | End: 2022-03-08

## 2020-12-07 NOTE — PROGRESS NOTES
Subjective:       Patient ID: Toi Fernandez is a 50 y.o. female.    Chief Complaint: Follow-up, Hypertension, and Sore Throat    HPI   C/o not feeling well.  Eating only 2-3 bites few times a day.  Snacks on pecans.  Doesn't feel hungry.    C/o mild sob after lifting boxes.  , mild chest pain for several weeks.  She has a tightness in  Chest when picking up boxes.  Lasts seconds.  Sharp discomfort. Not pl euritic    No cough.  Stress Echo last year  Negative for ischemia. Chronic nasal congestion.  No fever.  BP has been elevated for several weeks.  Awakens with headache.  Headache better in the dark.  She can't tell me when headache started.  Hasn't tried analgesics.    She lost 45 lbs since gastric sleeve May 2020.    Doesn't typically get headaches.  No  Nausea.  Mildly sensitive to light.       She has not taken demadex for several weeks,as she feared getting dehyrated.  Takes irbesartan 300 mg daily and coreg bid..    She has always slept poorly.  No increase in stress.      Chronic htn with ckd 3.    Review of Systems   Constitutional: Negative for fever and unexpected weight change.   HENT: Negative for nasal congestion and postnasal drip.    Eyes: Negative for pain, discharge and visual disturbance.   Respiratory: Positive for shortness of breath. Negative for cough, chest tightness and wheezing.    Cardiovascular: Negative for chest pain and leg swelling.   Gastrointestinal: Negative for abdominal pain, constipation, diarrhea and nausea.   Genitourinary: Negative for difficulty urinating, dysuria and hematuria.   Integumentary:  Negative for rash.   Neurological: Positive for headaches.   Psychiatric/Behavioral: Negative for dysphoric mood and sleep disturbance. The patient is not nervous/anxious.          Objective:      Physical Exam  Constitutional:       Appearance: She is well-developed.   Eyes:      General: No scleral icterus.  Neck:      Thyroid: No thyromegaly.      Vascular: No JVD.    Cardiovascular:      Rate and Rhythm: Normal rate and regular rhythm.      Heart sounds: Normal heart sounds.   Pulmonary:      Effort: Pulmonary effort is normal. No respiratory distress.      Breath sounds: Normal breath sounds. No wheezing or rales. Rhonchi: l costochondral junction.   Chest:      Chest wall: Tenderness present.   Abdominal:      Palpations: Abdomen is soft. There is no mass.      Tenderness: There is no abdominal tenderness.   Neurological:      Mental Status: She is alert and oriented to person, place, and time.   Psychiatric:         Behavior: Behavior normal.         168/102  Results for orders placed or performed in visit on 12/04/20   CBC Auto Differential   Result Value Ref Range    WBC 3.96 3.90 - 12.70 K/uL    RBC 3.87 (L) 4.00 - 5.40 M/uL    Hemoglobin 12.4 12.0 - 16.0 g/dL    Hematocrit 40.1 37.0 - 48.5 %     (H) 82 - 98 fL    MCH 32.0 (H) 27.0 - 31.0 pg    MCHC 30.9 (L) 32.0 - 36.0 g/dL    RDW 12.0 11.5 - 14.5 %    Platelets 166 150 - 350 K/uL    MPV 11.9 9.2 - 12.9 fL    Immature Granulocytes 0.3 0.0 - 0.5 %    Gran # (ANC) 1.6 (L) 1.8 - 7.7 K/uL    Immature Grans (Abs) 0.01 0.00 - 0.04 K/uL    Lymph # 1.8 1.0 - 4.8 K/uL    Mono # 0.2 (L) 0.3 - 1.0 K/uL    Eos # 0.3 0.0 - 0.5 K/uL    Baso # 0.03 0.00 - 0.20 K/uL    nRBC 0 0 /100 WBC    Gran % 41.3 38.0 - 73.0 %    Lymph % 45.5 18.0 - 48.0 %    Mono % 5.3 4.0 - 15.0 %    Eosinophil % 6.8 0.0 - 8.0 %    Basophil % 0.8 0.0 - 1.9 %    Differential Method Automated    Comprehensive Metabolic Panel   Result Value Ref Range    Sodium 141 136 - 145 mmol/L    Potassium 3.7 3.5 - 5.1 mmol/L    Chloride 108 95 - 110 mmol/L    CO2 24 23 - 29 mmol/L    Glucose 77 70 - 110 mg/dL    BUN 16 6 - 20 mg/dL    Creatinine 1.4 0.5 - 1.4 mg/dL    Calcium 9.0 8.7 - 10.5 mg/dL    Total Protein 7.3 6.0 - 8.4 g/dL    Albumin 3.8 3.5 - 5.2 g/dL    Total Bilirubin 1.1 (H) 0.1 - 1.0 mg/dL    Alkaline Phosphatase 71 55 - 135 U/L    AST 18 10 - 40 U/L     ALT 22 10 - 44 U/L    Anion Gap 9 8 - 16 mmol/L    eGFR if African American 50.9 (A) >60 mL/min/1.73 m^2    eGFR if non  44.1 (A) >60 mL/min/1.73 m^2   TSH   Result Value Ref Range    TSH 1.997 0.400 - 4.000 uIU/mL     Assessment:       1. Atypical chest pain    2. Dyspnea, unspecified type    3. Essential hypertension    4. Costochondritis    5. Stage 3a chronic kidney disease    6. Bariatric surgery status    7. Acute intractable headache, unspecified headache type    8. Insomnia, unspecified type        Plan:       Toi was seen today for follow-up, hypertension and sore throat.    Diagnoses and all orders for this visit:    Atypical chest pain  -     EKG 12-lead; Future  -     EKG 12-lead    Dyspnea, unspecified type  -     X-Ray Chest PA And Lateral; Future    Essential hypertension  -     amLODIPine (NORVASC) 5 MG tablet; Take 1 tablet (5 mg total) by mouth once daily.    Costochondritis    Stage 3a chronic kidney disease    Bariatric surgery status    Acute intractable headache, unspecified headache type    Insomnia, unspecified type    Other orders  -     traMADoL (ULTRAM) 50 mg tablet; Take 1 tablet (50 mg total) by mouth every 8 (eight) hours as needed for Pain.  -     nortriptyline (PAMELOR) 10 MG capsule; 1-3 caps po qh s for sleep, headache.         Addendum - ekg stable - small complexes with non-specific changes, as before (interted T III, flat avf.  Poor r wave progression (stable)    Add amlodipine.    see pt instructions  Call if no better.  Reassurance

## 2020-12-07 NOTE — PATIENT INSTRUCTIONS
Add Amlodipine 5 mg daily for blood pressure.  Continue with other meds.    Tramadol - 1 tab up to 3 times a day for chest pain,  Headache.    Nortriptyline - 1-3 capsules at bedtime for sleep, headache prevention.

## 2021-01-04 ENCOUNTER — PATIENT MESSAGE (OUTPATIENT)
Dept: ADMINISTRATIVE | Facility: HOSPITAL | Age: 51
End: 2021-01-04

## 2021-01-24 DIAGNOSIS — E66.9 CLASS 1 OBESITY WITH SERIOUS COMORBIDITY AND BODY MASS INDEX (BMI) OF 32.0 TO 32.9 IN ADULT, UNSPECIFIED OBESITY TYPE: ICD-10-CM

## 2021-01-24 DIAGNOSIS — N18.30 CHRONIC KIDNEY DISEASE, STAGE III (MODERATE): ICD-10-CM

## 2021-01-24 DIAGNOSIS — E55.9 VITAMIN D DEFICIENCY: ICD-10-CM

## 2021-01-24 RX ORDER — CARVEDILOL 25 MG/1
TABLET ORAL
Qty: 180 TABLET | Refills: 3 | Status: SHIPPED | OUTPATIENT
Start: 2021-01-24 | End: 2021-09-14 | Stop reason: SDUPTHER

## 2021-01-24 RX ORDER — ERGOCALCIFEROL 1.25 MG/1
CAPSULE ORAL
Qty: 3 CAPSULE | Refills: 3 | Status: SHIPPED | OUTPATIENT
Start: 2021-01-24 | End: 2022-03-08 | Stop reason: SDUPTHER

## 2021-02-04 ENCOUNTER — OFFICE VISIT (OUTPATIENT)
Dept: INTERNAL MEDICINE | Facility: CLINIC | Age: 51
End: 2021-02-04
Payer: COMMERCIAL

## 2021-02-04 ENCOUNTER — LAB VISIT (OUTPATIENT)
Dept: INTERNAL MEDICINE | Facility: CLINIC | Age: 51
End: 2021-02-04
Payer: COMMERCIAL

## 2021-02-04 VITALS
OXYGEN SATURATION: 96 % | HEART RATE: 79 BPM | SYSTOLIC BLOOD PRESSURE: 120 MMHG | HEIGHT: 67 IN | DIASTOLIC BLOOD PRESSURE: 84 MMHG | BODY MASS INDEX: 26.98 KG/M2 | WEIGHT: 171.88 LBS

## 2021-02-04 DIAGNOSIS — R05.9 COUGH: ICD-10-CM

## 2021-02-04 DIAGNOSIS — R21 RASH: Primary | ICD-10-CM

## 2021-02-04 DIAGNOSIS — J98.01 BRONCHOSPASM: ICD-10-CM

## 2021-02-04 PROCEDURE — 3079F PR MOST RECENT DIASTOLIC BLOOD PRESSURE 80-89 MM HG: ICD-10-PCS | Mod: CPTII,S$GLB,, | Performed by: INTERNAL MEDICINE

## 2021-02-04 PROCEDURE — 99214 OFFICE O/P EST MOD 30 MIN: CPT | Mod: S$GLB,,, | Performed by: INTERNAL MEDICINE

## 2021-02-04 PROCEDURE — 3074F SYST BP LT 130 MM HG: CPT | Mod: CPTII,S$GLB,, | Performed by: INTERNAL MEDICINE

## 2021-02-04 PROCEDURE — 3079F DIAST BP 80-89 MM HG: CPT | Mod: CPTII,S$GLB,, | Performed by: INTERNAL MEDICINE

## 2021-02-04 PROCEDURE — U0003 INFECTIOUS AGENT DETECTION BY NUCLEIC ACID (DNA OR RNA); SEVERE ACUTE RESPIRATORY SYNDROME CORONAVIRUS 2 (SARS-COV-2) (CORONAVIRUS DISEASE [COVID-19]), AMPLIFIED PROBE TECHNIQUE, MAKING USE OF HIGH THROUGHPUT TECHNOLOGIES AS DESCRIBED BY CMS-2020-01-R: HCPCS

## 2021-02-04 PROCEDURE — 1125F AMNT PAIN NOTED PAIN PRSNT: CPT | Mod: S$GLB,,, | Performed by: INTERNAL MEDICINE

## 2021-02-04 PROCEDURE — 3008F BODY MASS INDEX DOCD: CPT | Mod: CPTII,S$GLB,, | Performed by: INTERNAL MEDICINE

## 2021-02-04 PROCEDURE — 3074F PR MOST RECENT SYSTOLIC BLOOD PRESSURE < 130 MM HG: ICD-10-PCS | Mod: CPTII,S$GLB,, | Performed by: INTERNAL MEDICINE

## 2021-02-04 PROCEDURE — 99999 PR PBB SHADOW E&M-EST. PATIENT-LVL V: ICD-10-PCS | Mod: PBBFAC,,, | Performed by: INTERNAL MEDICINE

## 2021-02-04 PROCEDURE — 99999 PR PBB SHADOW E&M-EST. PATIENT-LVL V: CPT | Mod: PBBFAC,,, | Performed by: INTERNAL MEDICINE

## 2021-02-04 PROCEDURE — 3008F PR BODY MASS INDEX (BMI) DOCUMENTED: ICD-10-PCS | Mod: CPTII,S$GLB,, | Performed by: INTERNAL MEDICINE

## 2021-02-04 PROCEDURE — 99214 PR OFFICE/OUTPT VISIT, EST, LEVL IV, 30-39 MIN: ICD-10-PCS | Mod: S$GLB,,, | Performed by: INTERNAL MEDICINE

## 2021-02-04 PROCEDURE — 1125F PR PAIN SEVERITY QUANTIFIED, PAIN PRESENT: ICD-10-PCS | Mod: S$GLB,,, | Performed by: INTERNAL MEDICINE

## 2021-02-04 RX ORDER — PREDNISONE 20 MG/1
TABLET ORAL
Qty: 10 TABLET | Refills: 0 | Status: SHIPPED | OUTPATIENT
Start: 2021-02-04 | End: 2021-08-16

## 2021-02-04 RX ORDER — PROMETHAZINE HYDROCHLORIDE AND CODEINE PHOSPHATE 6.25; 1 MG/5ML; MG/5ML
5 SOLUTION ORAL EVERY 4 HOURS PRN
Qty: 118 ML | Refills: 0 | Status: SHIPPED | OUTPATIENT
Start: 2021-02-04 | End: 2021-02-14

## 2021-02-05 LAB — SARS-COV-2 RNA RESP QL NAA+PROBE: DETECTED

## 2021-02-06 ENCOUNTER — PATIENT OUTREACH (OUTPATIENT)
Dept: ADMINISTRATIVE | Facility: OTHER | Age: 51
End: 2021-02-06

## 2021-02-06 DIAGNOSIS — U07.1 COVID-19 VIRUS DETECTED: ICD-10-CM

## 2021-02-16 ENCOUNTER — PATIENT MESSAGE (OUTPATIENT)
Dept: INTERNAL MEDICINE | Facility: CLINIC | Age: 51
End: 2021-02-16

## 2021-02-17 ENCOUNTER — PATIENT MESSAGE (OUTPATIENT)
Dept: INTERNAL MEDICINE | Facility: CLINIC | Age: 51
End: 2021-02-17

## 2021-03-29 ENCOUNTER — PATIENT MESSAGE (OUTPATIENT)
Dept: INTERNAL MEDICINE | Facility: CLINIC | Age: 51
End: 2021-03-29

## 2021-04-05 ENCOUNTER — PATIENT MESSAGE (OUTPATIENT)
Dept: ADMINISTRATIVE | Facility: HOSPITAL | Age: 51
End: 2021-04-05

## 2021-05-11 ENCOUNTER — PATIENT OUTREACH (OUTPATIENT)
Dept: ADMINISTRATIVE | Facility: OTHER | Age: 51
End: 2021-05-11

## 2021-06-04 ENCOUNTER — OFFICE VISIT (OUTPATIENT)
Dept: OBSTETRICS AND GYNECOLOGY | Facility: CLINIC | Age: 51
End: 2021-06-04
Payer: COMMERCIAL

## 2021-06-04 VITALS
SYSTOLIC BLOOD PRESSURE: 132 MMHG | DIASTOLIC BLOOD PRESSURE: 84 MMHG | BODY MASS INDEX: 29.49 KG/M2 | WEIGHT: 187.88 LBS | HEIGHT: 67 IN

## 2021-06-04 DIAGNOSIS — Z12.31 SCREENING MAMMOGRAM, ENCOUNTER FOR: ICD-10-CM

## 2021-06-04 DIAGNOSIS — Z01.419 ENCOUNTER FOR ANNUAL ROUTINE GYNECOLOGICAL EXAMINATION: Primary | ICD-10-CM

## 2021-06-04 DIAGNOSIS — A64 STD (FEMALE): ICD-10-CM

## 2021-06-04 PROCEDURE — 99999 PR PBB SHADOW E&M-EST. PATIENT-LVL III: CPT | Mod: PBBFAC,,, | Performed by: OBSTETRICS & GYNECOLOGY

## 2021-06-04 PROCEDURE — 99999 PR PBB SHADOW E&M-EST. PATIENT-LVL III: ICD-10-PCS | Mod: PBBFAC,,, | Performed by: OBSTETRICS & GYNECOLOGY

## 2021-06-04 PROCEDURE — 87591 N.GONORRHOEAE DNA AMP PROB: CPT | Performed by: OBSTETRICS & GYNECOLOGY

## 2021-06-04 PROCEDURE — 99386 PREV VISIT NEW AGE 40-64: CPT | Mod: S$GLB,,, | Performed by: OBSTETRICS & GYNECOLOGY

## 2021-06-04 PROCEDURE — 87491 CHLMYD TRACH DNA AMP PROBE: CPT | Performed by: OBSTETRICS & GYNECOLOGY

## 2021-06-04 PROCEDURE — 99386 PR PREVENTIVE VISIT,NEW,40-64: ICD-10-PCS | Mod: S$GLB,,, | Performed by: OBSTETRICS & GYNECOLOGY

## 2021-06-04 PROCEDURE — 3008F BODY MASS INDEX DOCD: CPT | Mod: CPTII,S$GLB,, | Performed by: OBSTETRICS & GYNECOLOGY

## 2021-06-04 PROCEDURE — 3008F PR BODY MASS INDEX (BMI) DOCUMENTED: ICD-10-PCS | Mod: CPTII,S$GLB,, | Performed by: OBSTETRICS & GYNECOLOGY

## 2021-06-08 ENCOUNTER — PATIENT MESSAGE (OUTPATIENT)
Dept: OBSTETRICS AND GYNECOLOGY | Facility: CLINIC | Age: 51
End: 2021-06-08

## 2021-06-08 LAB
C TRACH DNA SPEC QL NAA+PROBE: NOT DETECTED
N GONORRHOEA DNA SPEC QL NAA+PROBE: NOT DETECTED

## 2021-07-06 ENCOUNTER — PATIENT MESSAGE (OUTPATIENT)
Dept: ADMINISTRATIVE | Facility: HOSPITAL | Age: 51
End: 2021-07-06

## 2021-07-12 ENCOUNTER — PATIENT OUTREACH (OUTPATIENT)
Dept: ADMINISTRATIVE | Facility: OTHER | Age: 51
End: 2021-07-12

## 2021-07-12 ENCOUNTER — HOSPITAL ENCOUNTER (OUTPATIENT)
Dept: RADIOLOGY | Facility: HOSPITAL | Age: 51
Discharge: HOME | End: 2021-07-12
Attending: ORTHOPAEDIC SURGERY
Payer: COMMERCIAL

## 2021-07-12 ENCOUNTER — OFFICE VISIT (OUTPATIENT)
Dept: ORTHOPEDICS | Facility: CLINIC | Age: 51
End: 2021-07-12
Payer: COMMERCIAL

## 2021-07-12 VITALS
BODY MASS INDEX: 29.48 KG/M2 | DIASTOLIC BLOOD PRESSURE: 90 MMHG | HEIGHT: 67 IN | HEART RATE: 61 BPM | SYSTOLIC BLOOD PRESSURE: 141 MMHG | WEIGHT: 187.81 LBS

## 2021-07-12 DIAGNOSIS — M79.645 PAIN OF LEFT THUMB: Primary | ICD-10-CM

## 2021-07-12 DIAGNOSIS — M79.645 PAIN OF LEFT THUMB: ICD-10-CM

## 2021-07-12 DIAGNOSIS — M79.642 PAIN OF LEFT HAND: ICD-10-CM

## 2021-07-12 PROCEDURE — 99213 OFFICE O/P EST LOW 20 MIN: CPT | Mod: S$GLB,,, | Performed by: ORTHOPAEDIC SURGERY

## 2021-07-12 PROCEDURE — 1125F PR PAIN SEVERITY QUANTIFIED, PAIN PRESENT: ICD-10-PCS | Mod: S$GLB,,, | Performed by: ORTHOPAEDIC SURGERY

## 2021-07-12 PROCEDURE — 3008F BODY MASS INDEX DOCD: CPT | Mod: CPTII,S$GLB,, | Performed by: ORTHOPAEDIC SURGERY

## 2021-07-12 PROCEDURE — 99999 PR PBB SHADOW E&M-EST. PATIENT-LVL V: ICD-10-PCS | Mod: PBBFAC,,, | Performed by: ORTHOPAEDIC SURGERY

## 2021-07-12 PROCEDURE — 3008F PR BODY MASS INDEX (BMI) DOCUMENTED: ICD-10-PCS | Mod: CPTII,S$GLB,, | Performed by: ORTHOPAEDIC SURGERY

## 2021-07-12 PROCEDURE — 73130 X-RAY EXAM OF HAND: CPT | Mod: TC,PN,LT

## 2021-07-12 PROCEDURE — 1125F AMNT PAIN NOTED PAIN PRSNT: CPT | Mod: S$GLB,,, | Performed by: ORTHOPAEDIC SURGERY

## 2021-07-12 PROCEDURE — 73130 X-RAY EXAM OF HAND: CPT | Mod: 26,LT,, | Performed by: RADIOLOGY

## 2021-07-12 PROCEDURE — 99213 PR OFFICE/OUTPT VISIT, EST, LEVL III, 20-29 MIN: ICD-10-PCS | Mod: S$GLB,,, | Performed by: ORTHOPAEDIC SURGERY

## 2021-07-12 PROCEDURE — 99999 PR PBB SHADOW E&M-EST. PATIENT-LVL V: CPT | Mod: PBBFAC,,, | Performed by: ORTHOPAEDIC SURGERY

## 2021-07-12 PROCEDURE — 73130 XR HAND COMPLETE 3 VIEW LEFT: ICD-10-PCS | Mod: 26,LT,, | Performed by: RADIOLOGY

## 2021-07-12 RX ORDER — DICLOFENAC SODIUM 20 MG/G
40 SOLUTION TOPICAL 2 TIMES DAILY
Qty: 1 BOTTLE | Refills: 1 | Status: SHIPPED | OUTPATIENT
Start: 2021-07-12 | End: 2022-01-27

## 2021-07-16 ENCOUNTER — TELEPHONE (OUTPATIENT)
Dept: ORTHOPEDICS | Facility: CLINIC | Age: 51
End: 2021-07-16

## 2021-07-19 ENCOUNTER — HOSPITAL ENCOUNTER (OUTPATIENT)
Dept: RADIOLOGY | Facility: HOSPITAL | Age: 51
Discharge: HOME | End: 2021-07-19
Attending: ORTHOPAEDIC SURGERY
Payer: COMMERCIAL

## 2021-07-19 DIAGNOSIS — M79.645 PAIN OF LEFT THUMB: ICD-10-CM

## 2021-07-19 DIAGNOSIS — M79.642 PAIN OF LEFT HAND: ICD-10-CM

## 2021-07-19 PROCEDURE — 73200 CT UPPER EXTREMITY W/O DYE: CPT | Mod: 26,LT,, | Performed by: RADIOLOGY

## 2021-07-19 PROCEDURE — 73200 CT HAND WITHOUT CONTRAST LEFT: ICD-10-PCS | Mod: 26,LT,, | Performed by: RADIOLOGY

## 2021-07-19 PROCEDURE — 73200 CT UPPER EXTREMITY W/O DYE: CPT | Mod: TC,LT

## 2021-07-31 NOTE — NURSING
After sipping 3 medicine cups of water over 45 min the pt became nauseated and vomited the water up.  Zofran 4mg IV administered.    
Pt in bed resting with family at bedside. Pt denies c/o pain or n/v. D/c orders and prescriptions given to pt. Pt states understanding of orders. D/c PIV and tolerated well by pt. Pt waiting for transport to vehicle via w/c.   
Water protocol started @ 0500.  Pt ambulated full length of hallway without difficulty.  Tolerating water intake well.   
No

## 2021-08-04 ENCOUNTER — PATIENT MESSAGE (OUTPATIENT)
Dept: ORTHOPEDICS | Facility: CLINIC | Age: 51
End: 2021-08-04

## 2021-08-07 ENCOUNTER — IMMUNIZATION (OUTPATIENT)
Dept: INTERNAL MEDICINE | Facility: CLINIC | Age: 51
End: 2021-08-07
Payer: COMMERCIAL

## 2021-08-07 DIAGNOSIS — Z23 NEED FOR VACCINATION: Primary | ICD-10-CM

## 2021-08-07 PROCEDURE — 91300 COVID-19, MRNA, LNP-S, PF, 30 MCG/0.3 ML DOSE VACCINE: ICD-10-PCS | Mod: ,,, | Performed by: INTERNAL MEDICINE

## 2021-08-07 PROCEDURE — 0001A COVID-19, MRNA, LNP-S, PF, 30 MCG/0.3 ML DOSE VACCINE: CPT | Mod: CV19,,, | Performed by: INTERNAL MEDICINE

## 2021-08-07 PROCEDURE — 91300 COVID-19, MRNA, LNP-S, PF, 30 MCG/0.3 ML DOSE VACCINE: CPT | Mod: ,,, | Performed by: INTERNAL MEDICINE

## 2021-08-07 PROCEDURE — 0001A COVID-19, MRNA, LNP-S, PF, 30 MCG/0.3 ML DOSE VACCINE: ICD-10-PCS | Mod: CV19,,, | Performed by: INTERNAL MEDICINE

## 2021-08-12 ENCOUNTER — PATIENT MESSAGE (OUTPATIENT)
Dept: INTERNAL MEDICINE | Facility: CLINIC | Age: 51
End: 2021-08-12

## 2021-08-12 ENCOUNTER — TELEPHONE (OUTPATIENT)
Dept: INTERNAL MEDICINE | Facility: CLINIC | Age: 51
End: 2021-08-12

## 2021-08-12 DIAGNOSIS — R51.9 HEAD ACHE: ICD-10-CM

## 2021-08-13 ENCOUNTER — LAB VISIT (OUTPATIENT)
Dept: INTERNAL MEDICINE | Facility: CLINIC | Age: 51
End: 2021-08-13
Payer: COMMERCIAL

## 2021-08-13 DIAGNOSIS — R51.9 HEAD ACHE: ICD-10-CM

## 2021-08-13 PROCEDURE — U0003 INFECTIOUS AGENT DETECTION BY NUCLEIC ACID (DNA OR RNA); SEVERE ACUTE RESPIRATORY SYNDROME CORONAVIRUS 2 (SARS-COV-2) (CORONAVIRUS DISEASE [COVID-19]), AMPLIFIED PROBE TECHNIQUE, MAKING USE OF HIGH THROUGHPUT TECHNOLOGIES AS DESCRIBED BY CMS-2020-01-R: HCPCS | Performed by: INTERNAL MEDICINE

## 2021-08-13 PROCEDURE — U0005 INFEC AGEN DETEC AMPLI PROBE: HCPCS | Performed by: INTERNAL MEDICINE

## 2021-08-14 DIAGNOSIS — I10 ESSENTIAL HYPERTENSION: Primary | ICD-10-CM

## 2021-08-14 DIAGNOSIS — L29.9 ITCHING: ICD-10-CM

## 2021-08-14 LAB
SARS-COV-2 RNA RESP QL NAA+PROBE: DETECTED
SARS-COV-2- CYCLE NUMBER: 33.73

## 2021-08-15 DIAGNOSIS — R51.9 HEAD ACHE: ICD-10-CM

## 2021-08-16 RX ORDER — PREDNISONE 20 MG/1
TABLET ORAL
Qty: 10 TABLET | Refills: 0 | Status: SHIPPED | OUTPATIENT
Start: 2021-08-16 | End: 2022-01-27

## 2021-08-17 RX ORDER — IRBESARTAN 300 MG/1
300 TABLET ORAL EVERY MORNING
Qty: 90 TABLET | Refills: 1 | Status: SHIPPED | OUTPATIENT
Start: 2021-08-17 | End: 2021-09-14 | Stop reason: SDUPTHER

## 2021-08-17 RX ORDER — CETIRIZINE HYDROCHLORIDE 10 MG/1
TABLET ORAL
Qty: 180 TABLET | Refills: 1 | Status: SHIPPED | OUTPATIENT
Start: 2021-08-17 | End: 2022-11-08 | Stop reason: SDUPTHER

## 2021-08-22 ENCOUNTER — PATIENT MESSAGE (OUTPATIENT)
Dept: INTERNAL MEDICINE | Facility: CLINIC | Age: 51
End: 2021-08-22

## 2021-09-11 DIAGNOSIS — N18.30 CHRONIC KIDNEY DISEASE, STAGE III (MODERATE): ICD-10-CM

## 2021-09-13 RX ORDER — TORSEMIDE 10 MG/1
TABLET ORAL
Qty: 90 TABLET | Refills: 1 | OUTPATIENT
Start: 2021-09-13

## 2021-09-14 ENCOUNTER — PATIENT MESSAGE (OUTPATIENT)
Dept: INTERNAL MEDICINE | Facility: CLINIC | Age: 51
End: 2021-09-14

## 2021-09-14 DIAGNOSIS — N18.30 CHRONIC KIDNEY DISEASE, STAGE III (MODERATE): ICD-10-CM

## 2021-09-14 DIAGNOSIS — I10 ESSENTIAL HYPERTENSION: ICD-10-CM

## 2021-09-14 RX ORDER — IRBESARTAN 300 MG/1
300 TABLET ORAL EVERY MORNING
Qty: 90 TABLET | Refills: 3 | Status: SHIPPED | OUTPATIENT
Start: 2021-09-14 | End: 2022-02-04 | Stop reason: SDUPTHER

## 2021-09-14 RX ORDER — AMLODIPINE BESYLATE 5 MG/1
5 TABLET ORAL DAILY
Qty: 90 TABLET | Refills: 3 | Status: SHIPPED | OUTPATIENT
Start: 2021-09-14 | End: 2022-02-04 | Stop reason: SDUPTHER

## 2021-09-14 RX ORDER — CARVEDILOL 25 MG/1
25 TABLET ORAL 2 TIMES DAILY WITH MEALS
Qty: 180 TABLET | Refills: 3 | Status: SHIPPED | OUTPATIENT
Start: 2021-09-14 | End: 2022-02-04 | Stop reason: SDUPTHER

## 2021-10-04 ENCOUNTER — PATIENT MESSAGE (OUTPATIENT)
Dept: ADMINISTRATIVE | Facility: HOSPITAL | Age: 51
End: 2021-10-04

## 2021-11-04 ENCOUNTER — PATIENT MESSAGE (OUTPATIENT)
Dept: BARIATRICS | Facility: CLINIC | Age: 51
End: 2021-11-04
Payer: COMMERCIAL

## 2021-11-04 ENCOUNTER — TELEPHONE (OUTPATIENT)
Dept: BARIATRICS | Facility: CLINIC | Age: 51
End: 2021-11-04
Payer: COMMERCIAL

## 2021-12-02 ENCOUNTER — PATIENT OUTREACH (OUTPATIENT)
Dept: ADMINISTRATIVE | Facility: OTHER | Age: 51
End: 2021-12-02
Payer: COMMERCIAL

## 2021-12-02 DIAGNOSIS — Z12.31 ENCOUNTER FOR SCREENING MAMMOGRAM FOR MALIGNANT NEOPLASM OF BREAST: Primary | ICD-10-CM

## 2021-12-03 ENCOUNTER — OFFICE VISIT (OUTPATIENT)
Dept: ORTHOPEDICS | Facility: CLINIC | Age: 51
End: 2021-12-03
Payer: COMMERCIAL

## 2021-12-03 VITALS — WEIGHT: 187 LBS | HEIGHT: 67 IN | BODY MASS INDEX: 29.35 KG/M2

## 2021-12-03 DIAGNOSIS — M18.12 PRIMARY OSTEOARTHRITIS OF FIRST CARPOMETACARPAL JOINT OF LEFT HAND: Primary | ICD-10-CM

## 2021-12-03 PROCEDURE — 99213 PR OFFICE/OUTPT VISIT, EST, LEVL III, 20-29 MIN: ICD-10-PCS | Mod: S$GLB,,, | Performed by: ORTHOPAEDIC SURGERY

## 2021-12-03 PROCEDURE — 99999 PR PBB SHADOW E&M-EST. PATIENT-LVL IV: ICD-10-PCS | Mod: PBBFAC,,, | Performed by: ORTHOPAEDIC SURGERY

## 2021-12-03 PROCEDURE — 4010F PR ACE/ARB THEARPY RXD/TAKEN: ICD-10-PCS | Mod: CPTII,S$GLB,, | Performed by: ORTHOPAEDIC SURGERY

## 2021-12-03 PROCEDURE — 99213 OFFICE O/P EST LOW 20 MIN: CPT | Mod: S$GLB,,, | Performed by: ORTHOPAEDIC SURGERY

## 2021-12-03 PROCEDURE — 4010F ACE/ARB THERAPY RXD/TAKEN: CPT | Mod: CPTII,S$GLB,, | Performed by: ORTHOPAEDIC SURGERY

## 2021-12-03 PROCEDURE — 99999 PR PBB SHADOW E&M-EST. PATIENT-LVL IV: CPT | Mod: PBBFAC,,, | Performed by: ORTHOPAEDIC SURGERY

## 2021-12-08 ENCOUNTER — TELEPHONE (OUTPATIENT)
Dept: ORTHOPEDICS | Facility: CLINIC | Age: 51
End: 2021-12-08
Payer: COMMERCIAL

## 2021-12-17 ENCOUNTER — PATIENT MESSAGE (OUTPATIENT)
Dept: RESEARCH | Facility: HOSPITAL | Age: 51
End: 2021-12-17
Payer: COMMERCIAL

## 2022-01-19 ENCOUNTER — PATIENT MESSAGE (OUTPATIENT)
Dept: ADMINISTRATIVE | Facility: HOSPITAL | Age: 52
End: 2022-01-19
Payer: COMMERCIAL

## 2022-01-27 ENCOUNTER — OFFICE VISIT (OUTPATIENT)
Dept: INTERNAL MEDICINE | Facility: CLINIC | Age: 52
End: 2022-01-27
Payer: COMMERCIAL

## 2022-01-27 DIAGNOSIS — I10 ESSENTIAL HYPERTENSION: ICD-10-CM

## 2022-01-27 DIAGNOSIS — Z00.00 ANNUAL PHYSICAL EXAM: ICD-10-CM

## 2022-01-27 DIAGNOSIS — E66.9 OBESITY, UNSPECIFIED CLASSIFICATION, UNSPECIFIED OBESITY TYPE, UNSPECIFIED WHETHER SERIOUS COMORBIDITY PRESENT: ICD-10-CM

## 2022-01-27 DIAGNOSIS — N95.1 MENOPAUSAL SYMPTOMS: Primary | ICD-10-CM

## 2022-01-27 PROCEDURE — 99213 OFFICE O/P EST LOW 20 MIN: CPT | Mod: 95,,, | Performed by: INTERNAL MEDICINE

## 2022-01-27 PROCEDURE — 99213 PR OFFICE/OUTPT VISIT, EST, LEVL III, 20-29 MIN: ICD-10-PCS | Mod: 95,,, | Performed by: INTERNAL MEDICINE

## 2022-01-27 PROCEDURE — 1159F PR MEDICATION LIST DOCUMENTED IN MEDICAL RECORD: ICD-10-PCS | Mod: CPTII,95,, | Performed by: INTERNAL MEDICINE

## 2022-01-27 PROCEDURE — 1160F RVW MEDS BY RX/DR IN RCRD: CPT | Mod: CPTII,95,, | Performed by: INTERNAL MEDICINE

## 2022-01-27 PROCEDURE — 1159F MED LIST DOCD IN RCRD: CPT | Mod: CPTII,95,, | Performed by: INTERNAL MEDICINE

## 2022-01-27 PROCEDURE — 1160F PR REVIEW ALL MEDS BY PRESCRIBER/CLIN PHARMACIST DOCUMENTED: ICD-10-PCS | Mod: CPTII,95,, | Performed by: INTERNAL MEDICINE

## 2022-01-27 RX ORDER — ESTRADIOL 1 MG/1
1 TABLET ORAL DAILY
Qty: 30 TABLET | Refills: 5 | Status: SHIPPED | OUTPATIENT
Start: 2022-01-27 | End: 2022-01-27

## 2022-01-27 RX ORDER — ESTRADIOL 1 MG/1
1 TABLET ORAL DAILY
Qty: 30 TABLET | Refills: 5 | Status: SHIPPED | OUTPATIENT
Start: 2022-01-27 | End: 2022-03-08 | Stop reason: SDUPTHER

## 2022-01-27 NOTE — PROGRESS NOTES
Established Patient - Audio Only Telehealth Visit     The patient location is: work  The chief complaint leading to consultation is: hot flashes, wt gain  Visit type: Virtual visit with audio only (telephone)  Total time spent with patient: 15       The reason for the audio only service rather than synchronous audio and video virtual visit was related to technical difficulties or patient preference/necessity.     Each patient to whom I provide medical services by telemedicine is:  (1) informed of the relationship between the physician and patient and the respective role of any other health care provider with respect to management of the patient; and (2) notified that they may decline to receive medical services by telemedicine and may withdraw from such care at any time. Patient verbally consented to receive this service via voice-only telephone call.       HPI:    C/o hot flashes.  Awakens her at night.  Began months also.    Craving sweats and eating more, and fearful of gaining wt back that she lost after gastric sleeve..  2012 had hysterectomy.    S/p gastric sleeve.  She has regained 15 lbs.    She restarted BP meds as BP has increased with wt gain.    Assessment and plan:  Diagnoses and all orders for this visit:    Menopausal symptoms  -     estradioL (ESTRACE) 1 MG tablet; Take 1 tablet (1 mg total) by mouth once daily.    Obesity, unspecified classification, unspecified obesity type, unspecified whether serious comorbidity present  -     Ambulatory referral/consult to Bariatric Medicine; Future  -     Discontinue: estradioL (ESTRACE) 1 MG tablet; Take 1 tablet (1 mg total) by mouth once daily.    BMI 29.0-29.9,adult    Essential hypertension       Risks and benefits of estrogen tx reviewed.      RTC for review, bp check, PE 4-6 weeks with labs                 This service was not originating from a related E/M service provided within the previous 7 days nor will  to an E/M service or procedure within  the next 24 hours or my soonest available appointment.  Prevailing standard of care was able to be met in this audio-only visit.        Answers for HPI/ROS submitted by the patient on 1/20/2022  activity change: No  unexpected weight change: Yes  neck pain: No  hearing loss: No  rhinorrhea: No  trouble swallowing: No  eye discharge: No  visual disturbance: No  chest tightness: No  wheezing: No  chest pain: No  palpitations: No  blood in stool: No  constipation: No  vomiting: No  diarrhea: No  polydipsia: No  polyuria: No  difficulty urinating: No  hematuria: No  menstrual problem: No  dysuria: No  joint swelling: No  arthralgias: No  headaches: Yes  weakness: No  confusion: No  dysphoric mood: No

## 2022-02-04 ENCOUNTER — PATIENT MESSAGE (OUTPATIENT)
Dept: INTERNAL MEDICINE | Facility: CLINIC | Age: 52
End: 2022-02-04
Payer: COMMERCIAL

## 2022-02-04 DIAGNOSIS — N18.30 CHRONIC KIDNEY DISEASE, STAGE III (MODERATE): ICD-10-CM

## 2022-02-04 DIAGNOSIS — I10 ESSENTIAL HYPERTENSION: ICD-10-CM

## 2022-02-04 RX ORDER — AMLODIPINE BESYLATE 5 MG/1
5 TABLET ORAL DAILY
Qty: 90 TABLET | Refills: 3 | Status: SHIPPED | OUTPATIENT
Start: 2022-02-04 | End: 2022-03-08 | Stop reason: SDUPTHER

## 2022-02-04 RX ORDER — CARVEDILOL 25 MG/1
25 TABLET ORAL 2 TIMES DAILY WITH MEALS
Qty: 180 TABLET | Refills: 3 | Status: SHIPPED | OUTPATIENT
Start: 2022-02-04 | End: 2022-03-08 | Stop reason: SDUPTHER

## 2022-02-04 RX ORDER — CYCLOBENZAPRINE HCL 10 MG
TABLET ORAL
Qty: 30 TABLET | Refills: 1 | Status: SHIPPED | OUTPATIENT
Start: 2022-02-04 | End: 2022-11-08 | Stop reason: SDUPTHER

## 2022-02-04 RX ORDER — IRBESARTAN 300 MG/1
300 TABLET ORAL EVERY MORNING
Qty: 90 TABLET | Refills: 3 | Status: SHIPPED | OUTPATIENT
Start: 2022-02-04 | End: 2022-03-08 | Stop reason: SDUPTHER

## 2022-02-04 NOTE — TELEPHONE ENCOUNTER
Care Due:                  Date            Visit Type   Department     Provider  --------------------------------------------------------------------------------                                VIRTUAL      [facility] INTERNAL  Last Visit: 01-      AUDIO ONLY   MEDICINE       BRYCE DENNIS                              EP -                              PRIMARY      [facility] INTERNAL  Next Visit: 03-      CARE ([facility])   MEDICINE       BRYCE DENNIS                                                            Last  Test          Frequency    Reason                     Performed    Due Date  --------------------------------------------------------------------------------    CMP.........  12 months..  irbesartan...............  Not Found    Overdue    Powered by [employer]. Reference number: [id number].   2/04/2022 4:57:19 PM CST

## 2022-02-09 ENCOUNTER — TELEPHONE (OUTPATIENT)
Dept: BARIATRICS | Facility: CLINIC | Age: 52
End: 2022-02-09
Payer: COMMERCIAL

## 2022-02-16 ENCOUNTER — PATIENT MESSAGE (OUTPATIENT)
Dept: INTERNAL MEDICINE | Facility: CLINIC | Age: 52
End: 2022-02-16
Payer: COMMERCIAL

## 2022-02-22 ENCOUNTER — TELEPHONE (OUTPATIENT)
Dept: BARIATRICS | Facility: CLINIC | Age: 52
End: 2022-02-22
Payer: COMMERCIAL

## 2022-03-03 ENCOUNTER — LAB VISIT (OUTPATIENT)
Dept: LAB | Facility: HOSPITAL | Age: 52
End: 2022-03-03
Attending: INTERNAL MEDICINE
Payer: COMMERCIAL

## 2022-03-03 DIAGNOSIS — Z00.00 ANNUAL PHYSICAL EXAM: ICD-10-CM

## 2022-03-03 LAB
ALBUMIN SERPL BCP-MCNC: 3.7 G/DL (ref 3.5–5.2)
ALP SERPL-CCNC: 62 U/L (ref 55–135)
ALT SERPL W/O P-5'-P-CCNC: 19 U/L (ref 10–44)
ANION GAP SERPL CALC-SCNC: 8 MMOL/L (ref 8–16)
AST SERPL-CCNC: 21 U/L (ref 10–40)
BILIRUB SERPL-MCNC: 0.9 MG/DL (ref 0.1–1)
BUN SERPL-MCNC: 14 MG/DL (ref 6–20)
CALCIUM SERPL-MCNC: 9.7 MG/DL (ref 8.7–10.5)
CHLORIDE SERPL-SCNC: 108 MMOL/L (ref 95–110)
CHOLEST SERPL-MCNC: 157 MG/DL (ref 120–199)
CHOLEST/HDLC SERPL: 3.3 {RATIO} (ref 2–5)
CO2 SERPL-SCNC: 27 MMOL/L (ref 23–29)
CREAT SERPL-MCNC: 1.3 MG/DL (ref 0.5–1.4)
ERYTHROCYTE [DISTWIDTH] IN BLOOD BY AUTOMATED COUNT: 12.1 % (ref 11.5–14.5)
EST. GFR  (AFRICAN AMERICAN): 55.3 ML/MIN/1.73 M^2
EST. GFR  (NON AFRICAN AMERICAN): 48 ML/MIN/1.73 M^2
FERRITIN SERPL-MCNC: 343 NG/ML (ref 20–300)
GLUCOSE SERPL-MCNC: 87 MG/DL (ref 70–110)
HCT VFR BLD AUTO: 42.1 % (ref 37–48.5)
HDLC SERPL-MCNC: 48 MG/DL (ref 40–75)
HDLC SERPL: 30.6 % (ref 20–50)
HGB BLD-MCNC: 12.9 G/DL (ref 12–16)
IRON SERPL-MCNC: 99 UG/DL (ref 30–160)
LDLC SERPL CALC-MCNC: 92.8 MG/DL (ref 63–159)
MCH RBC QN AUTO: 31.5 PG (ref 27–31)
MCHC RBC AUTO-ENTMCNC: 30.6 G/DL (ref 32–36)
MCV RBC AUTO: 103 FL (ref 82–98)
NONHDLC SERPL-MCNC: 109 MG/DL
PLATELET # BLD AUTO: 181 K/UL (ref 150–450)
PMV BLD AUTO: 11.1 FL (ref 9.2–12.9)
POTASSIUM SERPL-SCNC: 4.6 MMOL/L (ref 3.5–5.1)
PROT SERPL-MCNC: 7.2 G/DL (ref 6–8.4)
RBC # BLD AUTO: 4.09 M/UL (ref 4–5.4)
SATURATED IRON: 38 % (ref 20–50)
SODIUM SERPL-SCNC: 143 MMOL/L (ref 136–145)
TOTAL IRON BINDING CAPACITY: 262 UG/DL (ref 250–450)
TRANSFERRIN SERPL-MCNC: 177 MG/DL (ref 200–375)
TRIGL SERPL-MCNC: 81 MG/DL (ref 30–150)
TSH SERPL DL<=0.005 MIU/L-ACNC: 3.25 UIU/ML (ref 0.4–4)
WBC # BLD AUTO: 5.17 K/UL (ref 3.9–12.7)

## 2022-03-03 PROCEDURE — 84443 ASSAY THYROID STIM HORMONE: CPT | Performed by: INTERNAL MEDICINE

## 2022-03-03 PROCEDURE — 82728 ASSAY OF FERRITIN: CPT | Performed by: INTERNAL MEDICINE

## 2022-03-03 PROCEDURE — 36415 COLL VENOUS BLD VENIPUNCTURE: CPT | Mod: PO | Performed by: INTERNAL MEDICINE

## 2022-03-03 PROCEDURE — 85027 COMPLETE CBC AUTOMATED: CPT | Performed by: INTERNAL MEDICINE

## 2022-03-03 PROCEDURE — 80061 LIPID PANEL: CPT | Performed by: INTERNAL MEDICINE

## 2022-03-03 PROCEDURE — 80053 COMPREHEN METABOLIC PANEL: CPT | Performed by: INTERNAL MEDICINE

## 2022-03-03 PROCEDURE — 84466 ASSAY OF TRANSFERRIN: CPT | Performed by: INTERNAL MEDICINE

## 2022-03-07 ENCOUNTER — PATIENT MESSAGE (OUTPATIENT)
Dept: BARIATRICS | Facility: CLINIC | Age: 52
End: 2022-03-07
Payer: COMMERCIAL

## 2022-03-08 ENCOUNTER — OFFICE VISIT (OUTPATIENT)
Dept: INTERNAL MEDICINE | Facility: CLINIC | Age: 52
End: 2022-03-08
Payer: COMMERCIAL

## 2022-03-08 VITALS
DIASTOLIC BLOOD PRESSURE: 80 MMHG | OXYGEN SATURATION: 98 % | BODY MASS INDEX: 31.02 KG/M2 | SYSTOLIC BLOOD PRESSURE: 112 MMHG | HEIGHT: 67 IN | WEIGHT: 197.63 LBS | HEART RATE: 68 BPM

## 2022-03-08 DIAGNOSIS — N18.31 STAGE 3A CHRONIC KIDNEY DISEASE: ICD-10-CM

## 2022-03-08 DIAGNOSIS — N18.30 STAGE 3 CHRONIC KIDNEY DISEASE, UNSPECIFIED WHETHER STAGE 3A OR 3B CKD: ICD-10-CM

## 2022-03-08 DIAGNOSIS — E55.9 VITAMIN D DEFICIENCY: ICD-10-CM

## 2022-03-08 DIAGNOSIS — E66.01 MORBID OBESITY: ICD-10-CM

## 2022-03-08 DIAGNOSIS — Z87.898 HX OF WHEEZING: ICD-10-CM

## 2022-03-08 DIAGNOSIS — N25.81 HYPERPARATHYROIDISM DUE TO RENAL INSUFFICIENCY: ICD-10-CM

## 2022-03-08 DIAGNOSIS — E66.9 CLASS 1 OBESITY WITH SERIOUS COMORBIDITY AND BODY MASS INDEX (BMI) OF 32.0 TO 32.9 IN ADULT, UNSPECIFIED OBESITY TYPE: ICD-10-CM

## 2022-03-08 DIAGNOSIS — Z12.39 BREAST SCREENING: ICD-10-CM

## 2022-03-08 DIAGNOSIS — N95.1 MENOPAUSAL SYMPTOMS: Primary | ICD-10-CM

## 2022-03-08 DIAGNOSIS — I10 ESSENTIAL HYPERTENSION: ICD-10-CM

## 2022-03-08 PROCEDURE — 99999 PR PBB SHADOW E&M-EST. PATIENT-LVL IV: ICD-10-PCS | Mod: PBBFAC,,, | Performed by: INTERNAL MEDICINE

## 2022-03-08 PROCEDURE — 3008F PR BODY MASS INDEX (BMI) DOCUMENTED: ICD-10-PCS | Mod: CPTII,S$GLB,, | Performed by: INTERNAL MEDICINE

## 2022-03-08 PROCEDURE — 3079F DIAST BP 80-89 MM HG: CPT | Mod: CPTII,S$GLB,, | Performed by: INTERNAL MEDICINE

## 2022-03-08 PROCEDURE — 1160F PR REVIEW ALL MEDS BY PRESCRIBER/CLIN PHARMACIST DOCUMENTED: ICD-10-PCS | Mod: CPTII,S$GLB,, | Performed by: INTERNAL MEDICINE

## 2022-03-08 PROCEDURE — 4010F PR ACE/ARB THEARPY RXD/TAKEN: ICD-10-PCS | Mod: CPTII,S$GLB,, | Performed by: INTERNAL MEDICINE

## 2022-03-08 PROCEDURE — 3079F PR MOST RECENT DIASTOLIC BLOOD PRESSURE 80-89 MM HG: ICD-10-PCS | Mod: CPTII,S$GLB,, | Performed by: INTERNAL MEDICINE

## 2022-03-08 PROCEDURE — 3074F PR MOST RECENT SYSTOLIC BLOOD PRESSURE < 130 MM HG: ICD-10-PCS | Mod: CPTII,S$GLB,, | Performed by: INTERNAL MEDICINE

## 2022-03-08 PROCEDURE — 1159F PR MEDICATION LIST DOCUMENTED IN MEDICAL RECORD: ICD-10-PCS | Mod: CPTII,S$GLB,, | Performed by: INTERNAL MEDICINE

## 2022-03-08 PROCEDURE — 99396 PREV VISIT EST AGE 40-64: CPT | Mod: S$GLB,,, | Performed by: INTERNAL MEDICINE

## 2022-03-08 PROCEDURE — 4010F ACE/ARB THERAPY RXD/TAKEN: CPT | Mod: CPTII,S$GLB,, | Performed by: INTERNAL MEDICINE

## 2022-03-08 PROCEDURE — 99999 PR PBB SHADOW E&M-EST. PATIENT-LVL IV: CPT | Mod: PBBFAC,,, | Performed by: INTERNAL MEDICINE

## 2022-03-08 PROCEDURE — 1159F MED LIST DOCD IN RCRD: CPT | Mod: CPTII,S$GLB,, | Performed by: INTERNAL MEDICINE

## 2022-03-08 PROCEDURE — 1160F RVW MEDS BY RX/DR IN RCRD: CPT | Mod: CPTII,S$GLB,, | Performed by: INTERNAL MEDICINE

## 2022-03-08 PROCEDURE — 3008F BODY MASS INDEX DOCD: CPT | Mod: CPTII,S$GLB,, | Performed by: INTERNAL MEDICINE

## 2022-03-08 PROCEDURE — 99396 PR PREVENTIVE VISIT,EST,40-64: ICD-10-PCS | Mod: S$GLB,,, | Performed by: INTERNAL MEDICINE

## 2022-03-08 PROCEDURE — 3074F SYST BP LT 130 MM HG: CPT | Mod: CPTII,S$GLB,, | Performed by: INTERNAL MEDICINE

## 2022-03-08 RX ORDER — ESTRADIOL 1 MG/1
1 TABLET ORAL DAILY
Qty: 30 TABLET | Refills: 5 | Status: SHIPPED | OUTPATIENT
Start: 2022-03-08 | End: 2023-09-12

## 2022-03-08 RX ORDER — ALBUTEROL SULFATE 90 UG/1
2 AEROSOL, METERED RESPIRATORY (INHALATION) EVERY 4 HOURS PRN
Qty: 54 G | Refills: 2 | Status: SHIPPED | OUTPATIENT
Start: 2022-03-08

## 2022-03-08 RX ORDER — IRBESARTAN 300 MG/1
300 TABLET ORAL EVERY MORNING
Qty: 90 TABLET | Refills: 3 | Status: SHIPPED | OUTPATIENT
Start: 2022-03-08 | End: 2023-04-17

## 2022-03-08 RX ORDER — CARVEDILOL 25 MG/1
25 TABLET ORAL 2 TIMES DAILY WITH MEALS
Qty: 180 TABLET | Refills: 3 | Status: SHIPPED | OUTPATIENT
Start: 2022-03-08 | End: 2023-04-17

## 2022-03-08 RX ORDER — AMLODIPINE BESYLATE 5 MG/1
5 TABLET ORAL DAILY
Qty: 90 TABLET | Refills: 3 | Status: SHIPPED | OUTPATIENT
Start: 2022-03-08 | End: 2023-04-17

## 2022-03-08 RX ORDER — ERGOCALCIFEROL 1.25 MG/1
CAPSULE ORAL
Qty: 3 CAPSULE | Refills: 3 | Status: SHIPPED | OUTPATIENT
Start: 2022-03-08 | End: 2023-09-12 | Stop reason: SDUPTHER

## 2022-03-08 NOTE — PROGRESS NOTES
Subjective:       Patient ID: Toi Fernandez is a 51 y.o. female.    Chief Complaint: Annual Exam    HPI   Hot flashes better with estradiol, but still occurring several times a day.  She does not want to increase dose.    Htn controlled.     Very concerned by wt gain.  She was referred to bariatrics and was scheduled for support group, but she did not attend.  Up 16 lbs from Feb.  Eating chips, candy.  BMI up to 30.9.      Review of Systems   Constitutional: Negative for fever and unexpected weight change.   HENT: Negative for nasal congestion and postnasal drip.    Respiratory: Negative for chest tightness, shortness of breath and wheezing.    Cardiovascular: Negative for chest pain and leg swelling.   Gastrointestinal: Negative for abdominal pain, anal bleeding, constipation, diarrhea, nausea and vomiting.   Genitourinary: Negative for dysuria and urgency.   Integumentary:  Negative for rash.   Neurological: Negative for headaches.   Psychiatric/Behavioral: Negative for dysphoric mood and sleep disturbance. The patient is not nervous/anxious.          Objective:      Physical Exam  Constitutional:       General: She is not in acute distress.     Appearance: She is well-developed.   HENT:      Head: Normocephalic and atraumatic.      Right Ear: External ear normal.      Left Ear: External ear normal.      Nose: Nose normal.   Eyes:      General: No scleral icterus.        Right eye: No discharge.         Left eye: No discharge.      Conjunctiva/sclera: Conjunctivae normal.      Pupils: Pupils are equal, round, and reactive to light.   Neck:      Thyroid: No thyromegaly.      Vascular: No JVD.   Cardiovascular:      Rate and Rhythm: Normal rate and regular rhythm.      Heart sounds: Normal heart sounds. No murmur heard.    No gallop.   Pulmonary:      Effort: Pulmonary effort is normal. No respiratory distress.      Breath sounds: Normal breath sounds. No wheezing or rales.   Abdominal:      General: Bowel sounds  are normal. There is no distension.      Palpations: Abdomen is soft. There is no mass.      Tenderness: There is no abdominal tenderness. There is no guarding or rebound.   Musculoskeletal:         General: No tenderness. Normal range of motion.      Cervical back: Normal range of motion and neck supple.   Lymphadenopathy:      Cervical: No cervical adenopathy.   Skin:     General: Skin is warm and dry.      Findings: No rash.   Neurological:      Mental Status: She is alert and oriented to person, place, and time.      Cranial Nerves: No cranial nerve deficit.      Coordination: Coordination normal.   Psychiatric:         Behavior: Behavior normal.         Thought Content: Thought content normal.         Judgment: Judgment normal.         Results for orders placed or performed in visit on 03/03/22   CBC Without Differential   Result Value Ref Range    WBC 5.17 3.90 - 12.70 K/uL    RBC 4.09 4.00 - 5.40 M/uL    Hemoglobin 12.9 12.0 - 16.0 g/dL    Hematocrit 42.1 37.0 - 48.5 %     (H) 82 - 98 fL    MCH 31.5 (H) 27.0 - 31.0 pg    MCHC 30.6 (L) 32.0 - 36.0 g/dL    RDW 12.1 11.5 - 14.5 %    Platelets 181 150 - 450 K/uL    MPV 11.1 9.2 - 12.9 fL   Comprehensive Metabolic Panel   Result Value Ref Range    Sodium 143 136 - 145 mmol/L    Potassium 4.6 3.5 - 5.1 mmol/L    Chloride 108 95 - 110 mmol/L    CO2 27 23 - 29 mmol/L    Glucose 87 70 - 110 mg/dL    BUN 14 6 - 20 mg/dL    Creatinine 1.3 0.5 - 1.4 mg/dL    Calcium 9.7 8.7 - 10.5 mg/dL    Total Protein 7.2 6.0 - 8.4 g/dL    Albumin 3.7 3.5 - 5.2 g/dL    Total Bilirubin 0.9 0.1 - 1.0 mg/dL    Alkaline Phosphatase 62 55 - 135 U/L    AST 21 10 - 40 U/L    ALT 19 10 - 44 U/L    Anion Gap 8 8 - 16 mmol/L    eGFR if African American 55.3 (A) >60 mL/min/1.73 m^2    eGFR if non  48.0 (A) >60 mL/min/1.73 m^2   Lipid Panel   Result Value Ref Range    Cholesterol 157 120 - 199 mg/dL    Triglycerides 81 30 - 150 mg/dL    HDL 48 40 - 75 mg/dL    LDL  Cholesterol 92.8 63.0 - 159.0 mg/dL    HDL/Cholesterol Ratio 30.6 20.0 - 50.0 %    Total Cholesterol/HDL Ratio 3.3 2.0 - 5.0    Non-HDL Cholesterol 109 mg/dL   TSH   Result Value Ref Range    TSH 3.250 0.400 - 4.000 uIU/mL   Ferritin   Result Value Ref Range    Ferritin 343 (H) 20.0 - 300.0 ng/mL   Iron and TIBC   Result Value Ref Range    Iron 99 30 - 160 ug/dL    Transferrin 177 (L) 200 - 375 mg/dL    TIBC 262 250 - 450 ug/dL    Saturated Iron 38 20 - 50 %     Assessment:       Problem List Items Addressed This Visit     Vitamin D deficiency    Relevant Medications    ergocalciferol (ERGOCALCIFEROL) 50,000 unit Cap    Class 1 obesity with serious comorbidity and body mass index (BMI) of 32.0 to 32.9 in adult    Relevant Medications    ergocalciferol (ERGOCALCIFEROL) 50,000 unit Cap      Other Visit Diagnoses     Menopausal symptoms    -  Primary    Relevant Medications    estradioL (ESTRACE) 1 MG tablet    Other Relevant Orders    Ambulatory referral/consult to Gynecology    BMI 30.0-30.9,adult        Relevant Medications    semaglutide (OZEMPIC) 0.25 mg or 0.5 mg(2 mg/1.5 mL) pen injector    Breast screening        Relevant Orders    Mammo Digital Screening Bilat w/ Rodney    Chronic kidney disease, stage III (moderate)        Relevant Medications    carvediloL (COREG) 25 MG tablet    Essential hypertension        Relevant Medications    amLODIPine (NORVASC) 5 MG tablet    irbesartan (AVAPRO) 300 MG tablet    Hx of wheezing (infection, sinusitis trigger)        Relevant Medications    albuterol (PROVENTIL/VENTOLIN HFA) 90 mcg/actuation inhaler          Plan:       Toi was seen today for annual exam.    Diagnoses and all orders for this visit:    Menopausal symptoms  -     Cancel: Ambulatory referral/consult to Gynecology; Future  -     Ambulatory referral/consult to Gynecology; Future  -     estradioL (ESTRACE) 1 MG tablet; Take 1 tablet (1 mg total) by mouth once daily.    BMI 30.0-30.9,adult  -      Discontinue: semaglutide (OZEMPIC) 0.25 mg or 0.5 mg(2 mg/1.5 mL) pen injector; 0.25 mg sq q week, then 0.5 mg day 5-8, then 1 mg weekly.  -     semaglutide (OZEMPIC) 0.25 mg or 0.5 mg(2 mg/1.5 mL) pen injector; 0.25 mg sq q week, then 0.5 mg day 5-8, then 1 mg weekly.    Breast screening  -     Mammo Digital Screening Bilat w/ Rodney; Future    Chronic kidney disease, stage III (moderate)  -     carvediloL (COREG) 25 MG tablet; Take 1 tablet (25 mg total) by mouth 2 (two) times daily with meals.    Class 1 obesity with serious comorbidity and body mass index (BMI) of 32.0 to 32.9 in adult, unspecified obesity type  -     ergocalciferol (ERGOCALCIFEROL) 50,000 unit Cap; 1 tab po q month    Vitamin D deficiency  -     ergocalciferol (ERGOCALCIFEROL) 50,000 unit Cap; 1 tab po q month    Essential hypertension  -     amLODIPine (NORVASC) 5 MG tablet; Take 1 tablet (5 mg total) by mouth once daily.  -     irbesartan (AVAPRO) 300 MG tablet; Take 1 tablet (300 mg total) by mouth every morning.    Hx of wheezing (infection, sinusitis trigger)  -     albuterol (PROVENTIL/VENTOLIN HFA) 90 mcg/actuation inhaler; Inhale 2 puffs into the lungs every 4 (four) hours as needed for Wheezing. Rescue    Morbid obesity    Hyperparathyroidism due to renal insufficiency           Covid booster.   GYN for other thoughts on tx of menopausal symptoms  Join bariatric support group!

## 2022-03-10 ENCOUNTER — TELEPHONE (OUTPATIENT)
Dept: PHARMACY | Facility: CLINIC | Age: 52
End: 2022-03-10
Payer: COMMERCIAL

## 2022-03-10 ENCOUNTER — PATIENT MESSAGE (OUTPATIENT)
Dept: PHARMACY | Facility: CLINIC | Age: 52
End: 2022-03-10
Payer: COMMERCIAL

## 2022-03-12 ENCOUNTER — PATIENT MESSAGE (OUTPATIENT)
Dept: INTERNAL MEDICINE | Facility: CLINIC | Age: 52
End: 2022-03-12
Payer: COMMERCIAL

## 2022-03-15 ENCOUNTER — PATIENT MESSAGE (OUTPATIENT)
Dept: INTERNAL MEDICINE | Facility: CLINIC | Age: 52
End: 2022-03-15
Payer: COMMERCIAL

## 2022-03-15 RX ORDER — TOPIRAMATE 25 MG/1
TABLET ORAL
Qty: 90 TABLET | Refills: 2 | Status: SHIPPED | OUTPATIENT
Start: 2022-03-15 | End: 2022-03-16

## 2022-03-16 ENCOUNTER — PATIENT MESSAGE (OUTPATIENT)
Dept: ADMINISTRATIVE | Facility: HOSPITAL | Age: 52
End: 2022-03-16
Payer: COMMERCIAL

## 2022-03-16 RX ORDER — TOPIRAMATE 25 MG/1
TABLET ORAL
Qty: 368 TABLET | Refills: 1 | Status: SHIPPED | OUTPATIENT
Start: 2022-03-16 | End: 2022-11-08 | Stop reason: SDUPTHER

## 2022-04-01 ENCOUNTER — PATIENT OUTREACH (OUTPATIENT)
Dept: ADMINISTRATIVE | Facility: OTHER | Age: 52
End: 2022-04-01
Payer: COMMERCIAL

## 2022-04-01 NOTE — PROGRESS NOTES
Health Maintenance Due   Topic Date Due    TETANUS VACCINE  Never done    Mammogram  10/21/2020    COVID-19 Vaccine (2 - Pfizer 3-dose series) 08/28/2021    Influenza Vaccine (1) 09/01/2021    Shingles Vaccine (1 of 2) Never done     Updates were requested from care everywhere.  Chart was reviewed for overdue Proactive [facility] Encounters (TRIPP) topics (CRS, Breast Cancer Screening, Eye exam)  Health Maintenance has been updated.  LINKS immunization registry triggered.  Immunizations were reconciled.

## 2022-04-07 ENCOUNTER — PATIENT MESSAGE (OUTPATIENT)
Dept: BARIATRICS | Facility: CLINIC | Age: 52
End: 2022-04-07
Payer: COMMERCIAL

## 2022-04-12 ENCOUNTER — PATIENT MESSAGE (OUTPATIENT)
Dept: BARIATRICS | Facility: CLINIC | Age: 52
End: 2022-04-12
Payer: COMMERCIAL

## 2022-04-21 ENCOUNTER — OFFICE VISIT (OUTPATIENT)
Dept: INTERNAL MEDICINE | Facility: CLINIC | Age: 52
End: 2022-04-21
Payer: COMMERCIAL

## 2022-04-21 DIAGNOSIS — Z98.84 BARIATRIC SURGERY STATUS: Primary | ICD-10-CM

## 2022-04-21 DIAGNOSIS — E66.9 OBESITY, UNSPECIFIED CLASSIFICATION, UNSPECIFIED OBESITY TYPE, UNSPECIFIED WHETHER SERIOUS COMORBIDITY PRESENT: ICD-10-CM

## 2022-04-21 PROCEDURE — 99213 PR OFFICE/OUTPT VISIT, EST, LEVL III, 20-29 MIN: ICD-10-PCS | Mod: 95,,, | Performed by: INTERNAL MEDICINE

## 2022-04-21 PROCEDURE — 4010F PR ACE/ARB THEARPY RXD/TAKEN: ICD-10-PCS | Mod: CPTII,95,, | Performed by: INTERNAL MEDICINE

## 2022-04-21 PROCEDURE — 1159F MED LIST DOCD IN RCRD: CPT | Mod: CPTII,95,, | Performed by: INTERNAL MEDICINE

## 2022-04-21 PROCEDURE — 4010F ACE/ARB THERAPY RXD/TAKEN: CPT | Mod: CPTII,95,, | Performed by: INTERNAL MEDICINE

## 2022-04-21 PROCEDURE — 1160F RVW MEDS BY RX/DR IN RCRD: CPT | Mod: CPTII,95,, | Performed by: INTERNAL MEDICINE

## 2022-04-21 PROCEDURE — 99213 OFFICE O/P EST LOW 20 MIN: CPT | Mod: 95,,, | Performed by: INTERNAL MEDICINE

## 2022-04-21 PROCEDURE — 1159F PR MEDICATION LIST DOCUMENTED IN MEDICAL RECORD: ICD-10-PCS | Mod: CPTII,95,, | Performed by: INTERNAL MEDICINE

## 2022-04-21 PROCEDURE — 1160F PR REVIEW ALL MEDS BY PRESCRIBER/CLIN PHARMACIST DOCUMENTED: ICD-10-PCS | Mod: CPTII,95,, | Performed by: INTERNAL MEDICINE

## 2022-05-05 ENCOUNTER — PATIENT MESSAGE (OUTPATIENT)
Dept: BARIATRICS | Facility: CLINIC | Age: 52
End: 2022-05-05
Payer: COMMERCIAL

## 2022-05-10 ENCOUNTER — PATIENT MESSAGE (OUTPATIENT)
Dept: BARIATRICS | Facility: CLINIC | Age: 52
End: 2022-05-10
Payer: COMMERCIAL

## 2022-06-01 ENCOUNTER — PATIENT MESSAGE (OUTPATIENT)
Dept: ADMINISTRATIVE | Facility: HOSPITAL | Age: 52
End: 2022-06-01
Payer: COMMERCIAL

## 2022-06-10 ENCOUNTER — PATIENT MESSAGE (OUTPATIENT)
Dept: BARIATRICS | Facility: CLINIC | Age: 52
End: 2022-06-10
Payer: COMMERCIAL

## 2022-06-14 ENCOUNTER — PATIENT MESSAGE (OUTPATIENT)
Dept: BARIATRICS | Facility: CLINIC | Age: 52
End: 2022-06-14
Payer: COMMERCIAL

## 2022-07-05 ENCOUNTER — PATIENT MESSAGE (OUTPATIENT)
Dept: BARIATRICS | Facility: CLINIC | Age: 52
End: 2022-07-05
Payer: COMMERCIAL

## 2022-07-13 ENCOUNTER — HOSPITAL ENCOUNTER (OUTPATIENT)
Dept: RADIOLOGY | Facility: HOSPITAL | Age: 52
Discharge: HOME | End: 2022-07-13
Attending: INTERNAL MEDICINE
Payer: COMMERCIAL

## 2022-07-13 DIAGNOSIS — Z12.39 BREAST SCREENING: ICD-10-CM

## 2022-07-13 PROCEDURE — 77063 BREAST TOMOSYNTHESIS BI: CPT | Mod: TC,PN

## 2022-07-13 PROCEDURE — 77067 SCR MAMMO BI INCL CAD: CPT | Mod: 26,,, | Performed by: RADIOLOGY

## 2022-07-13 PROCEDURE — 77063 MAMMO DIGITAL SCREENING BILAT WITH TOMO: ICD-10-PCS | Mod: 26,,, | Performed by: RADIOLOGY

## 2022-07-13 PROCEDURE — 77067 MAMMO DIGITAL SCREENING BILAT WITH TOMO: ICD-10-PCS | Mod: 26,,, | Performed by: RADIOLOGY

## 2022-07-13 PROCEDURE — 77063 BREAST TOMOSYNTHESIS BI: CPT | Mod: 26,,, | Performed by: RADIOLOGY

## 2022-07-20 ENCOUNTER — OFFICE VISIT (OUTPATIENT)
Dept: DERMATOLOGY | Facility: CLINIC | Age: 52
End: 2022-07-20
Payer: COMMERCIAL

## 2022-07-20 DIAGNOSIS — B07.8 OTHER VIRAL WARTS: Primary | ICD-10-CM

## 2022-07-20 DIAGNOSIS — L70.0 ACNE VULGARIS: ICD-10-CM

## 2022-07-20 PROCEDURE — 1160F PR REVIEW ALL MEDS BY PRESCRIBER/CLIN PHARMACIST DOCUMENTED: ICD-10-PCS | Mod: CPTII,S$GLB,, | Performed by: DERMATOLOGY

## 2022-07-20 PROCEDURE — 1159F PR MEDICATION LIST DOCUMENTED IN MEDICAL RECORD: ICD-10-PCS | Mod: CPTII,S$GLB,, | Performed by: DERMATOLOGY

## 2022-07-20 PROCEDURE — 4010F PR ACE/ARB THEARPY RXD/TAKEN: ICD-10-PCS | Mod: CPTII,S$GLB,, | Performed by: DERMATOLOGY

## 2022-07-20 PROCEDURE — 1159F MED LIST DOCD IN RCRD: CPT | Mod: CPTII,S$GLB,, | Performed by: DERMATOLOGY

## 2022-07-20 PROCEDURE — 1160F RVW MEDS BY RX/DR IN RCRD: CPT | Mod: CPTII,S$GLB,, | Performed by: DERMATOLOGY

## 2022-07-20 PROCEDURE — 17110 PR DESTRUCTION BENIGN LESIONS UP TO 14: ICD-10-PCS | Mod: S$GLB,,, | Performed by: DERMATOLOGY

## 2022-07-20 PROCEDURE — 17110 DESTRUCTION B9 LES UP TO 14: CPT | Mod: S$GLB,,, | Performed by: DERMATOLOGY

## 2022-07-20 PROCEDURE — 4010F ACE/ARB THERAPY RXD/TAKEN: CPT | Mod: CPTII,S$GLB,, | Performed by: DERMATOLOGY

## 2022-07-20 PROCEDURE — 99999 PR PBB SHADOW E&M-EST. PATIENT-LVL II: ICD-10-PCS | Mod: PBBFAC,,, | Performed by: DERMATOLOGY

## 2022-07-20 PROCEDURE — 99203 OFFICE O/P NEW LOW 30 MIN: CPT | Mod: 25,S$GLB,, | Performed by: DERMATOLOGY

## 2022-07-20 PROCEDURE — 99999 PR PBB SHADOW E&M-EST. PATIENT-LVL II: CPT | Mod: PBBFAC,,, | Performed by: DERMATOLOGY

## 2022-07-20 PROCEDURE — 99203 PR OFFICE/OUTPT VISIT, NEW, LEVL III, 30-44 MIN: ICD-10-PCS | Mod: 25,S$GLB,, | Performed by: DERMATOLOGY

## 2022-07-20 NOTE — PATIENT INSTRUCTIONS
CRYOSURGERY      Your doctor has used a method called cryosurgery to treat your skin condition. Cryosurgery refers to the use of very cold substances to treat a variety of skin conditions such as warts, pre-skin cancers, molluscum contagiosum, sun spots, and several benign growths. The substance we use in cryosurgery is liquid nitrogen and is so cold (-195 degrees Celsius) that is burns when administered.     Following treatment in the office, the skin may immediately burn and become red. You may find the area around the lesion is affected as well. It is sometimes necessary to treat not only the lesion, but a small area of the surrounding normal skin to achieve a good response.     A blister, and even a blood filled blister, may form after treatment.   This is a normal response. If the blister is painful, it is acceptable to sterilize a needle and with rubbing alcohol and gently pop the blister. It is important that you gently wash the area with soap and warm water as the blister fluid may contain wart virus if a wart was treated. Do no remove the roof of the blister.     The area treated can take anywhere from 1-3 weeks to heal. Healing time depends on the kind skin lesion treated, the location, and how aggressively the lesion was treated. It is recommended that the areas treated are covered with Vaseline or bacitracin ointment and a band-aid. If a band-aid is not practical, just ointment applied several times per day will do. Keeping these areas moist will speed the healing time.    Treatment with liquid nitrogen can leave a scar. In dark skin, it may be a light or dark scar, in light skin it may be a white or pink scar. These will generally fade with time, but may never go away completely.     If you have any concerns after your treatment, please feel free to call the office.       [address] [place] [address]/ [phone] FAX/ [email or web address]     Sun Protection      The [facility]  [facility] would like to remind you of the importance of sun protection all year round and particularly during the summer when the suns rays are the strongest. It has been proven that both acute and chronic sun exposure damages our cells and leads to skin cancer. Beyond skin cancer, the sun causes 90% of the symptoms of premature skin aging, including wrinkles, lentigines (brown spots), and thin, easily bruised skin. Proper sun protection can help prevent these unwanted conditions.    Many patients report that they dont go in the sun. It has been shown that the average person receives 18 hours of incidental sun exposure per week during activities such as walking through parking lots, driving, or sitting next to windows. This accumulates to several bad sunburns per year!    In choosing sunscreen, you want one that protects against both UVA and UVB rays (broad spectrum). It is recommended that you use one of SPF 30 or higher. It is important to apply the sunscreen about 20 minutes prior to sun exposure. Most sunscreens are chemical sunscreens and a reaction must take place in the skin so that they are effective. If they are applied and then you are immediately exposed to the sun or start sweating, this reaction has not had time to take place and you are therefore unprotected. Sunscreen needs to be reapplied every 2 hours if you are participating in water sports or sweating. We recommend Elta MD or CeraVe sunscreens for daily use; however there are many options and it is most important for you to find one that you will use on a consistent basis.    If you have sensitive skin, you may do best with a sunscreen that contains only physical blockers in the active ingredient section. The only physical blockers available in the [place] currently are titanium dioxide or zinc oxide. These are typically thicker and harder to apply, however they afford very good protection. Neutrogena Sensitive Skin, Blue Lizard  Sensitive Skin (pink top) or Neutrogena Pure and Free are popular ones.     Aside from sunscreen, clothes with UV protection (UPF), wide brimmed hats, and sunglasses are other means of sun protection that we recommend.      Based on a recent study (6/2021) and out of an abundance of caution, we are recommending that you AVOID the following sunscreens as they may contain the carcinogen, benzene:    Spray and gel sunscreens  Any [facility] or [facility] brands as well as Max Block and TopCare brands   Neutrogena Ultra Sheer Dry-touch Water Resistant Sunscreen LOTION SPF 70   Neutrogena Sheer Zinc Dry-touch Face Sunscreen LOTION SPF 50   5.   Aveeno Baby Continuous Protection Sensitive Skin Sunscreen LOTION - Broad Spectrum SPF 50    Please note that Benzene is not an ingredient or the degradation product of any ingredient in any sunscreen. This study suggested that the findings are a result of contamination in the manufacturing process. At this point, we don't know how effectively Benzene gets through the skin, if it gets absorbed systemically, and what effects it may have.     We do know that ultraviolet radiation is a well-established carcinogen. Please use daily sun protection/avoidance and use of at least SPF 30, broad-spectrum sunscreen not listed above.                       [facility] - DERMATOLOGY [address]  [address]  [place] [address]  Dept: [phone]  Dept Fax: [phone]      RETINOIDS           Your doctor has prescribed a topical retinoid for your skin. A retinoid is a vitamin A derived product used to treat a variety of skin conditions including acne, actinic keratoses (pre-skin cancers), uneven pigmentation from sun damage, fine lines and wrinkles, and enlarged pores.    How do they work?         Retinoids increase skin cell turn over from the normal 30 days to five or six days, minimizing clogged pores-the major factor in acne. Retinoids can also repair the DNA in  cells damaged by the sun helping to even out skin pigmentation and clear pre-skin cancers. They can shrink oil glands and minimize the appearance of large pores. These effects can not be appreciated unless the medication is used on a consistent basis!    How do I use a retinoid?         After washing with a mild cleanser (Purpose, Aqua glycolic face cleanser, Cetaphil, Neutrogena deep cream cleanser), the skin should be moisturized with a non-retinol containing moisturizer such as Cerave PM. Then a thin layer of medication is applied to the forehead, nose cheeks, and chin (and around eyes if treating fine lines and wrinkles) at night. The amount of medication needed to cover the entire face should be no more than the size of a green pea. Irritation around the eyes can be treated with Vaseline at night.    What if my skin appears dry, red, and is peeling?          Retinoids do not cause dry skin but rather they cause the top layer of the skin the shed, giving an appearance of dry skin. In fact, new healthy skin cells are replacing older, damaged cells on the surface. This usually occurs the first 2-4 weeks as the skin is adjusting to the medication. It is reasonable to use the medication every other night or even every 2 nights until your skin adjusts. You can use a MILD exfoliant to remove the peeling skin (Aveeno daily clarifying pads, Aqua glycolic face cream) and can apply a moisturizer throughout the day as needed. Retinoids come in a variety of strengths and vehicles and your doctor can find one best for you. If you cannot tolerate prescription strength retinoids, over the counter products with retinol may be beneficial. (Olay ProX wrinkle cream, MATA deep wrinkle cream, Green Cream at [employer])    Will my skin be more sensitive in the sun?           You will need to use sunscreen with SPF 30 daily. Retinoids will cause the outermost layer of the skin to be thinner and thus more sensitive to ultraviolet rays.  However, remember that over time, retinoids actually make the skin thicker by enhancing collagen deposition which protects the skin from sun damage.    When will I see results?            If you are using a retinoid for acne, you should see improvement in 6-8 weeks. Do not be alarmed if you find that your acne gets worse before it gets better-KEEP USING THE MEDICATION- this is a normal response and your acne will improve if you can stick with it.           If you are using the medication for anti-aging and skin dyspigmentation, you may see results in 3 months, but most effects are not visible until 6 months. Retinoids are clinically proven to reverse signs of aging, but only if used on a CONSTISTENT BASIS!             Remember that retinoids should not be used if you are pregnant.           Discontinue use 1 week prior to waxing, as skin is more likely to tear.

## 2022-07-20 NOTE — PROGRESS NOTES
Subjective:       Patient ID:  Toi Fernandez is a 51 y.o. female who presents for   Chief Complaint   Patient presents with    Lesion     HPI  Pt here today for a painful skin tag on R nostril x yrs. States it has been cut off twice but keeps growing back.  Also c/o acne blemishes on face x yrs. No current tx.    Has hx of eczema.    Review of Systems   Constitutional: Negative for fever and chills.   Skin: Negative for itching and rash.        Objective:    Physical Exam   Constitutional: She appears well-developed and well-nourished. No distress.   Neurological: She is alert and oriented to person, place, and time. She is not disoriented.   Psychiatric: She has a normal mood and affect.   Skin:   Areas Examined (abnormalities noted in diagram):   Head / Face Inspection Performed              Diagram Legend     Erythematous scaling macule/papule c/w actinic keratosis       Vascular papule c/w angioma      Pigmented verrucoid papule/plaque c/w seborrheic keratosis      Yellow umbilicated papule c/w sebaceous hyperplasia      Irregularly shaped tan macule c/w lentigo     1-2 mm smooth white papules consistent with Milia      Movable subcutaneous cyst with punctum c/w epidermal inclusion cyst      Subcutaneous movable cyst c/w pilar cyst      Firm pink to brown papule c/w dermatofibroma      Pedunculated fleshy papule(s) c/w skin tag(s)      Evenly pigmented macule c/w junctional nevus     Mildly variegated pigmented, slightly irregular-bordered macule c/w mildly atypical nevus      Flesh colored to evenly pigmented papule c/w intradermal nevus       Pink pearly papule/plaque c/w basal cell carcinoma      Erythematous hyperkeratotic cursted plaque c/w SCC      Surgical scar with no sign of skin cancer recurrence      Open and closed comedones      Inflammatory papules and pustules      Verrucoid papule consistent consistent with wart     Erythematous eczematous patches and plaques     Dystrophic onycholytic nail  with subungual debris c/w onychomycosis     Umbilicated papule    Erythematous-base heme-crusted tan verrucoid plaque consistent with inflamed seborrheic keratosis     Erythematous Silvery Scaling Plaque c/w Psoriasis     See annotation      Assessment / Plan:        Other viral warts  Cryosurgery procedure note:    Verbal consent from the patient is obtained including, but not limited to, risk of hypopigmentation/hyperpigmentation, scar, recurrence of lesion. Liquid nitrogen cryosurgery is applied to 1 lesion to produce a freeze injury. The patient is aware that blisters may form and is instructed on wound care with gentle cleansing and use of vaseline ointment to keep moist until healed. The patient is supplied a handout on cryosurgery and is instructed to call if lesions do not completely resolve.    Acne vulgaris  Rec: OTC Differin gel    Counseled pt that the retinoid may make the skin dry, red, and irritated. May moisturize daily with a non-comedogenic moisturizer. If still dry, would recommend using the retinoid every other night or less frequently as tolerated. Avoid using around corners of eyes, nose, and mouth.  Patient instructed in importance of daily broad spectrum sunscreen use with spf at least 30. Sun avoidance and topical protection/protective clothing discussed.      Follow up in about 4 months (around 11/20/2022) for skin check or sooner for any concerns.

## 2022-08-11 ENCOUNTER — OFFICE VISIT (OUTPATIENT)
Dept: INTERNAL MEDICINE | Facility: CLINIC | Age: 52
End: 2022-08-11
Payer: COMMERCIAL

## 2022-08-11 DIAGNOSIS — Z72.820 SLEEP DEPRIVATION: ICD-10-CM

## 2022-08-11 DIAGNOSIS — F43.9 SITUATIONAL STRESS: ICD-10-CM

## 2022-08-11 DIAGNOSIS — R51.9 FRONTAL HEADACHE: Primary | ICD-10-CM

## 2022-08-11 DIAGNOSIS — S09.90XA TRAUMATIC INJURY OF HEAD, INITIAL ENCOUNTER: ICD-10-CM

## 2022-08-11 DIAGNOSIS — S09.90XA HEAD TRAUMA, INITIAL ENCOUNTER: ICD-10-CM

## 2022-08-11 PROCEDURE — 4010F PR ACE/ARB THEARPY RXD/TAKEN: ICD-10-PCS | Mod: CPTII,95,, | Performed by: INTERNAL MEDICINE

## 2022-08-11 PROCEDURE — 99213 OFFICE O/P EST LOW 20 MIN: CPT | Mod: 95,,, | Performed by: INTERNAL MEDICINE

## 2022-08-11 PROCEDURE — 99213 PR OFFICE/OUTPT VISIT, EST, LEVL III, 20-29 MIN: ICD-10-PCS | Mod: 95,,, | Performed by: INTERNAL MEDICINE

## 2022-08-11 PROCEDURE — 4010F ACE/ARB THERAPY RXD/TAKEN: CPT | Mod: CPTII,95,, | Performed by: INTERNAL MEDICINE

## 2022-08-11 PROCEDURE — 1159F PR MEDICATION LIST DOCUMENTED IN MEDICAL RECORD: ICD-10-PCS | Mod: CPTII,95,, | Performed by: INTERNAL MEDICINE

## 2022-08-11 PROCEDURE — 1160F PR REVIEW ALL MEDS BY PRESCRIBER/CLIN PHARMACIST DOCUMENTED: ICD-10-PCS | Mod: CPTII,95,, | Performed by: INTERNAL MEDICINE

## 2022-08-11 PROCEDURE — 1159F MED LIST DOCD IN RCRD: CPT | Mod: CPTII,95,, | Performed by: INTERNAL MEDICINE

## 2022-08-11 PROCEDURE — 1160F RVW MEDS BY RX/DR IN RCRD: CPT | Mod: CPTII,95,, | Performed by: INTERNAL MEDICINE

## 2022-08-11 RX ORDER — TRAMADOL HYDROCHLORIDE 50 MG/1
TABLET ORAL
Qty: 21 EACH | Refills: 0 | Status: SHIPPED | OUTPATIENT
Start: 2022-08-11 | End: 2022-11-08 | Stop reason: SDUPTHER

## 2022-08-11 NOTE — PROGRESS NOTES
Subjective:       Patient ID: Toi Fernandez is a 51 y.o. female.    Chief Complaint: No chief complaint on file.    HPI   C/o worsening headaches.  [place] 21 - [place] in [place] and forehead hit steering wheel.  Headaches started after.  No LOC.   She had a hickey, but it is improving.  Pain is across forehead.  Better when she takes benadryl and goes to bed.  She may awaken with it.  BP has been controlled.    Pain is not daily, but may occur multiple times a day.  9/10 at times.  She takes tylenol, which helps a little.  No nausea.  Not sleeping well, or long enough.  She waits til son returns from work in early morning hours before going to sleep, then awakens early for work.  No neck pain.  No visual difficulties, localized numbness.  I have prescribed tramadol in past for headaches.  She avoids nsaids due to ckd 3.  She is very worried about headaches and would like CT.  Review of Systems   Constitutional: Negative for unexpected weight change.   HENT: Negative for hearing loss, rhinorrhea and trouble swallowing.    Eyes: Negative for discharge and visual disturbance.   Respiratory: Negative for wheezing.    Gastrointestinal: Negative for blood in stool, constipation, diarrhea and vomiting.   Endocrine: Negative for polydipsia and polyuria.   Genitourinary: Negative for difficulty urinating, dysuria, hematuria and menstrual problem.   Musculoskeletal: Positive for neck pain. Negative for arthralgias and joint swelling.   Neurological: Negative for weakness and headaches.   Psychiatric/Behavioral: Negative for confusion and dysphoric mood.         Objective:      Physical Exam  Constitutional:       Appearance: Normal appearance. She is not ill-appearing or diaphoretic.   Neurological:      Mental Status: She is alert.   Psychiatric:         Behavior: Behavior normal.         Thought Content: Thought content normal.         Judgment: Judgment normal.      Comments: Mildly anxious         Assessment:       Problem List  Items Addressed This Visit    None     Visit Diagnoses     Frontal headache    -  Primary    Situational stress        Sleep deprivation        Traumatic injury of head, initial encounter              Plan:       Diagnoses and all orders for this visit:    Frontal headache    Situational stress    Sleep deprivation    Traumatic injury of head, initial encounter       She may use Excedrin Migraine occasionally.      Call if no improvement

## 2022-08-12 ENCOUNTER — HOSPITAL ENCOUNTER (EMERGENCY)
Facility: HOSPITAL | Age: 52
Discharge: HOME | End: 2022-08-12
Attending: EMERGENCY MEDICINE
Payer: COMMERCIAL

## 2022-08-12 VITALS
SYSTOLIC BLOOD PRESSURE: 157 MMHG | TEMPERATURE: 99 F | HEART RATE: 67 BPM | DIASTOLIC BLOOD PRESSURE: 101 MMHG | OXYGEN SATURATION: 100 % | RESPIRATION RATE: 20 BRPM

## 2022-08-12 DIAGNOSIS — K64.9 HEMORRHOIDS, UNSPECIFIED HEMORRHOID TYPE: Primary | ICD-10-CM

## 2022-08-12 DIAGNOSIS — K59.00 CONSTIPATION, UNSPECIFIED CONSTIPATION TYPE: ICD-10-CM

## 2022-08-12 PROCEDURE — 99283 EMERGENCY DEPT VISIT LOW MDM: CPT

## 2022-08-12 RX ORDER — SENNOSIDES 8.6 MG/1
1-2 TABLET ORAL 2 TIMES DAILY
COMMUNITY
Start: 2022-08-12 | End: 2022-12-01

## 2022-08-12 RX ORDER — NAPROXEN 500 MG/1
500 TABLET ORAL 2 TIMES DAILY PRN
Qty: 30 TABLET | Refills: 0 | Status: SHIPPED | OUTPATIENT
Start: 2022-08-12 | End: 2022-08-12 | Stop reason: CLARIF

## 2022-08-12 RX ORDER — POLYETHYLENE GLYCOL 3350 17 G/17G
17 POWDER, FOR SOLUTION ORAL 3 TIMES DAILY PRN
Qty: 100 EACH | Refills: 0 | Status: SHIPPED | OUTPATIENT
Start: 2022-08-12 | End: 2022-12-01

## 2022-08-12 NOTE — ED PROVIDER NOTES
Encounter Date: 8/12/2022    SCRIBE #1 NOTE: I, Ramo Baires, am scribing for, and in the presence of, Keegan Win MD.       History     Chief Complaint   Patient presents with    Abdominal Pain     Pt has been c/o constipation x4 days and took a laxative today at 0200. Upon awakening today, pt attempted to have BM and noticed blood. Hx of gastric sleeve 2020. C/o umbilical, abd pain; denies n/v and no fever or antibiotic use. 8/10 abd  and rectal pain.      This is a 51 y.o. female who has a past medical history of Chronic kidney disease, stage II (mild) (09/21/2012), Depression, H/O nephrotic syndrome, 2001. (04/21/2015), Hypertension, Obesity, Secondary hyperparathyroidism, and Vitamin D deficiency disease.     The patient presents to the Emergency Department with lower abdominal pain.   Symptoms are associated with constipation, rectal pain, and rectal bleeding.  Pt reports she has been constipated for 4 days and took a laxative at 0200 without relief.   She reports she noticed bright red blood in the toilet bowl after attempting a BM this morning.  Patient has no prior history of hemorrhoids or rectal bleeding.  She reports prior history of gastric sleeve (2020).   Patient reports she is compliant with her hypertension medications.      The history is provided by the patient. No  was used.     Review of patient's allergies indicates:   Allergen Reactions    No known allergies      Past Medical History:   Diagnosis Date    Chronic kidney disease, stage II (mild) 09/21/2012    Depression     H/O nephrotic syndrome, 2001. 04/21/2015    Hypertension     Obesity     Secondary hyperparathyroidism     Vitamin D deficiency disease      Past Surgical History:   Procedure Laterality Date    DILATION AND CURETTAGE OF UTERUS      ENDOMETRIAL ABLATION  2004    HYSTERECTOMY  2011    DLH/BS (AUB/Fibroids) KB    LAPAROSCOPIC SLEEVE GASTRECTOMY N/A 05/27/2020    Procedure: GASTRECTOMY,  SLEEVE, LAPAROSCOPIC, with intraop EGD;  Surgeon: Leodan Burk MD;  Location: [facility] OR [address] [place] [address];  Service: General;  Laterality: N/A;    TUBAL LIGATION       Family History   Problem Relation Age of Onset    Diabetes Mother         None    Hypertension Mother     Colon cancer Father     Hypertension Father     Diabetes Maternal Aunt         None    Diabetes Maternal Grandmother         None    Epilepsy Son     Hypertension Sister     Hypertension Sister     Breast cancer Neg Hx     Ovarian cancer Neg Hx      Social History     Tobacco Use    Smoking status: Never Smoker    Smokeless tobacco: Never Used   Substance Use Topics    Alcohol use: Not Currently     Comment: less than weekly.    Drug use: No     Review of Systems   Constitutional: Negative for chills and fever.   HENT: Negative for ear pain and sore throat.    Eyes: Negative for redness.   Respiratory: Negative for shortness of breath.    Cardiovascular: Negative for chest pain.   Gastrointestinal: Positive for abdominal pain, anal bleeding, constipation and rectal pain.   Genitourinary: Negative for dysuria.   Musculoskeletal: Negative for back pain.   Skin: Negative for rash.   Neurological: Negative for headaches.       Physical Exam     Initial Vitals   BP Pulse Resp Temp SpO2   08/12/22 0721 08/12/22 0721 08/12/22 0722 08/12/22 0721 08/12/22 0721   (!) 157/101 67 20 98.6 °F (37 °C) 100 %      MAP       --                Physical Exam    Nursing note and vitals reviewed.  Constitutional: She appears well-developed and well-nourished. She is not diaphoretic. No distress.   HENT:   Head: Normocephalic and atraumatic.   Eyes: Conjunctivae are normal.   Cardiovascular: Normal rate, regular rhythm and intact distal pulses.   Pulmonary/Chest: No respiratory distress.   Genitourinary:    Genitourinary Comments: Hemorrhoid noted at the 3 o'clock position     Musculoskeletal:         General: Normal range of motion.     Neurological: She  is alert and oriented to person, place, and time.   Skin: Skin is warm and dry. Capillary refill takes less than 2 seconds. No rash noted. No erythema.   Psychiatric: She has a normal mood and affect.         ED Course   Procedures  Labs Reviewed - No data to display       Imaging Results    None          Medications - No data to display  Medical Decision Making:   Initial Assessment:   Pt presents with mild gross bleeding from rectum without stool, painful. Exam remarkable for small hemorrhoid at the anal verge. Likely internal hemorrhoidal bleed with typical hx as presented. No evidence of fissue. Diverticulosis also possible but less likely considered with pain at the rectum and small amount of dripping blood.    Counseled on fiber intake, diet, stool softeners, laxatives, hydration, pain control and options, topical medications.    F/U with PCP and GI. Return for any severe pain or bleeding, or for any other emergent concerns.          Scribe Attestation:   Scribe #1: I performed the above scribed service and the documentation accurately describes the services I performed. I attest to the accuracy of the note.                 Clinical Impression:   Final diagnoses:  [K64.9] Hemorrhoids, unspecified hemorrhoid type (Primary)  [K59.00] Constipation, unspecified constipation type          ED Disposition Condition    Discharge Stable        ED Prescriptions     Medication Sig Dispense Start Date End Date Auth. Provider    hydrocortisone-pramoxine (PROCTOFOAM-HS) rectal foam Place 1 applicator rectally 2 (two) times daily. 10 g 8/12/2022  Keegan Win MD    naproxen (NAPROSYN) 500 MG tablet  (Status: Discontinued) Take 1 tablet (500 mg total) by mouth 2 (two) times daily as needed (pain). 30 tablet 8/12/2022 8/12/2022 Keegan Win MD    polyethylene glycol (GLYCOLAX) 17 gram PwPk Take 17 g by mouth 3 (three) times daily as needed (constipation). 100 each 8/12/2022 8/12/2023 Keegan Win MD    senna (SENOKOT)  8.6 mg tablet Take 1-2 tablets by mouth 2 (two) times daily.  8/12/2022  Keegan Win MD        Follow-up Information     Follow up With Specialties Details Why Contact Info Additional Information    [facility] Gastroenterology   [address], [address]  [facility] [address] [phone] Please park in [address] or D and use [address] entrance. Take Medical Office Bldg elevators.    [facility] General Surgery Surgery   [address]  [facility] [id number] [phone] Please park in [address] C or D and use [address] entrance. Take Medical Office Bldg elevators.          I, Dr. Keegan Win, personally performed the services described in this documentation.   All medical record entries made by the scribe were at my direction and in my presence.   I have reviewed the chart and agree that the record is accurate and complete.   Keegan Win MD.        Keegan Win MD  08/15/22 0625

## 2022-08-12 NOTE — DISCHARGE INSTRUCTIONS
Thank you for coming in to see us at [facility]! It was nice to meet you, and I hope you feel better soon. Please feel free to return to the ER at any time should your symptoms get worse, or if you have different emergent concerns.    Our goal in the emergency department is to always give you outstanding care and exceptional service. You may receive a survey by mail or e-mail in the next week regarding your experience in our ED. We would greatly appreciate your completing and returning the survey. Your feedback provides us with a way to recognize our staff who give very good care and it helps us learn how to improve when your experience was below our aspiration of excellence.       Sincerely,    Keegan Win MD  Medical Director  Emergency Department  [facility]

## 2022-09-13 ENCOUNTER — HOSPITAL ENCOUNTER (OUTPATIENT)
Dept: RADIOLOGY | Facility: HOSPITAL | Age: 52
Discharge: HOME | End: 2022-09-13
Attending: INTERNAL MEDICINE
Payer: COMMERCIAL

## 2022-09-13 DIAGNOSIS — R51.9 FRONTAL HEADACHE: ICD-10-CM

## 2022-09-13 DIAGNOSIS — S09.90XA HEAD TRAUMA, INITIAL ENCOUNTER: ICD-10-CM

## 2022-09-13 PROCEDURE — 70450 CT HEAD/BRAIN W/O DYE: CPT | Mod: TC

## 2022-09-13 PROCEDURE — 70450 CT HEAD WITHOUT CONTRAST: ICD-10-PCS | Mod: 26,,, | Performed by: RADIOLOGY

## 2022-09-13 PROCEDURE — 70450 CT HEAD/BRAIN W/O DYE: CPT | Mod: 26,,, | Performed by: RADIOLOGY

## 2022-09-19 ENCOUNTER — PATIENT MESSAGE (OUTPATIENT)
Dept: INTERNAL MEDICINE | Facility: CLINIC | Age: 52
End: 2022-09-19
Payer: COMMERCIAL

## 2022-09-20 RX ORDER — FLUTICASONE PROPIONATE 50 MCG
2 SPRAY, SUSPENSION (ML) NASAL 2 TIMES DAILY
Qty: 16 G | Refills: 0 | Status: SHIPPED | OUTPATIENT
Start: 2022-09-20 | End: 2022-10-20

## 2022-09-20 RX ORDER — AMOXICILLIN AND CLAVULANATE POTASSIUM 875; 125 MG/1; MG/1
1 TABLET, FILM COATED ORAL EVERY 12 HOURS
Qty: 20 TABLET | Refills: 0 | Status: SHIPPED | OUTPATIENT
Start: 2022-09-20 | End: 2022-09-30

## 2022-11-09 ENCOUNTER — PATIENT MESSAGE (OUTPATIENT)
Dept: ALLERGY | Facility: CLINIC | Age: 52
End: 2022-11-09
Payer: COMMERCIAL

## 2022-11-11 ENCOUNTER — TELEPHONE (OUTPATIENT)
Dept: INTERNAL MEDICINE | Facility: CLINIC | Age: 52
End: 2022-11-11
Payer: COMMERCIAL

## 2022-11-11 NOTE — TELEPHONE ENCOUNTER
----- Message from Bunny Thakkar sent at 11/9/2022  8:25 AM CST -----  Contact: Patient Portual  .Type:  Sooner Apoointment Request    Caller is requesting a sooner appointment.  Caller declined first available appointment listed below.  Caller will not accept being placed on the waitlist and is requesting a message be sent to doctor.  Name of Caller:pt  When is the first available appointment? Dec.  Symptoms:: Check up  Would the patient rather a call back or a response via [facility]? Call back  Best Call Back Number:[phone]  Additional Information: 11/9/2022 - 11/15/2022

## 2022-11-29 ENCOUNTER — PATIENT MESSAGE (OUTPATIENT)
Dept: BARIATRICS | Facility: CLINIC | Age: 52
End: 2022-11-29
Payer: COMMERCIAL

## 2022-12-01 ENCOUNTER — OFFICE VISIT (OUTPATIENT)
Dept: INTERNAL MEDICINE | Facility: CLINIC | Age: 52
End: 2022-12-01
Payer: COMMERCIAL

## 2022-12-01 ENCOUNTER — PATIENT MESSAGE (OUTPATIENT)
Dept: ADMINISTRATIVE | Facility: OTHER | Age: 52
End: 2022-12-01
Payer: COMMERCIAL

## 2022-12-01 ENCOUNTER — LAB VISIT (OUTPATIENT)
Dept: LAB | Facility: HOSPITAL | Age: 52
End: 2022-12-01
Attending: INTERNAL MEDICINE
Payer: COMMERCIAL

## 2022-12-01 DIAGNOSIS — I10 ESSENTIAL HYPERTENSION: ICD-10-CM

## 2022-12-01 DIAGNOSIS — Z98.84 BARIATRIC SURGERY STATUS: ICD-10-CM

## 2022-12-01 DIAGNOSIS — F43.29 PROLONGED GRIEF REACTION: ICD-10-CM

## 2022-12-01 DIAGNOSIS — E55.9 VITAMIN D DEFICIENCY: ICD-10-CM

## 2022-12-01 DIAGNOSIS — R53.83 FATIGUE, UNSPECIFIED TYPE: ICD-10-CM

## 2022-12-01 DIAGNOSIS — R53.83 FATIGUE, UNSPECIFIED TYPE: Primary | ICD-10-CM

## 2022-12-01 LAB
25(OH)D3+25(OH)D2 SERPL-MCNC: 33 NG/ML (ref 30–96)
ALBUMIN SERPL BCP-MCNC: 3.8 G/DL (ref 3.5–5.2)
ALP SERPL-CCNC: 63 U/L (ref 55–135)
ALT SERPL W/O P-5'-P-CCNC: 30 U/L (ref 10–44)
ANION GAP SERPL CALC-SCNC: 11 MMOL/L (ref 8–16)
AST SERPL-CCNC: 27 U/L (ref 10–40)
BASOPHILS # BLD AUTO: 0.04 K/UL (ref 0–0.2)
BASOPHILS NFR BLD: 0.9 % (ref 0–1.9)
BILIRUB SERPL-MCNC: 1.2 MG/DL (ref 0.1–1)
BUN SERPL-MCNC: 16 MG/DL (ref 6–20)
CALCIUM SERPL-MCNC: 9.9 MG/DL (ref 8.7–10.5)
CHLORIDE SERPL-SCNC: 107 MMOL/L (ref 95–110)
CO2 SERPL-SCNC: 23 MMOL/L (ref 23–29)
CREAT SERPL-MCNC: 1.2 MG/DL (ref 0.5–1.4)
DIFFERENTIAL METHOD: ABNORMAL
EOSINOPHIL # BLD AUTO: 0.2 K/UL (ref 0–0.5)
EOSINOPHIL NFR BLD: 4.5 % (ref 0–8)
ERYTHROCYTE [DISTWIDTH] IN BLOOD BY AUTOMATED COUNT: 11.5 % (ref 11.5–14.5)
EST. GFR  (NO RACE VARIABLE): 54.8 ML/MIN/1.73 M^2
GLUCOSE SERPL-MCNC: 68 MG/DL (ref 70–110)
HCT VFR BLD AUTO: 41.2 % (ref 37–48.5)
HGB BLD-MCNC: 13.4 G/DL (ref 12–16)
IMM GRANULOCYTES # BLD AUTO: 0.01 K/UL (ref 0–0.04)
IMM GRANULOCYTES NFR BLD AUTO: 0.2 % (ref 0–0.5)
LYMPHOCYTES # BLD AUTO: 2.4 K/UL (ref 1–4.8)
LYMPHOCYTES NFR BLD: 52.8 % (ref 18–48)
MCH RBC QN AUTO: 32 PG (ref 27–31)
MCHC RBC AUTO-ENTMCNC: 32.5 G/DL (ref 32–36)
MCV RBC AUTO: 98 FL (ref 82–98)
MONOCYTES # BLD AUTO: 0.2 K/UL (ref 0.3–1)
MONOCYTES NFR BLD: 4.7 % (ref 4–15)
NEUTROPHILS # BLD AUTO: 1.7 K/UL (ref 1.8–7.7)
NEUTROPHILS NFR BLD: 36.9 % (ref 38–73)
NRBC BLD-RTO: 0 /100 WBC
PLATELET # BLD AUTO: 177 K/UL (ref 150–450)
PMV BLD AUTO: 11.6 FL (ref 9.2–12.9)
POTASSIUM SERPL-SCNC: 3.9 MMOL/L (ref 3.5–5.1)
PROT SERPL-MCNC: 7.5 G/DL (ref 6–8.4)
RBC # BLD AUTO: 4.19 M/UL (ref 4–5.4)
SODIUM SERPL-SCNC: 141 MMOL/L (ref 136–145)
VIT B12 SERPL-MCNC: 272 PG/ML (ref 210–950)
WBC # BLD AUTO: 4.49 K/UL (ref 3.9–12.7)

## 2022-12-01 PROCEDURE — 99214 PR OFFICE/OUTPT VISIT, EST, LEVL IV, 30-39 MIN: ICD-10-PCS | Mod: 95,,, | Performed by: INTERNAL MEDICINE

## 2022-12-01 PROCEDURE — 80053 COMPREHEN METABOLIC PANEL: CPT | Performed by: INTERNAL MEDICINE

## 2022-12-01 PROCEDURE — 99214 OFFICE O/P EST MOD 30 MIN: CPT | Mod: 95,,, | Performed by: INTERNAL MEDICINE

## 2022-12-01 PROCEDURE — 4010F PR ACE/ARB THEARPY RXD/TAKEN: ICD-10-PCS | Mod: CPTII,95,, | Performed by: INTERNAL MEDICINE

## 2022-12-01 PROCEDURE — 4010F ACE/ARB THERAPY RXD/TAKEN: CPT | Mod: CPTII,95,, | Performed by: INTERNAL MEDICINE

## 2022-12-01 PROCEDURE — 1159F PR MEDICATION LIST DOCUMENTED IN MEDICAL RECORD: ICD-10-PCS | Mod: CPTII,95,, | Performed by: INTERNAL MEDICINE

## 2022-12-01 PROCEDURE — 1160F RVW MEDS BY RX/DR IN RCRD: CPT | Mod: CPTII,95,, | Performed by: INTERNAL MEDICINE

## 2022-12-01 PROCEDURE — 82607 VITAMIN B-12: CPT | Performed by: INTERNAL MEDICINE

## 2022-12-01 PROCEDURE — 85025 COMPLETE CBC W/AUTO DIFF WBC: CPT | Performed by: INTERNAL MEDICINE

## 2022-12-01 PROCEDURE — 82306 VITAMIN D 25 HYDROXY: CPT | Performed by: INTERNAL MEDICINE

## 2022-12-01 PROCEDURE — 1159F MED LIST DOCD IN RCRD: CPT | Mod: CPTII,95,, | Performed by: INTERNAL MEDICINE

## 2022-12-01 PROCEDURE — 1160F PR REVIEW ALL MEDS BY PRESCRIBER/CLIN PHARMACIST DOCUMENTED: ICD-10-PCS | Mod: CPTII,95,, | Performed by: INTERNAL MEDICINE

## 2022-12-01 PROCEDURE — 36415 COLL VENOUS BLD VENIPUNCTURE: CPT | Mod: PO | Performed by: INTERNAL MEDICINE

## 2022-12-01 RX ORDER — TORSEMIDE 10 MG/1
10 TABLET ORAL DAILY
Qty: 90 TABLET | Refills: 3 | Status: SHIPPED | OUTPATIENT
Start: 2022-12-01 | End: 2023-12-18

## 2022-12-01 NOTE — PROGRESS NOTES
"Subjective:    The patient location is: [facility]  The chief complaint leading to consultation is: fatigue    Visit type: audiovisual    Face to Face time with patient: 13  22 minutes of total time spent on the encounter, which includes face to face time and non-face to face time preparing to see the patient (eg, review of tests), Obtaining and/or reviewing separately obtained history, Documenting clinical information in the electronic or other health record, Independently interpreting results (not separately reported) and communicating results to the patient/family/caregiver, or Care coordination (not separately reported).         Each patient to whom he or she provides medical services by telemedicine is:  (1) informed of the relationship between the physician and patient and the respective role of any other health care provider with respect to management of the patient; and (2) notified that he or she may decline to receive medical services by telemedicine and may withdraw from such care at any time.    Notes:     Patient ID: Toi Fernandez is a 52 y.o. female.    Chief Complaint: No chief complaint on file.    Fatigue  HPI  "BOdy is not right."  Unable to take vitamins orally, as MVI and Vit B 12 make her nauseated.    She wonders if deficiencies in these vitamins are contributing.  Bones hurt.  She has been off ozempic for a while.    No energy.  Tired.  "Not the same person she was before bariatric surgery," when she was more energetic at work.    Wt down to 179 lb, intentionally.    No sob.  Upper chest gets a little tight.  Seconds only.  Hasn't used inhaler.   or above.  She is out of Demadex.  No abd pain.    Depressed, chronically, following death of son.  Cries not infrequently.  She hides her sadness from others.  Took wellbutrin and celexa in past, but she can't say if they were helpful.  Anhedonia.    Very interested in counseling.    Taking flexeril for neck pain, sleep and headache " prevention.  She stopped tompamax.      Review of Systems   Constitutional:  Positive for activity change and unexpected weight change.   HENT:  Positive for trouble swallowing. Negative for hearing loss and rhinorrhea.    Eyes:  Negative for discharge and visual disturbance.   Respiratory:  Negative for chest tightness and wheezing.    Cardiovascular:  Positive for chest pain. Negative for palpitations.   Gastrointestinal:  Positive for constipation. Negative for blood in stool, diarrhea and vomiting.   Endocrine: Negative for polydipsia and polyuria.   Genitourinary:  Negative for difficulty urinating, dysuria, hematuria and menstrual problem.   Musculoskeletal:  Positive for neck pain. Negative for arthralgias and joint swelling.   Neurological:  Positive for weakness and headaches.   Psychiatric/Behavioral:  Positive for dysphoric mood. Negative for confusion.        Objective:      Physical Exam  Constitutional:       Appearance: Normal appearance. She is not ill-appearing or diaphoretic.   Neurological:      Mental Status: She is alert.   Psychiatric:         Mood and Affect: Mood normal.         Thought Content: Thought content normal.       Assessment:       Problem List Items Addressed This Visit       Vitamin D deficiency    Relevant Orders    Vitamin D    Bariatric surgery status     Other Visit Diagnoses       Fatigue, unspecified type    -  Primary    Relevant Orders    Comprehensive Metabolic Panel    CBC Auto Differential    Vitamin B12    Prolonged grief reaction        Essential hypertension        Relevant Orders    Hypertension [facility] ([facility]) Enrollment Order (Completed)    Hypertension Digital Medicine ([facility]): Assign Onboarding Questionnaires (Completed)            Plan:       Diagnoses and all orders for this visit:    Fatigue, unspecified type  -     Comprehensive Metabolic Panel; Future  -     CBC Auto Differential; Future  -     Vitamin B12; Future    Bariatric surgery  status    Prolonged grief reaction    Vitamin D deficiency  -     Vitamin D; Future    Essential hypertension  -     Hypertension Digital Medicine ([facility]) Enrollment Order  -     Hypertension Digital Medicine ([facility]): Assign Onboarding Questionnaires    Other orders  -     torsemide (DEMADEX) 10 MG Tab; Take 1 tablet (10 mg total) by mouth once daily.       Labs today  Flu vax    Counseling -  Dig htn.  Restart torsemide.  We discussed trying an antidepressant.

## 2022-12-04 DIAGNOSIS — Z98.84 BARIATRIC SURGERY STATUS: Primary | ICD-10-CM

## 2022-12-07 ENCOUNTER — PATIENT MESSAGE (OUTPATIENT)
Dept: BARIATRICS | Facility: CLINIC | Age: 52
End: 2022-12-07
Payer: COMMERCIAL

## 2022-12-07 DIAGNOSIS — Z98.84 BARIATRIC SURGERY STATUS: Primary | ICD-10-CM

## 2022-12-12 ENCOUNTER — PATIENT MESSAGE (OUTPATIENT)
Dept: BARIATRICS | Facility: CLINIC | Age: 52
End: 2022-12-12
Payer: COMMERCIAL

## 2022-12-27 ENCOUNTER — PATIENT MESSAGE (OUTPATIENT)
Dept: INTERNAL MEDICINE | Facility: CLINIC | Age: 52
End: 2022-12-27
Payer: COMMERCIAL

## 2022-12-28 ENCOUNTER — OFFICE VISIT (OUTPATIENT)
Dept: FAMILY MEDICINE | Facility: CLINIC | Age: 52
End: 2022-12-28
Payer: COMMERCIAL

## 2022-12-28 VITALS
HEART RATE: 60 BPM | DIASTOLIC BLOOD PRESSURE: 106 MMHG | WEIGHT: 177.5 LBS | HEIGHT: 67 IN | TEMPERATURE: 98 F | SYSTOLIC BLOOD PRESSURE: 168 MMHG | BODY MASS INDEX: 27.86 KG/M2

## 2022-12-28 DIAGNOSIS — R05.9 COUGH, UNSPECIFIED TYPE: ICD-10-CM

## 2022-12-28 DIAGNOSIS — J01.90 ACUTE BACTERIAL SINUSITIS: Primary | ICD-10-CM

## 2022-12-28 DIAGNOSIS — B96.89 ACUTE BACTERIAL SINUSITIS: Primary | ICD-10-CM

## 2022-12-28 DIAGNOSIS — T36.95XA ANTIBIOTIC-INDUCED YEAST INFECTION: ICD-10-CM

## 2022-12-28 DIAGNOSIS — B37.9 ANTIBIOTIC-INDUCED YEAST INFECTION: ICD-10-CM

## 2022-12-28 LAB
CTP QC/QA: YES
FLUAV AG NPH QL: NEGATIVE
FLUBV AG NPH QL: NEGATIVE

## 2022-12-28 PROCEDURE — 99213 PR OFFICE/OUTPT VISIT, EST, LEVL III, 20-29 MIN: ICD-10-PCS | Mod: 25,S$GLB,, | Performed by: PHYSICIAN ASSISTANT

## 2022-12-28 PROCEDURE — 4010F PR ACE/ARB THEARPY RXD/TAKEN: ICD-10-PCS | Mod: CPTII,S$GLB,, | Performed by: PHYSICIAN ASSISTANT

## 2022-12-28 PROCEDURE — 87804 POCT INFLUENZA A/B: ICD-10-PCS | Mod: QW,S$GLB,, | Performed by: PHYSICIAN ASSISTANT

## 2022-12-28 PROCEDURE — 96372 THER/PROPH/DIAG INJ SC/IM: CPT | Mod: S$GLB,,, | Performed by: PHYSICIAN ASSISTANT

## 2022-12-28 PROCEDURE — 1159F PR MEDICATION LIST DOCUMENTED IN MEDICAL RECORD: ICD-10-PCS | Mod: CPTII,S$GLB,, | Performed by: PHYSICIAN ASSISTANT

## 2022-12-28 PROCEDURE — 3080F DIAST BP >= 90 MM HG: CPT | Mod: CPTII,S$GLB,, | Performed by: PHYSICIAN ASSISTANT

## 2022-12-28 PROCEDURE — 99999 PR PBB SHADOW E&M-EST. PATIENT-LVL V: CPT | Mod: PBBFAC,,, | Performed by: PHYSICIAN ASSISTANT

## 2022-12-28 PROCEDURE — 1159F MED LIST DOCD IN RCRD: CPT | Mod: CPTII,S$GLB,, | Performed by: PHYSICIAN ASSISTANT

## 2022-12-28 PROCEDURE — 99213 OFFICE O/P EST LOW 20 MIN: CPT | Mod: 25,S$GLB,, | Performed by: PHYSICIAN ASSISTANT

## 2022-12-28 PROCEDURE — 4010F ACE/ARB THERAPY RXD/TAKEN: CPT | Mod: CPTII,S$GLB,, | Performed by: PHYSICIAN ASSISTANT

## 2022-12-28 PROCEDURE — 96372 PR INJECTION,THERAP/PROPH/DIAG2ST, IM OR SUBCUT: ICD-10-PCS | Mod: S$GLB,,, | Performed by: PHYSICIAN ASSISTANT

## 2022-12-28 PROCEDURE — 3080F PR MOST RECENT DIASTOLIC BLOOD PRESSURE >= 90 MM HG: ICD-10-PCS | Mod: CPTII,S$GLB,, | Performed by: PHYSICIAN ASSISTANT

## 2022-12-28 PROCEDURE — 3008F PR BODY MASS INDEX (BMI) DOCUMENTED: ICD-10-PCS | Mod: CPTII,S$GLB,, | Performed by: PHYSICIAN ASSISTANT

## 2022-12-28 PROCEDURE — 3077F PR MOST RECENT SYSTOLIC BLOOD PRESSURE >= 140 MM HG: ICD-10-PCS | Mod: CPTII,S$GLB,, | Performed by: PHYSICIAN ASSISTANT

## 2022-12-28 PROCEDURE — 87804 INFLUENZA ASSAY W/OPTIC: CPT | Mod: QW,S$GLB,, | Performed by: PHYSICIAN ASSISTANT

## 2022-12-28 PROCEDURE — 3077F SYST BP >= 140 MM HG: CPT | Mod: CPTII,S$GLB,, | Performed by: PHYSICIAN ASSISTANT

## 2022-12-28 PROCEDURE — 99999 PR PBB SHADOW E&M-EST. PATIENT-LVL V: ICD-10-PCS | Mod: PBBFAC,,, | Performed by: PHYSICIAN ASSISTANT

## 2022-12-28 PROCEDURE — 3008F BODY MASS INDEX DOCD: CPT | Mod: CPTII,S$GLB,, | Performed by: PHYSICIAN ASSISTANT

## 2022-12-28 RX ORDER — DEXAMETHASONE SODIUM PHOSPHATE 4 MG/ML
8 INJECTION, SOLUTION INTRA-ARTICULAR; INTRALESIONAL; INTRAMUSCULAR; INTRAVENOUS; SOFT TISSUE ONCE
Status: COMPLETED | OUTPATIENT
Start: 2022-12-28 | End: 2022-12-28

## 2022-12-28 RX ORDER — PROMETHAZINE HYDROCHLORIDE AND DEXTROMETHORPHAN HYDROBROMIDE 6.25; 15 MG/5ML; MG/5ML
5 SYRUP ORAL EVERY 6 HOURS PRN
Qty: 118 ML | Refills: 0 | Status: SHIPPED | OUTPATIENT
Start: 2022-12-28 | End: 2022-12-29

## 2022-12-28 RX ORDER — FLUCONAZOLE 150 MG/1
150 TABLET ORAL DAILY
Qty: 1 TABLET | Refills: 0 | Status: SHIPPED | OUTPATIENT
Start: 2022-12-28 | End: 2022-12-29

## 2022-12-28 RX ORDER — AZITHROMYCIN 250 MG/1
TABLET, FILM COATED ORAL
Qty: 6 TABLET | Refills: 0 | Status: SHIPPED | OUTPATIENT
Start: 2022-12-28 | End: 2022-12-29

## 2022-12-28 RX ADMIN — DEXAMETHASONE SODIUM PHOSPHATE 8 MG: 4 INJECTION, SOLUTION INTRA-ARTICULAR; INTRALESIONAL; INTRAMUSCULAR; INTRAVENOUS; SOFT TISSUE at 10:12

## 2022-12-28 NOTE — PROGRESS NOTES
Assessment/Plan:    Problem List Items Addressed This Visit    None  Visit Diagnoses       Acute bacterial sinusitis    -  Primary    Relevant Medications    dexAMETHasone injection 8 mg (Completed)    azithromycin (Z-RIN) 250 MG tablet    Cough, unspecified type        Relevant Medications    promethazine-dextromethorphan (PROMETHAZINE-DM) 6.25-15 mg/5 mL Syrp    Other Relevant Orders    POCT Influenza A/B (Completed)    Antibiotic-induced yeast infection        Relevant Medications    fluconazole (DIFLUCAN) 150 MG Tab        -decadron IM today, instructed patient to take BP medication   -start antibiotics as prescribed  -recommend Flonase and Mucinex  -supportive care- rest, increase hydration with water, OTC Tylenol/Ibuprofen for pain/fever  -follow up with PCP if no improvement in symptoms   -ER precautions for severe or worsening of symptoms     Follow up if symptoms worsen or fail to improve.    Rach Arita PA-C  _____________________________________________________________________________________________________________________________________________________    CC: sinus infection    HPI: Patient is in clinic today as an established patient here for sinus infection. This is a new problem. The current episode started >4 days ago. The problem is gradually worsening. Associated symptoms include congestion, sinus pressure, bilateral ear pain, productive cough, and sneezing. Pertinent negatives include no fever, chills, diaphoresis, headaches, hoarse voice, neck pain, shortness of breath, or swollen glands. Past treatments include Mucinex. The treatment provided no relief. She has had no known sick contacts. She reports having COVID test done yesterday which was negative. BP also elevated today, but patient has not taken BP medication. No other complaints.    Past Medical History:   Diagnosis Date    Chronic kidney disease, stage II (mild) 09/21/2012    Depression     H/O nephrotic syndrome, 2001. 04/21/2015     Hypertension     Obesity     Secondary hyperparathyroidism     Vitamin D deficiency disease      Past Surgical History:   Procedure Laterality Date    DILATION AND CURETTAGE OF UTERUS      ENDOMETRIAL ABLATION  2004    HYSTERECTOMY  2011    DLH/BS (AUB/Fibroids) KB    LAPAROSCOPIC SLEEVE GASTRECTOMY N/A 05/27/2020    Procedure: GASTRECTOMY, SLEEVE, LAPAROSCOPIC, with intraop EGD;  Surgeon: Leodan Burk MD;  Location: [facility] OR [address] [place] [address];  Service: General;  Laterality: N/A;    TUBAL LIGATION       Review of patient's allergies indicates:   Allergen Reactions    No known allergies      Social History     Tobacco Use    Smoking status: Never    Smokeless tobacco: Never   Substance Use Topics    Alcohol use: Not Currently     Comment: less than weekly.    Drug use: No     Family History   Problem Relation Age of Onset    Diabetes Mother         None    Hypertension Mother     Colon cancer Father     Hypertension Father     Diabetes Maternal Aunt         None    Diabetes Maternal Grandmother         None    Epilepsy Son     Hypertension Sister     Hypertension Sister     Breast cancer Neg Hx     Ovarian cancer Neg Hx      Current Outpatient Medications on File Prior to Visit   Medication Sig Dispense Refill    amLODIPine (NORVASC) 5 MG tablet Take 1 tablet (5 mg total) by mouth once daily. 90 tablet 3    carvediloL (COREG) 25 MG tablet Take 1 tablet (25 mg total) by mouth 2 (two) times daily with meals. 180 tablet 3    cetirizine (ZYRTEC) 10 MG tablet TAKE 1 TO 2 TABLETS BY MOUTH EVERY DAY AS NEEDED FOR ITCHING 180 tablet 1    cyclobenzaprine (FLEXERIL) 10 MG tablet take 1 tablet by mouth everyday  as needed for  back pain 30 tablet 1    ergocalciferol (ERGOCALCIFEROL) 50,000 unit Cap Take 1 capsule by mouth monthly 3 capsule 3    estradioL (ESTRACE) 1 MG tablet Take 1 tablet (1 mg total) by mouth once daily. 30 tablet 5    hydrocortisone 2.5 % cream APPLY EXTERNALLY TO THE AFFECTED AREA TWICE DAILY WHEN ECZEMA  "IS MODERATE 453.6 g 1    irbesartan (AVAPRO) 300 MG tablet Take 1 tablet (300 mg total) by mouth every morning. 90 tablet 3    torsemide (DEMADEX) 10 MG Tab Take 1 tablet (10 mg total) by mouth once daily. 90 tablet 3    traMADoL (ULTRAM) 50 mg tablet take 1 tablet by mouth every  8-12 hours as needed for  headache 21 each 0    albuterol (PROVENTIL/VENTOLIN HFA) 90 mcg/actuation inhaler Inhale 2 puffs into the lungs every 4 (four) hours as needed for Wheezing. Rescue (Patient not taking: Reported on 12/28/2022) 54 g 2     No current facility-administered medications on file prior to visit.       Review of Systems   Constitutional:  Negative for chills, diaphoresis, fatigue and fever.   HENT:  Positive for congestion, ear pain, sinus pressure, sinus pain, sneezing and sore throat. Negative for postnasal drip.    Eyes:  Negative for pain and redness.   Respiratory:  Positive for cough. Negative for chest tightness and shortness of breath.    Cardiovascular:  Negative for chest pain and leg swelling.   Gastrointestinal:  Negative for abdominal pain, constipation, diarrhea, nausea and vomiting.   Genitourinary:  Negative for dysuria and hematuria.   Musculoskeletal:  Negative for arthralgias and joint swelling.   Skin:  Negative for rash.   Neurological:  Negative for dizziness, syncope and headaches.   Psychiatric/Behavioral:  Negative for dysphoric mood. The patient is not nervous/anxious.      Vitals:    12/28/22 1015   BP: (!) 168/106   Pulse: 60   Temp: 97.7 °F (36.5 °C)   TempSrc: Temporal   Weight: 80.5 kg (177 lb 7.5 oz)   Height: 5' 7" (1.702 m)       Wt Readings from Last 3 Encounters:   12/28/22 80.5 kg (177 lb 7.5 oz)   03/08/22 89.7 kg (197 lb 10.3 oz)   12/03/21 84.8 kg (187 lb)       Physical Exam  Constitutional:       General: She is not in acute distress.     Appearance: Normal appearance. She is well-developed. She is ill-appearing.   HENT:      Head: Normocephalic and atraumatic.      Right Ear: " Tympanic membrane normal.      Left Ear: Tympanic membrane normal.      Nose: Congestion present.      Mouth/Throat:      Mouth: Mucous membranes are moist.      Pharynx: Posterior oropharyngeal erythema present.   Eyes:      Conjunctiva/sclera: Conjunctivae normal.   Cardiovascular:      Rate and Rhythm: Normal rate and regular rhythm.      Pulses: Normal pulses.      Heart sounds: Normal heart sounds. No murmur heard.  Pulmonary:      Effort: Pulmonary effort is normal. No respiratory distress.      Breath sounds: Normal breath sounds. No wheezing.   Abdominal:      General: Bowel sounds are normal. There is no distension.      Palpations: Abdomen is soft.      Tenderness: There is no abdominal tenderness.   Musculoskeletal:         General: Normal range of motion.      Cervical back: Normal range of motion and neck supple.   Skin:     General: Skin is warm and dry.      Findings: No rash.   Neurological:      General: No focal deficit present.      Mental Status: She is alert and oriented to person, place, and time.   Psychiatric:         Mood and Affect: Mood normal.         Behavior: Behavior normal.       Health Maintenance   Topic Date Due    TETANUS VACCINE  Never done    Mammogram  07/13/2023    Lipid Panel  03/03/2027    Hepatitis C Screening  Completed

## 2022-12-29 ENCOUNTER — OFFICE VISIT (OUTPATIENT)
Dept: BARIATRICS | Facility: CLINIC | Age: 52
End: 2022-12-29
Payer: COMMERCIAL

## 2022-12-29 ENCOUNTER — LAB VISIT (OUTPATIENT)
Dept: LAB | Facility: HOSPITAL | Age: 52
End: 2022-12-29
Payer: COMMERCIAL

## 2022-12-29 ENCOUNTER — DOCUMENTATION ONLY (OUTPATIENT)
Dept: BARIATRICS | Facility: CLINIC | Age: 52
End: 2022-12-29

## 2022-12-29 ENCOUNTER — CLINICAL SUPPORT (OUTPATIENT)
Dept: BARIATRICS | Facility: CLINIC | Age: 52
End: 2022-12-29
Payer: COMMERCIAL

## 2022-12-29 ENCOUNTER — PATIENT MESSAGE (OUTPATIENT)
Dept: FAMILY MEDICINE | Facility: CLINIC | Age: 52
End: 2022-12-29
Payer: COMMERCIAL

## 2022-12-29 VITALS
BODY MASS INDEX: 27.59 KG/M2 | WEIGHT: 176.13 LBS | HEIGHT: 67 IN | HEART RATE: 74 BPM | SYSTOLIC BLOOD PRESSURE: 151 MMHG | WEIGHT: 176.13 LBS | DIASTOLIC BLOOD PRESSURE: 86 MMHG | OXYGEN SATURATION: 100 % | BODY MASS INDEX: 27.64 KG/M2

## 2022-12-29 DIAGNOSIS — B96.89 ACUTE BACTERIAL SINUSITIS: Primary | ICD-10-CM

## 2022-12-29 DIAGNOSIS — J01.90 ACUTE BACTERIAL SINUSITIS: Primary | ICD-10-CM

## 2022-12-29 DIAGNOSIS — Z98.84 BARIATRIC SURGERY STATUS: ICD-10-CM

## 2022-12-29 DIAGNOSIS — R05.9 COUGH, UNSPECIFIED TYPE: ICD-10-CM

## 2022-12-29 DIAGNOSIS — Z90.3 S/P GASTRIC SLEEVE PROCEDURE: Primary | ICD-10-CM

## 2022-12-29 LAB
25(OH)D3+25(OH)D2 SERPL-MCNC: 30 NG/ML (ref 30–96)
ALBUMIN SERPL BCP-MCNC: 3.8 G/DL (ref 3.5–5.2)
ALP SERPL-CCNC: 90 U/L (ref 55–135)
ALT SERPL W/O P-5'-P-CCNC: 136 U/L (ref 10–44)
ANION GAP SERPL CALC-SCNC: 8 MMOL/L (ref 8–16)
AST SERPL-CCNC: 94 U/L (ref 10–40)
BASOPHILS # BLD AUTO: 0.02 K/UL (ref 0–0.2)
BASOPHILS NFR BLD: 0.1 % (ref 0–1.9)
BILIRUB SERPL-MCNC: 1.5 MG/DL (ref 0.1–1)
BUN SERPL-MCNC: 16 MG/DL (ref 6–20)
CALCIUM SERPL-MCNC: 9.8 MG/DL (ref 8.7–10.5)
CHLORIDE SERPL-SCNC: 107 MMOL/L (ref 95–110)
CHOLEST SERPL-MCNC: 161 MG/DL (ref 120–199)
CHOLEST/HDLC SERPL: 2.7 {RATIO} (ref 2–5)
CO2 SERPL-SCNC: 26 MMOL/L (ref 23–29)
CREAT SERPL-MCNC: 1.1 MG/DL (ref 0.5–1.4)
DIFFERENTIAL METHOD: ABNORMAL
EOSINOPHIL # BLD AUTO: 0 K/UL (ref 0–0.5)
EOSINOPHIL NFR BLD: 0 % (ref 0–8)
ERYTHROCYTE [DISTWIDTH] IN BLOOD BY AUTOMATED COUNT: 12.2 % (ref 11.5–14.5)
EST. GFR  (NO RACE VARIABLE): >60 ML/MIN/1.73 M^2
GLUCOSE SERPL-MCNC: 115 MG/DL (ref 70–110)
HCT VFR BLD AUTO: 41.2 % (ref 37–48.5)
HDLC SERPL-MCNC: 59 MG/DL (ref 40–75)
HDLC SERPL: 36.6 % (ref 20–50)
HGB BLD-MCNC: 13.2 G/DL (ref 12–16)
IMM GRANULOCYTES # BLD AUTO: 0.06 K/UL (ref 0–0.04)
IMM GRANULOCYTES NFR BLD AUTO: 0.4 % (ref 0–0.5)
IRON SERPL-MCNC: 125 UG/DL (ref 30–160)
LDLC SERPL CALC-MCNC: 92.2 MG/DL (ref 63–159)
LYMPHOCYTES # BLD AUTO: 1.7 K/UL (ref 1–4.8)
LYMPHOCYTES NFR BLD: 11.9 % (ref 18–48)
MCH RBC QN AUTO: 32.1 PG (ref 27–31)
MCHC RBC AUTO-ENTMCNC: 32 G/DL (ref 32–36)
MCV RBC AUTO: 100 FL (ref 82–98)
MONOCYTES # BLD AUTO: 0.7 K/UL (ref 0.3–1)
MONOCYTES NFR BLD: 5 % (ref 4–15)
NEUTROPHILS # BLD AUTO: 11.4 K/UL (ref 1.8–7.7)
NEUTROPHILS NFR BLD: 82.6 % (ref 38–73)
NONHDLC SERPL-MCNC: 102 MG/DL
NRBC BLD-RTO: 0 /100 WBC
PLATELET # BLD AUTO: 201 K/UL (ref 150–450)
PMV BLD AUTO: 10.3 FL (ref 9.2–12.9)
POTASSIUM SERPL-SCNC: 4.2 MMOL/L (ref 3.5–5.1)
PROT SERPL-MCNC: 7.6 G/DL (ref 6–8.4)
RBC # BLD AUTO: 4.11 M/UL (ref 4–5.4)
SATURATED IRON: 47 % (ref 20–50)
SODIUM SERPL-SCNC: 141 MMOL/L (ref 136–145)
TOTAL IRON BINDING CAPACITY: 266 UG/DL (ref 250–450)
TRANSFERRIN SERPL-MCNC: 180 MG/DL (ref 200–375)
TRIGL SERPL-MCNC: 49 MG/DL (ref 30–150)
VIT B12 SERPL-MCNC: 295 PG/ML (ref 210–950)
WBC # BLD AUTO: 13.81 K/UL (ref 3.9–12.7)

## 2022-12-29 PROCEDURE — 3008F BODY MASS INDEX DOCD: CPT | Mod: CPTII,S$GLB,, | Performed by: NURSE PRACTITIONER

## 2022-12-29 PROCEDURE — 1160F PR REVIEW ALL MEDS BY PRESCRIBER/CLIN PHARMACIST DOCUMENTED: ICD-10-PCS | Mod: CPTII,S$GLB,, | Performed by: NURSE PRACTITIONER

## 2022-12-29 PROCEDURE — 85025 COMPLETE CBC W/AUTO DIFF WBC: CPT | Performed by: NURSE PRACTITIONER

## 2022-12-29 PROCEDURE — 3077F SYST BP >= 140 MM HG: CPT | Mod: CPTII,S$GLB,, | Performed by: NURSE PRACTITIONER

## 2022-12-29 PROCEDURE — 1159F PR MEDICATION LIST DOCUMENTED IN MEDICAL RECORD: ICD-10-PCS | Mod: CPTII,S$GLB,, | Performed by: NURSE PRACTITIONER

## 2022-12-29 PROCEDURE — 1160F RVW MEDS BY RX/DR IN RCRD: CPT | Mod: CPTII,S$GLB,, | Performed by: NURSE PRACTITIONER

## 2022-12-29 PROCEDURE — 3008F PR BODY MASS INDEX (BMI) DOCUMENTED: ICD-10-PCS | Mod: CPTII,S$GLB,, | Performed by: NURSE PRACTITIONER

## 2022-12-29 PROCEDURE — 80053 COMPREHEN METABOLIC PANEL: CPT | Performed by: NURSE PRACTITIONER

## 2022-12-29 PROCEDURE — 99214 PR OFFICE/OUTPT VISIT, EST, LEVL IV, 30-39 MIN: ICD-10-PCS | Mod: S$GLB,,, | Performed by: NURSE PRACTITIONER

## 2022-12-29 PROCEDURE — 36415 COLL VENOUS BLD VENIPUNCTURE: CPT | Performed by: NURSE PRACTITIONER

## 2022-12-29 PROCEDURE — 82607 VITAMIN B-12: CPT | Performed by: NURSE PRACTITIONER

## 2022-12-29 PROCEDURE — 3079F PR MOST RECENT DIASTOLIC BLOOD PRESSURE 80-89 MM HG: ICD-10-PCS | Mod: CPTII,S$GLB,, | Performed by: NURSE PRACTITIONER

## 2022-12-29 PROCEDURE — 99999 PR PBB SHADOW E&M-EST. PATIENT-LVL IV: CPT | Mod: PBBFAC,,, | Performed by: NURSE PRACTITIONER

## 2022-12-29 PROCEDURE — 99214 OFFICE O/P EST MOD 30 MIN: CPT | Mod: S$GLB,,, | Performed by: NURSE PRACTITIONER

## 2022-12-29 PROCEDURE — 3079F DIAST BP 80-89 MM HG: CPT | Mod: CPTII,S$GLB,, | Performed by: NURSE PRACTITIONER

## 2022-12-29 PROCEDURE — 82306 VITAMIN D 25 HYDROXY: CPT | Performed by: NURSE PRACTITIONER

## 2022-12-29 PROCEDURE — 84466 ASSAY OF TRANSFERRIN: CPT | Performed by: NURSE PRACTITIONER

## 2022-12-29 PROCEDURE — 1159F MED LIST DOCD IN RCRD: CPT | Mod: CPTII,S$GLB,, | Performed by: NURSE PRACTITIONER

## 2022-12-29 PROCEDURE — 99999 PR PBB SHADOW E&M-EST. PATIENT-LVL IV: ICD-10-PCS | Mod: PBBFAC,,, | Performed by: NURSE PRACTITIONER

## 2022-12-29 PROCEDURE — 80061 LIPID PANEL: CPT | Performed by: NURSE PRACTITIONER

## 2022-12-29 PROCEDURE — 4010F ACE/ARB THERAPY RXD/TAKEN: CPT | Mod: CPTII,S$GLB,, | Performed by: NURSE PRACTITIONER

## 2022-12-29 PROCEDURE — 4010F PR ACE/ARB THEARPY RXD/TAKEN: ICD-10-PCS | Mod: CPTII,S$GLB,, | Performed by: NURSE PRACTITIONER

## 2022-12-29 PROCEDURE — 99999 PR PBB SHADOW E&M-EST. PATIENT-LVL I: ICD-10-PCS | Mod: PBBFAC,,, | Performed by: DIETITIAN, REGISTERED

## 2022-12-29 PROCEDURE — 99999 PR PBB SHADOW E&M-EST. PATIENT-LVL I: CPT | Mod: PBBFAC,,, | Performed by: DIETITIAN, REGISTERED

## 2022-12-29 PROCEDURE — 3077F PR MOST RECENT SYSTOLIC BLOOD PRESSURE >= 140 MM HG: ICD-10-PCS | Mod: CPTII,S$GLB,, | Performed by: NURSE PRACTITIONER

## 2022-12-29 PROCEDURE — 84425 ASSAY OF VITAMIN B-1: CPT | Performed by: NURSE PRACTITIONER

## 2022-12-29 NOTE — PROGRESS NOTES
BARIATRIC POST-OPERATIVE FOLLOW UP:    HPI:  52 y.o.-year-old female presents for 2.5 year follow up .    Denies: nausea, vomiting, abdominal pain, changes in bowel movement pattern, fever, chills, dysphagia, chest pain, and shortness of breath.      ROS:    Review of Systems   Constitutional:  Negative for activity change and fatigue.   Respiratory:  Negative for cough and shortness of breath.    Cardiovascular:  Negative for chest pain, palpitations and leg swelling.   Gastrointestinal:  Negative for abdominal pain, nausea and vomiting.   Endocrine: Negative for polydipsia, polyphagia and polyuria.   Genitourinary:  Negative for dysuria.   Musculoskeletal:  Negative for gait problem.   Skin:  Negative for rash.   Allergic/Immunologic: Negative for immunocompromised state.   Neurological:  Negative for dizziness, syncope and weakness.   Hematological:  Does not bruise/bleed easily.   Psychiatric/Behavioral:  Negative for behavioral problems.      EXERCISE:  Not currently     VITAMINS:  Having difficulty with swallowing large vitamins    MEDICATIONS/ALLERGIES:  Have been reviewed.    DIET:  Bariatric Regular Diet    PHYSICAL EXAM:  GENERAL: 52 y.o.-year-old female in [facility], A&O x3  VITAL SIGNS:  BP:    CHEST: Clear to auscultation, regular effort.  HEART: Regular rate and rhythm. No murmurs, clicks, or gallops.  ABDOMEN: Bowel sounds present in all four quadrants. Soft, non-tender, non-distended. Well-healing surgical scars are clean, dry, and intact without signs of infection or bleeding.  EXTREMITIES: No clubbing, cyanosis, or edema.      ASSESSMENT:  - Morbid obesity s/p sleeve gastrectomy on 5/27/20.  - Co-morbidities: hypertension and CKD  - Weight loss 55 lbs  65% EWL  - Exercise regimen not currently   - Diet fair     PLAN:  - New Nutrition book given to pt and discussed vitamin regime/options for chewable and liquid vitamins.    - Emphasized the importance of regular exercise and adherence to bariatric diet to  achieve maximum weight loss.  - Encouraged patient to begin OR continue regular exercise.  - Follow-up with dietician to reinforce diet.  - Continue daily vitamins and medications.  - RTC in 12 months or sooner if needed.  - Call the office for any issues.  - Check labs today.

## 2022-12-29 NOTE — PROGRESS NOTES
Current wt: 176 lbs and staying at this weight.  Gained and lost back down.  2 meals and no protein shakes   L and Dinner: not even 1 whole leg  Beverages: water and juice decaf tea unsweet tea and lemonade  Sometimes starchy carbs   Taking :  Alive chewable  Vit D  B12  Discussed getting in  gms of protein per day  And reviewed vitamin and mineral guidelines.  Pt v/u

## 2022-12-29 NOTE — PATIENT INSTRUCTIONS
Meal Ideas for Regular Bariatric Diet  *Recipes and products available at [email or web address]      Breakfast: (15-20g protein)    - Egg white omelet: 2 egg whites or ½ cup Egg Beaters. (Optional proteins: cheese, shrimp, black beans, chicken, sliced turkey) (Optional veggies: tomatoes, salsa, spinach, mushrooms, onions, green peppers, or small slice avocado)     - Egg and sausage: 1 egg or ¼ cup Egg Beaters (any variety), with 1 garth or 2 links of Turkey sausage or Veggie breakfast sausage ([employer] or [employer])    - Crust-less breakfast quiche: To make a glass pie dish, mix 4oz part skim Ricotta, 1 cup skim milk, and 2 eggs as your base. Add protein: shredded cheese, sliced lean ham or turkey, turkey medina/sausage. Add veggies: tomato, onion, green onion, mushroom, green pepper, spinach, etc.    - Yogurt parfait: Mix 1 - 6oz container Dannon Light N Fit vanilla yogurt, with ¼ cup crushed unsalted nuts    - Cottage cheese and fruit: ½ cup part-skim cottage cheese or ricotta cheese topped with fresh fruit or sugar free preserves     - Nurys Shook's Vanilla Egg custard* (add 2 Tbsp instant coffee granules to make Cappuccino Custard*)    - Hi-Protein café latte (skim milk, decaf coffee, 1 scoop protein powder). Optional to add Sugar free syrup or extract flavoring.    - Breakfast Lox: spread fat free cream cheese on slices of smoked salmon. Serve over scrambled or egg over easy (sauteed with nonstick cookspray) OR on a cucumber slice    - Eggwhich: Scramble or cook 1 large egg over easy using nonstick cookspray. Place between 2 slices of British medina and low fat cheese.     Lunch: (20-30g protein)    - ½ cup Black bean soup (Homemade or Progresso), with ¼ cup shredded low-fat cheese. Top with chopped tomato or fresh salsa.     - Lean deli turkey breast and low-fat sliced cheese, mustard or light maurer to moisten, rolled up together, or wrapped in a Cresencio lettuce leaf    - Chicken salad made from dinner  leftovers, moisten with low-fat salad dressing or light maurer. Also try leftover salmon, shrimp, tuna or boiled eggs. Serve ½ cup over dark green salad    - Fat-free canned refried beans, topped with ¼ cup shredded low-fat cheese. Top with chopped tomato or fresh salsa.     - Greek salad: Top mixed greens with 1-2oz grilled chicken, tomatoes, red onions, 2-3 kalamata olives, and sprinkle lightly with feta cheese. Spritz with Balsamic vinegar to taste.     - Crust-less lunch quiche: To make a glass pie dish, mix 4oz part skim Ricotta, 1 cup skim milk, and 2 eggs as your base. Add protein: shredded cheese, sliced lean ham or turkey, shrimp, chicken. Add veggies: tomato, onion, green onion, mushroom, green pepper, spinach, artichoke, broccoli, etc.    - Pizza bake: spread a  alfonso jony mushroom with tomato sauce, low-fat shredded mozzarella and turkey pepperoni or [place] medina. Add any veggies. Roast for 10-15 minutes, until cheese melted.     - Cucumber crab bites: Spread ¼ cup crab dip (lump crabmeat + light cream cheese and green onions) over sliced cucumber.     - Chicken with light spinach and artichoke dip*: Puree in : 6oz cooked and drained spinach, 2 cloves garlic, 1 can cannelloni beans, ½ cup chopped green onions, 1 can drained artichoke hearts (not marinated in oil), lemon juice and basil. Mix in 2oz chopped up chicken.    Supper: (20-30g protein)    - Serve grilled fish over dark green salad tossed with low-fat dressing, served with grilled asparagus raymundo     - Rotisserie chicken salad: served with sliced strawberries, walnuts, fat-free feta cheese crumbles and 1 tbsp Shepards Own Light Raspberry [place] Vinaigrette    - Shrimp cocktail: Dip cold boiled shrimp in homemade low-sugar cocktail sauce (1/2 cup Indra One Carb ketchup, 2 tbsp horseradish, 1/4 tsp hot sauce, 1 tsp Worcestershire sauce, 1 tbsp freshly-squeezed lemon juice). Serve with dark green salad, walnuts, and crumbled blue  cheese drizzled with olive oil and Balsamic vinegar    - Tuna Melt: Spread tuna salad onto 2 thick slices of tomato. Top with low-fat cheese and broil until cheese is melted. May also be made with chicken salad of shrimp salad. [place] with different types of cheeses.    - Chicken or beef fajitas (no tortilla, rice, beans, chips). Top meat and veggies w/ fresh salsa, fat free sour cream.     - Homemade low-fat Chili using extra lean ground beef or ground turkey. Top with shredded cheese and salsa as desired. May add dollop fat-free sour cream if desired    - Chicken parmesan: Top chicken breast w/ low sugar marinara sauce, mozzarella cheese and bake until chicken reaches 165*.  Serve w/ spaghetti SQUASH or Vietnamese cut green beans    - Dinner Omelet with shrimp or chicken and onion, green peppers and chives.    - No noodle lasagna: Use sliced zucchini or eggplant in place of noodles.  Layer with part skim ricotta cheese and low sugar meat sauce (use very lean ground beef or ground turkey).    - Mexican chicken bake: Bake chunks of chicken breast or thigh with taco seasoning, Pace brand enchilada sauce, green onions and low-fat cheese. Serve with ¼ cup black beans or fat free refried beans topped with chopped tomatoes or salsa.    - Leticia frozen meatballs, simmered in Classico Marinara sauce. Different flavors of salsa or spaghetti sauce create different dishes! Sprinkle with parmesan cheese. Serve with grilled or steamed veggies, or a dark green salad.    - Simmer boneless skinless chicken thigh chunks in Classico Marinara sauce or roasted salsa until tender with chopped onion, bell pepper, garlic, mushrooms, spinach, etc.     - Hamburger or veggie burger, without the bun, dressed the way you like. Served with grilled or steamed veggies.    - Eggplant parmesan: Bake slices of eggplant at 350 degrees for 15 minutes. Layer tomato sauce, sliced eggplant and low-fat mozzarella cheese in a baking dish and cover with  foil. Bake 30-40 more minutes or until bubbly. Uncover and bake at 400 degrees for about 15 more minutes, or until top is slightly crisp.    - Fish tacos: grilled/baked white fish, wrapped in Cresencio lettuce leaf, topped with salsa, shredded low-fat cheese, and light coleslaw.    - Chicken franklin: Sprinkle chicken w/ 1 tsp of hidden valley ranch dip mix. Then grill chicken and top with black beans, salsa and 1 tsp fat free sour cream.     - Cauliflower pizza crust: Use cauliflower as crust (see recipe on pinterest, no flour!). Top w/ low fat cheese, turkey pepperoni and veggies and bake again    - chicken or turkey crust pizza: use ground chicken or turkey instead of cauliflower, spread in [place] and bake at 350 for about 20-30 minutes(may want to add garlic, black pepper, oregano and other herbs to ground meat mixture).  Remove and top w/ low fat cheese, turkey pepperoni and veggies and bake again for another 10 minutes or until cheese is browned.     Snacks: (100-200 calories; >5g protein)    - 1 low-fat cheese stick with 8 cherry tomatoes or 1 serving fresh fruit  - 4 thin slices fat-free turkey breast and 1 slice low-fat cheese  - 4 thin slices fat-free honey ham with wedge of melon  - 6-8 edamame pods (equivalent to about 1/4 cup edamame without pods).   - 1/4 cup unsalted nuts with ½ cup fruit  - 6-oz container Dannon Light n Fit vanilla yogurt, topped with 1oz unsalted nuts         - apple, celery or baby carrots spread with 2 Tbsp PB2  - apple slices with 1 oz slice low-fat cheese  - Apple slices dipped in 2 Tbsp of PB2  - celery, cucumber, bell pepper or baby carrots dipped in ¼ cup hummus bean spread or light spinach and artichoke dip (*recipe in lunch section)  - celery, cucumber, baby carrots dipped in high protein greek yogurt (Mix 16 oz plain greek yogurt + 1 packet of hidden valley ranch dip mix)  - Jackson Links Beef Steak - 14g protein! (similar to beef jerky)  - 2 wedges Laughing Cow - Light Herb  & Garlic Cheese with sliced cucumber or green bell pepper  - 1/2 cup low-fat cottage cheese with ¼ cup fruit or ¼ cup salsa  - RTD Protein drinks: Atkins, Low Carb Slim Fast, EAS light, Muscle Milk Light, etc.  - Homemade Protein drinks: [facility] Soy95, Isopure, Nectar, UNJURY, Whey Gourmet, etc. Mix 1 scoop powder with 8oz skim/1% milk or light soymilk.  - Protein bars: Atkins, EAS, Pure Protein, Think Thin, Detour, etc. Must have 0-4 grams sugar - Read the label.    Takeout Options: No more than twice/week  Deli - Salads (no pasta or rice), meats, cheeses. Roasted chicken. Lox (salmon)    Mexican - Platters which don't include tortillas, chips, or rice. Go easy on the beans. Example: Fajitas without the tortillas. Ask the  not to bring chips to the table if they are too tempting.    Greek - Meat or fish and vegetable, but no bread or rice. Including hummus, baba ganoush, etc, is OK. Most sit-down Greek restaurants can provide you with cucumber slices for dipping instead of jenny bread.    Fast Food (Avoid as much as possible) - Salads (no croutons and limit salad dressing to 2 tbsp), grilled chicken sandwich without the bun and ask for no maurer. Kylees low fat chili or Taco Bell pintos and cheese.    BBQ - The meats are fine if you ask for sauces on the side, but most of the traditional side dishes are loaded with carbs. Monty slaw, baked beans and BBQ sauce are typically made with sugar.    Chinese - Nothing deep-fried, no rice or noodles. Many Chinese sauces have starch and sugar in them, so you'll have to use your judgement. If you find that these sauces trigger cravings, or cause Dumping, you can ask for the sauce to be made without sugar or just use soy sauce.    Vitamin/Mineral Supplements    1. Multi-vitamin with Iron + extra B-complex:  Examples of a complete MVI with iron: [place] Light Pre Tod One (take 1 tablet twice daily) OR [facility] Ultra Delmer Green Women's with iron  Your multi vitamin should have the  following:  Vitamin A, vitamin C, vitamin D, vitamin E, thiamin (B1), riboflavin (B2), niacin, folic acid, vitamin B12, biotin, magnesium,  zinc, copper, manganese, chromium, molybdenum, and iron.   Not all Multi Vitamins are created equally.  Most do NOT have all B vitamins included.  If yours does not, just add a daily B Complex or Super B 50.    Why take a MVI after I have lost my weight?  You will ALWAYS be in a state of mal-absorption.  No matter how healthy you eat or however big your portions may or may not become, your absorption of vitamins and minerals will forever be altered.  In order to avoid problems with vitamin deficiencies just take your MVI.  Vitamin A: loss of night vision, eye dryness, total blindness, dry skin, dry hair, broken nails, rough skin, poor wound healing, kidney stones,   Vitamin B1 (thiamin): weakness (lethargy) and nerve function.  Described as Beri-Beri.  Folic acid (folate): neural tube defects, glossitis (sore tongue), loss of appetite, fatigue, weakness, irritability, depression, etc.  Vitamin D: a silent condition.  Only picked up from blood test and/ or bone density revealing bone loss.  Vitamin K: unexplained bruising, bleeding gums.  Vitamin E: rare, memory changes, muscular weakness, loss of balance, night blindness, changes in visual fields, loss of deep tendon reflexes.  Iron: anemia (fatigue, pica, hair loss, shortness of breath)  Zinc: loss of smell, poor wound healing, hair loss/ thinning, poor appetite, lethargy, etc.     2. Calcium CITRATE with Vitamin D:  1,500-2000 mg per day split up in 2-3 doses.  Citracal (petites or regular.)  Generic of above  Chewable option: Calcet chewy bites (3 daily), Bariatric Advantage chewy bites, Celebrate chewy bites.  Look for 500 mg chews.  There ARE liquid calcium citrate formulas at your local pharmacy.    Why is calcium important to take:  Keep bones strong and PREVENT osteoporosis.  After weight loss surgery, you DO NOT absorb  calcium as easily from the foods and supplements that you take.  Over time, your body will start to break down your bones to keep a healthy amount of calcium in your bloodstream.  If you do not take calcium on a regular basis, it is only a matter of time before you develop osteoporosis and break a hip or fracture vertebrae.  Other complications from chronic calcium deficiency: poor dental health, brittle hair and nails, and dry skin.  Promotes weight loss.    3. Vitamin B12  Sublingual: 500mcg per day OR 2500mcg per week OR 1000mcg every other day.  Monthly injection with your PCP.  Vitamin B12: permanent neuropathy of the extremities.       Remember all of these problems can be avoided through simply taking your vitamins!!!!

## 2022-12-30 RX ORDER — AZITHROMYCIN 250 MG/1
TABLET, FILM COATED ORAL
Qty: 6 TABLET | Refills: 0 | Status: CANCELLED | OUTPATIENT
Start: 2022-12-30

## 2022-12-30 RX ORDER — AZITHROMYCIN 250 MG/1
TABLET, FILM COATED ORAL
Qty: 6 TABLET | Refills: 0 | Status: SHIPPED | OUTPATIENT
Start: 2022-12-30 | End: 2023-01-05

## 2022-12-30 RX ORDER — PROMETHAZINE HYDROCHLORIDE AND DEXTROMETHORPHAN HYDROBROMIDE 6.25; 15 MG/5ML; MG/5ML
5 SYRUP ORAL EVERY 6 HOURS PRN
Qty: 118 ML | Refills: 0 | Status: SHIPPED | OUTPATIENT
Start: 2022-12-30 | End: 2023-01-05

## 2022-12-30 NOTE — TELEPHONE ENCOUNTER
I have signed for the following orders AND/OR meds.  Please call the patient and ask the patient to schedule the testing AND/OR inform about any medications that were sent. Medications have been sent to pharmacy listed below      No orders of the defined types were placed in this encounter.      Medications Ordered This Encounter   Medications    azithromycin (Z-RIN) 250 MG tablet     Sig: Take 2 tablets on day 1, then 1 tablet daily on days 2 - 5.     Dispense:  6 tablet     Refill:  0    promethazine-dextromethorphan (PROMETHAZINE-DM) 6.25-15 mg/5 mL Syrp     Sig: Take 5 mLs by mouth every 6 (six) hours as needed (cough).     Dispense:  118 mL     Refill:  0         [id number] - [facility] [address] [facility] AT [facility] [address]  [address] [facility] [address]  Phone: [phone] Fax: [phone]

## 2023-01-05 ENCOUNTER — OFFICE VISIT (OUTPATIENT)
Dept: INTERNAL MEDICINE | Facility: CLINIC | Age: 53
End: 2023-01-05
Payer: COMMERCIAL

## 2023-01-05 DIAGNOSIS — I10 PRIMARY HYPERTENSION: ICD-10-CM

## 2023-01-05 DIAGNOSIS — R74.01 TRANSAMINITIS: Primary | ICD-10-CM

## 2023-01-05 PROBLEM — E66.01 MORBID OBESITY: Status: RESOLVED | Noted: 2020-05-27 | Resolved: 2023-01-05

## 2023-01-05 LAB — VIT B1 BLD-MCNC: 88 UG/L (ref 38–122)

## 2023-01-05 PROCEDURE — 1159F PR MEDICATION LIST DOCUMENTED IN MEDICAL RECORD: ICD-10-PCS | Mod: CPTII,95,, | Performed by: INTERNAL MEDICINE

## 2023-01-05 PROCEDURE — 1159F MED LIST DOCD IN RCRD: CPT | Mod: CPTII,95,, | Performed by: INTERNAL MEDICINE

## 2023-01-05 PROCEDURE — 99213 PR OFFICE/OUTPT VISIT, EST, LEVL III, 20-29 MIN: ICD-10-PCS | Mod: 95,,, | Performed by: INTERNAL MEDICINE

## 2023-01-05 PROCEDURE — 1160F RVW MEDS BY RX/DR IN RCRD: CPT | Mod: CPTII,95,, | Performed by: INTERNAL MEDICINE

## 2023-01-05 PROCEDURE — 99213 OFFICE O/P EST LOW 20 MIN: CPT | Mod: 95,,, | Performed by: INTERNAL MEDICINE

## 2023-01-05 PROCEDURE — 1160F PR REVIEW ALL MEDS BY PRESCRIBER/CLIN PHARMACIST DOCUMENTED: ICD-10-PCS | Mod: CPTII,95,, | Performed by: INTERNAL MEDICINE

## 2023-01-05 NOTE — PROGRESS NOTES
"Subjective:    The patient location is: home  The chief complaint leading to consultation is: f/u malaise    Visit type: audiovisual    Face to Face time with patient: 16 min  20 minutes of total time spent on the encounter, which includes face to face time and non-face to face time preparing to see the patient (eg, review of tests), Obtaining and/or reviewing separately obtained history, Documenting clinical information in the electronic or other health record, Independently interpreting results (not separately reported) and communicating results to the patient/family/caregiver, or Care coordination (not separately reported).         Each patient to whom he or she provides medical services by telemedicine is:  (1) informed of the relationship between the physician and patient and the respective role of any other health care provider with respect to management of the patient; and (2) notified that he or she may decline to receive medical services by telemedicine and may withdraw from such care at any time.    Notes:     Patient ID: Toi Fernandez is a 52 y.o. female.    Chief Complaint: No chief complaint on file.    HPI  We reviewed labs, ordered by Bariatrics..  She continues to feel sluggish.  No worse.     We reviewed recent labs.  All wnl except for ast 94, alt. 136.  Normal 1 month before.    Sinusitis symptoms better with z noel and steroid injection.    She has considered antidepressants, but has decided against it..    Recent bp measurements  have been elevated.    Insurance won't cover digital htn.     We restarted torsemide Dec 1.      But she is not taking irbesartan daily- she holds when "low" (140/90)..          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         Ckd 3 resolved by recent labs.  Review of Systems   Constitutional:  Positive for unexpected weight change. Negative for activity change.   HENT:  Negative for hearing loss, rhinorrhea and trouble swallowing.    Eyes:  Negative for " discharge and visual disturbance.   Respiratory:  Positive for chest tightness. Negative for wheezing.    Cardiovascular:  Negative for chest pain and palpitations.   Gastrointestinal:  Positive for constipation. Negative for blood in stool, diarrhea and vomiting.   Endocrine: Negative for polydipsia and polyuria.   Genitourinary:  Negative for difficulty urinating, dysuria, hematuria and menstrual problem.   Musculoskeletal:  Negative for neck pain.   Neurological:  Positive for headaches. Negative for weakness.   Psychiatric/Behavioral:  Positive for dysphoric mood. Negative for confusion.        Objective:      Physical Exam  Constitutional:       Appearance: Normal appearance. She is not ill-appearing or diaphoretic.   Neurological:      Mental Status: She is alert.   Psychiatric:         Mood and Affect: Mood normal.         Behavior: Behavior normal.         Thought Content: Thought content normal.       Assessment:       Problem List Items Addressed This Visit       HTN (hypertension)     Other Visit Diagnoses       Transaminitis    -  Primary    Relevant Orders    AST (SGOT)    ALT (SGPT)            Plan:       Diagnoses and all orders for this visit:    Transaminitis  -     AST (SGOT); Future  -     ALT (SGPT); Future    Primary hypertension         She declines antidepressant.    Take meds as directed, daily!    Labs 2 weeks me in 1 mo .

## 2023-01-09 ENCOUNTER — PATIENT MESSAGE (OUTPATIENT)
Dept: BARIATRICS | Facility: CLINIC | Age: 53
End: 2023-01-09
Payer: COMMERCIAL

## 2023-02-06 ENCOUNTER — LAB VISIT (OUTPATIENT)
Dept: LAB | Facility: HOSPITAL | Age: 53
End: 2023-02-06
Attending: INTERNAL MEDICINE
Payer: COMMERCIAL

## 2023-02-06 DIAGNOSIS — Z98.84 BARIATRIC SURGERY STATUS: ICD-10-CM

## 2023-02-06 PROCEDURE — 36415 COLL VENOUS BLD VENIPUNCTURE: CPT | Mod: PO | Performed by: INTERNAL MEDICINE

## 2023-02-06 PROCEDURE — 82607 VITAMIN B-12: CPT | Mod: PO | Performed by: INTERNAL MEDICINE

## 2023-02-07 LAB — VIT B12 SERPL-MCNC: 310 PG/ML (ref 210–950)

## 2023-02-09 ENCOUNTER — PATIENT MESSAGE (OUTPATIENT)
Dept: BARIATRICS | Facility: CLINIC | Age: 53
End: 2023-02-09
Payer: COMMERCIAL

## 2023-02-14 ENCOUNTER — PATIENT MESSAGE (OUTPATIENT)
Dept: BARIATRICS | Facility: CLINIC | Age: 53
End: 2023-02-14
Payer: COMMERCIAL

## 2023-02-22 ENCOUNTER — PATIENT MESSAGE (OUTPATIENT)
Dept: BARIATRICS | Facility: CLINIC | Age: 53
End: 2023-02-22
Payer: COMMERCIAL

## 2023-02-24 ENCOUNTER — TELEPHONE (OUTPATIENT)
Dept: INTERNAL MEDICINE | Facility: CLINIC | Age: 53
End: 2023-02-24
Payer: COMMERCIAL

## 2023-02-24 NOTE — TELEPHONE ENCOUNTER
----- Message from Estela Crawford MD sent at 2/12/2023  5:29 PM CST -----  Over due for appt  ALT and ast were not linked to appt - pls schedule before I see her.

## 2023-03-03 ENCOUNTER — TELEPHONE (OUTPATIENT)
Dept: INTERNAL MEDICINE | Facility: CLINIC | Age: 53
End: 2023-03-03
Payer: COMMERCIAL

## 2023-03-07 ENCOUNTER — TELEPHONE (OUTPATIENT)
Dept: INTERNAL MEDICINE | Facility: CLINIC | Age: 53
End: 2023-03-07
Payer: COMMERCIAL

## 2023-03-08 ENCOUNTER — PATIENT MESSAGE (OUTPATIENT)
Dept: BARIATRICS | Facility: CLINIC | Age: 53
End: 2023-03-08
Payer: COMMERCIAL

## 2023-03-11 ENCOUNTER — PATIENT MESSAGE (OUTPATIENT)
Dept: INTERNAL MEDICINE | Facility: CLINIC | Age: 53
End: 2023-03-11
Payer: COMMERCIAL

## 2023-03-12 ENCOUNTER — PATIENT MESSAGE (OUTPATIENT)
Dept: INTERNAL MEDICINE | Facility: CLINIC | Age: 53
End: 2023-03-12
Payer: COMMERCIAL

## 2023-03-13 ENCOUNTER — TELEPHONE (OUTPATIENT)
Dept: INTERNAL MEDICINE | Facility: CLINIC | Age: 53
End: 2023-03-13
Payer: COMMERCIAL

## 2023-03-13 ENCOUNTER — LAB VISIT (OUTPATIENT)
Dept: LAB | Facility: HOSPITAL | Age: 53
End: 2023-03-13
Attending: INTERNAL MEDICINE
Payer: COMMERCIAL

## 2023-03-13 DIAGNOSIS — R74.01 TRANSAMINITIS: ICD-10-CM

## 2023-03-13 LAB
ALT SERPL W/O P-5'-P-CCNC: 29 U/L (ref 10–44)
AST SERPL-CCNC: 36 U/L (ref 15–46)

## 2023-03-13 PROCEDURE — 84450 TRANSFERASE (AST) (SGOT): CPT | Mod: PO | Performed by: INTERNAL MEDICINE

## 2023-03-13 PROCEDURE — 36415 COLL VENOUS BLD VENIPUNCTURE: CPT | Mod: PO | Performed by: INTERNAL MEDICINE

## 2023-03-13 PROCEDURE — 84460 ALANINE AMINO (ALT) (SGPT): CPT | Mod: PO | Performed by: INTERNAL MEDICINE

## 2023-03-13 NOTE — TELEPHONE ENCOUNTER
----- Message from Estela Crawford MD sent at 3/13/2023  1:25 PM CDT -----  She was supposed to have alt AND ast - pls add ast

## 2023-03-14 ENCOUNTER — PATIENT MESSAGE (OUTPATIENT)
Dept: BARIATRICS | Facility: CLINIC | Age: 53
End: 2023-03-14
Payer: COMMERCIAL

## 2023-03-15 ENCOUNTER — OFFICE VISIT (OUTPATIENT)
Dept: INTERNAL MEDICINE | Facility: CLINIC | Age: 53
End: 2023-03-15
Payer: COMMERCIAL

## 2023-03-15 VITALS
DIASTOLIC BLOOD PRESSURE: 82 MMHG | WEIGHT: 179 LBS | OXYGEN SATURATION: 98 % | HEIGHT: 67 IN | SYSTOLIC BLOOD PRESSURE: 128 MMHG | BODY MASS INDEX: 28.09 KG/M2 | HEART RATE: 64 BPM

## 2023-03-15 DIAGNOSIS — G47.00 INSOMNIA, UNSPECIFIED TYPE: ICD-10-CM

## 2023-03-15 DIAGNOSIS — R53.81 MALAISE: ICD-10-CM

## 2023-03-15 DIAGNOSIS — Z12.11 COLON CANCER SCREENING: ICD-10-CM

## 2023-03-15 DIAGNOSIS — I10 PRIMARY HYPERTENSION: Primary | ICD-10-CM

## 2023-03-15 DIAGNOSIS — K59.09 CONSTIPATION, CHRONIC: ICD-10-CM

## 2023-03-15 DIAGNOSIS — N18.31 STAGE 3A CHRONIC KIDNEY DISEASE: ICD-10-CM

## 2023-03-15 DIAGNOSIS — N25.81 HYPERPARATHYROIDISM DUE TO RENAL INSUFFICIENCY: ICD-10-CM

## 2023-03-15 PROCEDURE — 3008F PR BODY MASS INDEX (BMI) DOCUMENTED: ICD-10-PCS | Mod: CPTII,S$GLB,, | Performed by: INTERNAL MEDICINE

## 2023-03-15 PROCEDURE — 3074F SYST BP LT 130 MM HG: CPT | Mod: CPTII,S$GLB,, | Performed by: INTERNAL MEDICINE

## 2023-03-15 PROCEDURE — 3008F BODY MASS INDEX DOCD: CPT | Mod: CPTII,S$GLB,, | Performed by: INTERNAL MEDICINE

## 2023-03-15 PROCEDURE — 1159F MED LIST DOCD IN RCRD: CPT | Mod: CPTII,S$GLB,, | Performed by: INTERNAL MEDICINE

## 2023-03-15 PROCEDURE — 3079F PR MOST RECENT DIASTOLIC BLOOD PRESSURE 80-89 MM HG: ICD-10-PCS | Mod: CPTII,S$GLB,, | Performed by: INTERNAL MEDICINE

## 2023-03-15 PROCEDURE — 1160F PR REVIEW ALL MEDS BY PRESCRIBER/CLIN PHARMACIST DOCUMENTED: ICD-10-PCS | Mod: CPTII,S$GLB,, | Performed by: INTERNAL MEDICINE

## 2023-03-15 PROCEDURE — 3074F PR MOST RECENT SYSTOLIC BLOOD PRESSURE < 130 MM HG: ICD-10-PCS | Mod: CPTII,S$GLB,, | Performed by: INTERNAL MEDICINE

## 2023-03-15 PROCEDURE — 99999 PR PBB SHADOW E&M-EST. PATIENT-LVL V: CPT | Mod: PBBFAC,,, | Performed by: INTERNAL MEDICINE

## 2023-03-15 PROCEDURE — 1159F PR MEDICATION LIST DOCUMENTED IN MEDICAL RECORD: ICD-10-PCS | Mod: CPTII,S$GLB,, | Performed by: INTERNAL MEDICINE

## 2023-03-15 PROCEDURE — 99999 PR PBB SHADOW E&M-EST. PATIENT-LVL V: ICD-10-PCS | Mod: PBBFAC,,, | Performed by: INTERNAL MEDICINE

## 2023-03-15 PROCEDURE — 3079F DIAST BP 80-89 MM HG: CPT | Mod: CPTII,S$GLB,, | Performed by: INTERNAL MEDICINE

## 2023-03-15 PROCEDURE — 1160F RVW MEDS BY RX/DR IN RCRD: CPT | Mod: CPTII,S$GLB,, | Performed by: INTERNAL MEDICINE

## 2023-03-15 PROCEDURE — 99214 OFFICE O/P EST MOD 30 MIN: CPT | Mod: S$GLB,,, | Performed by: INTERNAL MEDICINE

## 2023-03-15 PROCEDURE — 99214 PR OFFICE/OUTPT VISIT, EST, LEVL IV, 30-39 MIN: ICD-10-PCS | Mod: S$GLB,,, | Performed by: INTERNAL MEDICINE

## 2023-03-15 RX ORDER — MIRTAZAPINE 7.5 MG/1
TABLET, FILM COATED ORAL
Qty: 30 TABLET | Refills: 2 | Status: SHIPPED | OUTPATIENT
Start: 2023-03-15 | End: 2023-04-12

## 2023-03-15 NOTE — PATIENT INSTRUCTIONS
Noom weight loss program.    Try Magnesium, over the counter, - 1 tab daily .    Mirtazapine for sleep, anxiety, depression.     1-2 at bedtime.

## 2023-03-15 NOTE — PROGRESS NOTES
Subjective:       Patient ID: Toi Fernandez is a 52 y.o. female.    Chief Complaint: Hypertension    HPI  BP varies.  This am - 158/107 this am , and most mornings.    Taking Demadex, carvedilol, irbesartan, amlodipine    S/p bariatric surgery.  She has lost 50 lbs, but has plateaued.  BMI 28.    Caters, and does a lot of tasting    Continue low energy.  Works 7 days a week.     Sleeps 4-5 h daily.  Tired during day.      Transaminases elevated, but have normalized.       B12, vit D, iron, h/h normal.    When BP high, she worries and chest gets tight.      She worries a lot.      Chronically depressed, but better. She works not to think about it.       Chronically constipated.  Takes ducolax and has BM once a week.    Review of Systems   Constitutional:  Positive for activity change and unexpected weight change.   HENT:  Negative for hearing loss, rhinorrhea and trouble swallowing.    Eyes:  Negative for discharge and visual disturbance.   Respiratory:  Positive for chest tightness. Negative for wheezing.    Cardiovascular:  Positive for chest pain. Negative for palpitations.   Gastrointestinal:  Positive for constipation. Negative for blood in stool, diarrhea and vomiting.   Endocrine: Positive for polyuria. Negative for polydipsia.   Genitourinary:  Negative for difficulty urinating, dysuria, hematuria and menstrual problem.   Musculoskeletal:  Positive for neck pain. Negative for joint swelling.   Neurological:  Positive for headaches. Negative for weakness.   Psychiatric/Behavioral:  Positive for dysphoric mood. Negative for confusion.        Objective:      Physical Exam  Constitutional:       General: She is not in acute distress.     Appearance: She is well-developed. She is not ill-appearing, toxic-appearing or diaphoretic.   Eyes:      General: No scleral icterus.  Neck:      Thyroid: No thyromegaly.      Vascular: No JVD.   Cardiovascular:      Rate and Rhythm: Normal rate and regular rhythm.      Heart  sounds: Normal heart sounds.   Pulmonary:      Effort: Pulmonary effort is normal. No respiratory distress.      Breath sounds: Normal breath sounds. No stridor. No wheezing, rhonchi or rales.   Abdominal:      General: There is no distension.      Palpations: Abdomen is soft. There is no mass.      Tenderness: There is no abdominal tenderness. There is no guarding or rebound.      Hernia: No hernia is present.   Musculoskeletal:      Right lower leg: No edema.      Left lower leg: No edema.   Neurological:      Mental Status: She is alert and oriented to person, place, and time.      Cranial Nerves: No cranial nerve deficit.   Psychiatric:         Mood and Affect: Mood normal.         Behavior: Behavior normal.         Thought Content: Thought content normal.         Judgment: Judgment normal.       128/82  Last labs reviewed.  Assessment:       Problem List Items Addressed This Visit       Stage 3a chronic kidney disease    Insomnia    Hyperparathyroidism due to renal insufficiency     Asymptomatic.  Normal calcium         HTN (hypertension) - Primary     Other Visit Diagnoses       Colon cancer screening        Relevant Orders    Ambulatory referral/consult to Endo Procedure     Constipation, chronic        Relevant Medications    linaCLOtide (LINZESS) 145 mcg Cap capsule    Malaise              BP normal here, home readings elevated.  Plan:       Toi was seen today for hypertension.    Diagnoses and all orders for this visit:    Primary hypertension    Colon cancer screening  -     Ambulatory referral/consult to Endo Procedure ; Future    Constipation, chronic  -     linaCLOtide (LINZESS) 145 mcg Cap capsule; 1 tab po q d prn constipation    Insomnia, unspecified type    Hyperparathyroidism due to renal insufficiency    Stage 3a chronic kidney disease    Malaise    Other orders  -     mirtazapine (REMERON) 7.5 MG Tab; 1-2 tabs po q hs for sleep and mood.           Try linzess, magnesium.     Stop dulcolax.  Bring in BP machine next visit.  Start remeron for mood, sleep  Walk 30 min most days!

## 2023-04-05 ENCOUNTER — PATIENT MESSAGE (OUTPATIENT)
Dept: BARIATRICS | Facility: CLINIC | Age: 53
End: 2023-04-05
Payer: COMMERCIAL

## 2023-04-11 ENCOUNTER — PATIENT MESSAGE (OUTPATIENT)
Dept: BARIATRICS | Facility: CLINIC | Age: 53
End: 2023-04-11
Payer: COMMERCIAL

## 2023-04-12 ENCOUNTER — OFFICE VISIT (OUTPATIENT)
Dept: INTERNAL MEDICINE | Facility: CLINIC | Age: 53
End: 2023-04-12
Payer: COMMERCIAL

## 2023-04-12 ENCOUNTER — TELEPHONE (OUTPATIENT)
Dept: BARIATRICS | Facility: CLINIC | Age: 53
End: 2023-04-12
Payer: COMMERCIAL

## 2023-04-12 VITALS
WEIGHT: 186.5 LBS | DIASTOLIC BLOOD PRESSURE: 80 MMHG | OXYGEN SATURATION: 99 % | BODY MASS INDEX: 27.62 KG/M2 | SYSTOLIC BLOOD PRESSURE: 128 MMHG | HEIGHT: 69 IN | HEART RATE: 72 BPM

## 2023-04-12 DIAGNOSIS — Z80.0 FAMILY HISTORY OF COLON CANCER: ICD-10-CM

## 2023-04-12 DIAGNOSIS — N18.2 CHRONIC KIDNEY DISEASE, STAGE II (MILD): ICD-10-CM

## 2023-04-12 DIAGNOSIS — I10 HYPERTENSION, UNSPECIFIED TYPE: ICD-10-CM

## 2023-04-12 DIAGNOSIS — G47.00 INSOMNIA, UNSPECIFIED TYPE: Primary | ICD-10-CM

## 2023-04-12 PROBLEM — E66.9 CLASS 1 OBESITY WITH SERIOUS COMORBIDITY AND BODY MASS INDEX (BMI) OF 32.0 TO 32.9 IN ADULT: Status: RESOLVED | Noted: 2019-10-21 | Resolved: 2023-04-12

## 2023-04-12 PROBLEM — E66.811 CLASS 1 OBESITY WITH SERIOUS COMORBIDITY AND BODY MASS INDEX (BMI) OF 32.0 TO 32.9 IN ADULT: Status: RESOLVED | Noted: 2019-10-21 | Resolved: 2023-04-12

## 2023-04-12 PROCEDURE — 3074F PR MOST RECENT SYSTOLIC BLOOD PRESSURE < 130 MM HG: ICD-10-PCS | Mod: CPTII,S$GLB,, | Performed by: PHYSICIAN ASSISTANT

## 2023-04-12 PROCEDURE — 99214 OFFICE O/P EST MOD 30 MIN: CPT | Mod: S$GLB,,, | Performed by: PHYSICIAN ASSISTANT

## 2023-04-12 PROCEDURE — 3079F DIAST BP 80-89 MM HG: CPT | Mod: CPTII,S$GLB,, | Performed by: PHYSICIAN ASSISTANT

## 2023-04-12 PROCEDURE — 99214 PR OFFICE/OUTPT VISIT, EST, LEVL IV, 30-39 MIN: ICD-10-PCS | Mod: S$GLB,,, | Performed by: PHYSICIAN ASSISTANT

## 2023-04-12 PROCEDURE — 1159F PR MEDICATION LIST DOCUMENTED IN MEDICAL RECORD: ICD-10-PCS | Mod: CPTII,S$GLB,, | Performed by: PHYSICIAN ASSISTANT

## 2023-04-12 PROCEDURE — 99999 PR PBB SHADOW E&M-EST. PATIENT-LVL V: CPT | Mod: PBBFAC,,, | Performed by: PHYSICIAN ASSISTANT

## 2023-04-12 PROCEDURE — 3008F PR BODY MASS INDEX (BMI) DOCUMENTED: ICD-10-PCS | Mod: CPTII,S$GLB,, | Performed by: PHYSICIAN ASSISTANT

## 2023-04-12 PROCEDURE — 1160F RVW MEDS BY RX/DR IN RCRD: CPT | Mod: CPTII,S$GLB,, | Performed by: PHYSICIAN ASSISTANT

## 2023-04-12 PROCEDURE — 3074F SYST BP LT 130 MM HG: CPT | Mod: CPTII,S$GLB,, | Performed by: PHYSICIAN ASSISTANT

## 2023-04-12 PROCEDURE — 1159F MED LIST DOCD IN RCRD: CPT | Mod: CPTII,S$GLB,, | Performed by: PHYSICIAN ASSISTANT

## 2023-04-12 PROCEDURE — 3008F BODY MASS INDEX DOCD: CPT | Mod: CPTII,S$GLB,, | Performed by: PHYSICIAN ASSISTANT

## 2023-04-12 PROCEDURE — 1160F PR REVIEW ALL MEDS BY PRESCRIBER/CLIN PHARMACIST DOCUMENTED: ICD-10-PCS | Mod: CPTII,S$GLB,, | Performed by: PHYSICIAN ASSISTANT

## 2023-04-12 PROCEDURE — 3079F PR MOST RECENT DIASTOLIC BLOOD PRESSURE 80-89 MM HG: ICD-10-PCS | Mod: CPTII,S$GLB,, | Performed by: PHYSICIAN ASSISTANT

## 2023-04-12 PROCEDURE — 99999 PR PBB SHADOW E&M-EST. PATIENT-LVL V: ICD-10-PCS | Mod: PBBFAC,,, | Performed by: PHYSICIAN ASSISTANT

## 2023-04-12 RX ORDER — TRAZODONE HYDROCHLORIDE 50 MG/1
25-50 TABLET ORAL NIGHTLY PRN
Qty: 30 TABLET | Refills: 2 | Status: SHIPPED | OUTPATIENT
Start: 2023-04-12 | End: 2024-04-11

## 2023-04-12 NOTE — TELEPHONE ENCOUNTER
----- Message from Trina Swift sent at 4/12/2023  9:15 AM CDT -----  Regarding: Appointment  Contact: [phone]  Calling to get an appointment as soon as possible per referral from pcp on yesterday. Please call and schedule.

## 2023-04-12 NOTE — PATIENT INSTRUCTIONS
Noom - weight loss wendy to download    Do not take tramadol and trazodone together    Bariatric medicine - Dr. Blanca or Dr. Andrews    3 meals a day made up of the following:  Unlimited green vegetables, tomatoes, mushrooms, spaghetti squash, cauliflower, meat, poultry, seafood, eggs and hard cheeses.   Milk and plain yogurt  Dressings, seasonings, condiments, etc should have less than 2 g sugars.   Beans (1-1.5 cups) or nuts (1/4 cup) can have 1 x a day.   1-2 servings of citrus fruits, berries, pineapple or melon a day (1/2 cup)  Avoid fried foods     No grains, rice, pasta, potatoes, bread, corn, peas, oatmeal, grits, tortillas, crackers, chips     No soda, sweet tea, juices or lemonade     [email or web address] for recipes. Moderate carb intake

## 2023-04-12 NOTE — PROGRESS NOTES
"Subjective:       Patient ID: Toi Fernandez is a 52 y.o. female.        Chief Complaint: Follow-up    Toi Fernandez is an established patient of Estela Crawford MD here today for follow up visit.    Busy working as a caterer.  Around food all the time.  Difficulty abstaining from eating.    Insomnia -   Remeron 7.5 mg --> helped with sleep but caused inc appetite which was intolerable for her as she gained several pounds  Melatonin occ seems to be helping    BMI 27 -  Lost 50# with gastric sleeve 2020 but weight loss plateau  Reports appetite is just really big  Wants "appetite medicine"    Chronic constipation much improved on linzess, only taking 2-3 times/week and having daily bowel movement now, in process of scheduling colonoscopy    HTN - now taking meds daily and BP much improved  Amlodipine 5 mg  Carvedilol 25 mg BID  Demadex 10 mg  Irbesartan 300 mg    Occ takes flexeril for back pain         Review of Systems   Constitutional:  Negative for chills, diaphoresis, fatigue and fever.   HENT:  Negative for congestion and sore throat.    Eyes:  Negative for visual disturbance.   Respiratory:  Negative for cough, chest tightness and shortness of breath.    Cardiovascular:  Negative for chest pain, palpitations and leg swelling.   Gastrointestinal:  Negative for abdominal pain, blood in stool, constipation, diarrhea, nausea and vomiting.   Genitourinary:  Negative for dysuria, frequency, hematuria and urgency.   Musculoskeletal:  Negative for arthralgias and back pain.   Skin:  Negative for rash.   Neurological:  Negative for dizziness, syncope, weakness and headaches.   Psychiatric/Behavioral:  Negative for dysphoric mood and sleep disturbance. The patient is not nervous/anxious.      Objective:      Physical Exam  Vitals and nursing note reviewed.   Constitutional:       Appearance: Normal appearance. She is well-developed.   HENT:      Head: Normocephalic.      Right Ear: External ear normal.      Left " "Ear: External ear normal.   Eyes:      Pupils: Pupils are equal, round, and reactive to light.   Cardiovascular:      Rate and Rhythm: Normal rate and regular rhythm.      Heart sounds: Normal heart sounds. No murmur heard.    No friction rub. No gallop.   Pulmonary:      Effort: Pulmonary effort is normal. No respiratory distress.      Breath sounds: Normal breath sounds.   Abdominal:      Palpations: Abdomen is soft.      Tenderness: There is no abdominal tenderness.   Skin:     General: Skin is warm and dry.   Neurological:      Mental Status: She is alert.       Assessment:       1. Insomnia, unspecified type    2. BMI 27.0-27.9,adult    3. Hypertension, unspecified type    4. Chronic kidney disease, stage II (mild)    5. Family history of colon cancer        Plan:       Toi was seen today for follow-up.    Diagnoses and all orders for this visit:    Insomnia, unspecified type  -     START: traZODone (DESYREL) 50 MG tablet; Take 0.5-1 tablets (25-50 mg total) by mouth nightly as needed for Insomnia - do not take if using tramadol, rare use of tramadol.    BMI 27.0-27.9,adult  -     Ambulatory referral/consult to Bariatric Medicine; Future    Hypertension, unspecified type - stable and controlled, continue current regimen, back on meds regularly now  BP Readings from Last 5 Encounters:   04/12/23 128/80   03/15/23 128/82   12/29/22 (!) 151/86   12/28/22 (!) 168/106   08/12/22 (!) 157/101      Chronic kidney disease, stage II (mild)    Family history of colon cancer - working on colonoscopy scheduling    Pt has been given instructions populated from patient instructions database and has verbalized understanding of the after visit summary and information contained wherein.    Follow up with a primary care provider. May go to ER for acute shortness of breath, lightheadedness, fever, or any other emergent complaints or changes in condition.    "This note will be shared with the patient"    Future Appointments "   Date Time Provider Department Center   9/12/2023  9:30 AM Estela Crawford MD [facility]

## 2023-04-21 ENCOUNTER — PATIENT MESSAGE (OUTPATIENT)
Dept: ADMINISTRATIVE | Facility: HOSPITAL | Age: 53
End: 2023-04-21
Payer: COMMERCIAL

## 2023-05-03 ENCOUNTER — PATIENT MESSAGE (OUTPATIENT)
Dept: BARIATRICS | Facility: CLINIC | Age: 53
End: 2023-05-03
Payer: COMMERCIAL

## 2023-05-09 ENCOUNTER — PATIENT MESSAGE (OUTPATIENT)
Dept: BARIATRICS | Facility: CLINIC | Age: 53
End: 2023-05-09
Payer: COMMERCIAL

## 2023-05-23 ENCOUNTER — PATIENT MESSAGE (OUTPATIENT)
Dept: INTERNAL MEDICINE | Facility: CLINIC | Age: 53
End: 2023-05-23
Payer: COMMERCIAL

## 2023-05-23 NOTE — LETTER
May 24, 2023    Toi Fernandez  [address] [place] [address]             [facility]  [address]  [facility] [address]  Phone: [phone]  Fax: [phone] To whom it may concern:    Hemalatha Fernandez has chronic kidney disease.  Last lab in December:  Creatinine  1.2, Bun 16, eGFR 54.     Kidney function has improved over measurements two years ago.  Her hypertension is well controlled.      If you have any questions or concerns, please don't hesitate to call.    Sincerely,         Estela Crawford MD

## 2023-05-25 ENCOUNTER — PATIENT MESSAGE (OUTPATIENT)
Dept: INTERNAL MEDICINE | Facility: CLINIC | Age: 53
End: 2023-05-25
Payer: COMMERCIAL

## 2023-05-31 ENCOUNTER — OFFICE VISIT (OUTPATIENT)
Dept: FAMILY MEDICINE | Facility: CLINIC | Age: 53
End: 2023-05-31
Payer: COMMERCIAL

## 2023-05-31 DIAGNOSIS — R63.1 POLYDIPSIA: ICD-10-CM

## 2023-05-31 DIAGNOSIS — Z13.1 SCREENING FOR DIABETES MELLITUS: Primary | ICD-10-CM

## 2023-05-31 DIAGNOSIS — M62.838 MUSCLE SPASM: ICD-10-CM

## 2023-05-31 PROCEDURE — 99214 PR OFFICE/OUTPT VISIT, EST, LEVL IV, 30-39 MIN: ICD-10-PCS | Mod: 95,,, | Performed by: FAMILY MEDICINE

## 2023-05-31 PROCEDURE — 4010F ACE/ARB THERAPY RXD/TAKEN: CPT | Mod: CPTII,95,, | Performed by: FAMILY MEDICINE

## 2023-05-31 PROCEDURE — 99214 OFFICE O/P EST MOD 30 MIN: CPT | Mod: 95,,, | Performed by: FAMILY MEDICINE

## 2023-05-31 PROCEDURE — 4010F PR ACE/ARB THEARPY RXD/TAKEN: ICD-10-PCS | Mod: CPTII,95,, | Performed by: FAMILY MEDICINE

## 2023-05-31 NOTE — PROGRESS NOTES
The patient location is: [place]  The chief complaint leading to consultation is: polydipsia, screening for diabetes    Visit type: audiovisual    Notes:    Subjective:    HPI: 52 y.o. female, pt of Dr Estela Crawford, with pertinent PMHx of HTN, CKD, vitamin-D deficiency presented for audiovisual visit with concerns about diabetes.  Patient reports that she has been having polydipsia, feeling thirsty all the time, having muscle cramps and feeling generally weak,having headache and gaining weight, she is concerned about possibility of diabetes.  Patient wanted to make appointment with the endocrinologist, however, did make appointment here by mistake.  She denies having any other symptoms or concern.    Additional HPI -     Review of Systems:   Negative unless otherwise noted positive-  Gen- Weakness/ Fatigue  Neuro- Confusion  CV- Chest Pain/ Palpitations  Resp: Cough/ SOB  GI- Nausea/Vomiting  Extrem- Pain/Swelling      Objective:  No flowsheet data found.      Physical Exam:  General- Patient alert and oriented x3 in NAD  HEENT- PERRLA, EOMI, OP clear  Resp- No increased WOB noted. Not using accessory muscles.  GI- Non tender/non-distended  Extrem- No cyanosis, clubbing, edema.   Skin-  No Jaundice. No visible skin lesions.      Plan:      1. Polydipsia    - Hemoglobin A1C; Future    2. Muscle spasm    - BASIC METABOLIC PANEL; Future      3. Screening for diabetes mellitus    Hba1c added    Advised to follow up with PCP to discuss test results      Face to Face time with patient: 15 mins  30 minutes of total time spent on the encounter, which includes face to face time and non-face to face time preparing to see the patient (eg, review of tests), Obtaining and/or reviewing separately obtained history, Documenting clinical information in the electronic or other health record, Independently interpreting results (not separately reported) and communicating results to the patient/family/caregiver, or Care coordination  (not separately reported).     Each patient to whom he or she provides medical services by telemedicine is:  (1) informed of the relationship between the physician and patient and the respective role of any other health care provider with respect to management of the patient; and (2) notified that he or she may decline to receive medical services by telemedicine and may withdraw from such care at any time.       Answers submitted by the patient for this visit:  Diabetes Questionnaire (Submitted on 5/30/2023)  Chief Complaint: Diabetes problem  MedicAlert ID: No  blurred vision: Yes  chest pain: No  foot ulcerations: No  polydipsia: Yes  polyphagia: Yes  polyuria: Yes  visual change: Yes  weakness: No  weight loss: No  confusion: No  dizziness: Yes  headaches: Yes  hunger: Yes  mood changes: Yes  nervous/anxious: Yes  pallor: No  seizures: No  sleepiness: Yes  speech difficulty: No  sweats: Yes  tremors: No  blackouts: No  hospitalization: No  nocturnal hypoglycemia: No  required assistance: No  required glucagon: No  CVA: No  heart disease: No  nephropathy: No  peripheral neuropathy: No  PVD: No  retinopathy: No  autonomic neuropathy: No  CAD risks: family history, hypertension, obesity, post-menopausal, sedentary lifestyle, stress  Current treatments: none  Treatment compliance: none of the time  Weight trend: increasing rapidly  Current diet: low salt  Exercise: intermittently  Dietitian visit: Yes  Eye exam current: Yes  Sees podiatrist: No

## 2023-06-01 ENCOUNTER — LAB VISIT (OUTPATIENT)
Dept: LAB | Facility: HOSPITAL | Age: 53
End: 2023-06-01
Attending: FAMILY MEDICINE
Payer: COMMERCIAL

## 2023-06-01 ENCOUNTER — PATIENT MESSAGE (OUTPATIENT)
Dept: ORTHOPEDICS | Facility: CLINIC | Age: 53
End: 2023-06-01
Payer: COMMERCIAL

## 2023-06-01 DIAGNOSIS — M62.838 MUSCLE SPASM: ICD-10-CM

## 2023-06-01 DIAGNOSIS — R63.1 POLYDIPSIA: ICD-10-CM

## 2023-06-01 LAB
ANION GAP SERPL CALC-SCNC: 8 MMOL/L (ref 8–16)
CALCIUM SERPL-MCNC: 9.5 MG/DL (ref 8.7–10.5)
CHLORIDE SERPL-SCNC: 107 MMOL/L (ref 95–110)
CO2 SERPL-SCNC: 29 MMOL/L (ref 23–29)
CREAT SERPL-MCNC: 1.47 MG/DL (ref 0.5–1.4)
EST. GFR  (NO RACE VARIABLE): 43 ML/MIN/1.73 M^2
ESTIMATED AVG GLUCOSE: 88 MG/DL (ref 68–131)
GLUCOSE SERPL-MCNC: 93 MG/DL (ref 70–110)
HBA1C MFR BLD: 4.7 % (ref 4–5.6)
POTASSIUM SERPL-SCNC: 4 MMOL/L (ref 3.5–5.1)
SODIUM SERPL-SCNC: 144 MMOL/L (ref 136–145)
UUN UR-MCNC: 20 MG/DL (ref 7–17)

## 2023-06-01 PROCEDURE — 83036 HEMOGLOBIN GLYCOSYLATED A1C: CPT | Performed by: FAMILY MEDICINE

## 2023-06-01 PROCEDURE — 80048 BASIC METABOLIC PNL TOTAL CA: CPT | Mod: PO | Performed by: FAMILY MEDICINE

## 2023-06-01 PROCEDURE — 36415 COLL VENOUS BLD VENIPUNCTURE: CPT | Mod: PO | Performed by: FAMILY MEDICINE

## 2023-06-02 RX ORDER — TOPIRAMATE 25 MG/1
TABLET ORAL
Qty: 90 TABLET | Refills: 2 | Status: SHIPPED | OUTPATIENT
Start: 2023-06-02 | End: 2023-09-27 | Stop reason: SDUPTHER

## 2023-06-06 ENCOUNTER — OFFICE VISIT (OUTPATIENT)
Dept: ORTHOPEDICS | Facility: CLINIC | Age: 53
End: 2023-06-06
Payer: COMMERCIAL

## 2023-06-06 ENCOUNTER — HOSPITAL ENCOUNTER (OUTPATIENT)
Dept: RADIOLOGY | Facility: HOSPITAL | Age: 53
Discharge: HOME | End: 2023-06-06
Attending: PHYSICIAN ASSISTANT
Payer: COMMERCIAL

## 2023-06-06 VITALS — WEIGHT: 188 LBS | BODY MASS INDEX: 27.85 KG/M2 | HEIGHT: 69 IN

## 2023-06-06 DIAGNOSIS — M79.641 PAIN IN BOTH HANDS: Primary | ICD-10-CM

## 2023-06-06 DIAGNOSIS — M18.12 PRIMARY OSTEOARTHRITIS OF FIRST CARPOMETACARPAL JOINT OF LEFT HAND: Primary | ICD-10-CM

## 2023-06-06 DIAGNOSIS — M79.642 PAIN IN BOTH HANDS: ICD-10-CM

## 2023-06-06 DIAGNOSIS — M79.642 PAIN IN BOTH HANDS: Primary | ICD-10-CM

## 2023-06-06 DIAGNOSIS — M79.641 PAIN IN BOTH HANDS: ICD-10-CM

## 2023-06-06 PROCEDURE — 1160F PR REVIEW ALL MEDS BY PRESCRIBER/CLIN PHARMACIST DOCUMENTED: ICD-10-PCS | Mod: CPTII,S$GLB,, | Performed by: PHYSICIAN ASSISTANT

## 2023-06-06 PROCEDURE — 73130 X-RAY EXAM OF HAND: CPT | Mod: 26,,, | Performed by: RADIOLOGY

## 2023-06-06 PROCEDURE — 3008F PR BODY MASS INDEX (BMI) DOCUMENTED: ICD-10-PCS | Mod: CPTII,S$GLB,, | Performed by: PHYSICIAN ASSISTANT

## 2023-06-06 PROCEDURE — 1159F MED LIST DOCD IN RCRD: CPT | Mod: CPTII,S$GLB,, | Performed by: PHYSICIAN ASSISTANT

## 2023-06-06 PROCEDURE — 99213 PR OFFICE/OUTPT VISIT, EST, LEVL III, 20-29 MIN: ICD-10-PCS | Mod: S$GLB,,, | Performed by: PHYSICIAN ASSISTANT

## 2023-06-06 PROCEDURE — 4010F ACE/ARB THERAPY RXD/TAKEN: CPT | Mod: CPTII,S$GLB,, | Performed by: PHYSICIAN ASSISTANT

## 2023-06-06 PROCEDURE — 3008F BODY MASS INDEX DOCD: CPT | Mod: CPTII,S$GLB,, | Performed by: PHYSICIAN ASSISTANT

## 2023-06-06 PROCEDURE — 3044F HG A1C LEVEL LT 7.0%: CPT | Mod: CPTII,S$GLB,, | Performed by: PHYSICIAN ASSISTANT

## 2023-06-06 PROCEDURE — 99999 PR PBB SHADOW E&M-EST. PATIENT-LVL IV: ICD-10-PCS | Mod: PBBFAC,,, | Performed by: PHYSICIAN ASSISTANT

## 2023-06-06 PROCEDURE — 3044F PR MOST RECENT HEMOGLOBIN A1C LEVEL <7.0%: ICD-10-PCS | Mod: CPTII,S$GLB,, | Performed by: PHYSICIAN ASSISTANT

## 2023-06-06 PROCEDURE — 99213 OFFICE O/P EST LOW 20 MIN: CPT | Mod: S$GLB,,, | Performed by: PHYSICIAN ASSISTANT

## 2023-06-06 PROCEDURE — 4010F PR ACE/ARB THEARPY RXD/TAKEN: ICD-10-PCS | Mod: CPTII,S$GLB,, | Performed by: PHYSICIAN ASSISTANT

## 2023-06-06 PROCEDURE — 73130 XR HAND COMPLETE 3 VIEWS BILATERAL: ICD-10-PCS | Mod: 26,,, | Performed by: RADIOLOGY

## 2023-06-06 PROCEDURE — 1159F PR MEDICATION LIST DOCUMENTED IN MEDICAL RECORD: ICD-10-PCS | Mod: CPTII,S$GLB,, | Performed by: PHYSICIAN ASSISTANT

## 2023-06-06 PROCEDURE — 1160F RVW MEDS BY RX/DR IN RCRD: CPT | Mod: CPTII,S$GLB,, | Performed by: PHYSICIAN ASSISTANT

## 2023-06-06 PROCEDURE — 73130 X-RAY EXAM OF HAND: CPT | Mod: TC,50,PN

## 2023-06-06 PROCEDURE — 99999 PR PBB SHADOW E&M-EST. PATIENT-LVL IV: CPT | Mod: PBBFAC,,, | Performed by: PHYSICIAN ASSISTANT

## 2023-06-06 NOTE — PROGRESS NOTES
Subjective:      Patient ID: Toi Fernandez is a 52 y.o. female.    Chief Complaint: No chief complaint on file.      HPI: Toi Fernandez is here in follow-up of chronic left thumb pain. CT + plain film imaging of the thumb shows advanced DJD of the thumb CMC with partial radial subluxation of the thumb metacarpal relative to the trapezium.    The patient returns today with continued complaints of severe sharp pain to the thumb CMC joint.  Both 3D thumb bracing and corticosteroid injections have not provided any sort of pain relief over the past few years.  She is having a great deal of  and pinch weakness.    The patient would like to proceed with surgical arthroplasty of left thumb CMC, but needs to check her summer schedule prior to proceeding.    Past Medical History:   Diagnosis Date    Chronic kidney disease, stage II (mild) 09/21/2012    Depression     H/O nephrotic syndrome, 2001. 04/21/2015    Hypertension     Obesity     Secondary hyperparathyroidism     Vitamin D deficiency disease        Current Outpatient Medications:     albuterol (PROVENTIL/VENTOLIN HFA) 90 mcg/actuation inhaler, Inhale 2 puffs into the lungs every 4 (four) hours as needed for Wheezing. Rescue (Patient not taking: Reported on 3/15/2023), Disp: 54 g, Rfl: 2    amLODIPine (NORVASC) 5 MG tablet, TAKE 1 TABLET(5 MG) BY MOUTH EVERY DAY, Disp: 90 tablet, Rfl: 3    carvediloL (COREG) 25 MG tablet, TAKE 1 TABLET(25 MG) BY MOUTH TWICE DAILY WITH MEALS, Disp: 180 tablet, Rfl: 3    cetirizine (ZYRTEC) 10 MG tablet, TAKE 1 TO 2 TABLETS BY MOUTH EVERY DAY AS NEEDED FOR ITCHING, Disp: 180 tablet, Rfl: 1    cyclobenzaprine (FLEXERIL) 10 MG tablet, take 1 tablet by mouth everyday  as needed for  back pain, Disp: 30 tablet, Rfl: 1    ergocalciferol (ERGOCALCIFEROL) 50,000 unit Cap, Take 1 capsule by mouth monthly, Disp: 3 capsule, Rfl: 3    estradioL (ESTRACE) 1 MG tablet, Take 1 tablet (1 mg total) by mouth once daily., Disp: 30 tablet, Rfl:  5    hydrocortisone 2.5 % cream, APPLY EXTERNALLY TO THE AFFECTED AREA TWICE DAILY WHEN ECZEMA IS MODERATE, Disp: 453.6 g, Rfl: 1    irbesartan (AVAPRO) 300 MG tablet, TAKE 1 TABLET(300 MG) BY MOUTH EVERY MORNING, Disp: 90 tablet, Rfl: 2    linaCLOtide (LINZESS) 145 mcg Cap capsule, 1 tab po q d prn constipation, Disp: 30 capsule, Rfl: 5    topiramate (TOPAMAX) 25 MG tablet, TAKE 1 TO 2 TABLETS BY MOUTH TWICE DAILY, Disp: 90 tablet, Rfl: 2    torsemide (DEMADEX) 10 MG Tab, Take 1 tablet (10 mg total) by mouth once daily., Disp: 90 tablet, Rfl: 3    traMADoL (ULTRAM) 50 mg tablet, take 1 tablet by mouth every  8-12 hours as needed for  headache, Disp: 21 each, Rfl: 0    traZODone (DESYREL) 50 MG tablet, Take 0.5-1 tablets (25-50 mg total) by mouth nightly as needed for Insomnia., Disp: 30 tablet, Rfl: 2  Review of patient's allergies indicates:   Allergen Reactions    No known allergies        LMP  (LMP Unknown) Comment: 2012        Objective:    Ortho Exam       Left UE: Skin intact. Swelling moderate. TTP base of the thumb, CMC. ROM full. Sensation intact. Tinels negatuve. Pulses present. Cap refill brisk. Grind negative today. (+) 3/5 strength to thumb opposition/pinch      GEN: Well developed, well nourished female. AAOX3. No acute distress.   Normocephalic, atraumatic.   STARR  Breathing unlabored.  Mood and affect appropriate.     Imaging:   Three-view x-ray of bilateral hands obtained in the orthopedic clinic today, and independently reviewed, shows:  Loss of joint space with radial subluxation to left thumb CMC joint.  No acute fractures throughout.    Assessment:         1. Primary osteoarthritis of first carpometacarpal joint of left hand          Plan:        The patient has failed conservative therapy in the form of anti-inflammatories, CMC thumb bracing, corticosteroid injections, activity modifications, and rest.    She continues to have ongoing pain and weakness to her left hand.    OR:  The patient  would like to proceed with left thumb CMC arthroplasty once her schedule allows    Consent signed today    Pain control: Rest, ice, warm water soaks, bracing, Tylenol  * patient unable to tolerate NSAIDs secondary to nephrotic syndrome    Injection:  Patient declined Injection stating that they have not worked in the past    Follow-up:  The patient will be scheduled for follow-up in the clinic to meet Dr. Monroy (per request) and discuss surgical scheduling date

## 2023-06-07 ENCOUNTER — PATIENT MESSAGE (OUTPATIENT)
Dept: BARIATRICS | Facility: CLINIC | Age: 53
End: 2023-06-07
Payer: COMMERCIAL

## 2023-06-07 ENCOUNTER — OFFICE VISIT (OUTPATIENT)
Dept: ENDOCRINOLOGY | Facility: CLINIC | Age: 53
End: 2023-06-07
Payer: COMMERCIAL

## 2023-06-07 ENCOUNTER — OFFICE VISIT (OUTPATIENT)
Dept: NEPHROLOGY | Facility: CLINIC | Age: 53
End: 2023-06-07
Payer: COMMERCIAL

## 2023-06-07 VITALS
DIASTOLIC BLOOD PRESSURE: 88 MMHG | BODY MASS INDEX: 27.67 KG/M2 | HEART RATE: 68 BPM | OXYGEN SATURATION: 99 % | SYSTOLIC BLOOD PRESSURE: 139 MMHG | WEIGHT: 187.38 LBS

## 2023-06-07 VITALS
WEIGHT: 190.69 LBS | DIASTOLIC BLOOD PRESSURE: 78 MMHG | HEART RATE: 66 BPM | OXYGEN SATURATION: 99 % | SYSTOLIC BLOOD PRESSURE: 118 MMHG | RESPIRATION RATE: 16 BRPM | BODY MASS INDEX: 28.16 KG/M2

## 2023-06-07 DIAGNOSIS — N95.1 HOT FLUSHES, PERIMENOPAUSAL: Primary | ICD-10-CM

## 2023-06-07 DIAGNOSIS — N18.31 STAGE 3A CHRONIC KIDNEY DISEASE: Primary | ICD-10-CM

## 2023-06-07 DIAGNOSIS — R63.5 WEIGHT GAIN: ICD-10-CM

## 2023-06-07 PROCEDURE — 3008F BODY MASS INDEX DOCD: CPT | Mod: CPTII,S$GLB,, | Performed by: INTERNAL MEDICINE

## 2023-06-07 PROCEDURE — 3044F HG A1C LEVEL LT 7.0%: CPT | Mod: CPTII,S$GLB,, | Performed by: INTERNAL MEDICINE

## 2023-06-07 PROCEDURE — 1159F MED LIST DOCD IN RCRD: CPT | Mod: CPTII,S$GLB,, | Performed by: INTERNAL MEDICINE

## 2023-06-07 PROCEDURE — 4010F ACE/ARB THERAPY RXD/TAKEN: CPT | Mod: CPTII,S$GLB,, | Performed by: INTERNAL MEDICINE

## 2023-06-07 PROCEDURE — 3044F PR MOST RECENT HEMOGLOBIN A1C LEVEL <7.0%: ICD-10-PCS | Mod: CPTII,S$GLB,, | Performed by: INTERNAL MEDICINE

## 2023-06-07 PROCEDURE — 4010F PR ACE/ARB THEARPY RXD/TAKEN: ICD-10-PCS | Mod: CPTII,S$GLB,, | Performed by: INTERNAL MEDICINE

## 2023-06-07 PROCEDURE — 99204 PR OFFICE/OUTPT VISIT, NEW, LEVL IV, 45-59 MIN: ICD-10-PCS | Mod: S$GLB,,, | Performed by: INTERNAL MEDICINE

## 2023-06-07 PROCEDURE — 3074F PR MOST RECENT SYSTOLIC BLOOD PRESSURE < 130 MM HG: ICD-10-PCS | Mod: CPTII,S$GLB,, | Performed by: INTERNAL MEDICINE

## 2023-06-07 PROCEDURE — 3008F PR BODY MASS INDEX (BMI) DOCUMENTED: ICD-10-PCS | Mod: CPTII,S$GLB,, | Performed by: INTERNAL MEDICINE

## 2023-06-07 PROCEDURE — 3066F PR DOCUMENTATION OF TREATMENT FOR NEPHROPATHY: ICD-10-PCS | Mod: CPTII,S$GLB,, | Performed by: INTERNAL MEDICINE

## 2023-06-07 PROCEDURE — 3066F NEPHROPATHY DOC TX: CPT | Mod: CPTII,S$GLB,, | Performed by: INTERNAL MEDICINE

## 2023-06-07 PROCEDURE — 99204 OFFICE O/P NEW MOD 45 MIN: CPT | Mod: S$GLB,,, | Performed by: INTERNAL MEDICINE

## 2023-06-07 PROCEDURE — 3074F SYST BP LT 130 MM HG: CPT | Mod: CPTII,S$GLB,, | Performed by: INTERNAL MEDICINE

## 2023-06-07 PROCEDURE — 3079F DIAST BP 80-89 MM HG: CPT | Mod: CPTII,S$GLB,, | Performed by: INTERNAL MEDICINE

## 2023-06-07 PROCEDURE — 1159F PR MEDICATION LIST DOCUMENTED IN MEDICAL RECORD: ICD-10-PCS | Mod: CPTII,S$GLB,, | Performed by: INTERNAL MEDICINE

## 2023-06-07 PROCEDURE — 3078F PR MOST RECENT DIASTOLIC BLOOD PRESSURE < 80 MM HG: ICD-10-PCS | Mod: CPTII,S$GLB,, | Performed by: INTERNAL MEDICINE

## 2023-06-07 PROCEDURE — 3078F DIAST BP <80 MM HG: CPT | Mod: CPTII,S$GLB,, | Performed by: INTERNAL MEDICINE

## 2023-06-07 PROCEDURE — 3075F SYST BP GE 130 - 139MM HG: CPT | Mod: CPTII,S$GLB,, | Performed by: INTERNAL MEDICINE

## 2023-06-07 PROCEDURE — 3079F PR MOST RECENT DIASTOLIC BLOOD PRESSURE 80-89 MM HG: ICD-10-PCS | Mod: CPTII,S$GLB,, | Performed by: INTERNAL MEDICINE

## 2023-06-07 PROCEDURE — 99999 PR PBB SHADOW E&M-EST. PATIENT-LVL V: CPT | Mod: PBBFAC,,, | Performed by: INTERNAL MEDICINE

## 2023-06-07 PROCEDURE — 99999 PR PBB SHADOW E&M-EST. PATIENT-LVL IV: ICD-10-PCS | Mod: PBBFAC,,, | Performed by: INTERNAL MEDICINE

## 2023-06-07 PROCEDURE — 99999 PR PBB SHADOW E&M-EST. PATIENT-LVL V: ICD-10-PCS | Mod: PBBFAC,,, | Performed by: INTERNAL MEDICINE

## 2023-06-07 PROCEDURE — 3075F PR MOST RECENT SYSTOLIC BLOOD PRESS GE 130-139MM HG: ICD-10-PCS | Mod: CPTII,S$GLB,, | Performed by: INTERNAL MEDICINE

## 2023-06-07 PROCEDURE — 99999 PR PBB SHADOW E&M-EST. PATIENT-LVL IV: CPT | Mod: PBBFAC,,, | Performed by: INTERNAL MEDICINE

## 2023-06-07 RX ORDER — DEXAMETHASONE 1 MG/1
1 TABLET ORAL ONCE
Qty: 1 TABLET | Refills: 0 | Status: SHIPPED | OUTPATIENT
Start: 2023-06-07 | End: 2023-06-07

## 2023-06-07 NOTE — PROGRESS NOTES
ENDOCRINOLOGY INITIAL VISIT  06/07/2023    Reason for Visit:  referred by No ref. provider found for evaluation of hot flushes and weight gain    HPI:  Toi Fernandez is a 52 y.o. female with hx of CKD (h/o nephrotic syndrome in 2001 now with secondary hyperparathyroidism), hypertension, depression, vitD deficiency, previous bariatric surgery here for evaluation of weight gain and excessive thirst and urination.      For the past 3 years reports dry mouth and thirsty a lot.    Drinking 64 oz of water daily.  Also gatorade, lemonade, tea throughout the day (5 or 6 bottles)  Urinating 3-4 times overnight and 4-5 times during the day with yellow urine  She has had normal sodium levels.    Reports gaining 25 lbs in the past year.  Denies change to diet or physical activity.   Walks 30 min twice weekly.  She is concerned that this may be related to hormones.  She has had normal TSH and has no clinical symptoms concerning for Cushing's syndrome (see below)     She also reports struggling with hot flushes for the past few years.  LMP in 2012 2/2 hysterectomy for fibroids.  Not sure if ovaries removed but she denies having symptoms of low estrogen after her hysterectomy and was not on estrogen replacement so I suspect her ovaries were left intact..    Previously on estrace 1 mg daily prescribed by her PCP but reports that she stopped taking this as it did not help.  She does not see a gyn.    Lab Results   Component Value Date    TSH 3.250 03/03/2022    FREET4 0.75 11/15/2019         Lab Results   Component Value Date     06/01/2023     12/29/2022     12/01/2022     Lab Results   Component Value Date    HGBA1C 4.7 06/01/2023      Cushing's evaluation:   []  Easy bruising [x]  Weight gain  []  Worse glycemic control     []  New/worse HTN []  Acne  []  Hirsutism    []  Striae  []  Menstrual change []  VTE     []  Fractures  []  Plethora  []  Proximal muscle weakness     []  Denies all        Denies new  HA/vision changes.   Chronic unchanged migraines     Review of patient's allergies indicates:   Allergen Reactions    No known allergies          Current Outpatient Medications:     albuterol (PROVENTIL/VENTOLIN HFA) 90 mcg/actuation inhaler, Inhale 2 puffs into the lungs every 4 (four) hours as needed for Wheezing. Rescue, Disp: 54 g, Rfl: 2    amLODIPine (NORVASC) 5 MG tablet, TAKE 1 TABLET(5 MG) BY MOUTH EVERY DAY, Disp: 90 tablet, Rfl: 3    carvediloL (COREG) 25 MG tablet, TAKE 1 TABLET(25 MG) BY MOUTH TWICE DAILY WITH MEALS, Disp: 180 tablet, Rfl: 3    cetirizine (ZYRTEC) 10 MG tablet, TAKE 1 TO 2 TABLETS BY MOUTH EVERY DAY AS NEEDED FOR ITCHING, Disp: 180 tablet, Rfl: 1    cyclobenzaprine (FLEXERIL) 10 MG tablet, take 1 tablet by mouth everyday  as needed for  back pain, Disp: 30 tablet, Rfl: 1    ergocalciferol (ERGOCALCIFEROL) 50,000 unit Cap, Take 1 capsule by mouth monthly, Disp: 3 capsule, Rfl: 3    estradioL (ESTRACE) 1 MG tablet, Take 1 tablet (1 mg total) by mouth once daily., Disp: 30 tablet, Rfl: 5    hydrocortisone 2.5 % cream, APPLY EXTERNALLY TO THE AFFECTED AREA TWICE DAILY WHEN ECZEMA IS MODERATE, Disp: 453.6 g, Rfl: 1    irbesartan (AVAPRO) 300 MG tablet, TAKE 1 TABLET(300 MG) BY MOUTH EVERY MORNING, Disp: 90 tablet, Rfl: 2    linaCLOtide (LINZESS) 145 mcg Cap capsule, 1 tab po q d prn constipation, Disp: 30 capsule, Rfl: 5    topiramate (TOPAMAX) 25 MG tablet, TAKE 1 TO 2 TABLETS BY MOUTH TWICE DAILY, Disp: 90 tablet, Rfl: 2    torsemide (DEMADEX) 10 MG Tab, Take 1 tablet (10 mg total) by mouth once daily., Disp: 90 tablet, Rfl: 3    traMADoL (ULTRAM) 50 mg tablet, take 1 tablet by mouth every  8-12 hours as needed for  headache, Disp: 21 each, Rfl: 0    traZODone (DESYREL) 50 MG tablet, Take 0.5-1 tablets (25-50 mg total) by mouth nightly as needed for Insomnia., Disp: 30 tablet, Rfl: 2      ROS: see HPI       PHYSICAL EXAM  /78   Pulse 66   Resp 16   Wt 86.5 kg (190 lb 11.2 oz)    LMP  (LMP Unknown) Comment: 2012  SpO2 99%   BMI 28.16 kg/m²   Wt Readings from Last 3 Encounters:   06/07/23 86.5 kg (190 lb 11.2 oz)   06/07/23 85 kg (187 lb 6.3 oz)   06/06/23 85.3 kg (188 lb)   ]    Constitutional:  Pleasant,  in no acute distress.   HENT:   Head:    Normocephalic and atraumatic.   Eyes:    EOMI. No scleral icterus. VF in tact to confrontation   Skin:    Skin is warm, dry, no stria or bruising on extremities  No prominent supraclavicular or dorsal cervical fat pads      IMAGING STUDIES    ASSESSMENT/PLAN    Problem List Items Addressed This Visit          3     Weight gain     Normal TSH and clinically without symptoms concerning for hypercortisolemia.  As patient is concerned about hormone issue will complete workup with low-dose dexamethasone suppression test.  If normal advise follow-up with primary care or Bariatric Medicine Clinic.         Relevant Medications    dexAMETHasone (DECADRON) 1 MG Tab    Other Relevant Orders    Cortisol, 8AM    Dexamethasone       Unprioritized    Hot flushes, perimenopausal - Primary     Continues to struggle with symptoms.  Tried low-dose Estrace through her primary care provider but stopped as it was not helping.  Will refer to gyn for further evaluation and treatment.         Relevant Orders    Ambulatory referral/consult to Gynecology       RTC p.r.n. if labs abnormal    Edison Zhu MD

## 2023-06-07 NOTE — ASSESSMENT & PLAN NOTE
Normal TSH and clinically without symptoms concerning for hypercortisolemia.  As patient is concerned about hormone issue will complete workup with low-dose dexamethasone suppression test.  If normal advise follow-up with primary care or Bariatric Medicine Clinic.

## 2023-06-07 NOTE — PROGRESS NOTES
CHIEF COMPLAINT/HPI: Toi is a 52 y.o. female for evaluation of No chief complaint on file.      HPI:  I have seen Toi Perera today to assess her renal function. She was diagnosed to have Nephrotic syndrome in 2001.  A kidney biopsy was performed at that time. Will try to pull the result. She dose not recall that she received any specific treatment and does not recall the diagnosis. During her follow up recently with the primary physician she was asked to see a nephrologist for assessment of her renal function. Latest labs showed increase S. Creatinine to 1.4mg/dl with eGFR of  43ml/min form  1mg with eGFR of > 60 ml/min. She do not recall that she had any change in the urine. No flank pain. She checks her Bp every morning and it is around 150/100mmHg. She had been using her medications regularly. She eats regular food. I have asked her to check the BP 3X daily for a week and will repeat the labs.  Dietician consult will be set to provide the advice for food with 30 KCal/kg. 0.8gm prot. 2gm Na.        Past Medical History:   Diagnosis Date    Chronic kidney disease, stage II (mild) 09/21/2012    Depression     H/O nephrotic syndrome, 2001. 04/21/2015    Hypertension     Obesity     Secondary hyperparathyroidism     Vitamin D deficiency disease        Toi reports that she has never smoked. She has never used smokeless tobacco. She reports current alcohol use. She reports that she does not use drugs.    Family History   Problem Relation Age of Onset    Diabetes Mother         None    Hypertension Mother     Colon cancer Father     Hypertension Father     Diabetes Maternal Aunt         None    Diabetes Maternal Grandmother         None    Epilepsy Son     Hypertension Sister     Hypertension Sister     Breast cancer Neg Hx     Ovarian cancer Neg Hx        ROS:    General: No fever, chills, change in appetite or weight loss  Skin: No rashes or pruritus  Head: No headaches or recent head trauma  Eyes: No  changes in vision or eye pain  Nodes: No swollen glands  Pulmonary: No dyspnea, wheezes, cough or sputum production  Cardiovascular: No chest pain, edema, PND, orthopnea or reduced exercise tolerance  Abdomen: No abdominal pain, nausea, vomiting, constipation or diarrhea  Urinary: No flank pain, dysuria or hematuria  Peripheral Vascular: No claudication or cyanosis  Musculoskeletal: No joint stiffness, swelling or back pain  Neurologic: No history of seizures, tremors, forcal weakness or numbness    PE:    Vitals:    06/07/23 1057   BP: 139/88   BP Location: Right arm   Patient Position: Sitting   Pulse: 68   SpO2: 99%   Weight: 85 kg (187 lb 6.3 oz)       HEENT: Normocephalic, atraumatic, EOMI, PERRLA, normal conjunctive and lids, supple neck with no thyromeglay or masses, normal oropharynx, hearing intact  Cardiovascular: Regular without murmur, gallop or rub, no edema  Respiratory: Clear bilaterally without rales, rhonchi, wheezes with normal effort  Abdomen: Soft, nontender/nondistended, positive bowel sounds, no hepatospenomegaly  Extremities: No cyanosis, clubbing, normal gait  Skin: No rashes, ulcers, induration  Psych: Oriented x 3 with normal mood and effect    Impression and plan:      Diet: 30KCal/kg, 0.8gm prot. 2gm Na.     Labs:  CBC  CMP  Uric Acid  Urinalysis.      Impression and plan:  Primary HTN. Will need home Bp measurement. 3X daily for a week. Will consider adjustment of the medications according to home BP.   CKD III. Will reassess after better BP control and lifestyle modification.   Nephrotic syndrome 20 years ago will pull the file and the previous kidney Bx results.  Depression on regular antidepressant.   Hyperparath with vit. D deficiency  Insomnia. She was advised to reduce Caffeine containing drinks.   Post bariatric surgery      JC ABURTO.Yusef. MD. ADRIANA. FACP.  , [employer] / [employer]  Nephrology Consultant.  [facility].   [address],  [facility]. 5th [place] [address].    email: [email or web address]  Tel: Office: [phone]

## 2023-06-08 ENCOUNTER — HOSPITAL ENCOUNTER (OUTPATIENT)
Dept: RADIOLOGY | Facility: HOSPITAL | Age: 53
Discharge: HOME | End: 2023-06-08
Attending: INTERNAL MEDICINE
Payer: COMMERCIAL

## 2023-06-08 ENCOUNTER — PATIENT MESSAGE (OUTPATIENT)
Dept: NEPHROLOGY | Facility: CLINIC | Age: 53
End: 2023-06-08
Payer: COMMERCIAL

## 2023-06-08 DIAGNOSIS — N18.31 STAGE 3A CHRONIC KIDNEY DISEASE: ICD-10-CM

## 2023-06-08 PROCEDURE — 76770 US RETROPERITONEAL COMPLETE: ICD-10-PCS | Mod: 26,,, | Performed by: RADIOLOGY

## 2023-06-08 PROCEDURE — 76770 US EXAM ABDO BACK WALL COMP: CPT | Mod: TC,PO

## 2023-06-08 PROCEDURE — 76770 US EXAM ABDO BACK WALL COMP: CPT | Mod: 26,,, | Performed by: RADIOLOGY

## 2023-06-09 DIAGNOSIS — N32.89 BLADDER MASS: Primary | ICD-10-CM

## 2023-06-13 ENCOUNTER — PATIENT MESSAGE (OUTPATIENT)
Dept: BARIATRICS | Facility: CLINIC | Age: 53
End: 2023-06-13
Payer: COMMERCIAL

## 2023-06-14 ENCOUNTER — NUTRITION (OUTPATIENT)
Dept: NEPHROLOGY | Facility: CLINIC | Age: 53
End: 2023-06-14
Payer: COMMERCIAL

## 2023-06-14 DIAGNOSIS — N18.31 STAGE 3A CHRONIC KIDNEY DISEASE: ICD-10-CM

## 2023-06-14 PROCEDURE — 99999 PR PBB SHADOW E&M-EST. PATIENT-LVL II: ICD-10-PCS | Mod: PBBFAC,,,

## 2023-06-14 PROCEDURE — 97802 MEDICAL NUTRITION INDIV IN: CPT | Mod: S$GLB,,,

## 2023-06-14 PROCEDURE — 97802 PR MED NUTR THER, 1ST, INDIV, EA 15 MIN: ICD-10-PCS | Mod: S$GLB,,,

## 2023-06-14 PROCEDURE — 99999 PR PBB SHADOW E&M-EST. PATIENT-LVL II: CPT | Mod: PBBFAC,,,

## 2023-06-15 ENCOUNTER — HOSPITAL ENCOUNTER (OUTPATIENT)
Dept: RADIOLOGY | Facility: OTHER | Age: 53
Discharge: HOME | End: 2023-06-15
Attending: INTERNAL MEDICINE
Payer: COMMERCIAL

## 2023-06-15 VITALS — WEIGHT: 190.69 LBS | BODY MASS INDEX: 28.24 KG/M2 | HEIGHT: 69 IN

## 2023-06-15 DIAGNOSIS — N32.89 BLADDER MASS: ICD-10-CM

## 2023-06-15 PROCEDURE — 72195 MRI PELVIS W/O DYE: CPT | Mod: TC

## 2023-06-15 PROCEDURE — 72195 MRI PELVIS W/O DYE: CPT | Mod: 26,,, | Performed by: RADIOLOGY

## 2023-06-15 PROCEDURE — 72195 MRI FEMALE PELVIS WITHOUT CONTRAST: ICD-10-PCS | Mod: 26,,, | Performed by: RADIOLOGY

## 2023-06-15 NOTE — PROGRESS NOTES
"Referring Physician: Toi Pittsley was referred to nephrology nutrition education by Dr. Montana     Reason for visit:  Chief Complaint   Patient presents with    Nutrition Counseling    Chronic Kidney Disease        :1970     Allergies Reviewed  Meds Reviewed    Past Medical Hx: HTN, hx of bariatric surgery in      Anthropometrics  Weight:86.5 kg (190 lb 11.2 oz)  Height:5' 9" (1.753 m)  BMI:Body mass index is 28.16 kg/m².   IBW: 66.2kg     Labs:   Lab Results   Component Value Date    CREATININE 1.47 (H) 2023    URICACID 5.4 2007     Prot/Creat Ratio, Urine   Date Value Ref Range Status   10/21/2019 0.92 (H) 0.00 - 0.20 Final   2017 1.59 (H) 0.00 - 0.20 Final   03/15/2017 0.94 (H) 0.00 - 0.20 Final     Lab Results   Component Value Date     2023    K 4.0 2023    CO2 29 2023     2023     Lab Results   Component Value Date    .0 (H) 10/17/2019    CALCIUM 9.5 2023    PHOS 2.4 (L) 2020       eGFR: 43.0  HgA1C: n/a    Estimated Energy Needs: 1980Kcals/day (30kcal/kg),  53g protein (0.8g/kg)   Wt used for Calories: IBW    24hr Diet Recall / Usual Intake Hx:   Breakfast: sometimes skips, chic ibis breakfast sandwich   Lunch: skipped   Dinner: Hibachi (rice, meat, veggies)    Snacks: chips, rice crispy treat   Drinks: water, root beer, coke     Eating Out: daily     Supplements: none     Assessment: Pt and partner attend nutrition appointment. She reports eating out daily/for most meals. Pt reports not having much time to cook. Discussed sodium content of typical meals out to eat. Reviewed handouts on low sodium, salt substitutes, and moderate animal protein. Discussed the importance of reading food labels to reduce sodium intake. Also reviewed how to use the plate method to build healthy/balanced meals and snacks. Discussed protein intake in particular with bariatric surgery specifically serving sizes. Biggest concern is with sodium " intake- she is likely not over eating protein based on serving sizes. Reviewed plant based protein options. Encouraged her to consider cooking meals at home more often to reduce sodium intake associated with eating out. Answered all questions. Provided handouts and contact information.     Nutrition Diagnosis: Food and nutrition related knowledge deficit r/t no prior nutrition education as evidenced by intake of high sodium foods.     Recommendations: 1. Start reading food labels and select foods lower in sodium. Avoid high sodium foods such as sausage, hot dogs, medina, and pickles, etc.   2. Limit sodium intake to 2000 mg/day or less. Do not add salt to meals either when cooking or at the table.   3. Start using the plate method to build healthy/balanced meals consisting of plenty of vegetables, moderate animal protein, and moderate carbohydrates.    4. Avoid protein supplements such as boost, ensure, glucerna, premier protein, protein powders, etc.   5. Stay hydrated with water. Avoid sugar sweetened beverages and dark sodas.   6. Consider reducing eating out to 1x/wk and preparing more meals at home.     Goals Set with Pt:   Start preparing more meals at home and eating out less often.   Read food labels and consider sodium intake. Limit salty snacks.   Stop drinking dark sodas.     Written Materials Provided:   NKDEP sodium handout   NKDEP protein handout  Salt Free Seasonings handout   Sodium label reading handout   Build MyPlate Handout   Goal Setting Sheet      Consultation Time: 25 minutes.    Follow Up: 12 months/PRN.

## 2023-06-15 NOTE — PATIENT INSTRUCTIONS
Recommendations: 1. Start reading food labels and select foods lower in sodium. Avoid high sodium foods such as sausage, hot dogs, medina, and pickles, etc.   2. Limit sodium intake to 2000 mg/day or less. Do not add salt to meals either when cooking or at the table.   3. Start using the plate method to build healthy/balanced meals consisting of plenty of vegetables, moderate animal protein, and moderate carbohydrates.    4. Avoid protein supplements such as boost, ensure, glucerna, premier protein, protein powders, etc.   5. Stay hydrated with water. Avoid sugar sweetened beverages and dark sodas.   6. Consider reducing eating out to 1x/wk and preparing more meals at home.     Goals Set with Pt:   Start preparing more meals at home and eating out less often.   Read food labels and consider sodium intake. Limit salty snacks.   Stop drinking dark sodas.

## 2023-06-19 ENCOUNTER — PATIENT MESSAGE (OUTPATIENT)
Dept: NEPHROLOGY | Facility: CLINIC | Age: 53
End: 2023-06-19
Payer: COMMERCIAL

## 2023-06-20 ENCOUNTER — OFFICE VISIT (OUTPATIENT)
Dept: BARIATRICS | Facility: CLINIC | Age: 53
End: 2023-06-20
Payer: COMMERCIAL

## 2023-06-20 VITALS
HEIGHT: 68 IN | WEIGHT: 187.69 LBS | SYSTOLIC BLOOD PRESSURE: 118 MMHG | BODY MASS INDEX: 28.44 KG/M2 | HEART RATE: 67 BPM | DIASTOLIC BLOOD PRESSURE: 76 MMHG | OXYGEN SATURATION: 98 %

## 2023-06-20 DIAGNOSIS — E66.3 OVERWEIGHT WITH BODY MASS INDEX (BMI) OF 28 TO 28.9 IN ADULT: Primary | ICD-10-CM

## 2023-06-20 DIAGNOSIS — Z90.3 S/P GASTRIC SLEEVE PROCEDURE: ICD-10-CM

## 2023-06-20 DIAGNOSIS — I10 PRIMARY HYPERTENSION: ICD-10-CM

## 2023-06-20 PROCEDURE — 3066F PR DOCUMENTATION OF TREATMENT FOR NEPHROPATHY: ICD-10-PCS | Mod: CPTII,S$GLB,, | Performed by: STUDENT IN AN ORGANIZED HEALTH CARE EDUCATION/TRAINING PROGRAM

## 2023-06-20 PROCEDURE — 1159F PR MEDICATION LIST DOCUMENTED IN MEDICAL RECORD: ICD-10-PCS | Mod: CPTII,S$GLB,, | Performed by: STUDENT IN AN ORGANIZED HEALTH CARE EDUCATION/TRAINING PROGRAM

## 2023-06-20 PROCEDURE — 3074F PR MOST RECENT SYSTOLIC BLOOD PRESSURE < 130 MM HG: ICD-10-PCS | Mod: CPTII,S$GLB,, | Performed by: STUDENT IN AN ORGANIZED HEALTH CARE EDUCATION/TRAINING PROGRAM

## 2023-06-20 PROCEDURE — 1159F MED LIST DOCD IN RCRD: CPT | Mod: CPTII,S$GLB,, | Performed by: STUDENT IN AN ORGANIZED HEALTH CARE EDUCATION/TRAINING PROGRAM

## 2023-06-20 PROCEDURE — 1160F PR REVIEW ALL MEDS BY PRESCRIBER/CLIN PHARMACIST DOCUMENTED: ICD-10-PCS | Mod: CPTII,S$GLB,, | Performed by: STUDENT IN AN ORGANIZED HEALTH CARE EDUCATION/TRAINING PROGRAM

## 2023-06-20 PROCEDURE — 99999 PR PBB SHADOW E&M-EST. PATIENT-LVL IV: CPT | Mod: PBBFAC,,, | Performed by: STUDENT IN AN ORGANIZED HEALTH CARE EDUCATION/TRAINING PROGRAM

## 2023-06-20 PROCEDURE — 99204 PR OFFICE/OUTPT VISIT, NEW, LEVL IV, 45-59 MIN: ICD-10-PCS | Mod: S$GLB,,, | Performed by: STUDENT IN AN ORGANIZED HEALTH CARE EDUCATION/TRAINING PROGRAM

## 2023-06-20 PROCEDURE — 4010F ACE/ARB THERAPY RXD/TAKEN: CPT | Mod: CPTII,S$GLB,, | Performed by: STUDENT IN AN ORGANIZED HEALTH CARE EDUCATION/TRAINING PROGRAM

## 2023-06-20 PROCEDURE — 3074F SYST BP LT 130 MM HG: CPT | Mod: CPTII,S$GLB,, | Performed by: STUDENT IN AN ORGANIZED HEALTH CARE EDUCATION/TRAINING PROGRAM

## 2023-06-20 PROCEDURE — 3078F DIAST BP <80 MM HG: CPT | Mod: CPTII,S$GLB,, | Performed by: STUDENT IN AN ORGANIZED HEALTH CARE EDUCATION/TRAINING PROGRAM

## 2023-06-20 PROCEDURE — 99204 OFFICE O/P NEW MOD 45 MIN: CPT | Mod: S$GLB,,, | Performed by: STUDENT IN AN ORGANIZED HEALTH CARE EDUCATION/TRAINING PROGRAM

## 2023-06-20 PROCEDURE — 1160F RVW MEDS BY RX/DR IN RCRD: CPT | Mod: CPTII,S$GLB,, | Performed by: STUDENT IN AN ORGANIZED HEALTH CARE EDUCATION/TRAINING PROGRAM

## 2023-06-20 PROCEDURE — 3008F BODY MASS INDEX DOCD: CPT | Mod: CPTII,S$GLB,, | Performed by: STUDENT IN AN ORGANIZED HEALTH CARE EDUCATION/TRAINING PROGRAM

## 2023-06-20 PROCEDURE — 3044F HG A1C LEVEL LT 7.0%: CPT | Mod: CPTII,S$GLB,, | Performed by: STUDENT IN AN ORGANIZED HEALTH CARE EDUCATION/TRAINING PROGRAM

## 2023-06-20 PROCEDURE — 4010F PR ACE/ARB THEARPY RXD/TAKEN: ICD-10-PCS | Mod: CPTII,S$GLB,, | Performed by: STUDENT IN AN ORGANIZED HEALTH CARE EDUCATION/TRAINING PROGRAM

## 2023-06-20 PROCEDURE — 3008F PR BODY MASS INDEX (BMI) DOCUMENTED: ICD-10-PCS | Mod: CPTII,S$GLB,, | Performed by: STUDENT IN AN ORGANIZED HEALTH CARE EDUCATION/TRAINING PROGRAM

## 2023-06-20 PROCEDURE — 3066F NEPHROPATHY DOC TX: CPT | Mod: CPTII,S$GLB,, | Performed by: STUDENT IN AN ORGANIZED HEALTH CARE EDUCATION/TRAINING PROGRAM

## 2023-06-20 PROCEDURE — 3078F PR MOST RECENT DIASTOLIC BLOOD PRESSURE < 80 MM HG: ICD-10-PCS | Mod: CPTII,S$GLB,, | Performed by: STUDENT IN AN ORGANIZED HEALTH CARE EDUCATION/TRAINING PROGRAM

## 2023-06-20 PROCEDURE — 3044F PR MOST RECENT HEMOGLOBIN A1C LEVEL <7.0%: ICD-10-PCS | Mod: CPTII,S$GLB,, | Performed by: STUDENT IN AN ORGANIZED HEALTH CARE EDUCATION/TRAINING PROGRAM

## 2023-06-20 PROCEDURE — 99999 PR PBB SHADOW E&M-EST. PATIENT-LVL IV: ICD-10-PCS | Mod: PBBFAC,,, | Performed by: STUDENT IN AN ORGANIZED HEALTH CARE EDUCATION/TRAINING PROGRAM

## 2023-06-20 RX ORDER — MIRTAZAPINE 7.5 MG/1
7.5-15 TABLET, FILM COATED ORAL NIGHTLY
COMMUNITY
Start: 2023-06-07 | End: 2023-06-25

## 2023-06-20 NOTE — PROGRESS NOTES
"Subjective     Patient ID: Toi Fernandez is a 52 y.o. female.    Chief Complaint: Consult and Obesity    Patient presents for treatment of obesity.   Gastric Sleeve in 2020  234 lbs presurgery weight  172 lbs lowest post-surgery weight  Goal weight: 160 lbs    Co-morbidities  Migraines - on topiramate as needed  HTN  CKD  Depression    Current Physical Activity  "Stays busy all day"  No regular exercise routine    Current Eating Habits  Breakfast - oatmeal, grits, egg mcmuffin  Lunch - chicken salad  Dinner - baked chicken, broccoli  Snacks - not a snacker  Beverages - coke zero, sweet tea    Medical Weight Loss  6/20/2023: 187.7 lbs, BMI 28.5, BFP 36.2%, BFM 68 lbs, SMM 66.1 lbs      Review of Systems   Constitutional:  Negative for chills and fever.   Respiratory:  Negative for shortness of breath.    Cardiovascular:  Negative for chest pain.   Gastrointestinal:  Negative for abdominal pain, nausea and vomiting.   Neurological:  Negative for dizziness and light-headedness.   Psychiatric/Behavioral:  The patient is not nervous/anxious.         Objective     Vitals:    06/20/23 1358   BP: 118/76   Pulse: 67       Physical Exam  Vitals reviewed.   Constitutional:       General: She is not in acute distress.     Appearance: Normal appearance. She is obese. She is not ill-appearing, toxic-appearing or diaphoretic.   HENT:      Head: Normocephalic and atraumatic.   Cardiovascular:      Rate and Rhythm: Normal rate.   Pulmonary:      Effort: Pulmonary effort is normal. No respiratory distress.   Skin:     General: Skin is warm and dry.   Neurological:      Mental Status: She is alert and oriented to person, place, and time.          Assessment and Plan     1. Overweight with body mass index (BMI) of 28 to 28.9 in adult    2. BMI 27.0-27.9,adult  -     Ambulatory referral/consult to Bariatric Medicine    3. S/P gastric sleeve procedure    4. Primary hypertension        - Start taking topiramate 25 mg twice daily " instead of as needed    - Bariatric Diet    - Moderate intensity aerobic exercise plus strength exercises for 150 minutes per week

## 2023-06-22 ENCOUNTER — PATIENT MESSAGE (OUTPATIENT)
Dept: NEPHROLOGY | Facility: CLINIC | Age: 53
End: 2023-06-22
Payer: COMMERCIAL

## 2023-06-22 ENCOUNTER — HOSPITAL ENCOUNTER (OUTPATIENT)
Dept: RADIOLOGY | Facility: HOSPITAL | Age: 53
Discharge: HOME | End: 2023-06-22
Attending: INTERNAL MEDICINE
Payer: COMMERCIAL

## 2023-06-22 DIAGNOSIS — N32.89 BLADDER MASS: ICD-10-CM

## 2023-06-22 PROCEDURE — 76857 US BLADDER: ICD-10-PCS | Mod: 26,,, | Performed by: RADIOLOGY

## 2023-06-22 PROCEDURE — 76857 US EXAM PELVIC LIMITED: CPT | Mod: 26,,, | Performed by: RADIOLOGY

## 2023-06-22 PROCEDURE — 76857 US EXAM PELVIC LIMITED: CPT | Mod: TC,PO

## 2023-06-24 NOTE — TELEPHONE ENCOUNTER
Refill Routing Note   Medication(s) are not appropriate for processing by [facility] for the following reason(s):      New or recently adjusted medication    [facility] action(s):  Defer Care Due:  None identified          Appointments  past 12m or future 3m with PCP    Date Provider   Last Visit   3/15/2023 Estela Crawford MD   Next Visit   9/12/2023 Estela Crawford MD   ED visits in past 90 days: 0        Note composed:6:53 AM 06/24/2023

## 2023-06-24 NOTE — TELEPHONE ENCOUNTER
No care due was identified.  [facility] Embedded Care Due Messages. Reference number: [id number].   6/24/2023 3:09:28 AM CDT

## 2023-06-25 RX ORDER — MIRTAZAPINE 7.5 MG/1
TABLET, FILM COATED ORAL
Qty: 30 TABLET | Refills: 11 | Status: SHIPPED | OUTPATIENT
Start: 2023-06-25 | End: 2023-09-12

## 2023-07-31 ENCOUNTER — TELEPHONE (OUTPATIENT)
Dept: ORTHOPEDICS | Facility: CLINIC | Age: 53
End: 2023-07-31
Payer: COMMERCIAL

## 2023-07-31 NOTE — TELEPHONE ENCOUNTER
Left message for patient regarding which part of her arm hurts in order to get xrays.  Also informed her that Raymon does see hand, wrist or fingers.

## 2023-08-01 ENCOUNTER — TELEPHONE (OUTPATIENT)
Dept: ORTHOPEDICS | Facility: CLINIC | Age: 53
End: 2023-08-01
Payer: COMMERCIAL

## 2023-08-01 ENCOUNTER — OFFICE VISIT (OUTPATIENT)
Dept: ORTHOPEDICS | Facility: CLINIC | Age: 53
End: 2023-08-01
Payer: COMMERCIAL

## 2023-08-01 ENCOUNTER — HOSPITAL ENCOUNTER (OUTPATIENT)
Dept: RADIOLOGY | Facility: HOSPITAL | Age: 53
Discharge: HOME | End: 2023-08-01
Attending: PHYSICIAN ASSISTANT
Payer: COMMERCIAL

## 2023-08-01 VITALS
SYSTOLIC BLOOD PRESSURE: 143 MMHG | HEART RATE: 56 BPM | HEIGHT: 68 IN | BODY MASS INDEX: 27.36 KG/M2 | WEIGHT: 180.56 LBS | DIASTOLIC BLOOD PRESSURE: 96 MMHG

## 2023-08-01 DIAGNOSIS — M54.12 CERVICAL RADICULOPATHY: ICD-10-CM

## 2023-08-01 DIAGNOSIS — M75.82 ROTATOR CUFF TENDINITIS, LEFT: Primary | ICD-10-CM

## 2023-08-01 DIAGNOSIS — M25.512 LEFT SHOULDER PAIN, UNSPECIFIED CHRONICITY: ICD-10-CM

## 2023-08-01 DIAGNOSIS — M25.512 LEFT SHOULDER PAIN, UNSPECIFIED CHRONICITY: Primary | ICD-10-CM

## 2023-08-01 PROCEDURE — 3044F HG A1C LEVEL LT 7.0%: CPT | Mod: CPTII,S$GLB,, | Performed by: PHYSICIAN ASSISTANT

## 2023-08-01 PROCEDURE — 20610 LARGE JOINT ASPIRATION/INJECTION: L SUBACROMIAL BURSA: ICD-10-PCS | Mod: LT,S$GLB,, | Performed by: PHYSICIAN ASSISTANT

## 2023-08-01 PROCEDURE — 3080F DIAST BP >= 90 MM HG: CPT | Mod: CPTII,S$GLB,, | Performed by: PHYSICIAN ASSISTANT

## 2023-08-01 PROCEDURE — 3044F PR MOST RECENT HEMOGLOBIN A1C LEVEL <7.0%: ICD-10-PCS | Mod: CPTII,S$GLB,, | Performed by: PHYSICIAN ASSISTANT

## 2023-08-01 PROCEDURE — 1159F PR MEDICATION LIST DOCUMENTED IN MEDICAL RECORD: ICD-10-PCS | Mod: CPTII,S$GLB,, | Performed by: PHYSICIAN ASSISTANT

## 2023-08-01 PROCEDURE — 3008F PR BODY MASS INDEX (BMI) DOCUMENTED: ICD-10-PCS | Mod: CPTII,S$GLB,, | Performed by: PHYSICIAN ASSISTANT

## 2023-08-01 PROCEDURE — 3080F PR MOST RECENT DIASTOLIC BLOOD PRESSURE >= 90 MM HG: ICD-10-PCS | Mod: CPTII,S$GLB,, | Performed by: PHYSICIAN ASSISTANT

## 2023-08-01 PROCEDURE — 73030 XR SHOULDER COMPLETE 2 OR MORE VIEWS LEFT: ICD-10-PCS | Mod: 26,LT,, | Performed by: RADIOLOGY

## 2023-08-01 PROCEDURE — 3066F NEPHROPATHY DOC TX: CPT | Mod: CPTII,S$GLB,, | Performed by: PHYSICIAN ASSISTANT

## 2023-08-01 PROCEDURE — 3077F PR MOST RECENT SYSTOLIC BLOOD PRESSURE >= 140 MM HG: ICD-10-PCS | Mod: CPTII,S$GLB,, | Performed by: PHYSICIAN ASSISTANT

## 2023-08-01 PROCEDURE — 73030 X-RAY EXAM OF SHOULDER: CPT | Mod: TC,PN,LT

## 2023-08-01 PROCEDURE — 3066F PR DOCUMENTATION OF TREATMENT FOR NEPHROPATHY: ICD-10-PCS | Mod: CPTII,S$GLB,, | Performed by: PHYSICIAN ASSISTANT

## 2023-08-01 PROCEDURE — 20610 DRAIN/INJ JOINT/BURSA W/O US: CPT | Mod: LT,S$GLB,, | Performed by: PHYSICIAN ASSISTANT

## 2023-08-01 PROCEDURE — 73030 X-RAY EXAM OF SHOULDER: CPT | Mod: 26,LT,, | Performed by: RADIOLOGY

## 2023-08-01 PROCEDURE — 99999 PR PBB SHADOW E&M-EST. PATIENT-LVL V: CPT | Mod: PBBFAC,,, | Performed by: PHYSICIAN ASSISTANT

## 2023-08-01 PROCEDURE — 3008F BODY MASS INDEX DOCD: CPT | Mod: CPTII,S$GLB,, | Performed by: PHYSICIAN ASSISTANT

## 2023-08-01 PROCEDURE — 99999 PR PBB SHADOW E&M-EST. PATIENT-LVL V: ICD-10-PCS | Mod: PBBFAC,,, | Performed by: PHYSICIAN ASSISTANT

## 2023-08-01 PROCEDURE — 99214 PR OFFICE/OUTPT VISIT, EST, LEVL IV, 30-39 MIN: ICD-10-PCS | Mod: 25,S$GLB,, | Performed by: PHYSICIAN ASSISTANT

## 2023-08-01 PROCEDURE — 1159F MED LIST DOCD IN RCRD: CPT | Mod: CPTII,S$GLB,, | Performed by: PHYSICIAN ASSISTANT

## 2023-08-01 PROCEDURE — 1160F PR REVIEW ALL MEDS BY PRESCRIBER/CLIN PHARMACIST DOCUMENTED: ICD-10-PCS | Mod: CPTII,S$GLB,, | Performed by: PHYSICIAN ASSISTANT

## 2023-08-01 PROCEDURE — 4010F ACE/ARB THERAPY RXD/TAKEN: CPT | Mod: CPTII,S$GLB,, | Performed by: PHYSICIAN ASSISTANT

## 2023-08-01 PROCEDURE — 3077F SYST BP >= 140 MM HG: CPT | Mod: CPTII,S$GLB,, | Performed by: PHYSICIAN ASSISTANT

## 2023-08-01 PROCEDURE — 4010F PR ACE/ARB THEARPY RXD/TAKEN: ICD-10-PCS | Mod: CPTII,S$GLB,, | Performed by: PHYSICIAN ASSISTANT

## 2023-08-01 PROCEDURE — 1160F RVW MEDS BY RX/DR IN RCRD: CPT | Mod: CPTII,S$GLB,, | Performed by: PHYSICIAN ASSISTANT

## 2023-08-01 PROCEDURE — 99214 OFFICE O/P EST MOD 30 MIN: CPT | Mod: 25,S$GLB,, | Performed by: PHYSICIAN ASSISTANT

## 2023-08-01 RX ORDER — CYCLOBENZAPRINE HCL 5 MG
5 TABLET ORAL NIGHTLY
Qty: 30 TABLET | Refills: 0 | Status: SHIPPED | OUTPATIENT
Start: 2023-08-01 | End: 2023-09-12

## 2023-08-01 RX ORDER — TRIAMCINOLONE ACETONIDE 40 MG/ML
40 INJECTION, SUSPENSION INTRA-ARTICULAR; INTRAMUSCULAR
Status: DISCONTINUED | OUTPATIENT
Start: 2023-08-01 | End: 2023-08-01 | Stop reason: HOSPADM

## 2023-08-01 RX ADMIN — TRIAMCINOLONE ACETONIDE 40 MG: 40 INJECTION, SUSPENSION INTRA-ARTICULAR; INTRAMUSCULAR at 02:08

## 2023-08-01 NOTE — PROCEDURES
Large Joint Aspiration/Injection: L subacromial bursa    Date/Time: 8/1/2023 2:00 PM    Performed by: Lulu Delacruz PA-C  Authorized by: Lulu Delacruz PA-C    Consent Done?:  Yes (Verbal)  Indications:  Pain  Site marked: the procedure site was marked    Timeout: prior to procedure the correct patient, procedure, and site was verified    Prep: patient was prepped and draped in usual sterile fashion      Local anesthesia used?: Yes    Local anesthetic:  Lidocaine 1% without epinephrine and topical anesthetic    Details:  Needle Size:  21 G  Ultrasonic Guidance for needle placement?: No    Approach:  Posterior  Location:  Shoulder  Site:  L subacromial bursa  Medications:  40 mg triamcinolone acetonide 40 mg/mL  Patient tolerance:  Patient tolerated the procedure well with no immediate complications

## 2023-08-01 NOTE — TELEPHONE ENCOUNTER
Spoke with patient.  Stated it is her upper left arm near shoulder hurting.  Stated it is not her hands or wrist.  Informed her that Raymon does not see hand issues.  Verbalized understanding.

## 2023-08-01 NOTE — PROGRESS NOTES
Subjective:      Patient ID: Toi Fernandez is a 52 y.o. female.    Chief Complaint: Pain of the Left Shoulder      53yo RHD female phmx CKD presents with couple month history of left shoulder pain that radiates to her forearm. Pain is superior shoulder. She also notes mild neck pain and paresthesias in her entire hand. Symptoms worse with overhead motions and picking up objects. She notes weakness in her LUE. She currently taking ultram for pain.         Review of Systems   Constitutional: Negative for chills and fever.   Cardiovascular:  Negative for chest pain.   Respiratory:  Negative for cough.    Hematologic/Lymphatic: Does not bruise/bleed easily.   Skin:  Negative for poor wound healing and rash.   Musculoskeletal:  Positive for joint pain, myalgias and stiffness.   Gastrointestinal:  Negative for abdominal pain.   Genitourinary:  Negative for bladder incontinence.   Neurological:  Negative for dizziness, loss of balance and weakness.   Psychiatric/Behavioral:  Negative for altered mental status.        Review of patient's allergies indicates:   Allergen Reactions    No known allergies         Current Outpatient Medications   Medication Sig Dispense Refill    albuterol (PROVENTIL/VENTOLIN HFA) 90 mcg/actuation inhaler Inhale 2 puffs into the lungs every 4 (four) hours as needed for Wheezing. Rescue 54 g 2    amLODIPine (NORVASC) 5 MG tablet TAKE 1 TABLET(5 MG) BY MOUTH EVERY DAY 90 tablet 3    carvediloL (COREG) 25 MG tablet TAKE 1 TABLET(25 MG) BY MOUTH TWICE DAILY WITH MEALS 180 tablet 3    cetirizine (ZYRTEC) 10 MG tablet TAKE 1 TO 2 TABLETS BY MOUTH EVERY DAY AS NEEDED FOR ITCHING 180 tablet 1    cyclobenzaprine (FLEXERIL) 10 MG tablet take 1 tablet by mouth everyday  as needed for  back pain 30 tablet 1    ergocalciferol (ERGOCALCIFEROL) 50,000 unit Cap Take 1 capsule by mouth monthly 3 capsule 3    hydrocortisone 2.5 % cream APPLY EXTERNALLY TO THE AFFECTED AREA TWICE DAILY WHEN ECZEMA IS MODERATE  "453.6 g 1    irbesartan (AVAPRO) 300 MG tablet TAKE 1 TABLET(300 MG) BY MOUTH EVERY MORNING 90 tablet 2    linaCLOtide (LINZESS) 145 mcg Cap capsule 1 tab po q d prn constipation 30 capsule 5    mirtazapine (REMERON) 7.5 MG Tab TAKE 1 TO 2 TABLETS BY MOUTH AT BEDTIME FOR SLEEP OR MOOD 30 tablet 11    topiramate (TOPAMAX) 25 MG tablet TAKE 1 TO 2 TABLETS BY MOUTH TWICE DAILY 90 tablet 2    torsemide (DEMADEX) 10 MG Tab Take 1 tablet (10 mg total) by mouth once daily. 90 tablet 3    traMADoL (ULTRAM) 50 mg tablet take 1 tablet by mouth every  8-12 hours as needed for  headache 21 each 0    traZODone (DESYREL) 50 MG tablet Take 0.5-1 tablets (25-50 mg total) by mouth nightly as needed for Insomnia. 30 tablet 2    cyclobenzaprine (FLEXERIL) 5 MG tablet Take 1 tablet (5 mg total) by mouth nightly. 30 tablet 0    estradioL (ESTRACE) 1 MG tablet Take 1 tablet (1 mg total) by mouth once daily. 30 tablet 5     No current facility-administered medications for this visit.        The patient's relevant past medical, surgical, and social history was reviewed in Epic.       Objective:      VITAL SIGNS: BP (!) 143/96   Pulse (!) 56   Ht 5' 8" (1.727 m)   Wt 81.9 kg (180 lb 8.9 oz)   LMP  (LMP Unknown) Comment: 2012  BMI 27.45 kg/m²     General    Nursing note and vitals reviewed.  Constitutional: She is oriented to person, place, and time. She appears well-developed and well-nourished.   Neurological: She is alert and oriented to person, place, and time.         Back (L-Spine & T-Spine) / Neck (C-Spine) Exam     Tenderness   The patient is tender to palpation of the right trapezial and right SCM.   Right Shoulder Exam     Tenderness   The patient is tender to palpation of the supraspinatus.    Range of Motion   Active abduction:  120   Forward Flexion:  120   External Rotation 0 degrees:  80   Internal rotation 0 degrees:  T12     Tests & Signs   Drop arm: negative  Carranza test: positive  Impingement: positive  Speed's Test: " positive    Other   Sensation: normal    Comments:  Negative spurlings     Muscle Strength   Right Upper Extremity   Shoulder Abduction: 4/5   Shoulder Internal Rotation: 5/5   Shoulder External Rotation: 5/5   Supraspinatus: 5/5   Subscapularis: 5/5   Biceps: 5/5      X-Ray Shoulder 2 or More Views Left  Narrative: EXAMINATION:  XR SHOULDER COMPLETE 2 OR MORE VIEWS LEFT    CLINICAL HISTORY:  Pain in left shoulder    TECHNIQUE:  Two or three views of the left shoulder were performed.    COMPARISON:  None    FINDINGS:  No fracture.  No malalignment.  Preserved glenohumeral and acromioclavicular articulations.  No findings of calcific tendinitis.  Impression: No acute or significant bony findings.    Electronically signed by: Sonu Wallace  Date:    08/01/2023  Time:    13:29    I have reviewed the above radiograph and agree with the findings stated by the radiologist.         Assessment:       1. Rotator cuff tendinitis, left    2. Cervical radiculopathy          Plan:         Toi was seen today for pain.    Diagnoses and all orders for this visit:    Rotator cuff tendinitis, left  -     Ambulatory referral/consult to Physical/Occupational Therapy; Future  -     cyclobenzaprine (FLEXERIL) 5 MG tablet; Take 1 tablet (5 mg total) by mouth nightly.  -     Large Joint Aspiration/Injection: L subacromial bursa    Cervical radiculopathy  -     Ambulatory referral/consult to Physical/Occupational Therapy; Future  -     cyclobenzaprine (FLEXERIL) 5 MG tablet; Take 1 tablet (5 mg total) by mouth nightly.    Left shoulder pain. Rotator cuff tendinitis with probable cervical radiculopathy involvement. Explained the condition to the patient. I would like her to try course of formal PT for her shoulder and neck. Subacromial CS injection given today to help with shoulders symptoms. She will return in 8 weeks for repeat evaluation. Flexeril refilled.     Diagnoses and plan discussed with the patient, as well as the expected  course and duration of his symptoms.  All questions and concerns were addressed prior to the end of the visit.   Instructed patient to call office if they have any future questions/concerns or to schedule apt. Patient will return to see me if symptoms worsen or fail to improve    Note dictated with voice recognition software, please excuse any grammatical errors.        Lulu Delacruz PA-C   08/01/2023

## 2023-08-02 ENCOUNTER — PATIENT MESSAGE (OUTPATIENT)
Dept: BARIATRICS | Facility: CLINIC | Age: 53
End: 2023-08-02
Payer: COMMERCIAL

## 2023-08-02 DIAGNOSIS — Z12.31 OTHER SCREENING MAMMOGRAM: ICD-10-CM

## 2023-08-11 ENCOUNTER — TELEPHONE (OUTPATIENT)
Dept: ENDOSCOPY | Facility: HOSPITAL | Age: 53
End: 2023-08-11
Payer: COMMERCIAL

## 2023-08-11 NOTE — TELEPHONE ENCOUNTER
Attempted to contact the patient to schedule an endoscopy procedure(s) colonoscopy. The patient did not answer the call and left a voice message requesting a call back. Message sent to patient portal.

## 2023-08-24 ENCOUNTER — TELEPHONE (OUTPATIENT)
Dept: ENDOSCOPY | Facility: HOSPITAL | Age: 53
End: 2023-08-24
Payer: COMMERCIAL

## 2023-08-24 ENCOUNTER — PATIENT MESSAGE (OUTPATIENT)
Dept: ENDOSCOPY | Facility: HOSPITAL | Age: 53
End: 2023-08-24
Payer: COMMERCIAL

## 2023-08-24 NOTE — TELEPHONE ENCOUNTER
Contacted the patient to schedule an endoscopy procedure(s) Colonoscopy. The patient did not answer the call. I was unable to leave a voice message. I sent a message through the portal requesting a call back.            Procedure: Ambulatory referral/consult to Endo Procedure  Status: Needs Scheduling (Sent to Patient)   Requested appt date: 3/16/2023 Authorizing: Estela Crawford MD in [facility]   Referral: [id number] (Authorized)       Expires: 9/15/2023 Priority: Routine   Assign to: YARIEL PENDLETON     Diagnosis: Colon cancer screening [Z12.11]     Order Specific Questions   What procedure is to be performed?   Screening Colonoscopy            CPT Code:   COLON CA SCRN NOT HI RSK IND []

## 2023-08-28 ENCOUNTER — TELEPHONE (OUTPATIENT)
Dept: ENDOSCOPY | Facility: HOSPITAL | Age: 53
End: 2023-08-28
Payer: COMMERCIAL

## 2023-08-28 ENCOUNTER — PATIENT MESSAGE (OUTPATIENT)
Dept: ENDOSCOPY | Facility: HOSPITAL | Age: 53
End: 2023-08-28
Payer: COMMERCIAL

## 2023-08-28 NOTE — TELEPHONE ENCOUNTER
Contacted the patient to schedule an endoscopy procedure(s) Colonoscopy. The patient did not answer the call. I was unable to leave a voice message. I sent a message through the portal requesting a call back.               Procedure: Ambulatory referral/consult to Endo Procedure  Status: Needs Scheduling (Sent to Patient)   Requested appt date: 3/16/2023 Authorizing: Estela Crawford MD in [facility]   Referral: [id number] (Authorized)       Expires: 9/15/2023 Priority: Routine   Assign to: YARIEL PENDLETON       Diagnosis: Colon cancer screening [Z12.11]            Order Specific Questions   What procedure is to be performed?   Screening Colonoscopy                   CPT Code:   COLON CA SCRN NOT HI RSK IND []

## 2023-09-06 ENCOUNTER — PATIENT MESSAGE (OUTPATIENT)
Dept: BARIATRICS | Facility: CLINIC | Age: 53
End: 2023-09-06
Payer: COMMERCIAL

## 2023-09-12 ENCOUNTER — OFFICE VISIT (OUTPATIENT)
Dept: INTERNAL MEDICINE | Facility: CLINIC | Age: 53
End: 2023-09-12
Payer: COMMERCIAL

## 2023-09-12 ENCOUNTER — PATIENT MESSAGE (OUTPATIENT)
Dept: BARIATRICS | Facility: CLINIC | Age: 53
End: 2023-09-12
Payer: COMMERCIAL

## 2023-09-12 VITALS
DIASTOLIC BLOOD PRESSURE: 82 MMHG | HEIGHT: 68 IN | SYSTOLIC BLOOD PRESSURE: 136 MMHG | OXYGEN SATURATION: 100 % | WEIGHT: 180.75 LBS | BODY MASS INDEX: 27.39 KG/M2 | HEART RATE: 56 BPM

## 2023-09-12 DIAGNOSIS — E66.9 CLASS 1 OBESITY WITH SERIOUS COMORBIDITY AND BODY MASS INDEX (BMI) OF 32.0 TO 32.9 IN ADULT, UNSPECIFIED OBESITY TYPE: ICD-10-CM

## 2023-09-12 DIAGNOSIS — Z98.84 BARIATRIC SURGERY STATUS: Primary | ICD-10-CM

## 2023-09-12 DIAGNOSIS — E55.9 VITAMIN D DEFICIENCY: ICD-10-CM

## 2023-09-12 DIAGNOSIS — Z12.11 COLON CANCER SCREENING: ICD-10-CM

## 2023-09-12 DIAGNOSIS — I10 HYPERTENSION, UNSPECIFIED TYPE: ICD-10-CM

## 2023-09-12 DIAGNOSIS — N18.31 STAGE 3A CHRONIC KIDNEY DISEASE: ICD-10-CM

## 2023-09-12 PROCEDURE — 3008F BODY MASS INDEX DOCD: CPT | Mod: CPTII,S$GLB,, | Performed by: INTERNAL MEDICINE

## 2023-09-12 PROCEDURE — 3066F NEPHROPATHY DOC TX: CPT | Mod: CPTII,S$GLB,, | Performed by: INTERNAL MEDICINE

## 2023-09-12 PROCEDURE — 3008F PR BODY MASS INDEX (BMI) DOCUMENTED: ICD-10-PCS | Mod: CPTII,S$GLB,, | Performed by: INTERNAL MEDICINE

## 2023-09-12 PROCEDURE — 1160F RVW MEDS BY RX/DR IN RCRD: CPT | Mod: CPTII,S$GLB,, | Performed by: INTERNAL MEDICINE

## 2023-09-12 PROCEDURE — 3075F SYST BP GE 130 - 139MM HG: CPT | Mod: CPTII,S$GLB,, | Performed by: INTERNAL MEDICINE

## 2023-09-12 PROCEDURE — 3044F HG A1C LEVEL LT 7.0%: CPT | Mod: CPTII,S$GLB,, | Performed by: INTERNAL MEDICINE

## 2023-09-12 PROCEDURE — 3075F PR MOST RECENT SYSTOLIC BLOOD PRESS GE 130-139MM HG: ICD-10-PCS | Mod: CPTII,S$GLB,, | Performed by: INTERNAL MEDICINE

## 2023-09-12 PROCEDURE — 99214 PR OFFICE/OUTPT VISIT, EST, LEVL IV, 30-39 MIN: ICD-10-PCS | Mod: S$GLB,,, | Performed by: INTERNAL MEDICINE

## 2023-09-12 PROCEDURE — 1160F PR REVIEW ALL MEDS BY PRESCRIBER/CLIN PHARMACIST DOCUMENTED: ICD-10-PCS | Mod: CPTII,S$GLB,, | Performed by: INTERNAL MEDICINE

## 2023-09-12 PROCEDURE — 3079F PR MOST RECENT DIASTOLIC BLOOD PRESSURE 80-89 MM HG: ICD-10-PCS | Mod: CPTII,S$GLB,, | Performed by: INTERNAL MEDICINE

## 2023-09-12 PROCEDURE — 4010F PR ACE/ARB THEARPY RXD/TAKEN: ICD-10-PCS | Mod: CPTII,S$GLB,, | Performed by: INTERNAL MEDICINE

## 2023-09-12 PROCEDURE — 1159F PR MEDICATION LIST DOCUMENTED IN MEDICAL RECORD: ICD-10-PCS | Mod: CPTII,S$GLB,, | Performed by: INTERNAL MEDICINE

## 2023-09-12 PROCEDURE — 99214 OFFICE O/P EST MOD 30 MIN: CPT | Mod: S$GLB,,, | Performed by: INTERNAL MEDICINE

## 2023-09-12 PROCEDURE — 3044F PR MOST RECENT HEMOGLOBIN A1C LEVEL <7.0%: ICD-10-PCS | Mod: CPTII,S$GLB,, | Performed by: INTERNAL MEDICINE

## 2023-09-12 PROCEDURE — 3079F DIAST BP 80-89 MM HG: CPT | Mod: CPTII,S$GLB,, | Performed by: INTERNAL MEDICINE

## 2023-09-12 PROCEDURE — 99999 PR PBB SHADOW E&M-EST. PATIENT-LVL IV: CPT | Mod: PBBFAC,,, | Performed by: INTERNAL MEDICINE

## 2023-09-12 PROCEDURE — 99999 PR PBB SHADOW E&M-EST. PATIENT-LVL IV: ICD-10-PCS | Mod: PBBFAC,,, | Performed by: INTERNAL MEDICINE

## 2023-09-12 PROCEDURE — 1159F MED LIST DOCD IN RCRD: CPT | Mod: CPTII,S$GLB,, | Performed by: INTERNAL MEDICINE

## 2023-09-12 PROCEDURE — 3066F PR DOCUMENTATION OF TREATMENT FOR NEPHROPATHY: ICD-10-PCS | Mod: CPTII,S$GLB,, | Performed by: INTERNAL MEDICINE

## 2023-09-12 PROCEDURE — 4010F ACE/ARB THERAPY RXD/TAKEN: CPT | Mod: CPTII,S$GLB,, | Performed by: INTERNAL MEDICINE

## 2023-09-12 RX ORDER — CYCLOBENZAPRINE HCL 10 MG
TABLET ORAL
Qty: 30 TABLET | Refills: 1 | Status: SHIPPED | OUTPATIENT
Start: 2023-09-12 | End: 2023-12-18

## 2023-09-12 RX ORDER — ERGOCALCIFEROL 1.25 MG/1
CAPSULE ORAL
Qty: 3 CAPSULE | Refills: 3 | Status: SHIPPED | OUTPATIENT
Start: 2023-09-12 | End: 2023-11-02 | Stop reason: SDUPTHER

## 2023-09-12 NOTE — PROGRESS NOTES
Subjective     Patient ID: Toi Fernandez is a 53 y.o. female.    Chief Complaint: Follow-up    HPI   Stress continues.  Her son has been unable to see his infant daughter.     She is not taking mirtazapine.  Sleeps about 5.5 hours nightly.    E htn.  Sometimes 145/98 - 150/ when emotionally upset.     She thinks stress is contributing.      She only takes demadex when swelling, not daily.    Topirimate prescribed for wt loss, but she is not taking.    BMI 27, but down 10 lbs from June .     Not participating in [facility] group tx following bariatr surgery.    Constipation controlled with linzess qod or so.    No longer taking estradiol.    Ergo monthly.    Rotator cuff pain improved.  Postponing surgical arthroplasty of left thumb CMC,  Review of Systems   Constitutional:  Negative for fever and unexpected weight change.   HENT:  Negative for nasal congestion and postnasal drip.    Eyes:  Negative for pain, discharge and visual disturbance.   Respiratory:  Negative for cough, chest tightness, shortness of breath and wheezing.    Cardiovascular:  Negative for chest pain and leg swelling.   Gastrointestinal:  Negative for abdominal pain, constipation, diarrhea and nausea.   Genitourinary:  Negative for difficulty urinating, dysuria and hematuria.   Integumentary:  Negative for rash.   Neurological:  Negative for headaches.   Psychiatric/Behavioral:  Negative for dysphoric mood and sleep disturbance. The patient is not nervous/anxious.           Objective     Physical Exam  Constitutional:       General: She is not in acute distress.     Appearance: She is well-developed. She is not ill-appearing, toxic-appearing or diaphoretic.   Eyes:      General: No scleral icterus.  Neck:      Thyroid: No thyromegaly.      Vascular: No JVD.   Cardiovascular:      Rate and Rhythm: Normal rate and regular rhythm.      Heart sounds: Normal heart sounds. No murmur heard.  Pulmonary:      Effort: Pulmonary effort is normal. No  respiratory distress.      Breath sounds: Normal breath sounds. No stridor. No wheezing, rhonchi or rales.   Abdominal:      General: There is no distension.      Palpations: Abdomen is soft. There is no mass.      Tenderness: There is no abdominal tenderness. There is no guarding.      Hernia: No hernia is present.   Musculoskeletal:      Cervical back: No rigidity or tenderness.      Right lower leg: No edema.      Left lower leg: No edema.   Lymphadenopathy:      Cervical: No cervical adenopathy.   Neurological:      Mental Status: She is alert and oriented to person, place, and time.   Psychiatric:         Mood and Affect: Mood normal.         Behavior: Behavior normal.         Thought Content: Thought content normal.       Labs reviewed.     Assessment and Plan     1. Bariatric surgery status    2. Stage 3a chronic kidney disease    3. Hypertension, unspecified type    4. Class 1 obesity with serious comorbidity and body mass index (BMI) of 32.0 to 32.9 in adult, unspecified obesity type  -     ergocalciferol (ERGOCALCIFEROL) 50,000 unit Cap; Take 1 capsule by mouth monthly  Dispense: 3 capsule; Refill: 3    5. Vitamin D deficiency  -     ergocalciferol (ERGOCALCIFEROL) 50,000 unit Cap; Take 1 capsule by mouth monthly  Dispense: 3 capsule; Refill: 3  -     Vitamin D; Future; Expected date: 09/12/2023    6. Colon cancer screening  -     Ambulatory referral/consult to Endo Procedure ; Future; Expected date: 09/13/2023    Other orders  -     cyclobenzaprine (FLEXERIL) 10 MG tablet; take 1 tablet by mouth everyday  as needed for  back pain  Dispense: 30 tablet; Refill: 1          Take demadex daily if BP not controlled  Schedule MG  Declines vaccines       Follow up in about 6 months (around 3/12/2024).

## 2023-09-20 ENCOUNTER — LAB VISIT (OUTPATIENT)
Dept: LAB | Facility: HOSPITAL | Age: 53
End: 2023-09-20
Attending: INTERNAL MEDICINE
Payer: COMMERCIAL

## 2023-09-20 ENCOUNTER — PATIENT MESSAGE (OUTPATIENT)
Dept: NEPHROLOGY | Facility: CLINIC | Age: 53
End: 2023-09-20
Payer: COMMERCIAL

## 2023-09-20 DIAGNOSIS — N18.31 STAGE 3A CHRONIC KIDNEY DISEASE: ICD-10-CM

## 2023-09-20 DIAGNOSIS — E55.9 VITAMIN D DEFICIENCY: ICD-10-CM

## 2023-09-20 LAB
25(OH)D3+25(OH)D2 SERPL-MCNC: 30 NG/ML (ref 30–96)
ALBUMIN SERPL BCP-MCNC: 4.3 G/DL (ref 3.5–5.2)
ALP SERPL-CCNC: 57 U/L (ref 38–126)
ALT SERPL W/O P-5'-P-CCNC: 32 U/L (ref 10–44)
ANION GAP SERPL CALC-SCNC: 10 MMOL/L (ref 8–16)
AST SERPL-CCNC: 29 U/L (ref 15–46)
BASOPHILS # BLD AUTO: 0.03 K/UL (ref 0–0.2)
BASOPHILS NFR BLD: 0.7 % (ref 0–1.9)
BILIRUB SERPL-MCNC: 0.9 MG/DL (ref 0.1–1)
CALCIUM SERPL-MCNC: 9.8 MG/DL (ref 8.7–10.5)
CHLORIDE SERPL-SCNC: 106 MMOL/L (ref 95–110)
CO2 SERPL-SCNC: 26 MMOL/L (ref 23–29)
CREAT SERPL-MCNC: 1.47 MG/DL (ref 0.5–1.4)
DIFFERENTIAL METHOD: ABNORMAL
EOSINOPHIL # BLD AUTO: 0.1 K/UL (ref 0–0.5)
EOSINOPHIL NFR BLD: 2.4 % (ref 0–8)
ERYTHROCYTE [DISTWIDTH] IN BLOOD BY AUTOMATED COUNT: 11.8 % (ref 11.5–14.5)
EST. GFR  (NO RACE VARIABLE): 42.7 ML/MIN/1.73 M^2
GLUCOSE SERPL-MCNC: 72 MG/DL (ref 70–110)
HCT VFR BLD AUTO: 42.9 % (ref 37–48.5)
HGB BLD-MCNC: 13.6 G/DL (ref 12–16)
IMM GRANULOCYTES # BLD AUTO: 0.01 K/UL (ref 0–0.04)
IMM GRANULOCYTES NFR BLD AUTO: 0.2 % (ref 0–0.5)
LYMPHOCYTES # BLD AUTO: 1.7 K/UL (ref 1–4.8)
LYMPHOCYTES NFR BLD: 41.6 % (ref 18–48)
MCH RBC QN AUTO: 32.2 PG (ref 27–31)
MCHC RBC AUTO-ENTMCNC: 31.7 G/DL (ref 32–36)
MCV RBC AUTO: 101 FL (ref 82–98)
MONOCYTES # BLD AUTO: 0.2 K/UL (ref 0.3–1)
MONOCYTES NFR BLD: 4.8 % (ref 4–15)
NEUTROPHILS # BLD AUTO: 2.1 K/UL (ref 1.8–7.7)
NEUTROPHILS NFR BLD: 50.3 % (ref 38–73)
NRBC BLD-RTO: 0 /100 WBC
PLATELET # BLD AUTO: 206 K/UL (ref 150–450)
PMV BLD AUTO: 10 FL (ref 9.2–12.9)
POTASSIUM SERPL-SCNC: 4.2 MMOL/L (ref 3.5–5.1)
PROT SERPL-MCNC: 8.3 G/DL (ref 6–8.4)
RBC # BLD AUTO: 4.23 M/UL (ref 4–5.4)
SODIUM SERPL-SCNC: 142 MMOL/L (ref 136–145)
URATE SERPL-MCNC: 5.6 MG/DL (ref 2.4–5.7)
UUN UR-MCNC: 18 MG/DL (ref 7–17)
WBC # BLD AUTO: 4.18 K/UL (ref 3.9–12.7)

## 2023-09-20 PROCEDURE — 80053 COMPREHEN METABOLIC PANEL: CPT | Mod: PO | Performed by: INTERNAL MEDICINE

## 2023-09-20 PROCEDURE — 84550 ASSAY OF BLOOD/URIC ACID: CPT | Performed by: INTERNAL MEDICINE

## 2023-09-20 PROCEDURE — 85025 COMPLETE CBC W/AUTO DIFF WBC: CPT | Mod: PO | Performed by: INTERNAL MEDICINE

## 2023-09-20 PROCEDURE — 36415 COLL VENOUS BLD VENIPUNCTURE: CPT | Mod: PO | Performed by: INTERNAL MEDICINE

## 2023-09-20 PROCEDURE — 82306 VITAMIN D 25 HYDROXY: CPT | Mod: PO | Performed by: INTERNAL MEDICINE

## 2023-09-27 ENCOUNTER — OFFICE VISIT (OUTPATIENT)
Dept: BARIATRICS | Facility: CLINIC | Age: 53
End: 2023-09-27
Payer: COMMERCIAL

## 2023-09-27 VITALS
OXYGEN SATURATION: 99 % | HEIGHT: 68 IN | HEART RATE: 73 BPM | BODY MASS INDEX: 26.95 KG/M2 | SYSTOLIC BLOOD PRESSURE: 120 MMHG | DIASTOLIC BLOOD PRESSURE: 76 MMHG | WEIGHT: 177.81 LBS

## 2023-09-27 DIAGNOSIS — Z98.84 BARIATRIC SURGERY STATUS: ICD-10-CM

## 2023-09-27 DIAGNOSIS — I10 HYPERTENSION, UNSPECIFIED TYPE: ICD-10-CM

## 2023-09-27 DIAGNOSIS — Z71.3 ENCOUNTER FOR WEIGHT LOSS COUNSELING: ICD-10-CM

## 2023-09-27 DIAGNOSIS — E66.3 OVERWEIGHT WITH BODY MASS INDEX (BMI) OF 27 TO 27.9 IN ADULT: Primary | ICD-10-CM

## 2023-09-27 PROCEDURE — 99999 PR PBB SHADOW E&M-EST. PATIENT-LVL IV: ICD-10-PCS | Mod: PBBFAC,,, | Performed by: STUDENT IN AN ORGANIZED HEALTH CARE EDUCATION/TRAINING PROGRAM

## 2023-09-27 PROCEDURE — 3044F HG A1C LEVEL LT 7.0%: CPT | Mod: CPTII,S$GLB,, | Performed by: STUDENT IN AN ORGANIZED HEALTH CARE EDUCATION/TRAINING PROGRAM

## 2023-09-27 PROCEDURE — 1160F PR REVIEW ALL MEDS BY PRESCRIBER/CLIN PHARMACIST DOCUMENTED: ICD-10-PCS | Mod: CPTII,S$GLB,, | Performed by: STUDENT IN AN ORGANIZED HEALTH CARE EDUCATION/TRAINING PROGRAM

## 2023-09-27 PROCEDURE — 99999 PR PBB SHADOW E&M-EST. PATIENT-LVL IV: CPT | Mod: PBBFAC,,, | Performed by: STUDENT IN AN ORGANIZED HEALTH CARE EDUCATION/TRAINING PROGRAM

## 2023-09-27 PROCEDURE — 3066F PR DOCUMENTATION OF TREATMENT FOR NEPHROPATHY: ICD-10-PCS | Mod: CPTII,S$GLB,, | Performed by: STUDENT IN AN ORGANIZED HEALTH CARE EDUCATION/TRAINING PROGRAM

## 2023-09-27 PROCEDURE — 3074F SYST BP LT 130 MM HG: CPT | Mod: CPTII,S$GLB,, | Performed by: STUDENT IN AN ORGANIZED HEALTH CARE EDUCATION/TRAINING PROGRAM

## 2023-09-27 PROCEDURE — 3008F PR BODY MASS INDEX (BMI) DOCUMENTED: ICD-10-PCS | Mod: CPTII,S$GLB,, | Performed by: STUDENT IN AN ORGANIZED HEALTH CARE EDUCATION/TRAINING PROGRAM

## 2023-09-27 PROCEDURE — 99213 OFFICE O/P EST LOW 20 MIN: CPT | Mod: S$GLB,,, | Performed by: STUDENT IN AN ORGANIZED HEALTH CARE EDUCATION/TRAINING PROGRAM

## 2023-09-27 PROCEDURE — 3066F NEPHROPATHY DOC TX: CPT | Mod: CPTII,S$GLB,, | Performed by: STUDENT IN AN ORGANIZED HEALTH CARE EDUCATION/TRAINING PROGRAM

## 2023-09-27 PROCEDURE — 3008F BODY MASS INDEX DOCD: CPT | Mod: CPTII,S$GLB,, | Performed by: STUDENT IN AN ORGANIZED HEALTH CARE EDUCATION/TRAINING PROGRAM

## 2023-09-27 PROCEDURE — 3074F PR MOST RECENT SYSTOLIC BLOOD PRESSURE < 130 MM HG: ICD-10-PCS | Mod: CPTII,S$GLB,, | Performed by: STUDENT IN AN ORGANIZED HEALTH CARE EDUCATION/TRAINING PROGRAM

## 2023-09-27 PROCEDURE — 1159F PR MEDICATION LIST DOCUMENTED IN MEDICAL RECORD: ICD-10-PCS | Mod: CPTII,S$GLB,, | Performed by: STUDENT IN AN ORGANIZED HEALTH CARE EDUCATION/TRAINING PROGRAM

## 2023-09-27 PROCEDURE — 3078F PR MOST RECENT DIASTOLIC BLOOD PRESSURE < 80 MM HG: ICD-10-PCS | Mod: CPTII,S$GLB,, | Performed by: STUDENT IN AN ORGANIZED HEALTH CARE EDUCATION/TRAINING PROGRAM

## 2023-09-27 PROCEDURE — 3044F PR MOST RECENT HEMOGLOBIN A1C LEVEL <7.0%: ICD-10-PCS | Mod: CPTII,S$GLB,, | Performed by: STUDENT IN AN ORGANIZED HEALTH CARE EDUCATION/TRAINING PROGRAM

## 2023-09-27 PROCEDURE — 1159F MED LIST DOCD IN RCRD: CPT | Mod: CPTII,S$GLB,, | Performed by: STUDENT IN AN ORGANIZED HEALTH CARE EDUCATION/TRAINING PROGRAM

## 2023-09-27 PROCEDURE — 4010F ACE/ARB THERAPY RXD/TAKEN: CPT | Mod: CPTII,S$GLB,, | Performed by: STUDENT IN AN ORGANIZED HEALTH CARE EDUCATION/TRAINING PROGRAM

## 2023-09-27 PROCEDURE — 99213 PR OFFICE/OUTPT VISIT, EST, LEVL III, 20-29 MIN: ICD-10-PCS | Mod: S$GLB,,, | Performed by: STUDENT IN AN ORGANIZED HEALTH CARE EDUCATION/TRAINING PROGRAM

## 2023-09-27 PROCEDURE — 3078F DIAST BP <80 MM HG: CPT | Mod: CPTII,S$GLB,, | Performed by: STUDENT IN AN ORGANIZED HEALTH CARE EDUCATION/TRAINING PROGRAM

## 2023-09-27 PROCEDURE — 4010F PR ACE/ARB THEARPY RXD/TAKEN: ICD-10-PCS | Mod: CPTII,S$GLB,, | Performed by: STUDENT IN AN ORGANIZED HEALTH CARE EDUCATION/TRAINING PROGRAM

## 2023-09-27 PROCEDURE — 1160F RVW MEDS BY RX/DR IN RCRD: CPT | Mod: CPTII,S$GLB,, | Performed by: STUDENT IN AN ORGANIZED HEALTH CARE EDUCATION/TRAINING PROGRAM

## 2023-09-27 RX ORDER — TOPIRAMATE 50 MG/1
50 TABLET, FILM COATED ORAL 2 TIMES DAILY
Qty: 180 TABLET | Refills: 0 | Status: SHIPPED | OUTPATIENT
Start: 2023-09-27 | End: 2023-12-05 | Stop reason: SDUPTHER

## 2023-09-27 NOTE — PROGRESS NOTES
"Subjective     Patient ID: Toi Fernandez is a 53 y.o. female.    Chief Complaint: Follow-up and Weight Check    Patient presents for treatment of obesity.   Gastric Sleeve in 2020  234 lbs presurgery weight  172 lbs lowest post-surgery weight  Goal weight: 160 lbs    Co-morbidities  Migraines - on topiramate as needed  HTN  CKD  Depression    Current Physical Activity  "Stays busy all day"  No regular exercise routine    Current Eating Habits  Breakfast - oatmeal, grits, egg mcmuffin  Lunch - chicken salad  Dinner - baked chicken, broccoli  Snacks - not a snacker  Beverages - coke zero, sweet tea    Snacking on fruits  Not eating out as much    Medical Weight Loss  6/20/2023: 187.7 lbs, BMI 28.5, BFP 36.2%, BFM 68 lbs, SMM 66.1 lbs  9/27/2023: 177.8 lbs, BMI 27, BFP 34%, BFM 60.5 lbs, SMM 64.8 lbs, BMR 1519 kcal        Review of Systems   Constitutional:  Negative for chills and fever.   Respiratory:  Negative for shortness of breath.    Cardiovascular:  Negative for chest pain.   Gastrointestinal:  Negative for abdominal pain, nausea and vomiting.   Neurological:  Negative for dizziness and light-headedness.   Psychiatric/Behavioral:  The patient is not nervous/anxious.           Objective     Vitals:    09/27/23 1501   BP: 120/76   Pulse: 73       Physical Exam  Vitals reviewed.   Constitutional:       General: She is not in acute distress.     Appearance: Normal appearance. She is obese. She is not ill-appearing, toxic-appearing or diaphoretic.   HENT:      Head: Normocephalic and atraumatic.   Cardiovascular:      Rate and Rhythm: Normal rate.   Pulmonary:      Effort: Pulmonary effort is normal. No respiratory distress.   Skin:     General: Skin is warm and dry.   Neurological:      Mental Status: She is alert and oriented to person, place, and time.            Assessment and Plan     1. Overweight with body mass index (BMI) of 27 to 27.9 in adult    2. Hypertension, unspecified type    3. Bariatric surgery " status    4. Encounter for weight loss counseling    Other orders  -     topiramate (TOPAMAX) 50 MG tablet; Take 1 tablet (50 mg total) by mouth 2 (two) times daily.  Dispense: 180 tablet; Refill: 0          - Topiramate 50 mg twice daily    - Bariatric Diet    - Moderate intensity aerobic exercise plus strength exercises for 150 minutes per week

## 2023-10-04 ENCOUNTER — PATIENT MESSAGE (OUTPATIENT)
Dept: BARIATRICS | Facility: CLINIC | Age: 53
End: 2023-10-04
Payer: COMMERCIAL

## 2023-10-10 ENCOUNTER — PATIENT MESSAGE (OUTPATIENT)
Dept: BARIATRICS | Facility: CLINIC | Age: 53
End: 2023-10-10
Payer: COMMERCIAL

## 2023-10-30 ENCOUNTER — TELEPHONE (OUTPATIENT)
Dept: BARIATRICS | Facility: CLINIC | Age: 53
End: 2023-10-30
Payer: COMMERCIAL

## 2023-10-30 ENCOUNTER — PATIENT MESSAGE (OUTPATIENT)
Dept: BARIATRICS | Facility: CLINIC | Age: 53
End: 2023-10-30
Payer: COMMERCIAL

## 2023-10-30 DIAGNOSIS — Z98.84 BARIATRIC SURGERY STATUS: Primary | ICD-10-CM

## 2023-10-30 DIAGNOSIS — Z90.3 S/P GASTRIC SLEEVE PROCEDURE: ICD-10-CM

## 2023-11-01 ENCOUNTER — LAB VISIT (OUTPATIENT)
Dept: LAB | Facility: HOSPITAL | Age: 53
End: 2023-11-01
Attending: NURSE PRACTITIONER
Payer: COMMERCIAL

## 2023-11-01 ENCOUNTER — OFFICE VISIT (OUTPATIENT)
Dept: BARIATRICS | Facility: CLINIC | Age: 53
End: 2023-11-01
Payer: COMMERCIAL

## 2023-11-01 VITALS
OXYGEN SATURATION: 97 % | BODY MASS INDEX: 27.32 KG/M2 | DIASTOLIC BLOOD PRESSURE: 89 MMHG | SYSTOLIC BLOOD PRESSURE: 157 MMHG | WEIGHT: 179.69 LBS | HEART RATE: 58 BPM

## 2023-11-01 DIAGNOSIS — Z98.84 BARIATRIC SURGERY STATUS: ICD-10-CM

## 2023-11-01 DIAGNOSIS — Z90.3 S/P GASTRIC SLEEVE PROCEDURE: Primary | ICD-10-CM

## 2023-11-01 DIAGNOSIS — Z90.3 S/P GASTRIC SLEEVE PROCEDURE: ICD-10-CM

## 2023-11-01 LAB
25(OH)D3+25(OH)D2 SERPL-MCNC: 28 NG/ML (ref 30–96)
ALBUMIN SERPL BCP-MCNC: 4.2 G/DL (ref 3.5–5.2)
ALP SERPL-CCNC: 60 U/L (ref 38–126)
ALT SERPL W/O P-5'-P-CCNC: 27 U/L (ref 10–44)
ANION GAP SERPL CALC-SCNC: 9 MMOL/L (ref 8–16)
AST SERPL-CCNC: 26 U/L (ref 15–46)
BASOPHILS # BLD AUTO: 0.02 K/UL (ref 0–0.2)
BASOPHILS NFR BLD: 0.5 % (ref 0–1.9)
BILIRUB SERPL-MCNC: 0.9 MG/DL (ref 0.1–1)
CALCIUM SERPL-MCNC: 9.5 MG/DL (ref 8.7–10.5)
CHLORIDE SERPL-SCNC: 107 MMOL/L (ref 95–110)
CHOLEST SERPL-MCNC: 157 MG/DL (ref 120–199)
CHOLEST/HDLC SERPL: 3.1 {RATIO} (ref 2–5)
CO2 SERPL-SCNC: 29 MMOL/L (ref 23–29)
CREAT SERPL-MCNC: 1.3 MG/DL (ref 0.5–1.4)
DIFFERENTIAL METHOD: ABNORMAL
EOSINOPHIL # BLD AUTO: 0.1 K/UL (ref 0–0.5)
EOSINOPHIL NFR BLD: 3.3 % (ref 0–8)
ERYTHROCYTE [DISTWIDTH] IN BLOOD BY AUTOMATED COUNT: 11.4 % (ref 11.5–14.5)
EST. GFR  (NO RACE VARIABLE): 49.5 ML/MIN/1.73 M^2
GLUCOSE SERPL-MCNC: 116 MG/DL (ref 70–110)
HCT VFR BLD AUTO: 40.6 % (ref 37–48.5)
HDLC SERPL-MCNC: 50 MG/DL (ref 40–75)
HDLC SERPL: 31.8 % (ref 20–50)
HGB BLD-MCNC: 13.1 G/DL (ref 12–16)
IMM GRANULOCYTES # BLD AUTO: 0.01 K/UL (ref 0–0.04)
IMM GRANULOCYTES NFR BLD AUTO: 0.2 % (ref 0–0.5)
IRON SERPL-MCNC: 109 UG/DL (ref 30–160)
LDLC SERPL CALC-MCNC: 92.6 MG/DL (ref 63–159)
LYMPHOCYTES # BLD AUTO: 2.3 K/UL (ref 1–4.8)
LYMPHOCYTES NFR BLD: 54 % (ref 18–48)
MCH RBC QN AUTO: 32.1 PG (ref 27–31)
MCHC RBC AUTO-ENTMCNC: 32.3 G/DL (ref 32–36)
MCV RBC AUTO: 100 FL (ref 82–98)
MONOCYTES # BLD AUTO: 0.2 K/UL (ref 0.3–1)
MONOCYTES NFR BLD: 4.5 % (ref 4–15)
NEUTROPHILS # BLD AUTO: 1.6 K/UL (ref 1.8–7.7)
NEUTROPHILS NFR BLD: 37.5 % (ref 38–73)
NONHDLC SERPL-MCNC: 107 MG/DL
NRBC BLD-RTO: 0 /100 WBC
PLATELET # BLD AUTO: 199 K/UL (ref 150–450)
PMV BLD AUTO: 10.3 FL (ref 9.2–12.9)
POTASSIUM SERPL-SCNC: 3.8 MMOL/L (ref 3.5–5.1)
PROT SERPL-MCNC: 7.9 G/DL (ref 6–8.4)
RBC # BLD AUTO: 4.08 M/UL (ref 4–5.4)
SATURATED IRON: 41 % (ref 20–50)
SODIUM SERPL-SCNC: 145 MMOL/L (ref 136–145)
TOTAL IRON BINDING CAPACITY: 269 UG/DL (ref 250–450)
TRANSFERRIN SERPL-MCNC: 182 MG/DL (ref 200–375)
TRIGL SERPL-MCNC: 72 MG/DL (ref 30–150)
UUN UR-MCNC: 16 MG/DL (ref 7–17)
VIT B12 SERPL-MCNC: 388 PG/ML (ref 210–950)
WBC # BLD AUTO: 4.2 K/UL (ref 3.9–12.7)

## 2023-11-01 PROCEDURE — 3044F HG A1C LEVEL LT 7.0%: CPT | Mod: CPTII,S$GLB,, | Performed by: NURSE PRACTITIONER

## 2023-11-01 PROCEDURE — 3008F BODY MASS INDEX DOCD: CPT | Mod: CPTII,S$GLB,, | Performed by: NURSE PRACTITIONER

## 2023-11-01 PROCEDURE — 83540 ASSAY OF IRON: CPT | Mod: PN | Performed by: NURSE PRACTITIONER

## 2023-11-01 PROCEDURE — 36415 COLL VENOUS BLD VENIPUNCTURE: CPT | Mod: PN | Performed by: NURSE PRACTITIONER

## 2023-11-01 PROCEDURE — 3077F PR MOST RECENT SYSTOLIC BLOOD PRESSURE >= 140 MM HG: ICD-10-PCS | Mod: CPTII,S$GLB,, | Performed by: NURSE PRACTITIONER

## 2023-11-01 PROCEDURE — 80061 LIPID PANEL: CPT | Performed by: NURSE PRACTITIONER

## 2023-11-01 PROCEDURE — 82607 VITAMIN B-12: CPT | Mod: PN | Performed by: NURSE PRACTITIONER

## 2023-11-01 PROCEDURE — 4010F PR ACE/ARB THEARPY RXD/TAKEN: ICD-10-PCS | Mod: CPTII,S$GLB,, | Performed by: NURSE PRACTITIONER

## 2023-11-01 PROCEDURE — 82306 VITAMIN D 25 HYDROXY: CPT | Mod: PN | Performed by: NURSE PRACTITIONER

## 2023-11-01 PROCEDURE — 3008F PR BODY MASS INDEX (BMI) DOCUMENTED: ICD-10-PCS | Mod: CPTII,S$GLB,, | Performed by: NURSE PRACTITIONER

## 2023-11-01 PROCEDURE — 85025 COMPLETE CBC W/AUTO DIFF WBC: CPT | Mod: PN | Performed by: NURSE PRACTITIONER

## 2023-11-01 PROCEDURE — 1159F MED LIST DOCD IN RCRD: CPT | Mod: CPTII,S$GLB,, | Performed by: NURSE PRACTITIONER

## 2023-11-01 PROCEDURE — 4010F ACE/ARB THERAPY RXD/TAKEN: CPT | Mod: CPTII,S$GLB,, | Performed by: NURSE PRACTITIONER

## 2023-11-01 PROCEDURE — 3066F NEPHROPATHY DOC TX: CPT | Mod: CPTII,S$GLB,, | Performed by: NURSE PRACTITIONER

## 2023-11-01 PROCEDURE — 3066F PR DOCUMENTATION OF TREATMENT FOR NEPHROPATHY: ICD-10-PCS | Mod: CPTII,S$GLB,, | Performed by: NURSE PRACTITIONER

## 2023-11-01 PROCEDURE — 1159F PR MEDICATION LIST DOCUMENTED IN MEDICAL RECORD: ICD-10-PCS | Mod: CPTII,S$GLB,, | Performed by: NURSE PRACTITIONER

## 2023-11-01 PROCEDURE — 84466 ASSAY OF TRANSFERRIN: CPT | Mod: PN | Performed by: NURSE PRACTITIONER

## 2023-11-01 PROCEDURE — 3044F PR MOST RECENT HEMOGLOBIN A1C LEVEL <7.0%: ICD-10-PCS | Mod: CPTII,S$GLB,, | Performed by: NURSE PRACTITIONER

## 2023-11-01 PROCEDURE — 99999 PR PBB SHADOW E&M-EST. PATIENT-LVL IV: CPT | Mod: PBBFAC,,, | Performed by: NURSE PRACTITIONER

## 2023-11-01 PROCEDURE — 99213 PR OFFICE/OUTPT VISIT, EST, LEVL III, 20-29 MIN: ICD-10-PCS | Mod: S$GLB,,, | Performed by: NURSE PRACTITIONER

## 2023-11-01 PROCEDURE — 1160F PR REVIEW ALL MEDS BY PRESCRIBER/CLIN PHARMACIST DOCUMENTED: ICD-10-PCS | Mod: CPTII,S$GLB,, | Performed by: NURSE PRACTITIONER

## 2023-11-01 PROCEDURE — 84425 ASSAY OF VITAMIN B-1: CPT | Mod: PN | Performed by: NURSE PRACTITIONER

## 2023-11-01 PROCEDURE — 3079F DIAST BP 80-89 MM HG: CPT | Mod: CPTII,S$GLB,, | Performed by: NURSE PRACTITIONER

## 2023-11-01 PROCEDURE — 99213 OFFICE O/P EST LOW 20 MIN: CPT | Mod: S$GLB,,, | Performed by: NURSE PRACTITIONER

## 2023-11-01 PROCEDURE — 3077F SYST BP >= 140 MM HG: CPT | Mod: CPTII,S$GLB,, | Performed by: NURSE PRACTITIONER

## 2023-11-01 PROCEDURE — 3079F PR MOST RECENT DIASTOLIC BLOOD PRESSURE 80-89 MM HG: ICD-10-PCS | Mod: CPTII,S$GLB,, | Performed by: NURSE PRACTITIONER

## 2023-11-01 PROCEDURE — 99999 PR PBB SHADOW E&M-EST. PATIENT-LVL IV: ICD-10-PCS | Mod: PBBFAC,,, | Performed by: NURSE PRACTITIONER

## 2023-11-01 PROCEDURE — 80053 COMPREHEN METABOLIC PANEL: CPT | Mod: PN | Performed by: NURSE PRACTITIONER

## 2023-11-01 PROCEDURE — 1160F RVW MEDS BY RX/DR IN RCRD: CPT | Mod: CPTII,S$GLB,, | Performed by: NURSE PRACTITIONER

## 2023-11-01 NOTE — PROGRESS NOTES
BARIATRIC POST-OPERATIVE FOLLOW UP:    HPI:  53 y.o.-year-old female presents for 3 year follow up .    Denies: nausea, vomiting, abdominal pain, changes in bowel movement pattern, fever, chills, dysphagia, chest pain, and shortness of breath.    Concerns with some weight gain. Will restart back on protein shakes and tracking      ROS:    Review of Systems   Constitutional:  Negative for activity change and fatigue.   Respiratory:  Negative for cough and shortness of breath.    Cardiovascular:  Negative for chest pain, palpitations and leg swelling.   Gastrointestinal:  Negative for abdominal pain, nausea and vomiting.   Endocrine: Negative for polydipsia, polyphagia and polyuria.   Genitourinary:  Negative for dysuria.   Musculoskeletal:  Negative for gait problem.   Skin:  Negative for rash.   Allergic/Immunologic: Negative for immunocompromised state.   Neurological:  Negative for dizziness, syncope and weakness.   Hematological:  Does not bruise/bleed easily.   Psychiatric/Behavioral:  Negative for behavioral problems.        EXERCISE:  Not currently     VITAMINS:  Adherent vitamins     MEDICATIONS/ALLERGIES:  Have been reviewed.    DIET:  Bariatric Regular Diet    PHYSICAL EXAM:  GENERAL: 53 y.o.-year-old female in NAD, A&O x3  VITAL SIGNS:  BP:    CHEST: Clear to auscultation, regular effort.  HEART: Regular rate and rhythm. No murmurs, clicks, or gallops.  ABDOMEN: Bowel sounds present in all four quadrants. Soft, non-tender, non-distended. Well-healing surgical scars are clean, dry, and intact without signs of infection or bleeding.  EXTREMITIES: No clubbing, cyanosis, or edema.      ASSESSMENT:  - Morbid obesity s/p sleeve gastrectomy on 5/27/20.  - Co-morbidities: hypertension and CKD  - Weight loss 52 lbs  62% EWL  - Exercise regimen not currently   - Diet fair   - Vitamin Regimen good    PLAN:  - Emphasized the importance of regular exercise and adherence to bariatric diet to achieve maximum weight  loss.  - Encouraged patient to begin OR continue regular exercise.  - Follow-up with dietician to reinforce diet.  - Continue daily vitamins and medications.  - RTC in 12 months or sooner if needed.  - Call the office for any issues.  - Check labs today.

## 2023-11-01 NOTE — PATIENT INSTRUCTIONS
Meal Ideas for Regular Bariatric Diet  *Recipes and products available at [email or web address]      Breakfast: (15-20g protein)    - Egg white omelet: 2 egg whites or ½ cup Egg Beaters. (Optional proteins: cheese, shrimp, black beans, chicken, sliced turkey) (Optional veggies: tomatoes, salsa, spinach, mushrooms, onions, green peppers, or small slice avocado)     - Egg and sausage: 1 egg or ¼ cup Egg Beaters (any variety), with 1 garth or 2 links of Turkey sausage or Veggie breakfast sausage (picoChip or Cirro)    - Crust-less breakfast quiche: To make a glass pie dish, mix 4oz part skim Ricotta, 1 cup skim milk, and 2 eggs as your base. Add protein: shredded cheese, sliced lean ham or turkey, turkey medina/sausage. Add veggies: tomato, onion, green onion, mushroom, green pepper, spinach, etc.    - Yogurt parfait: Mix 1 - 6oz container Dannon Light N Fit vanilla yogurt, with ¼ cup crushed unsalted nuts    - Cottage cheese and fruit: ½ cup part-skim cottage cheese or ricotta cheese topped with fresh fruit or sugar free preserves     - Nurys Shook's Vanilla Egg custard* (add 2 Tbsp instant coffee granules to make Cappuccino Custard*)    - Hi-Protein café latte (skim milk, decaf coffee, 1 scoop protein powder). Optional to add Sugar free syrup or extract flavoring.    - Breakfast Lox: spread fat free cream cheese on slices of smoked salmon. Serve over scrambled or egg over easy (sauteed with nonstick cookspray) OR on a cucumber slice    - Eggwhich: Scramble or cook 1 large egg over easy using nonstick cookspray. Place between 2 slices of [place] medina and low fat cheese.     Lunch: (20-30g protein)    - ½ cup Black bean soup (Homemade or Progresso), with ¼ cup shredded low-fat cheese. Top with chopped tomato or fresh salsa.     - Lean deli turkey breast and low-fat sliced cheese, mustard or light maurer to moisten, rolled up together, or wrapped in a Cresencio lettuce leaf    - Chicken salad made from dinner  leftovers, moisten with low-fat salad dressing or light maurer. Also try leftover salmon, shrimp, tuna or boiled eggs. Serve ½ cup over dark green salad    - Fat-free canned refried beans, topped with ¼ cup shredded low-fat cheese. Top with chopped tomato or fresh salsa.     - Greek salad: Top mixed greens with 1-2oz grilled chicken, tomatoes, red onions, 2-3 kalamata olives, and sprinkle lightly with feta cheese. Spritz with Balsamic vinegar to taste.     - Crust-less lunch quiche: To make a glass pie dish, mix 4oz part skim Ricotta, 1 cup skim milk, and 2 eggs as your base. Add protein: shredded cheese, sliced lean ham or turkey, shrimp, chicken. Add veggies: tomato, onion, green onion, mushroom, green pepper, spinach, artichoke, broccoli, etc.    - Pizza bake: spread a  alfonso jony mushroom with tomato sauce, low-fat shredded mozzarella and turkey pepperoni or Lodge medina. Add any veggies. Roast for 10-15 minutes, until cheese melted.     - Cucumber crab bites: Spread ¼ cup crab dip (lump crabmeat + light cream cheese and green onions) over sliced cucumber.     - Chicken with light spinach and artichoke dip*: Puree in : 6oz cooked and drained spinach, 2 cloves garlic, 1 can cannelloni beans, ½ cup chopped green onions, 1 can drained artichoke hearts (not marinated in oil), lemon juice and basil. Mix in 2oz chopped up chicken.    Supper: (20-30g protein)    - Serve grilled fish over dark green salad tossed with low-fat dressing, served with grilled asparagus raymundo     - Rotisserie chicken salad: served with sliced strawberries, walnuts, fat-free feta cheese crumbles and 1 tbsp Shepards Own Light Raspberry [place] Vinaigrette    - Shrimp cocktail: Dip cold boiled shrimp in homemade low-sugar cocktail sauce (1/2 cup Indra One Carb ketchup, 2 tbsp horseradish, 1/4 tsp hot sauce, 1 tsp Worcestershire sauce, 1 tbsp freshly-squeezed lemon juice). Serve with dark green salad, walnuts, and crumbled blue  cheese drizzled with olive oil and Balsamic vinegar    - Tuna Melt: Spread tuna salad onto 2 thick slices of tomato. Top with low-fat cheese and broil until cheese is melted. May also be made with chicken salad of shrimp salad. [place] with different types of cheeses.    - Chicken or beef fajitas (no tortilla, rice, beans, chips). Top meat and veggies w/ fresh salsa, fat free sour cream.     - Homemade low-fat Chili using extra lean ground beef or ground turkey. Top with shredded cheese and salsa as desired. May add dollop fat-free sour cream if desired    - Chicken parmesan: Top chicken breast w/ low sugar marinara sauce, mozzarella cheese and bake until chicken reaches 165*.  Serve w/ spaghetti SQUASH or Stateless cut green beans    - Dinner Omelet with shrimp or chicken and onion, green peppers and chives.    - No noodle lasagna: Use sliced zucchini or eggplant in place of noodles.  Layer with part skim ricotta cheese and low sugar meat sauce (use very lean ground beef or ground turkey).    - Mexican chicken bake: Bake chunks of chicken breast or thigh with taco seasoning, Pace brand enchilada sauce, green onions and low-fat cheese. Serve with ¼ cup black beans or fat free refried beans topped with chopped tomatoes or salsa.    - Leticia frozen meatballs, simmered in Classico Marinara sauce. Different flavors of salsa or spaghetti sauce create different dishes! Sprinkle with parmesan cheese. Serve with grilled or steamed veggies, or a dark green salad.    - Simmer boneless skinless chicken thigh chunks in Classico Marinara sauce or roasted salsa until tender with chopped onion, bell pepper, garlic, mushrooms, spinach, etc.     - Hamburger or veggie burger, without the bun, dressed the way you like. Served with grilled or steamed veggies.    - Eggplant parmesan: Bake slices of eggplant at 350 degrees for 15 minutes. Layer tomato sauce, sliced eggplant and low-fat mozzarella cheese in a baking dish and cover with  foil. Bake 30-40 more minutes or until bubbly. Uncover and bake at 400 degrees for about 15 more minutes, or until top is slightly crisp.    - Fish tacos: grilled/baked white fish, wrapped in Cresencio lettuce leaf, topped with salsa, shredded low-fat cheese, and light coleslaw.    - Chicken franklin: Sprinkle chicken w/ 1 tsp of hidden valley ranch dip mix. Then grill chicken and top with black beans, salsa and 1 tsp fat free sour cream.     - Cauliflower pizza crust: Use cauliflower as crust (see recipe on pinterest, no flour!). Top w/ low fat cheese, turkey pepperoni and veggies and bake again    - chicken or turkey crust pizza: use ground chicken or turkey instead of cauliflower, spread in [place] and bake at 350 for about 20-30 minutes(may want to add garlic, black pepper, oregano and other herbs to ground meat mixture).  Remove and top w/ low fat cheese, turkey pepperoni and veggies and bake again for another 10 minutes or until cheese is browned.     Snacks: (100-200 calories; >5g protein)    - 1 low-fat cheese stick with 8 cherry tomatoes or 1 serving fresh fruit  - 4 thin slices fat-free turkey breast and 1 slice low-fat cheese  - 4 thin slices fat-free honey ham with wedge of melon  - 6-8 edamame pods (equivalent to about 1/4 cup edamame without pods).   - 1/4 cup unsalted nuts with ½ cup fruit  - 6-oz container Dannon Light n Fit vanilla yogurt, topped with 1oz unsalted nuts         - apple, celery or baby carrots spread with 2 Tbsp PB2  - apple slices with 1 oz slice low-fat cheese  - Apple slices dipped in 2 Tbsp of PB2  - celery, cucumber, bell pepper or baby carrots dipped in ¼ cup hummus bean spread or light spinach and artichoke dip (*recipe in lunch section)  - celery, cucumber, baby carrots dipped in high protein greek yogurt (Mix 16 oz plain greek yogurt + 1 packet of hidden valley ranch dip mix)  - Jackson Links Beef Steak - 14g protein! (similar to beef jerky)  - 2 wedges Laughing Cow - Light Herb  & Garlic Cheese with sliced cucumber or green bell pepper  - 1/2 cup low-fat cottage cheese with ¼ cup fruit or ¼ cup salsa  - RTD Protein drinks: Atkins, Low Carb Slim Fast, EAS light, Muscle Milk Light, etc.  - Homemade Protein drinks: GNC Soy95, Isopure, Nectar, UNJURY, Whey Gourmet, etc. Mix 1 scoop powder with 8oz skim/1% milk or light soymilk.  - Protein bars: Atkins, EAS, Pure Protein, Think Thin, Detour, etc. Must have 0-4 grams sugar - Read the label.    Takeout Options: No more than twice/week  Deli - Salads (no pasta or rice), meats, cheeses. Roasted chicken. Lox (salmon)    Mexican - Platters which don't include tortillas, chips, or rice. Go easy on the beans. Example: Fajitas without the tortillas. Ask the  not to bring chips to the table if they are too tempting.    Greek - Meat or fish and vegetable, but no bread or rice. Including hummus, baba ganoush, etc, is OK. Most sit-down Greek restaurants can provide you with cucumber slices for dipping instead of jenny bread.    Fast Food (Avoid as much as possible) - Salads (no croutons and limit salad dressing to 2 tbsp), grilled chicken sandwich without the bun and ask for no maurer. Kylees low fat chili or Taco Bell pintos and cheese.    BBQ - The meats are fine if you ask for sauces on the side, but most of the traditional side dishes are loaded with carbs. Monty slaw, baked beans and BBQ sauce are typically made with sugar.    Chinese - Nothing deep-fried, no rice or noodles. Many Chinese sauces have starch and sugar in them, so you'll have to use your judgement. If you find that these sauces trigger cravings, or cause Dumping, you can ask for the sauce to be made without sugar or just use soy sauce.    REBOOT DIET  High Protein Liquid Diet to Modified Liquid Diet  *Remember to always get in  grams of protein daily*    Sugar Free clear liquids allowed in unlimited amounts (H20, Crystal light, SF jello, low sodium broth, Powerade Zero,  Caffeine free tea, Diet V8 splash, etc)      For 7 days you should drink 4: 25-30 gram protein shakes (must have 4 g of sugar or less) to obtain between  grams of protein.        On Day 8 you will replace 1 protein shake with 1 high protein foods.  Therefore you will be drinking 3 protein shakes with 1 high protein foods. You will do this for 1 week.    After 1 week on 3 shakes and 1 high protein foods, you will replace another shake with 1-2 high protein foods.  Therefore you will be drinking 2 protein shakes with 3 high protein foods.  You will do this until you meet your desired goal.     High Protein Foods Include:  Canned tuna or chicken (packed in water)  Lean ground turkey breast or ground round  Turkey or chicken (no skin); cooked tender and cut in small pieces  [place] chicken meatballs  Lean pork or beef (cook in crock pot until very tender; cut in small pieces  Scrambled, poached, or boiled eggs  Baked, broiled, grilled or boiled fish and seafood (not fried!)  Silken tofu, Edamame (soybeans)  Beans, hummus and lentils  Lean deli meats (turkey and chicken breast, ham, roast beef)  1% or Skim Milk, Lactaid, or Soymilk  Low-fat or fat-free cottage cheese, soft cheese, mozzarella string cheese, or ricotta   Light yogurt, Greek yogurt, SF pudding      Avoid all items below:  High fat milk (whole, 2%)  Butter, margarine, oil, mayonnaise  Sour cream, cream cheese, salad dressing  Ice Cream Cakes, cookies, pies, desserts, candy  Luncheon meats (bologna, salami, chopped ham)  Corn Granola/cereal with nuts  Shredded Coconut  Sausage, Rabago Gravy Fried Foods  White and wheat Bread, Rice, Pasta Cereals (including grits, oatmeal)  Crackers, Pretzels, Chips, Granola Corn, Popcorn, Peas  White Potatoes, Sweet potatoes Flour and corn tortillas  Sugary drinks  Carbonated drinks  Alcohol

## 2023-11-02 DIAGNOSIS — E66.9 CLASS 1 OBESITY WITH SERIOUS COMORBIDITY AND BODY MASS INDEX (BMI) OF 32.0 TO 32.9 IN ADULT, UNSPECIFIED OBESITY TYPE: ICD-10-CM

## 2023-11-02 DIAGNOSIS — E55.9 VITAMIN D DEFICIENCY: ICD-10-CM

## 2023-11-02 RX ORDER — ERGOCALCIFEROL 1.25 MG/1
50000 CAPSULE ORAL
Qty: 12 CAPSULE | Refills: 0 | Status: SHIPPED | OUTPATIENT
Start: 2023-11-02 | End: 2024-01-19

## 2023-11-07 LAB — VIT B1 BLD-MCNC: 73 UG/L (ref 38–122)

## 2023-11-14 ENCOUNTER — PATIENT MESSAGE (OUTPATIENT)
Dept: BARIATRICS | Facility: CLINIC | Age: 53
End: 2023-11-14
Payer: COMMERCIAL

## 2023-12-05 ENCOUNTER — OFFICE VISIT (OUTPATIENT)
Dept: BARIATRICS | Facility: CLINIC | Age: 53
End: 2023-12-05
Payer: COMMERCIAL

## 2023-12-05 VITALS
OXYGEN SATURATION: 100 % | SYSTOLIC BLOOD PRESSURE: 155 MMHG | BODY MASS INDEX: 27.34 KG/M2 | HEART RATE: 57 BPM | WEIGHT: 180.38 LBS | HEIGHT: 68 IN | DIASTOLIC BLOOD PRESSURE: 87 MMHG

## 2023-12-05 DIAGNOSIS — Z98.84 BARIATRIC SURGERY STATUS: ICD-10-CM

## 2023-12-05 DIAGNOSIS — E66.3 OVERWEIGHT (BMI 25.0-29.9): Primary | ICD-10-CM

## 2023-12-05 DIAGNOSIS — Z71.3 ENCOUNTER FOR WEIGHT LOSS COUNSELING: ICD-10-CM

## 2023-12-05 DIAGNOSIS — I10 PRIMARY HYPERTENSION: ICD-10-CM

## 2023-12-05 PROCEDURE — 3044F HG A1C LEVEL LT 7.0%: CPT | Mod: CPTII,S$GLB,, | Performed by: STUDENT IN AN ORGANIZED HEALTH CARE EDUCATION/TRAINING PROGRAM

## 2023-12-05 PROCEDURE — 3077F SYST BP >= 140 MM HG: CPT | Mod: CPTII,S$GLB,, | Performed by: STUDENT IN AN ORGANIZED HEALTH CARE EDUCATION/TRAINING PROGRAM

## 2023-12-05 PROCEDURE — 3077F PR MOST RECENT SYSTOLIC BLOOD PRESSURE >= 140 MM HG: ICD-10-PCS | Mod: CPTII,S$GLB,, | Performed by: STUDENT IN AN ORGANIZED HEALTH CARE EDUCATION/TRAINING PROGRAM

## 2023-12-05 PROCEDURE — 99999 PR PBB SHADOW E&M-EST. PATIENT-LVL IV: CPT | Mod: PBBFAC,,, | Performed by: STUDENT IN AN ORGANIZED HEALTH CARE EDUCATION/TRAINING PROGRAM

## 2023-12-05 PROCEDURE — 3008F BODY MASS INDEX DOCD: CPT | Mod: CPTII,S$GLB,, | Performed by: STUDENT IN AN ORGANIZED HEALTH CARE EDUCATION/TRAINING PROGRAM

## 2023-12-05 PROCEDURE — 3066F PR DOCUMENTATION OF TREATMENT FOR NEPHROPATHY: ICD-10-PCS | Mod: CPTII,S$GLB,, | Performed by: STUDENT IN AN ORGANIZED HEALTH CARE EDUCATION/TRAINING PROGRAM

## 2023-12-05 PROCEDURE — 3079F PR MOST RECENT DIASTOLIC BLOOD PRESSURE 80-89 MM HG: ICD-10-PCS | Mod: CPTII,S$GLB,, | Performed by: STUDENT IN AN ORGANIZED HEALTH CARE EDUCATION/TRAINING PROGRAM

## 2023-12-05 PROCEDURE — 1159F MED LIST DOCD IN RCRD: CPT | Mod: CPTII,S$GLB,, | Performed by: STUDENT IN AN ORGANIZED HEALTH CARE EDUCATION/TRAINING PROGRAM

## 2023-12-05 PROCEDURE — 4010F ACE/ARB THERAPY RXD/TAKEN: CPT | Mod: CPTII,S$GLB,, | Performed by: STUDENT IN AN ORGANIZED HEALTH CARE EDUCATION/TRAINING PROGRAM

## 2023-12-05 PROCEDURE — 3079F DIAST BP 80-89 MM HG: CPT | Mod: CPTII,S$GLB,, | Performed by: STUDENT IN AN ORGANIZED HEALTH CARE EDUCATION/TRAINING PROGRAM

## 2023-12-05 PROCEDURE — 99213 OFFICE O/P EST LOW 20 MIN: CPT | Mod: S$GLB,,, | Performed by: STUDENT IN AN ORGANIZED HEALTH CARE EDUCATION/TRAINING PROGRAM

## 2023-12-05 PROCEDURE — 4010F PR ACE/ARB THEARPY RXD/TAKEN: ICD-10-PCS | Mod: CPTII,S$GLB,, | Performed by: STUDENT IN AN ORGANIZED HEALTH CARE EDUCATION/TRAINING PROGRAM

## 2023-12-05 PROCEDURE — 3066F NEPHROPATHY DOC TX: CPT | Mod: CPTII,S$GLB,, | Performed by: STUDENT IN AN ORGANIZED HEALTH CARE EDUCATION/TRAINING PROGRAM

## 2023-12-05 PROCEDURE — 3044F PR MOST RECENT HEMOGLOBIN A1C LEVEL <7.0%: ICD-10-PCS | Mod: CPTII,S$GLB,, | Performed by: STUDENT IN AN ORGANIZED HEALTH CARE EDUCATION/TRAINING PROGRAM

## 2023-12-05 PROCEDURE — 1160F PR REVIEW ALL MEDS BY PRESCRIBER/CLIN PHARMACIST DOCUMENTED: ICD-10-PCS | Mod: CPTII,S$GLB,, | Performed by: STUDENT IN AN ORGANIZED HEALTH CARE EDUCATION/TRAINING PROGRAM

## 2023-12-05 PROCEDURE — 1159F PR MEDICATION LIST DOCUMENTED IN MEDICAL RECORD: ICD-10-PCS | Mod: CPTII,S$GLB,, | Performed by: STUDENT IN AN ORGANIZED HEALTH CARE EDUCATION/TRAINING PROGRAM

## 2023-12-05 PROCEDURE — 99999 PR PBB SHADOW E&M-EST. PATIENT-LVL IV: ICD-10-PCS | Mod: PBBFAC,,, | Performed by: STUDENT IN AN ORGANIZED HEALTH CARE EDUCATION/TRAINING PROGRAM

## 2023-12-05 PROCEDURE — 99213 PR OFFICE/OUTPT VISIT, EST, LEVL III, 20-29 MIN: ICD-10-PCS | Mod: S$GLB,,, | Performed by: STUDENT IN AN ORGANIZED HEALTH CARE EDUCATION/TRAINING PROGRAM

## 2023-12-05 PROCEDURE — 1160F RVW MEDS BY RX/DR IN RCRD: CPT | Mod: CPTII,S$GLB,, | Performed by: STUDENT IN AN ORGANIZED HEALTH CARE EDUCATION/TRAINING PROGRAM

## 2023-12-05 PROCEDURE — 3008F PR BODY MASS INDEX (BMI) DOCUMENTED: ICD-10-PCS | Mod: CPTII,S$GLB,, | Performed by: STUDENT IN AN ORGANIZED HEALTH CARE EDUCATION/TRAINING PROGRAM

## 2023-12-05 RX ORDER — TOPIRAMATE 50 MG/1
50 TABLET, FILM COATED ORAL 2 TIMES DAILY
Qty: 180 TABLET | Refills: 0 | Status: SHIPPED | OUTPATIENT
Start: 2023-12-05 | End: 2024-03-04

## 2023-12-05 NOTE — PROGRESS NOTES
"Subjective     Patient ID: Toi Fernandez is a 53 y.o. female.    Chief Complaint: Follow-up and Weight Check    Patient presents for treatment of obesity.   Gastric Sleeve in 2020  234 lbs presurgery weight  172 lbs lowest post-surgery weight  Goal weight: 160 lbs    Co-morbidities  Migraines - on topiramate as needed  HTN  CKD  Depression    Current Physical Activity  "Stays busy all day"  No regular exercise routine    Current Eating Habits  Breakfast - oatmeal, grits, egg mcmuffin  Lunch - chicken salad  Dinner - baked chicken, broccoli  Snacks - not a snacker  Beverages - coke zero, sweet tea    Snacking on fruits  Not eating out as much    Medical Weight Loss  6/20/2023: 187.7 lbs, BMI 28.5, BFP 36.2%, BFM 68 lbs, SMM 66.1 lbs  9/27/2023: 177.8 lbs, BMI 27, BFP 34%, BFM 60.5 lbs, SMM 64.8 lbs, BMR 1519 kcal  12/5/2023: 180.4 lbs, BMI 27.4, BFP 34.3%, BFM 61.8 lbs, SMM 64.8 lbs, BMR 1531 kcal        Review of Systems   Constitutional:  Negative for chills and fever.   Respiratory:  Negative for shortness of breath.    Cardiovascular:  Negative for chest pain.   Gastrointestinal:  Negative for abdominal pain, nausea and vomiting.   Neurological:  Negative for dizziness and light-headedness.   Psychiatric/Behavioral:  The patient is not nervous/anxious.           Objective    Latest Reference Range & Units 09/20/23 09:32 11/01/23 08:16   WBC 3.90 - 12.70 K/uL 4.18 4.20   RBC 4.00 - 5.40 M/uL 4.23 4.08   Hemoglobin 12.0 - 16.0 g/dL 13.6 13.1   Hematocrit 37.0 - 48.5 % 42.9 40.6   MCV 82 - 98 fL 101 (H) 100 (H)   MCH 27.0 - 31.0 pg 32.2 (H) 32.1 (H)   MCHC 32.0 - 36.0 g/dL 31.7 (L) 32.3   RDW 11.5 - 14.5 % 11.8 11.4 (L)   Platelet Count 150 - 450 K/uL 206 199   MPV 9.2 - 12.9 fL 10.0 10.3   Gran % 38.0 - 73.0 % 50.3 37.5 (L)   Lymph % 18.0 - 48.0 % 41.6 54.0 (H)   Mono % 4.0 - 15.0 % 4.8 4.5   Eosinophil % 0.0 - 8.0 % 2.4 3.3   Basophil % 0.0 - 1.9 % 0.7 0.5   Immature Granulocytes 0.0 - 0.5 % 0.2 0.2   Gran # " (ANC) 1.8 - 7.7 K/uL 2.1 1.6 (L)   Lymph # 1.0 - 4.8 K/uL 1.7 2.3   Mono # 0.3 - 1.0 K/uL 0.2 (L) 0.2 (L)   Eos # 0.0 - 0.5 K/uL 0.1 0.1   Baso # 0.00 - 0.20 K/uL 0.03 0.02   Immature Grans (Abs) 0.00 - 0.04 K/uL 0.01 0.01   nRBC 0 /100 WBC 0 0   Differential Method  Automated Automated   Iron 30 - 160 ug/dL  109   TIBC 250 - 450 ug/dL  269   Saturated Iron 20 - 50 %  41   Transferrin 200 - 375 mg/dL  182 (L)   Vitamin B-12 210 - 950 pg/mL  388   Sodium 136 - 145 mmol/L 142 145   Potassium 3.5 - 5.1 mmol/L 4.2 3.8   Chloride 95 - 110 mmol/L 106 107   CO2 23 - 29 mmol/L 26 29   Anion Gap 8 - 16 mmol/L 10 9   BUN 7 - 17 mg/dL 18 (H) 16   Creatinine 0.50 - 1.40 mg/dL 1.47 (H) 1.30   eGFR >60 mL/min/1.73 m^2 42.7 ! 49.5 !   Glucose 70 - 110 mg/dL 72 116 (H)   Calcium 8.7 - 10.5 mg/dL 9.8 9.5   ALP 38 - 126 U/L 57 60   PROTEIN TOTAL 6.0 - 8.4 g/dL 8.3 7.9   Albumin 3.5 - 5.2 g/dL 4.3 4.2   Uric Acid 2.4 - 5.7 mg/dL 5.6    BILIRUBIN TOTAL 0.1 - 1.0 mg/dL 0.9 0.9   AST 15 - 46 U/L 29 26   ALT 10 - 44 U/L 32 27   Cholesterol Total 120 - 199 mg/dL  157   HDL 40 - 75 mg/dL  50   HDL/Cholesterol Ratio 20.0 - 50.0 %  31.8   Non-HDL Cholesterol mg/dL  107   Total Cholesterol/HDL Ratio 2.0 - 5.0   3.1   Triglycerides 30 - 150 mg/dL  72   LDL Cholesterol 63.0 - 159.0 mg/dL  92.6   Thiamine 38 - 122 ug/L  73   Vit D, 25-Hydroxy 30 - 96 ng/mL 30 28 (L)   (H): Data is abnormally high  (L): Data is abnormally low  !: Data is abnormal    Vitals:    12/05/23 1507   BP: (!) 155/87   Pulse: (!) 57         Physical Exam  Vitals reviewed.   Constitutional:       General: She is not in acute distress.     Appearance: Normal appearance. She is obese. She is not ill-appearing, toxic-appearing or diaphoretic.   HENT:      Head: Normocephalic and atraumatic.   Cardiovascular:      Rate and Rhythm: Normal rate.   Pulmonary:      Effort: Pulmonary effort is normal. No respiratory distress.   Skin:     General: Skin is warm and dry.   Neurological:       Mental Status: She is alert and oriented to person, place, and time.            Assessment and Plan     1. Overweight (BMI 25.0-29.9)    2. Primary hypertension    3. Bariatric surgery status    4. Encounter for weight loss counseling    Other orders  -     topiramate (TOPAMAX) 50 MG tablet; Take 1 tablet (50 mg total) by mouth 2 (two) times daily.  Dispense: 180 tablet; Refill: 0            - Topiramate 50 mg twice daily    - Bariatric Diet    - Moderate intensity aerobic exercise plus strength exercises for 150 minutes per week

## 2023-12-12 ENCOUNTER — PATIENT MESSAGE (OUTPATIENT)
Dept: BARIATRICS | Facility: CLINIC | Age: 53
End: 2023-12-12
Payer: COMMERCIAL

## 2023-12-13 ENCOUNTER — PATIENT MESSAGE (OUTPATIENT)
Dept: BARIATRICS | Facility: CLINIC | Age: 53
End: 2023-12-13
Payer: COMMERCIAL

## 2023-12-18 RX ORDER — TORSEMIDE 10 MG/1
10 TABLET ORAL
Qty: 90 TABLET | Refills: 3 | Status: SHIPPED | OUTPATIENT
Start: 2023-12-18

## 2023-12-18 RX ORDER — CYCLOBENZAPRINE HCL 10 MG
TABLET ORAL
Qty: 30 TABLET | Refills: 1 | Status: SHIPPED | OUTPATIENT
Start: 2023-12-18

## 2023-12-18 NOTE — TELEPHONE ENCOUNTER
Refill Routing Note   Medication(s) are not appropriate for processing by [facility] for the following reason(s):        Outside of protocol  Required vitals abnormal    [facility] action(s):  Defer  Route               Appointments  past 12m or future 3m with PCP    Date Provider   Last Visit   9/12/2023 Estela Crawford MD   Next Visit   Visit date not found Estela Crawford MD   ED visits in past 90 days: 0        Note composed:3:41 PM 12/18/2023

## 2023-12-18 NOTE — TELEPHONE ENCOUNTER
No care due was identified.  [facility] Embedded Care Due Messages. Reference number: [id number].   12/18/2023 3:54:37 PM CST

## 2023-12-25 ENCOUNTER — ON-DEMAND VIRTUAL (OUTPATIENT)
Dept: URGENT CARE | Facility: CLINIC | Age: 53
End: 2023-12-25
Payer: COMMERCIAL

## 2023-12-25 DIAGNOSIS — R09.82 POST-NASAL DRAINAGE: ICD-10-CM

## 2023-12-25 DIAGNOSIS — J02.0 STREP THROAT: Primary | ICD-10-CM

## 2023-12-25 PROCEDURE — 99213 PR OFFICE/OUTPT VISIT, EST, LEVL III, 20-29 MIN: ICD-10-PCS | Mod: 95,,, | Performed by: PHYSICIAN ASSISTANT

## 2023-12-25 PROCEDURE — 99213 OFFICE O/P EST LOW 20 MIN: CPT | Mod: 95,,, | Performed by: PHYSICIAN ASSISTANT

## 2023-12-25 RX ORDER — AZELASTINE HYDROCHLORIDE, FLUTICASONE PROPIONATE 137; 50 UG/1; UG/1
1 SPRAY, METERED NASAL 2 TIMES DAILY PRN
Qty: 23 G | Refills: 0 | Status: SHIPPED | OUTPATIENT
Start: 2023-12-25 | End: 2024-01-24

## 2023-12-25 RX ORDER — AMOXICILLIN 500 MG/1
500 TABLET, FILM COATED ORAL EVERY 12 HOURS
Qty: 20 TABLET | Refills: 0 | Status: SHIPPED | OUTPATIENT
Start: 2023-12-25 | End: 2024-01-04

## 2023-12-25 NOTE — PROGRESS NOTES
Subjective:      Patient ID: Toi Fernandez is a 53 y.o. female.    Vitals:  vitals were not taken for this visit.     Chief Complaint: Sore throat ear hurts      Visit Type: TELE AUDIOVISUAL    Present with the patient at the time of consultation: TELEMED PRESENT WITH PATIENT: None    Past Medical History:   Diagnosis Date    Carpal tunnel syndrome, left     Chronic kidney disease, stage II (mild) 09/21/2012    Depression     H/O nephrotic syndrome, 2001. 04/21/2015    Hypertension     Obesity     Secondary hyperparathyroidism     Vitamin D deficiency disease      Past Surgical History:   Procedure Laterality Date    DILATION AND CURETTAGE OF UTERUS      ENDOMETRIAL ABLATION  2004    HYSTERECTOMY  2011    DLH/BS (AUB/Fibroids) KB    LAPAROSCOPIC SLEEVE GASTRECTOMY N/A 05/27/2020    Procedure: GASTRECTOMY, SLEEVE, LAPAROSCOPIC, with intraop EGD;  Surgeon: Leodan Burk MD;  Location: [facility] OR [address] [place] [address];  Service: General;  Laterality: N/A;    TUBAL LIGATION       Review of patient's allergies indicates:   Allergen Reactions    No known allergies      Current Outpatient Medications on File Prior to Visit   Medication Sig Dispense Refill    albuterol (PROVENTIL/VENTOLIN HFA) 90 mcg/actuation inhaler Inhale 2 puffs into the lungs every 4 (four) hours as needed for Wheezing. Rescue 54 g 2    amLODIPine (NORVASC) 5 MG tablet TAKE 1 TABLET(5 MG) BY MOUTH EVERY DAY 90 tablet 3    carvediloL (COREG) 25 MG tablet TAKE 1 TABLET(25 MG) BY MOUTH TWICE DAILY WITH MEALS 180 tablet 3    cetirizine (ZYRTEC) 10 MG tablet TAKE 1 TO 2 TABLETS BY MOUTH EVERY DAY AS NEEDED FOR ITCHING 180 tablet 1    cyclobenzaprine (FLEXERIL) 10 MG tablet TAKE 1 TABLET BY MOUTH EVERY DAY AS NEEDED FOR BACK PAIN 30 tablet 1    ergocalciferol (ERGOCALCIFEROL) 50,000 unit Cap Take 1 capsule (50,000 Units total) by mouth every 7 days. for 12 doses 12 capsule 0    hydrocortisone 2.5 % cream APPLY EXTERNALLY TO THE AFFECTED AREA TWICE DAILY WHEN  ECZEMA IS MODERATE 453.6 g 1    irbesartan (AVAPRO) 300 MG tablet TAKE 1 TABLET(300 MG) BY MOUTH EVERY MORNING 90 tablet 2    linaCLOtide (LINZESS) 145 mcg Cap capsule 1 tab po q d prn constipation 30 capsule 5    topiramate (TOPAMAX) 50 MG tablet Take 1 tablet (50 mg total) by mouth 2 (two) times daily. 180 tablet 0    torsemide (DEMADEX) 10 MG Tab TAKE 1 TABLET(10 MG) BY MOUTH EVERY DAY 90 tablet 3    traMADoL (ULTRAM) 50 mg tablet take 1 tablet by mouth every  8-12 hours as needed for  headache 21 each 0    traZODone (DESYREL) 50 MG tablet Take 0.5-1 tablets (25-50 mg total) by mouth nightly as needed for Insomnia. 30 tablet 2     No current facility-administered medications on file prior to visit.     Family History   Problem Relation Age of Onset    Diabetes Mother         None    Hypertension Mother     Colon cancer Father     Hypertension Father     Diabetes Maternal Aunt         None    Diabetes Maternal Grandmother         None    Epilepsy Son     Hypertension Sister     Hypertension Sister     Breast cancer Neg Hx     Ovarian cancer Neg Hx        Medications Ordered                [id number] - [facility] [address] [facility] AT [facility] [address]   [address] [facility] [address]    Telephone: [phone]   Fax: [phone]   Hours: Not open 24 hours                         E-Prescribed (2 of 2)              amoxicillin (AMOXIL) 500 MG Tab    Sig: Take 1 tablet (500 mg total) by mouth every 12 (twelve) hours. for 10 days       Start: 23     Quantity: 20 tablet Refills: 0                         azelastine-fluticasone (DYMISTA) 137-50 mcg/spray Spry nassal spray    Si spray by Each Nostril route 2 (two) times daily as needed (runny nose and congestion).       Start: 23     Quantity: 23 g Refills: 0                           Ohs Peq Odvv Intake    2023  3:52 PM CST - Filed by Patient   Describe your reason for todays visit Throat hurt , ear  hurt scratches throat feeling   What is your current physical address in the event of a medical emergency? [address] [facility] [address]   Are you able to take your vital signs? No   Please attach any relevant images or files          Sore throat, ear hurts  Drinking orange juice, not eating due to pain  Scratching itchy throat  Hx of seasonal allergies: benedryl  Liquid Mucinex-DM      Sore Throat   This is a new problem. Episode onset: 2 days. The problem has been gradually worsening. There has been no fever. The pain is at a severity of 7/10. Associated symptoms include congestion, coughing, ear pain, a hoarse voice, neck pain and trouble swallowing. Pertinent negatives include no swollen glands. She has had no exposure to strep or mono. She has tried nothing for the symptoms. The treatment provided mild relief.       Constitution: Positive for appetite change. Negative for sweating and fever.   HENT:  Positive for ear pain, congestion, postnasal drip, sore throat and trouble swallowing. Negative for voice change.    Neck: Positive for neck pain and painful lymph nodes.   Respiratory:  Positive for cough and sputum production.    Allergic/Immunologic: Positive for environmental allergies and seasonal allergies.   Hematologic/Lymphatic: Positive for swollen lymph nodes.        Objective:   The physical exam was conducted virtually.  Physical Exam   Constitutional: She is oriented to person, place, and time. No distress. normal  HENT:   Head: Normocephalic and atraumatic.   Ears:   Right Ear: External ear normal.   Left Ear: External ear normal.   Nose: Congestion present. No rhinorrhea.   Mouth/Throat: Mucous membranes are moist. Oropharynx is clear.   Eyes: Conjunctivae are normal. Extraocular movement intact   Abdominal: Normal appearance.   Neurological: She is alert and oriented to person, place, and time.   Psychiatric: Her behavior is normal. Mood, judgment and thought content normal.  "      Assessment:     1. Strep throat    2. Post-nasal drainage        Plan:       Strep throat  -     amoxicillin (AMOXIL) 500 MG Tab; Take 1 tablet (500 mg total) by mouth every 12 (twelve) hours. for 10 days  Dispense: 20 tablet; Refill: 0    Post-nasal drainage  -     azelastine-fluticasone (DYMISTA) 137-50 mcg/spray Spry nassal spray; 1 spray by Each Nostril route 2 (two) times daily as needed (runny nose and congestion).  Dispense: 23 g; Refill: 0                Discussed results/diagnosis/plan with patient in clinic.  We had shared decision making for patient's treatment. Patient verbalized understanding and in agreement with current treatment plan.     Please remember that you have received care at [facility] today. Telehealth visits  are not equipped to handle life threatening emergencies and cannot rule in or out certain medical conditions and you may be released before all of your medical problems are known or treated.    Patient was instructed to return for re-evaluation with urgent care or PCP for continued outpatient workup and management if symptoms do not improve/worsening symptoms. Strict ED versus clinic precautions given in depth.    Discharge and follow-up instructions given verbally with the patient who expressed understanding. The After Visit Summary (AVS) and  instructions and results are also available on [facility].              Bianca "Shane Reveles PA-C          Patient Instructions   Recommend oral antihistamine (claritin, zyrtec, allegra,xyzal)  for rhinorrhea, steroid nasal spray (flonase) +/- antihistamine nasal spray (azelastine)=dymista, OTC cough medicine (Mucinex-Dm or Coricidin HBP® Maximum Strength Cold & Flu Day,  Tylenol (Acetaminophen) and/or Motrin (Ibuprofen) as directed for control of pain and/or fever.      Please complete full course of oral antibiotics to prevent strep complications.  Rheumatic fever (a disease that can affect the heart, joints, " brain, and skin)  Post-streptococcal glomerulonephritis (a kidney disease)    Discard old toothbrush after 2 days of oral antibiotics.    Fever and sore throat typically resolve within one to three days. Most patients can return to work, school, or  after 24 hours of antibiotic therapy, provided they are afebrile and otherwise well.     Please drink plenty of fluids.  Please get plenty of rest.  if you  smoke, please stop smoking.    To help ease a sore throat, you can:  Use a sore throat spray.  Suck on hard candy or throat lozenges.  Gargle with warm saltwater a few times each day. Mix of 1/4 teaspoon (1.25 grams) salt in 8 ounces (240 mL) of warm water.  Use a cool mist humidifier to help you breathe easier.      Discussed prescriptions and over-the-counter medicines to help with patient's symptoms:  A steroid nose spray (flonase) can help with a stuffy nose. It can also help with drainage down the back of your throat.  An antihistamine (loratadine,zyrtec,allegra, xyzal) can help with itching, sneezing, or runny nose.  An antihistamine eye drop can help with itchy eyes.  A decongestant (pseudoephedrine,  Phenylephrine) can help with a stuffy nose. Take <10 days for congestion and rhinorrhea. Once symptoms improve, proceed with loratadine/zyrtec once a day. These ingredients can keep you up all night, decrease appetite, feel jittery, and raise blood pressure with long term use.  OTC Coricidin can be used for patients with hypertension and palpitations because you cannot use ingredients such as pseudoephedrine and phenylephrine for oral decongestants.  Coricidin HBP Cough & Cold (Chlorpheniramine/Dextromethorphan)  Coricidin HBP Maximum Strength Multi-Symptom Flu  (Acetaminophen,Dextromethorphan, Chlorpheniramine)  Coricidin HBP® Maximum Strength Cold & Flu Day/Night (Acetaminophen,Dextromethorphan, Doxylamine, Guaifenesin)  Coricidin HBP Chest Congestion & Cough (Dextromethorphan +  Guaifenesin)    Medications that control cough are suppressants and expectorants. Suppressants are tessalon pearls and dextromethorphan. If you have a productive cough with sputum, you need an expectorant called guaifenesin. Dextromethorphan and Guaifenesin are active ingredients in many OTC cough/cold medications such as Dayquil/Nyquil, Mucinex, and Robitussin Mucus+Chest Congestion.            Common Cold Medicine Ingredients Cheat sheet  Acetaminophen (APAP) -pain reliever/fever reducer  Dextromethorphan - cough suppressant  Guaifenesin - expectorant/thins and loosens mucus  Phenylephrine - nasal decongestant  Diphenhydramine or Doxylamine succinate - antihistamine, helps you fall asleep  Promethazine or Brompheniramine - Prescription strength antihistamines    If not allergic, take Tylenol (Acetaminophen) 650 mg to  1 g every 6 hours as needed for fever and/or Motrin (Ibuprofen) 600 to 800 mg every 6 hours as needed for fever as directed for control of pain and/or fever          Please remember that you have received care at an urgent care today. Urgent cares are not emergency rooms and are not equipped to handle life threatening emergencies and cannot rule in or out certain medical conditions and you may be released before all of your medical problems are known or treated.     Please arrange follow up with your primary care physician or speciality clinic within 2-5 days if your signs and symptoms have not resolved or worsen.     Patient can call our Referral Hotline at [phone] to make an appointment.      Please return here or go to the Emergency Department for any concerns or worsening of condition.  Signs of infection. These include a fever of 100.4°F (38°C) or higher, chills, cough, more sputum or change in color of sputum.  You are having so much trouble breathing that you can only say one or two words at a time.  You need to sit upright at all times to be able to breathe and or cannot lie down.  You  have trouble breathing when talking or sitting still.  You have a fever of 100.4°F (38°C) or higher or chills.  You have chest pain when you cough, have trouble breathing but can still talk in full sentences, or cough up blood.

## 2023-12-25 NOTE — LETTER
"  December 25, 2023      Virtual Visit - Urgent Care  [address] [facility] [address]       Patient: Toi Fernandez   YOB: 1970  Date of Visit: 12/25/2023    To Whom It May Concern:    Clarice Fernandez  was at [facility] on 12/25/2023. The patient may return to work/school on 12/27/23 with no restrictions. If you have any questions or concerns, or if I can be of further assistance, please do not hesitate to contact me.    Sincerely,        Biancanick Reveles PA-C (Jackie)       "

## 2023-12-25 NOTE — PATIENT INSTRUCTIONS
Recommend oral antihistamine (claritin, zyrtec, allegra,xyzal)  for rhinorrhea, steroid nasal spray (flonase) +/- antihistamine nasal spray (azelastine)=dymista, OTC cough medicine (Mucinex-Dm or Coricidin HBP® Maximum Strength Cold & Flu Day,  Tylenol (Acetaminophen) and/or Motrin (Ibuprofen) as directed for control of pain and/or fever.      Please complete full course of oral antibiotics to prevent strep complications.  Rheumatic fever (a disease that can affect the heart, joints, brain, and skin)  Post-streptococcal glomerulonephritis (a kidney disease)    Discard old toothbrush after 2 days of oral antibiotics.    Fever and sore throat typically resolve within one to three days. Most patients can return to work, school, or  after 24 hours of antibiotic therapy, provided they are afebrile and otherwise well.     Please drink plenty of fluids.  Please get plenty of rest.  if you  smoke, please stop smoking.    To help ease a sore throat, you can:  Use a sore throat spray.  Suck on hard candy or throat lozenges.  Gargle with warm saltwater a few times each day. Mix of 1/4 teaspoon (1.25 grams) salt in 8 ounces (240 mL) of warm water.  Use a cool mist humidifier to help you breathe easier.      Discussed prescriptions and over-the-counter medicines to help with patient's symptoms:  A steroid nose spray (flonase) can help with a stuffy nose. It can also help with drainage down the back of your throat.  An antihistamine (loratadine,zyrtec,allegra, xyzal) can help with itching, sneezing, or runny nose.  An antihistamine eye drop can help with itchy eyes.  A decongestant (pseudoephedrine,  Phenylephrine) can help with a stuffy nose. Take <10 days for congestion and rhinorrhea. Once symptoms improve, proceed with loratadine/zyrtec once a day. These ingredients can keep you up all night, decrease appetite, feel jittery, and raise blood pressure with long term use.  OTC Coricidin can be used for patients with  hypertension and palpitations because you cannot use ingredients such as pseudoephedrine and phenylephrine for oral decongestants.  Coricidin HBP Cough & Cold (Chlorpheniramine/Dextromethorphan)  Coricidin HBP Maximum Strength Multi-Symptom Flu  (Acetaminophen,Dextromethorphan, Chlorpheniramine)  Coricidin HBP® Maximum Strength Cold & Flu Day/Night (Acetaminophen,Dextromethorphan, Doxylamine, Guaifenesin)  Coricidin HBP Chest Congestion & Cough (Dextromethorphan + Guaifenesin)    Medications that control cough are suppressants and expectorants. Suppressants are tessalon pearls and dextromethorphan. If you have a productive cough with sputum, you need an expectorant called guaifenesin. Dextromethorphan and Guaifenesin are active ingredients in many OTC cough/cold medications such as Dayquil/Nyquil, Mucinex, and Robitussin Mucus+Chest Congestion.            Common Cold Medicine Ingredients Cheat sheet  Acetaminophen (APAP) -pain reliever/fever reducer  Dextromethorphan - cough suppressant  Guaifenesin - expectorant/thins and loosens mucus  Phenylephrine - nasal decongestant  Diphenhydramine or Doxylamine succinate - antihistamine, helps you fall asleep  Promethazine or Brompheniramine - Prescription strength antihistamines    If not allergic, take Tylenol (Acetaminophen) 650 mg to  1 g every 6 hours as needed for fever and/or Motrin (Ibuprofen) 600 to 800 mg every 6 hours as needed for fever as directed for control of pain and/or fever          Please remember that you have received care at an urgent care today. Urgent cares are not emergency rooms and are not equipped to handle life threatening emergencies and cannot rule in or out certain medical conditions and you may be released before all of your medical problems are known or treated.     Please arrange follow up with your primary care physician or speciality clinic within 2-5 days if your signs and symptoms have not resolved or worsen.     Patient can call our  Referral Hotline at [phone] to make an appointment.      Please return here or go to the Emergency Department for any concerns or worsening of condition.  Signs of infection. These include a fever of 100.4°F (38°C) or higher, chills, cough, more sputum or change in color of sputum.  You are having so much trouble breathing that you can only say one or two words at a time.  You need to sit upright at all times to be able to breathe and or cannot lie down.  You have trouble breathing when talking or sitting still.  You have a fever of 100.4°F (38°C) or higher or chills.  You have chest pain when you cough, have trouble breathing but can still talk in full sentences, or cough up blood.

## 2024-01-09 ENCOUNTER — PATIENT MESSAGE (OUTPATIENT)
Dept: BARIATRICS | Facility: CLINIC | Age: 54
End: 2024-01-09
Payer: COMMERCIAL

## 2024-01-24 DIAGNOSIS — N18.31 STAGE 3A CHRONIC KIDNEY DISEASE: Primary | ICD-10-CM

## 2024-02-09 ENCOUNTER — LAB VISIT (OUTPATIENT)
Dept: LAB | Facility: HOSPITAL | Age: 54
End: 2024-02-09
Attending: INTERNAL MEDICINE
Payer: COMMERCIAL

## 2024-02-09 DIAGNOSIS — N18.31 STAGE 3A CHRONIC KIDNEY DISEASE: ICD-10-CM

## 2024-02-09 LAB
BILIRUB UR QL STRIP: NEGATIVE
CLARITY UR REFRACT.AUTO: CLEAR
COLOR UR AUTO: YELLOW
CREAT UR-MCNC: 126.9 MG/DL (ref 15–325)
GLUCOSE UR QL STRIP: NEGATIVE
HGB UR QL STRIP: NEGATIVE
KETONES UR QL STRIP: NEGATIVE
LEUKOCYTE ESTERASE UR QL STRIP: NEGATIVE
NITRITE UR QL STRIP: NEGATIVE
PH UR STRIP: 6 [PH] (ref 5–8)
PROT UR QL STRIP: NEGATIVE
PROT UR-MCNC: 9 MG/DL (ref 0–15)
PROT/CREAT UR: 0.07 MG/G{CREAT} (ref 0–0.2)
SP GR UR STRIP: 1.02 (ref 1–1.03)
URN SPEC COLLECT METH UR: NORMAL
UROBILINOGEN UR STRIP-ACNC: 1 EU/DL

## 2024-02-09 PROCEDURE — 82570 ASSAY OF URINE CREATININE: CPT | Performed by: INTERNAL MEDICINE

## 2024-02-09 PROCEDURE — 81003 URINALYSIS AUTO W/O SCOPE: CPT | Mod: PN | Performed by: INTERNAL MEDICINE

## 2024-02-14 ENCOUNTER — PATIENT MESSAGE (OUTPATIENT)
Dept: BARIATRICS | Facility: CLINIC | Age: 54
End: 2024-02-14
Payer: COMMERCIAL

## 2024-02-14 ENCOUNTER — OFFICE VISIT (OUTPATIENT)
Dept: NEPHROLOGY | Facility: CLINIC | Age: 54
End: 2024-02-14
Payer: COMMERCIAL

## 2024-02-14 VITALS
WEIGHT: 183 LBS | SYSTOLIC BLOOD PRESSURE: 132 MMHG | HEART RATE: 76 BPM | BODY MASS INDEX: 27.82 KG/M2 | OXYGEN SATURATION: 99 % | DIASTOLIC BLOOD PRESSURE: 85 MMHG

## 2024-02-14 DIAGNOSIS — I10 PRIMARY HYPERTENSION: ICD-10-CM

## 2024-02-14 DIAGNOSIS — N18.2 CHRONIC KIDNEY DISEASE, STAGE II (MILD): Primary | ICD-10-CM

## 2024-02-14 DIAGNOSIS — N25.81 HYPERPARATHYROIDISM DUE TO RENAL INSUFFICIENCY: ICD-10-CM

## 2024-02-14 DIAGNOSIS — N18.31 STAGE 3A CHRONIC KIDNEY DISEASE: ICD-10-CM

## 2024-02-14 PROCEDURE — 3075F SYST BP GE 130 - 139MM HG: CPT | Mod: CPTII,S$GLB,, | Performed by: INTERNAL MEDICINE

## 2024-02-14 PROCEDURE — 1159F MED LIST DOCD IN RCRD: CPT | Mod: CPTII,S$GLB,, | Performed by: INTERNAL MEDICINE

## 2024-02-14 PROCEDURE — 99214 OFFICE O/P EST MOD 30 MIN: CPT | Mod: S$GLB,,, | Performed by: INTERNAL MEDICINE

## 2024-02-14 PROCEDURE — 3079F DIAST BP 80-89 MM HG: CPT | Mod: CPTII,S$GLB,, | Performed by: INTERNAL MEDICINE

## 2024-02-14 PROCEDURE — 99999 PR PBB SHADOW E&M-EST. PATIENT-LVL III: CPT | Mod: PBBFAC,,, | Performed by: INTERNAL MEDICINE

## 2024-02-14 PROCEDURE — 3066F NEPHROPATHY DOC TX: CPT | Mod: CPTII,S$GLB,, | Performed by: INTERNAL MEDICINE

## 2024-02-14 PROCEDURE — 3008F BODY MASS INDEX DOCD: CPT | Mod: CPTII,S$GLB,, | Performed by: INTERNAL MEDICINE

## 2024-02-14 NOTE — PROGRESS NOTES
CHIEF COMPLAINT/HPI: Toi is a 53 y.o. female for evaluation of Chronic Kidney Disease    Clinical course:  Ms. Fernandez came for a reguarl follow up. She had no complain. Her BP at home is around 140/77mmHg. Lasb were discussed. The kidney function is stabel with the latest Cr.  1.3mg/dl with eGFR of  46 ml/min.  She was asked to follow diet. She needs 30 KCal/kg, 0.8gm prto 2 gm Na. She will need to start reguarl walking and reduce salt in her food for optimal BP control. Her labs showed elevation the ALT. Ricco assessemtn fo her liver will need to be doen with her primary physician. She was told to address that and diuscussed with her primary provider.     Past Medical History:   Diagnosis Date    Carpal tunnel syndrome, left     Chronic kidney disease, stage II (mild) 09/21/2012    Depression     H/O nephrotic syndrome, 2001. 04/21/2015    Hypertension     Obesity     Secondary hyperparathyroidism     Vitamin D deficiency disease        Toi reports that she has never smoked. She has never used smokeless tobacco. She reports that she does not currently use alcohol. She reports that she does not use drugs.    Family History   Problem Relation Age of Onset    Diabetes Mother         None    Hypertension Mother     Colon cancer Father     Hypertension Father     Diabetes Maternal Aunt         None    Diabetes Maternal Grandmother         None    Epilepsy Son     Hypertension Sister     Hypertension Sister     Breast cancer Neg Hx     Ovarian cancer Neg Hx        ROS:    General: No fever, chills, change in appetite or weight loss  Skin: No rashes or pruritus  Head: No headaches or recent head trauma  Eyes: No changes in vision or eye pain  Nodes: No swollen glands  Pulmonary: No dyspnea, wheezes, cough or sputum production  Cardiovascular: No chest pain, edema, PND, orthopnea or reduced exercise tolerance  Abdomen: No abdominal pain, nausea, vomiting, constipation or diarrhea  Urinary: No flank pain, dysuria  or hematuria  Peripheral Vascular: No claudication or cyanosis  Musculoskeletal: No joint stiffness, swelling or back pain  Neurologic: No history of seizures, tremors, forcal weakness or numbness    PE:    Vitals:    02/14/24 0842   BP: 132/85   Pulse: 76   SpO2: 99%   Weight: 83 kg (182 lb 15.7 oz)       HEENT: Normocephalic, atraumatic, EOMI, PERRLA, normal conjunctive and lids, supple neck with no thyromeglay or masses, normal oropharynx, hearing intact  Cardiovascular: Regular without murmur, gallop or rub, no edema  Respiratory: Clear bilaterally without rales, rhonchi, wheezes with normal effort  Abdomen: Soft, nontender/nondistended, positive bowel sounds, no hepatospenomegaly  Extremities: No cyanosis, clubbing, normal gait  Skin: No rashes, ulcers, induration  Psych: Oriented x 3 with normal mood and effect      Labs:  CBP  CMP  Uric Acid  Urinalysis.     Impression and plan:  CKD IIIa with stable renal function and eGFR of  46 ml/min. Proper diet and lifestyle will need to be followed.   HTN will need better Bp control. Will start with adjustment of he lifestyle. with low salt and daily walking. Home measurement was requested.   Post bariatric surgery will need to follow 30KCal/kg diet.   Secondary Hyperparathyroidism will repeat the level on follow up.    Insomnia. Addressed by her primary.   Depression.   JC ABURTO.Yusef. MD. ADRIANA. FACP.  , [employer] [facility] [employer] ([place]  Nephrology Consultant. [facility].   [facility],  [facility]. 5th [place] [address].    email: [email or web address]  Tel: Office: [phone]

## 2024-02-20 ENCOUNTER — PATIENT MESSAGE (OUTPATIENT)
Dept: BARIATRICS | Facility: CLINIC | Age: 54
End: 2024-02-20
Payer: COMMERCIAL

## 2024-02-25 ENCOUNTER — PATIENT MESSAGE (OUTPATIENT)
Dept: NEPHROLOGY | Facility: CLINIC | Age: 54
End: 2024-02-25
Payer: COMMERCIAL

## 2024-02-26 ENCOUNTER — TELEPHONE (OUTPATIENT)
Dept: INTERNAL MEDICINE | Facility: CLINIC | Age: 54
End: 2024-02-26
Payer: COMMERCIAL

## 2024-02-26 NOTE — TELEPHONE ENCOUNTER
----- Message from Chris Mendieta sent at 2/26/2024  8:58 AM CST -----  Contact: frank [phone]  Frank nurse  from [facility] stated pt have gaps in care if have any questions contact her.    Please call and advise

## 2024-02-29 ENCOUNTER — PATIENT MESSAGE (OUTPATIENT)
Dept: BARIATRICS | Facility: CLINIC | Age: 54
End: 2024-02-29
Payer: COMMERCIAL

## 2024-02-29 ENCOUNTER — OFFICE VISIT (OUTPATIENT)
Dept: INTERNAL MEDICINE | Facility: CLINIC | Age: 54
End: 2024-02-29
Payer: COMMERCIAL

## 2024-02-29 DIAGNOSIS — R74.01 ELEVATED ALT MEASUREMENT: ICD-10-CM

## 2024-02-29 DIAGNOSIS — I10 PRIMARY HYPERTENSION: Primary | ICD-10-CM

## 2024-02-29 PROCEDURE — 99214 OFFICE O/P EST MOD 30 MIN: CPT | Mod: 95,,, | Performed by: INTERNAL MEDICINE

## 2024-02-29 PROCEDURE — 3066F NEPHROPATHY DOC TX: CPT | Mod: CPTII,95,, | Performed by: INTERNAL MEDICINE

## 2024-02-29 NOTE — PROGRESS NOTES
The patient location is: [place]  The chief complaint leading to consultation is: elevated alt    Visit type: audiovisual    Face to Face time with patient: 16  23 minutes of total time spent on the encounter, which includes face to face time and non-face to face time preparing to see the patient (eg, review of tests), Obtaining and/or reviewing separately obtained history, Documenting clinical information in the electronic or other health record, Independently interpreting results (not separately reported) and communicating results to the patient/family/caregiver, or Care coordination (not separately reported).         Each patient to whom he or she provides medical services by telemedicine is:  (1) informed of the relationship between the physician and patient and the respective role of any other health care provider with respect to management of the patient; and (2) notified that he or she may decline to receive medical services by telemedicine and may withdraw from such care at any time.    Notes:  Subjective     Patient ID: Toi Fernandez is a 53 y.o. female.    Chief Complaint: No chief complaint on file.    HPI  Was told to follow up over alt of 51, measured by her nephrologist.     Normal previously.    Bmi 27.  S/p bariatric sleeve 2020  No new meds, or recent illnesses.    She has been following BP:  151/107, freq > 140.  No  edema   She is diligent with taking meds.  Taking demadex qod with noticeable diuresis.    She has questions about Zepbound, as her appetite is difficult to control..        We discussed options for obtaining, as her insurance is unlikely to cover..      Review of Systems   Constitutional:  Positive for activity change and unexpected weight change.   HENT:  Negative for hearing loss, rhinorrhea and trouble swallowing.    Eyes:  Negative for discharge and visual disturbance.   Respiratory:  Negative for chest tightness and wheezing.    Cardiovascular:  Negative for chest pain and  palpitations.   Gastrointestinal:  Positive for constipation. Negative for blood in stool, diarrhea and vomiting.   Endocrine: Positive for polydipsia and polyuria.   Genitourinary:  Negative for difficulty urinating, dysuria, hematuria and menstrual problem.   Musculoskeletal:  Negative for arthralgias, joint swelling and neck pain.   Neurological:  Positive for headaches. Negative for weakness.   Psychiatric/Behavioral:  Positive for dysphoric mood. Negative for confusion.           Objective     Physical Exam  Constitutional:       Appearance: Normal appearance. She is not ill-appearing or diaphoretic.   Neurological:      Mental Status: She is alert.   Psychiatric:         Mood and Affect: Mood normal.         Behavior: Behavior normal.         Thought Content: Thought content normal.            Assessment and Plan     1. Primary hypertension  -     Basic Metabolic Panel; Future; Expected date: 02/29/2024    2. Elevated ALT measurement  -     AST (SGOT); Future; Expected date: 02/29/2024  -     ALT (SGPT); Future; Expected date: 02/29/2024    3. BMI 27.0-27.9,adult           Increase demadex daily.    If bp not controlled, increase amlodipine    Labs 1 mo.  Reassurance.    Advised to consul with a bariatric clinic.    No follow-ups on file.

## 2024-03-06 ENCOUNTER — PATIENT MESSAGE (OUTPATIENT)
Dept: BARIATRICS | Facility: CLINIC | Age: 54
End: 2024-03-06
Payer: COMMERCIAL

## 2024-04-03 ENCOUNTER — PATIENT MESSAGE (OUTPATIENT)
Dept: BARIATRICS | Facility: CLINIC | Age: 54
End: 2024-04-03
Payer: COMMERCIAL

## 2024-04-09 ENCOUNTER — PATIENT MESSAGE (OUTPATIENT)
Dept: BARIATRICS | Facility: CLINIC | Age: 54
End: 2024-04-09
Payer: COMMERCIAL

## 2024-05-14 ENCOUNTER — PATIENT MESSAGE (OUTPATIENT)
Dept: BARIATRICS | Facility: CLINIC | Age: 54
End: 2024-05-14
Payer: COMMERCIAL

## 2024-05-14 ENCOUNTER — PATIENT MESSAGE (OUTPATIENT)
Dept: INTERNAL MEDICINE | Facility: CLINIC | Age: 54
End: 2024-05-14
Payer: COMMERCIAL

## 2024-05-14 NOTE — TELEPHONE ENCOUNTER
Called and spoke to pt. Pt states she suffers from depression and she wants to request off from work from August 1 through November 1. Pt states this will be unpaid leave. Pt states she will be going through the courts with custody of her son and she doesn't feel like she can do work and deal with her situation. Pt states she is faxing paperwork for you to review. Pt states she is willing to schedule an appt if needed. Please advise.  LOV with Estela Crawford MD , 2/29/2024

## 2024-05-16 ENCOUNTER — OFFICE VISIT (OUTPATIENT)
Dept: INTERNAL MEDICINE | Facility: CLINIC | Age: 54
End: 2024-05-16
Payer: COMMERCIAL

## 2024-05-16 DIAGNOSIS — I10 ESSENTIAL HYPERTENSION: ICD-10-CM

## 2024-05-16 DIAGNOSIS — F43.9 SITUATIONAL STRESS: ICD-10-CM

## 2024-05-16 DIAGNOSIS — N18.31 STAGE 3A CHRONIC KIDNEY DISEASE: ICD-10-CM

## 2024-05-16 DIAGNOSIS — F43.23 ADJUSTMENT DISORDER WITH MIXED ANXIETY AND DEPRESSED MOOD: Primary | ICD-10-CM

## 2024-05-16 PROCEDURE — 1160F RVW MEDS BY RX/DR IN RCRD: CPT | Mod: CPTII,95,, | Performed by: INTERNAL MEDICINE

## 2024-05-16 PROCEDURE — 1159F MED LIST DOCD IN RCRD: CPT | Mod: CPTII,95,, | Performed by: INTERNAL MEDICINE

## 2024-05-16 PROCEDURE — 99214 OFFICE O/P EST MOD 30 MIN: CPT | Mod: 95,,, | Performed by: INTERNAL MEDICINE

## 2024-05-16 PROCEDURE — 3066F NEPHROPATHY DOC TX: CPT | Mod: CPTII,95,, | Performed by: INTERNAL MEDICINE

## 2024-05-16 RX ORDER — ESCITALOPRAM OXALATE 10 MG/1
10 TABLET ORAL DAILY
Qty: 90 TABLET | Refills: 0 | Status: SHIPPED | OUTPATIENT
Start: 2024-05-16 | End: 2024-08-14

## 2024-05-16 NOTE — LETTER
"     May 16, 2024      [facility]  [address]  [facility] [address]  Phone: [phone]  Fax: [phone]       Patient: Toi Fernandez   YOB: 1970  Date of Visit: 05/16/2024    To Whom It May Concern:    Please excuse Toi Fernandez (Trina) form work August 1- December 1 due to medical illness.  . If you have any questions or concerns, or if I can be of further assistance, please do not hesitate to contact me.    Sincerely,       Estela Crawford MD     "

## 2024-05-16 NOTE — PROGRESS NOTES
The patient location is: [place]  The chief complaint leading to consultation is: depression, anxiety  Visit type: audiovisual    Face to Face time with patient: 25  30 minutes of total time spent on the encounter, which includes face to face time and non-face to face time preparing to see the patient (eg, review of tests), Obtaining and/or reviewing separately obtained history, Documenting clinical information in the electronic or other health record, Independently interpreting results (not separately reported) and communicating results to the patient/family/caregiver, or Care coordination (not separately reported).         Each patient to whom he or she provides medical services by telemedicine is:  (1) informed of the relationship between the physician and patient and the respective role of any other health care provider with respect to management of the patient; and (2) notified that he or she may decline to receive medical services by telemedicine and may withdraw from such care at any time.    Notes:  Subjective     Patient ID: Toi Fernandez is a 53 y.o. female.    Chief Complaint: No chief complaint on file.    HPI  She is requesting med leave of absence without pay from job as   Aug 1-    She is coping with a lot of stress.  Son was arrested for domestic violence.   He is fighting for custody of a baby from another woman.     She is overwhelmed, supporting her son.  She is attending court hearings with him.    She is unable to focus when distracted by son's legal challenges.    She plans on working to July.  She has a break in legal proceedings ,  so she can work til August.      She has had to stop [employer] business as doesn't have the band width to continue.    She obsesses about her son all the time.  Not sleeping.  Decr appetite.  She has lost 8 lbs.  She worries all the time.  Cries all the time.  Not suicidal.    Another son  in her home of a seizure.    See last virtual visit  -    BP variable - sometimes 148 systolic, but usually controlled.    Htn is complicated by ckd 3    .         Review of Systems   Constitutional:  Positive for activity change and unexpected weight change.   HENT:  Negative for hearing loss, rhinorrhea and trouble swallowing.    Eyes:  Negative for discharge.   Respiratory:  Positive for chest tightness. Negative for wheezing.    Cardiovascular:  Positive for chest pain and palpitations.   Gastrointestinal:  Positive for constipation. Negative for blood in stool, diarrhea and vomiting.   Endocrine: Positive for polydipsia. Negative for polyuria.   Genitourinary:  Negative for difficulty urinating, dysuria, hematuria and menstrual problem.   Musculoskeletal:  Positive for neck pain. Negative for arthralgias and joint swelling.   Neurological:  Positive for headaches. Negative for weakness.   Psychiatric/Behavioral:  Positive for dysphoric mood. Negative for confusion.           Objective     Physical Exam  Constitutional:       Appearance: Normal appearance. She is not ill-appearing or diaphoretic.   Neurological:      General: No focal deficit present.      Mental Status: She is alert and oriented to person, place, and time.   Psychiatric:         Mood and Affect: Mood normal.         Behavior: Behavior normal.         Thought Content: Thought content normal.         Judgment: Judgment normal.            Assessment and Plan     1. Adjustment disorder with mixed anxiety and depressed mood    2. Situational stress    3. Essential hypertension    4. Stage 3a chronic kidney disease    Other orders  -     EScitalopram oxalate (LEXAPRO) 10 MG tablet; Take 1 tablet (10 mg total) by mouth once daily.  Dispense: 90 tablet; Refill: 0             Start Lexapro  Consider psychotherapy - she declines for now   Will submit medical request for leave of absence.  Encouraged to complete [facility] paperwork    Follow up in about 4 weeks (around 6/13/2024) for virtual visit.

## 2024-05-24 ENCOUNTER — LAB VISIT (OUTPATIENT)
Dept: LAB | Facility: HOSPITAL | Age: 54
End: 2024-05-24
Attending: INTERNAL MEDICINE
Payer: COMMERCIAL

## 2024-05-24 DIAGNOSIS — N18.2 CHRONIC KIDNEY DISEASE, STAGE II (MILD): ICD-10-CM

## 2024-05-24 LAB
ALBUMIN SERPL BCP-MCNC: 4.3 G/DL (ref 3.5–5.2)
ALP SERPL-CCNC: 64 U/L (ref 38–126)
ALT SERPL W/O P-5'-P-CCNC: 19 U/L (ref 10–44)
ANION GAP SERPL CALC-SCNC: 11 MMOL/L (ref 8–16)
AST SERPL-CCNC: 27 U/L (ref 15–46)
BASOPHILS # BLD AUTO: 0.03 K/UL (ref 0–0.2)
BASOPHILS NFR BLD: 0.8 % (ref 0–1.9)
BILIRUB SERPL-MCNC: 1.2 MG/DL (ref 0.1–1)
CALCIUM SERPL-MCNC: 9.4 MG/DL (ref 8.7–10.5)
CHLORIDE SERPL-SCNC: 105 MMOL/L (ref 95–110)
CO2 SERPL-SCNC: 25 MMOL/L (ref 23–29)
CREAT SERPL-MCNC: 1.26 MG/DL (ref 0.5–1.4)
DIFFERENTIAL METHOD BLD: ABNORMAL
EOSINOPHIL # BLD AUTO: 0.3 K/UL (ref 0–0.5)
EOSINOPHIL NFR BLD: 6.6 % (ref 0–8)
ERYTHROCYTE [DISTWIDTH] IN BLOOD BY AUTOMATED COUNT: 11.7 % (ref 11.5–14.5)
EST. GFR  (NO RACE VARIABLE): 51.4 ML/MIN/1.73 M^2
GLUCOSE SERPL-MCNC: 83 MG/DL (ref 70–110)
HCT VFR BLD AUTO: 37.3 % (ref 37–48.5)
HGB BLD-MCNC: 12.2 G/DL (ref 12–16)
IMM GRANULOCYTES # BLD AUTO: 0 K/UL (ref 0–0.04)
IMM GRANULOCYTES NFR BLD AUTO: 0 % (ref 0–0.5)
LYMPHOCYTES # BLD AUTO: 1.5 K/UL (ref 1–4.8)
LYMPHOCYTES NFR BLD: 38.6 % (ref 18–48)
MCH RBC QN AUTO: 31.7 PG (ref 27–31)
MCHC RBC AUTO-ENTMCNC: 32.7 G/DL (ref 32–36)
MCV RBC AUTO: 97 FL (ref 82–98)
MONOCYTES # BLD AUTO: 0.5 K/UL (ref 0.3–1)
MONOCYTES NFR BLD: 12.3 % (ref 4–15)
NEUTROPHILS # BLD AUTO: 1.6 K/UL (ref 1.8–7.7)
NEUTROPHILS NFR BLD: 41.7 % (ref 38–73)
NRBC BLD-RTO: 0 /100 WBC
PLATELET # BLD AUTO: 172 K/UL (ref 150–450)
PMV BLD AUTO: 10.4 FL (ref 9.2–12.9)
POTASSIUM SERPL-SCNC: 3.8 MMOL/L (ref 3.5–5.1)
PROT SERPL-MCNC: 7.7 G/DL (ref 6–8.4)
PTH-INTACT SERPL-MCNC: 105.4 PG/ML (ref 9–77)
RBC # BLD AUTO: 3.85 M/UL (ref 4–5.4)
SODIUM SERPL-SCNC: 141 MMOL/L (ref 136–145)
URATE SERPL-MCNC: 5.5 MG/DL (ref 2.4–5.7)
UUN UR-MCNC: 15 MG/DL (ref 7–17)
WBC # BLD AUTO: 3.81 K/UL (ref 3.9–12.7)

## 2024-05-24 PROCEDURE — 80053 COMPREHEN METABOLIC PANEL: CPT | Mod: PN | Performed by: INTERNAL MEDICINE

## 2024-05-24 PROCEDURE — 36415 COLL VENOUS BLD VENIPUNCTURE: CPT | Mod: PN | Performed by: INTERNAL MEDICINE

## 2024-05-24 PROCEDURE — 83970 ASSAY OF PARATHORMONE: CPT | Mod: PN | Performed by: INTERNAL MEDICINE

## 2024-05-24 PROCEDURE — 84550 ASSAY OF BLOOD/URIC ACID: CPT | Performed by: INTERNAL MEDICINE

## 2024-05-24 PROCEDURE — 85025 COMPLETE CBC W/AUTO DIFF WBC: CPT | Mod: PN | Performed by: INTERNAL MEDICINE

## 2024-06-10 ENCOUNTER — PATIENT MESSAGE (OUTPATIENT)
Dept: INTERNAL MEDICINE | Facility: CLINIC | Age: 54
End: 2024-06-10
Payer: COMMERCIAL

## 2024-06-11 ENCOUNTER — PATIENT MESSAGE (OUTPATIENT)
Dept: BARIATRICS | Facility: CLINIC | Age: 54
End: 2024-06-11
Payer: COMMERCIAL

## 2024-06-20 ENCOUNTER — OFFICE VISIT (OUTPATIENT)
Dept: INTERNAL MEDICINE | Facility: CLINIC | Age: 54
End: 2024-06-20
Payer: COMMERCIAL

## 2024-06-20 ENCOUNTER — PATIENT MESSAGE (OUTPATIENT)
Dept: INTERNAL MEDICINE | Facility: CLINIC | Age: 54
End: 2024-06-20

## 2024-06-20 DIAGNOSIS — F43.23 ADJUSTMENT DISORDER WITH MIXED ANXIETY AND DEPRESSED MOOD: Primary | ICD-10-CM

## 2024-06-20 DIAGNOSIS — F43.9 SITUATIONAL STRESS: ICD-10-CM

## 2024-06-20 PROCEDURE — 99213 OFFICE O/P EST LOW 20 MIN: CPT | Mod: 95,,, | Performed by: INTERNAL MEDICINE

## 2024-06-20 PROCEDURE — 1159F MED LIST DOCD IN RCRD: CPT | Mod: CPTII,95,, | Performed by: INTERNAL MEDICINE

## 2024-06-20 PROCEDURE — 1160F RVW MEDS BY RX/DR IN RCRD: CPT | Mod: CPTII,95,, | Performed by: INTERNAL MEDICINE

## 2024-06-20 PROCEDURE — 3066F NEPHROPATHY DOC TX: CPT | Mod: CPTII,95,, | Performed by: INTERNAL MEDICINE

## 2024-06-20 NOTE — PROGRESS NOTES
The patient location is: [place]  The chief complaint leading to consultation is: depression, anxiety f/u    Visit type: audiovisual    Face to Face time with patient: 9 min  15 minutes of total time spent on the encounter, which includes face to face time and non-face to face time preparing to see the patient (eg, review of tests), Obtaining and/or reviewing separately obtained history, Documenting clinical information in the electronic or other health record, Independently interpreting results (not separately reported) and communicating results to the patient/family/caregiver, or Care coordination (not separately reported).         Each patient to whom he or she provides medical services by telemedicine is:  (1) informed of the relationship between the physician and patient and the respective role of any other health care provider with respect to management of the patient; and (2) notified that he or she may decline to receive medical services by telemedicine and may withdraw from such care at any time.    Notes:  Subjective     Patient ID: Toi Fernandez is a 53 y.o. female.    Chief Complaint: No chief complaint on file.    HPI  We started lexapro for adjustment DO with depression and anxiety    She would like me to review blood work  She has ckd .  Renal function stable PTH elevated,as before.    Long standing htn 149/but ate a bag of chips last night.  Review of Systems   Constitutional:  Positive for unexpected weight change. Negative for activity change.   HENT:  Negative for hearing loss, rhinorrhea and trouble swallowing.    Eyes:  Negative for discharge and visual disturbance.   Respiratory:  Positive for chest tightness. Negative for wheezing.    Cardiovascular:  Positive for chest pain and palpitations.   Gastrointestinal:  Positive for constipation. Negative for blood in stool, diarrhea and vomiting.   Endocrine: Negative for polydipsia and polyuria.   Genitourinary:  Negative for difficulty urinating,  dysuria, hematuria and menstrual problem.   Musculoskeletal:  Negative for neck pain.   Neurological:  Positive for headaches. Negative for weakness.   Psychiatric/Behavioral:  Positive for dysphoric mood. Negative for confusion.           Objective     Physical Exam  Constitutional:       General: She is not in acute distress.     Appearance: Normal appearance. She is not ill-appearing, toxic-appearing or diaphoretic.   Neurological:      General: No focal deficit present.      Mental Status: She is alert and oriented to person, place, and time.   Psychiatric:         Mood and Affect: Mood normal.         Behavior: Behavior normal.         Thought Content: Thought content normal.            Results for orders placed or performed in visit on 05/24/24   CBC Auto Differential   Result Value Ref Range    WBC 3.81 (L) 3.90 - 12.70 K/uL    RBC 3.85 (L) 4.00 - 5.40 M/uL    Hemoglobin 12.2 12.0 - 16.0 g/dL    Hematocrit 37.3 37.0 - 48.5 %    MCV 97 82 - 98 fL    MCH 31.7 (H) 27.0 - 31.0 pg    MCHC 32.7 32.0 - 36.0 g/dL    RDW 11.7 11.5 - 14.5 %    Platelets 172 150 - 450 K/uL    MPV 10.4 9.2 - 12.9 fL    Immature Granulocytes 0.0 0.0 - 0.5 %    Gran # (ANC) 1.6 (L) 1.8 - 7.7 K/uL    Immature Grans (Abs) 0.00 0.00 - 0.04 K/uL    Lymph # 1.5 1.0 - 4.8 K/uL    Mono # 0.5 0.3 - 1.0 K/uL    Eos # 0.3 0.0 - 0.5 K/uL    Baso # 0.03 0.00 - 0.20 K/uL    nRBC 0 0 /100 WBC    Gran % 41.7 38.0 - 73.0 %    Lymph % 38.6 18.0 - 48.0 %    Mono % 12.3 4.0 - 15.0 %    Eosinophil % 6.6 0.0 - 8.0 %    Basophil % 0.8 0.0 - 1.9 %    Differential Method Automated    Comprehensive Metabolic Panel   Result Value Ref Range    Sodium 141 136 - 145 mmol/L    Potassium 3.8 3.5 - 5.1 mmol/L    Chloride 105 95 - 110 mmol/L    CO2 25 23 - 29 mmol/L    Glucose 83 70 - 110 mg/dL    BUN 15 7 - 17 mg/dL    Creatinine 1.26 0.50 - 1.40 mg/dL    Calcium 9.4 8.7 - 10.5 mg/dL    Total Protein 7.7 6.0 - 8.4 g/dL    Albumin 4.3 3.5 - 5.2 g/dL    Total Bilirubin 1.2  (H) 0.1 - 1.0 mg/dL    Alkaline Phosphatase 64 38 - 126 U/L    AST 27 15 - 46 U/L    ALT 19 10 - 44 U/L    Anion Gap 11 8 - 16 mmol/L    eGFR 51.4 (A) >60 mL/min/1.73 m^2   URIC ACID   Result Value Ref Range    Uric Acid 5.5 2.4 - 5.7 mg/dL   PTH, intact   Result Value Ref Range    PTH, Intact 105.4 (H) 9.0 - 77.0 pg/mL      Assessment and Plan     1. Adjustment disorder with mixed anxiety and depressed mood    2. Situational stress             Increase lexapro - 20 mg daily.    She lives in [facility].      Therapist info,when I learn more.  Monitor BP  Schedule F/U Nephrology      Follow up in about 5 weeks (around 7/25/2024) for virtual visit.

## 2024-06-21 ENCOUNTER — PATIENT MESSAGE (OUTPATIENT)
Dept: BEHAVIORAL HEALTH | Facility: CLINIC | Age: 54
End: 2024-06-21
Payer: COMMERCIAL

## 2024-06-24 ENCOUNTER — TELEPHONE (OUTPATIENT)
Dept: BEHAVIORAL HEALTH | Facility: CLINIC | Age: 54
End: 2024-06-24
Payer: COMMERCIAL

## 2024-06-24 NOTE — PROGRESS NOTES
Behavioral Health Community Health Worker  Initial Assessment  Completed by:  Gray Nuñez    Date:  6/24/2024    Patient Enrollment in Behavioral Health Program:  Patient verbalized understanding of Behavioral Health Integration services to include:  Patient understands that CHW, LCSW, PharmD and consulting Psychiatrist are members of the care team working collaboratively with his/her primary care provider: Yes  Patient understands that activation of [facility] portal account is required for accessing the full scope of team services: Yes  Patient understands that some counseling sessions may occur via video: Yes  Clinic visits with the psychiatrist may be subject to a co-pay based on your insurance: Yes  Patient consents to enroll in BHI program: Yes    Assessments     Single Item Health Literacy Scale:       Promis 10:       Depression PHQ:      6/24/2024     3:01 PM 2/14/2024     8:42 AM 12/5/2023     3:06 PM 11/1/2023     9:19 AM 9/27/2023     3:10 PM 9/27/2023     3:09 PM 9/27/2023     3:02 PM   PHQ-9 Depression Patient Health Questionnaire   Over the last two weeks how often have you been bothered by little interest or pleasure in doing things 3 0 0 0 1 0 0   Over the last two weeks how often have you been bothered by feeling down, depressed or hopeless 2 0 0 0 1 0 0   Over the last two weeks how often have you been bothered by trouble falling or staying asleep, or sleeping too much 2         Over the last two weeks how often have you been bothered by feeling tired or having little energy 3         Over the last two weeks how often have you been bothered by a poor appetite or overeating 3         Over the last two weeks how often have you been bothered by feeling bad about yourself - or that you are a failure or have let yourself or your family down 2         Over the last two weeks how often have you been bothered by trouble concentrating on things, such as reading the newspaper or watching  television 2         Over the last two weeks how often have you been bothered by moving or speaking so slowly that other people could have noticed. 1         Over the last two weeks how often have you been bothered by thoughts that you would be better off dead, or of hurting yourself 0         If you checked off any problems, how difficult have these problems made it for you to do your work, take care of things at home or get along with other people? Extremely difficult         PHQ-9 Score 18                Generalized Anxiety Disorder 7-Item Scale:      6/24/2024     3:04 PM   GAD7   1. Feeling nervous, anxious, or on edge? 1   2. Not being able to stop or control worrying? 2   3. Worrying too much about different things? 2   4. Trouble relaxing? 1   5. Being so restless that it is hard to sit still? 1   6. Becoming easily annoyed or irritable? 3   7. Feeling afraid as if something awful might happen? 2   8. If you checked off any problems, how difficult have these problems made it for you to do your work, take care of things at home, or get along with other people? 1   LINNETTE-7 Score 12       History     Social History     Socioeconomic History    Marital status:    Occupational History    Occupation: [employer]     Employer: [employer]   Tobacco Use    Smoking status: Never    Smokeless tobacco: Never   Substance and Sexual Activity    Alcohol use: Not Currently     Comment: less than weekly.    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    Also owns a [employer] business        Exercise:  [employer] after work twice a week.     Social Determinants of Health     Financial Resource Strain: Medium Risk (12/5/2023)    Overall Financial Resource Strain (CARDIA)     Difficulty of Paying Living Expenses: Somewhat hard   Food Insecurity: Food Insecurity Present  "(12/5/2023)    Hunger Vital Sign     Worried About Running Out of Food in the Last Year: Sometimes true     Ran Out of Food in the Last Year: Sometimes true   Transportation Needs: Unmet Transportation Needs (12/5/2023)    PRAPARE - Transportation     Lack of Transportation (Medical): Yes     Lack of Transportation (Non-Medical): Yes   Physical Activity: Insufficiently Active (12/5/2023)    Exercise Vital Sign     Days of Exercise per Week: 1 day     Minutes of Exercise per Session: 30 min   Stress: Stress Concern Present (12/5/2023)    [place] of Occupational [employer] - Occupational Stress Questionnaire     Feeling of Stress : Very much   Housing Stability: High Risk (12/5/2023)    Housing Stability Vital Sign     Unable to Pay for Housing in the Last Year: Yes     Number of Places Lived in the Last Year: 2     Unstable Housing in the Last Year: Yes       Call Summary   Patient was referred to the BHI (Non-opioid) program by Primary Care Provider, Dr. Orourke CHW contacted Toi Fernandez who reports adjustment disorder that limits [his/her] activities of daily living (ADLs).   Patient scored "18" on the PHQ9 and "12" on the LINNETTE 7. Based on these scores patient is eligible for the Behavioral health Integration (Non-opioid) Program. CHW completed the intake and scheduled an appointment for patient with Dr Alesha Whalen, LPC,PhD on 7/9/24.          "

## 2024-07-03 ENCOUNTER — PATIENT MESSAGE (OUTPATIENT)
Dept: BARIATRICS | Facility: CLINIC | Age: 54
End: 2024-07-03
Payer: COMMERCIAL

## 2024-07-09 ENCOUNTER — PATIENT MESSAGE (OUTPATIENT)
Dept: BARIATRICS | Facility: CLINIC | Age: 54
End: 2024-07-09
Payer: COMMERCIAL

## 2024-07-09 ENCOUNTER — PATIENT OUTREACH (OUTPATIENT)
Dept: BEHAVIORAL HEALTH | Facility: CLINIC | Age: 54
End: 2024-07-09
Payer: COMMERCIAL

## 2024-07-25 ENCOUNTER — OFFICE VISIT (OUTPATIENT)
Dept: INTERNAL MEDICINE | Facility: CLINIC | Age: 54
End: 2024-07-25
Payer: COMMERCIAL

## 2024-07-25 ENCOUNTER — CLINICAL SUPPORT (OUTPATIENT)
Dept: BEHAVIORAL HEALTH | Facility: CLINIC | Age: 54
End: 2024-07-25
Payer: COMMERCIAL

## 2024-07-25 DIAGNOSIS — F43.23 ADJUSTMENT DISORDER WITH MIXED ANXIETY AND DEPRESSED MOOD: ICD-10-CM

## 2024-07-25 DIAGNOSIS — I10 ESSENTIAL HYPERTENSION: Primary | ICD-10-CM

## 2024-07-25 DIAGNOSIS — N18.31 STAGE 3A CHRONIC KIDNEY DISEASE: ICD-10-CM

## 2024-07-25 PROCEDURE — 99213 OFFICE O/P EST LOW 20 MIN: CPT | Mod: 95,,, | Performed by: INTERNAL MEDICINE

## 2024-07-25 PROCEDURE — G2211 COMPLEX E/M VISIT ADD ON: HCPCS | Mod: 95,,, | Performed by: INTERNAL MEDICINE

## 2024-07-25 PROCEDURE — 3066F NEPHROPATHY DOC TX: CPT | Mod: CPTII,95,, | Performed by: INTERNAL MEDICINE

## 2024-07-25 RX ORDER — AMLODIPINE BESYLATE 10 MG/1
10 TABLET ORAL DAILY
Qty: 90 TABLET | Refills: 3 | Status: SHIPPED | OUTPATIENT
Start: 2024-07-25

## 2024-07-25 NOTE — PROGRESS NOTES
Primary Care Behavioral Health Integration: Initial  Date:  7/25/2024  Referral Source:  Estela Crawford MD  Length of Appointment: 30  The patient [place] is:  [place]  The patient phone number is: [phone]  Visit type: Virtual visit with synchronous audio and video    Each patient to whom he or she provides medical services by telemedicine is:  (1) informed of the relationship between the physician and patient and the respective role of any other health care provider with respect to management of the patient; and (2) notified that he or she may decline to receive medical services by telemedicine and may withdraw from such care at any time.    Chief Complaint/Reason for Encounter:  Depression and Grief    History of Present Illness: Toi Fernandez, a 53 y.o. female referred by Estela Crawford MD.  Patient was seen, examined and chart was reviewed. Met with patient.     Current Session: Patient reported still grieving the loss of her son 8 years ago. Is very depressed and wants to stay in bed. Does go to work and takes care of 23 yo son who lives with her. Stays focused on the loss of her son. Is very overprotective of other son. Wants to look at medication change. Stays in bed when off and just wants to lie down all the time.    Past Medical History:   Diagnosis Date    Carpal tunnel syndrome, left     Chronic kidney disease, stage II (mild) 09/21/2012    Depression     H/O nephrotic syndrome, 2001. 04/21/2015    Hypertension     Obesity     Secondary hyperparathyroidism     Vitamin D deficiency disease          Current Outpatient Medications:     albuterol (PROVENTIL/VENTOLIN HFA) 90 mcg/actuation inhaler, Inhale 2 puffs into the lungs every 4 (four) hours as needed for Wheezing. Rescue, Disp: 54 g, Rfl: 2    amLODIPine (NORVASC) 10 MG tablet, Take 1 tablet (10 mg total) by mouth once daily., Disp: 90 tablet, Rfl: 3    carvediloL (COREG) 25 MG tablet, TAKE 1 TABLET(25 MG) BY MOUTH TWICE DAILY WITH MEALS,  Disp: 180 tablet, Rfl: 3    cetirizine (ZYRTEC) 10 MG tablet, TAKE 1 TO 2 TABLETS BY MOUTH EVERY DAY AS NEEDED FOR ITCHING, Disp: 180 tablet, Rfl: 1    cyclobenzaprine (FLEXERIL) 10 MG tablet, TAKE 1 TABLET BY MOUTH EVERY DAY AS NEEDED FOR BACK PAIN, Disp: 30 tablet, Rfl: 1    EScitalopram oxalate (LEXAPRO) 10 MG tablet, Take 1 tablet (10 mg total) by mouth once daily., Disp: 90 tablet, Rfl: 0    irbesartan (AVAPRO) 300 MG tablet, TAKE 1 TABLET(300 MG) BY MOUTH EVERY MORNING, Disp: 90 tablet, Rfl: 2    linaCLOtide (LINZESS) 145 mcg Cap capsule, 1 tab po q d prn constipation, Disp: 30 capsule, Rfl: 5    topiramate (TOPAMAX) 50 MG tablet, Take 1 tablet (50 mg total) by mouth 2 (two) times daily., Disp: 180 tablet, Rfl: 0    torsemide (DEMADEX) 10 MG Tab, TAKE 1 TABLET(10 MG) BY MOUTH EVERY DAY, Disp: 90 tablet, Rfl: 3    traMADoL (ULTRAM) 50 mg tablet, take 1 tablet by mouth every  8-12 hours as needed for  headache, Disp: 21 each, Rfl: 0    traZODone (DESYREL) 50 MG tablet, Take 0.5-1 tablets (25-50 mg total) by mouth nightly as needed for Insomnia., Disp: 30 tablet, Rfl: 2    Current symptoms:  Depression Symptoms: hopelessness and difficulty concentrating.  Anxiety Symptoms: excessive worrying and flashbacks/nightmares.  Sleep Difficulties: frequent night time awakening.  Manic Symptoms:  denies.  Psychosis: denies .    Risk assessment:  Patient reports no suicidal ideation  Patient reports no homicidal ideation  Patient reports no self-injurious behavior  Patient reports no violent behavior    Patient advised to call [phone] or present the the nearest ED if they experience suicidal or homicidal ideation, plan or intent.      Psychiatric History:  Diagnosis:    Current Psychiatric Medication: Yes - Lexapro      Medication Trial History:  Medication Trials: No    Outpatient Treatment: No   Inpatient Treatment: No   Suicide Attempts: No   Access to Firearms: No   History of Trauma: Yes - Son passing away   Family  Psychiatric History:      Current and Past Substance Use:  Alcohol: Social alcohol use.   Drugs: Denied.   Nicotine: denied   Caffeine:  Tea and Coffee     Mental Status Exam  General Appearance:  appears stated age, neatly dressed, well groomed   Speech: normal tone, normal pitch, normal volume      Level of Cooperation: cooperative      Thought Processes: linear, logical, goal-directed   Mood: depressed, sad      Thought Content: {relevant and appropriate   Affect: congruent and appropriate   Orientation: Oriented x4   Memory/Attention/Concentration: No gross cognitive deficits made evident during conversation   Judgment & Insight: poor   Language  intact          No data to display                    6/24/2024     3:04 PM   GAD7   1. Feeling nervous, anxious, or on edge? 1   2. Not being able to stop or control worrying? 2   3. Worrying too much about different things? 2   4. Trouble relaxing? 1   5. Being so restless that it is hard to sit still? 1   6. Becoming easily annoyed or irritable? 3   7. Feeling afraid as if something awful might happen? 2   8. If you checked off any problems, how difficult have these problems made it for you to do your work, take care of things at home, or get along with other people? 1   LINNETTE-7 Score 12       Impression: Initial appointment focused on gathering history, identifying treatment goals and developing a treatment plan.      Diagnosis:  1. Adjustment disorder with mixed anxiety and depressed mood  Ambulatory referral/consult to Primary Care Behavioral Health (Non-Opioids)          Treatment Goals and Plan:   Anxiety: reducing negative automatic thoughts and reducing physical symptoms of anxiety  Depression: reducing fatigue and reducing negative automatic thoughts  Grief    Future treatment will utilize CBT.      Return to Clinic: No follow-ups on file.

## 2024-07-25 NOTE — PROGRESS NOTES
The patient location is: [place]  The chief complaint leading to consultation is: f/u depression    Visit type: audiovisual    Face to Face time with patient: 16  21 minutes of total time spent on the encounter, which includes face to face time and non-face to face time preparing to see the patient (eg, review of tests), Obtaining and/or reviewing separately obtained history, Documenting clinical information in the electronic or other health record, Independently interpreting results (not separately reported) and communicating results to the patient/family/caregiver, or Care coordination (not separately reported).         Each patient to whom he or she provides medical services by telemedicine is:  (1) informed of the relationship between the physician and patient and the respective role of any other health care provider with respect to management of the patient; and (2) notified that he or she may decline to receive medical services by telemedicine and may withdraw from such care at any time.    Notes:  Subjective     Patient ID: Toi Fernandez is a 53 y.o. female.    Chief Complaint: No chief complaint on file.    HPI  153/98 is bp today.    Similar readings other times, sometimes higher.      No edema.    CKD 3a.          Appt with Nephrology yet to be rescheduled.      Mood is no better since increasing lexapro      She has appt with therapist today.    FMLA form has been completed.    Review of Systems   Constitutional:  Positive for activity change and unexpected weight change.   HENT:  Negative for hearing loss, rhinorrhea and trouble swallowing.    Eyes:  Negative for discharge and visual disturbance.   Respiratory:  Positive for chest tightness. Negative for wheezing.    Cardiovascular:  Positive for chest pain and palpitations.   Gastrointestinal:  Negative for blood in stool, constipation, diarrhea and vomiting.   Endocrine: Positive for polydipsia. Negative for polyuria.   Genitourinary:  Negative for  difficulty urinating, dysuria, hematuria and menstrual problem.   Musculoskeletal:  Positive for arthralgias and neck pain. Negative for joint swelling.   Neurological:  Positive for headaches. Negative for weakness.   Psychiatric/Behavioral:  Positive for dysphoric mood. Negative for confusion.           Objective     Physical Exam  Constitutional:       Appearance: Normal appearance. She is not ill-appearing.   Neurological:      Mental Status: She is alert.   Psychiatric:         Mood and Affect: Mood normal.         Behavior: Behavior normal.         Thought Content: Thought content normal.            Assessment and Plan     1. Essential hypertension  -     amLODIPine (NORVASC) 10 MG tablet; Take 1 tablet (10 mg total) by mouth once daily.  Dispense: 90 tablet; Refill: 3    2. Adjustment disorder with mixed anxiety and depressed mood    3. Stage 3a chronic kidney disease          To see therapist today, virtually    Any recs on medication change would be welcomed, as high dose lexapro is ineffective.     Increase amlodipine    FMLA form completed.  Follow up in about 6 weeks (around 9/5/2024).    CARMELO Braxton nephrology.

## 2024-07-25 NOTE — Clinical Note
You are seeing her today.  Any recs on medication from your supervising psychiatrist change would be welcomed, as high dose lexapro is ineffective. Thanks NE

## 2024-07-26 ENCOUNTER — TELEPHONE (OUTPATIENT)
Dept: NEPHROLOGY | Facility: CLINIC | Age: 54
End: 2024-07-26
Payer: COMMERCIAL

## 2024-07-30 ENCOUNTER — PATIENT MESSAGE (OUTPATIENT)
Dept: INTERNAL MEDICINE | Facility: CLINIC | Age: 54
End: 2024-07-30
Payer: COMMERCIAL

## 2024-07-30 ENCOUNTER — TELEPHONE (OUTPATIENT)
Dept: INTERNAL MEDICINE | Facility: CLINIC | Age: 54
End: 2024-07-30
Payer: COMMERCIAL

## 2024-07-30 NOTE — TELEPHONE ENCOUNTER
Sent pt back a portal message with information number of the [facility] department

## 2024-07-30 NOTE — TELEPHONE ENCOUNTER
----- Message from Chris Hernandez sent at 7/30/2024  1:06 PM CDT -----  Contact: Pt [phone]  Consult    She requested a callback or message via the portal with the status of the paperwork she dropped off to your office on 7/22/24 to be faxed to her job. She said as of today her job said they haven't received it. (See the message she sent via the portal today)    Thank you

## 2024-07-30 NOTE — TELEPHONE ENCOUNTER
Pt f/u on [place] paperwork, separate encounter instructed pt to contact a particular number, he stated that he called them and was told that MD signatures are still pending

## 2024-08-07 ENCOUNTER — CLINICAL SUPPORT (OUTPATIENT)
Dept: BEHAVIORAL HEALTH | Facility: CLINIC | Age: 54
End: 2024-08-07
Payer: COMMERCIAL

## 2024-08-07 DIAGNOSIS — F43.23 ADJUSTMENT DISORDER WITH MIXED ANXIETY AND DEPRESSED MOOD: Primary | ICD-10-CM

## 2024-08-07 PROCEDURE — 90832 PSYTX W PT 30 MINUTES: CPT | Mod: 95,,, | Performed by: COUNSELOR

## 2024-08-12 ENCOUNTER — PATIENT MESSAGE (OUTPATIENT)
Dept: BARIATRICS | Facility: CLINIC | Age: 54
End: 2024-08-12
Payer: COMMERCIAL

## 2024-08-26 NOTE — TELEPHONE ENCOUNTER
No care due was identified.  [facility] Embedded Care Due Messages. Reference number: [id number].   8/26/2024 3:09:45 PM CDT

## 2024-08-27 RX ORDER — ESCITALOPRAM OXALATE 10 MG/1
10 TABLET ORAL
Qty: 90 TABLET | Refills: 0 | Status: SHIPPED | OUTPATIENT
Start: 2024-08-27

## 2024-08-27 NOTE — TELEPHONE ENCOUNTER
Refill Routing Note   Medication(s) are not appropriate for processing by [facility] for the following reason(s):        New or recently adjusted medication    [facility] action(s):  Defer               Appointments  past 12m or future 3m with PCP    Date Provider   Last Visit   7/25/2024 Estela Crawford MD   Next Visit   9/18/2024 Estela Crawford MD   ED visits in past 90 days: 0        Note composed:2:16 AM 08/27/2024

## 2024-08-29 ENCOUNTER — CLINICAL SUPPORT (OUTPATIENT)
Dept: BEHAVIORAL HEALTH | Facility: CLINIC | Age: 54
End: 2024-08-29
Payer: COMMERCIAL

## 2024-08-29 ENCOUNTER — PATIENT MESSAGE (OUTPATIENT)
Dept: BEHAVIORAL HEALTH | Facility: CLINIC | Age: 54
End: 2024-08-29
Payer: COMMERCIAL

## 2024-08-29 DIAGNOSIS — F43.23 ADJUSTMENT DISORDER WITH MIXED ANXIETY AND DEPRESSED MOOD: Primary | ICD-10-CM

## 2024-08-29 NOTE — PROGRESS NOTES
Primary Care Behavioral Health Integration: Follow-up  Date:  2024  Patient Name: Toi Fernandez  Type of Visit:  Video Session  Site:  [facility]  Referral Source:  Estela Crawford MD    Visit type: Virtual visit with synchronous audio and video  The patient location is:  [place]  The patient location Parish is: [facility]  The patient phone number is: [phone]    Each patient to whom he or she provides medical services by telemedicine is:  (1) informed of the relationship between the physician and patient and the respective role of any other health care provider with respect to management of the patient; and (2) notified that he or she may decline to receive medical services by telemedicine and may withdraw from such care at any time.    History of Present Illness:  Toi Fernandez, a 53 y.o.  female with history of Adjustment disorders; with depressed mood [F43.21] referred by Estela Crawford MD.  Patient was seen, examined and chart was reviewed.    Met with patient.     Chief complaint/reason for encounter: depression     Current session: Patient reported being overprotective over 23 yo son bc he is the same age as son who . Son lives with pt and granddaughter. Discussed letting son make his own decisions. Son just got CDL and pt is afraid to go on the road. Pt's  just works and takes care of house. Does not give pt emotional support. Pt talks to sister every day for support.    Treatment plan:  Target symptoms: depression  Why chosen therapy is appropriate versus another modality: relevant to diagnosis  Outcome monitoring methods: self-report, observation  Therapeutic intervention type: supportive psychotherapy    Risk parameters:  Patient reports no suicidal ideation  Patient reports no homicidal ideation  Patient reports no self-injurious behavior  Patient reports no violent behavior    Verbal deficits: None    Patient's response to intervention:  The patient's  response to intervention is accepting.    Progress toward goals and other mental status changes:  The patient's progress toward goals is not progressing.    Patient advised to call [phone] or present the the nearest ED if they experience suicidal or homicidal ideation, plan or intent.           No data to display                    6/24/2024     3:04 PM   GAD7   1. Feeling nervous, anxious, or on edge? 1   2. Not being able to stop or control worrying? 2   3. Worrying too much about different things? 2   4. Trouble relaxing? 1   5. Being so restless that it is hard to sit still? 1   6. Becoming easily annoyed or irritable? 3   7. Feeling afraid as if something awful might happen? 2   8. If you checked off any problems, how difficult have these problems made it for you to do your work, take care of things at home, or get along with other people? 1   LINNETTE-7 Score 12       Diagnosis:     ICD-10-CM ICD-9-CM   1. Adjustment disorder with mixed anxiety and depressed mood  F43.23 309.28       Plan: Pt plans to continue individual therapy.    Return to clinic: 2 weeks    Length of Service (minutes): 30    Non face-to-face clinical time (minutes): 15

## 2024-09-03 ENCOUNTER — PATIENT MESSAGE (OUTPATIENT)
Dept: BARIATRICS | Facility: CLINIC | Age: 54
End: 2024-09-03
Payer: COMMERCIAL

## 2024-09-04 ENCOUNTER — PATIENT OUTREACH (OUTPATIENT)
Dept: ADMINISTRATIVE | Facility: HOSPITAL | Age: 54
End: 2024-09-04
Payer: COMMERCIAL

## 2024-09-04 NOTE — PROGRESS NOTES
Health Maintenance Due   Topic Date Due    Annual UACr  Never done    TETANUS VACCINE  Never done    Shingles Vaccine (1 of 2) Never done    Colorectal Cancer Screening  02/11/2023    Mammogram  07/13/2023    Influenza Vaccine (1) 09/01/2024    COVID-19 Vaccine (3 - 2023-24 season) 09/01/2024       Chart reviewed and updated. Reconciled immunizations.    Annette Anthony LPN   Clinical Care Coordinator  Primary Care and Wellness

## 2024-09-10 ENCOUNTER — PATIENT MESSAGE (OUTPATIENT)
Dept: BARIATRICS | Facility: CLINIC | Age: 54
End: 2024-09-10
Payer: COMMERCIAL

## 2024-09-13 ENCOUNTER — CLINICAL SUPPORT (OUTPATIENT)
Dept: BEHAVIORAL HEALTH | Facility: CLINIC | Age: 54
End: 2024-09-13
Payer: COMMERCIAL

## 2024-09-13 ENCOUNTER — PATIENT MESSAGE (OUTPATIENT)
Dept: BEHAVIORAL HEALTH | Facility: CLINIC | Age: 54
End: 2024-09-13

## 2024-09-13 DIAGNOSIS — F43.23 ADJUSTMENT DISORDER WITH MIXED ANXIETY AND DEPRESSED MOOD: Primary | ICD-10-CM

## 2024-09-13 NOTE — PROGRESS NOTES
"Primary Care Behavioral Health Integration: Follow-up  Date:  9/13/2024  Patient Name: Toi Fernandez  Type of Visit:  Video Session  Site:  [facility]  Referral Source:  Estela Crawford MD    Visit type: Virtual visit with synchronous audio and video  The patient location is:  [place]  The patient location Parish is: [facility]  The patient phone number is: [phone]    Each patient to whom he or she provides medical services by telemedicine is:  (1) informed of the relationship between the physician and patient and the respective role of any other health care provider with respect to management of the patient; and (2) notified that he or she may decline to receive medical services by telemedicine and may withdraw from such care at any time.    History of Present Illness:  Toi Fernandez, a 54 y.o.  female with history of Adjustment disorders; with depressed mood [F43.21] referred by Estela Crawford MD.  Patient was seen, examined and chart was reviewed.    Met with patient.     Chief complaint/reason for encounter: depression and anxiety     Current session: Patient reported being triggered by seeing a present to his "wife" which was for another woman from years ago.  not taking accountability for stepping out of the marriage. Has not apologized but just admits he messed up. Discussed acceptance with pt. Used ImTT to release hurt and anger towards 's cheating. Pt and son is getting along well and her grandbaby is the highlight of her life.    Treatment plan:  Target symptoms: depression, anxiety   Why chosen therapy is appropriate versus another modality: relevant to diagnosis  Outcome monitoring methods: self-report, observation  Therapeutic intervention type: supportive psychotherapy    Risk parameters:  Patient reports no suicidal ideation  Patient reports no homicidal ideation  Patient reports no self-injurious behavior  Patient reports no violent behavior    Verbal " deficits: None    Patient's response to intervention:  The patient's response to intervention is accepting.    Progress toward goals and other mental status changes:  The patient's progress toward goals is not progressing.    Patient advised to call [phone] or present the the nearest ED if they experience suicidal or homicidal ideation, plan or intent.           No data to display                    6/24/2024     3:04 PM   GAD7   1. Feeling nervous, anxious, or on edge? 1   2. Not being able to stop or control worrying? 2   3. Worrying too much about different things? 2   4. Trouble relaxing? 1   5. Being so restless that it is hard to sit still? 1   6. Becoming easily annoyed or irritable? 3   7. Feeling afraid as if something awful might happen? 2   8. If you checked off any problems, how difficult have these problems made it for you to do your work, take care of things at home, or get along with other people? 1   LINNETTE-7 Score 12       Diagnosis:     ICD-10-CM ICD-9-CM   1. Adjustment disorder with mixed anxiety and depressed mood  F43.23 309.28         Plan: Pt plans to continue individual therapy.    Return to clinic: 2 weeks    Length of Service (minutes): 30    Non face-to-face clinical time (minutes): 15

## 2024-09-18 ENCOUNTER — OFFICE VISIT (OUTPATIENT)
Dept: INTERNAL MEDICINE | Facility: CLINIC | Age: 54
End: 2024-09-18
Payer: COMMERCIAL

## 2024-09-18 VITALS
HEART RATE: 67 BPM | OXYGEN SATURATION: 98 % | DIASTOLIC BLOOD PRESSURE: 82 MMHG | HEIGHT: 68 IN | WEIGHT: 181.19 LBS | SYSTOLIC BLOOD PRESSURE: 128 MMHG | BODY MASS INDEX: 27.46 KG/M2

## 2024-09-18 DIAGNOSIS — G56.03 BILATERAL CARPAL TUNNEL SYNDROME: ICD-10-CM

## 2024-09-18 DIAGNOSIS — F33.41 MDD (MAJOR DEPRESSIVE DISORDER), RECURRENT, IN PARTIAL REMISSION: ICD-10-CM

## 2024-09-18 DIAGNOSIS — I10 PRIMARY HYPERTENSION: ICD-10-CM

## 2024-09-18 DIAGNOSIS — Z00.00 ANNUAL PHYSICAL EXAM: Primary | ICD-10-CM

## 2024-09-18 DIAGNOSIS — N18.30 STAGE 3 CHRONIC KIDNEY DISEASE, UNSPECIFIED WHETHER STAGE 3A OR 3B CKD: ICD-10-CM

## 2024-09-18 DIAGNOSIS — Z12.11 COLON CANCER SCREENING: ICD-10-CM

## 2024-09-18 DIAGNOSIS — I10 ESSENTIAL HYPERTENSION: ICD-10-CM

## 2024-09-18 PROCEDURE — 3008F BODY MASS INDEX DOCD: CPT | Mod: CPTII,S$GLB,, | Performed by: INTERNAL MEDICINE

## 2024-09-18 PROCEDURE — 4010F ACE/ARB THERAPY RXD/TAKEN: CPT | Mod: CPTII,S$GLB,, | Performed by: INTERNAL MEDICINE

## 2024-09-18 PROCEDURE — 1160F RVW MEDS BY RX/DR IN RCRD: CPT | Mod: CPTII,S$GLB,, | Performed by: INTERNAL MEDICINE

## 2024-09-18 PROCEDURE — 3074F SYST BP LT 130 MM HG: CPT | Mod: CPTII,S$GLB,, | Performed by: INTERNAL MEDICINE

## 2024-09-18 PROCEDURE — 1159F MED LIST DOCD IN RCRD: CPT | Mod: CPTII,S$GLB,, | Performed by: INTERNAL MEDICINE

## 2024-09-18 PROCEDURE — 99999 PR PBB SHADOW E&M-EST. PATIENT-LVL III: CPT | Mod: PBBFAC,,, | Performed by: INTERNAL MEDICINE

## 2024-09-18 PROCEDURE — 99396 PREV VISIT EST AGE 40-64: CPT | Mod: S$GLB,,, | Performed by: INTERNAL MEDICINE

## 2024-09-18 PROCEDURE — 3066F NEPHROPATHY DOC TX: CPT | Mod: CPTII,S$GLB,, | Performed by: INTERNAL MEDICINE

## 2024-09-18 PROCEDURE — 3079F DIAST BP 80-89 MM HG: CPT | Mod: CPTII,S$GLB,, | Performed by: INTERNAL MEDICINE

## 2024-09-18 RX ORDER — AMLODIPINE BESYLATE 10 MG/1
10 TABLET ORAL DAILY
Qty: 90 TABLET | Refills: 3 | Status: SHIPPED | OUTPATIENT
Start: 2024-09-18

## 2024-09-18 RX ORDER — TORSEMIDE 10 MG/1
10 TABLET ORAL DAILY
Qty: 90 TABLET | Refills: 3 | Status: SHIPPED | OUTPATIENT
Start: 2024-09-18

## 2024-09-18 RX ORDER — IRBESARTAN 300 MG/1
300 TABLET ORAL EVERY MORNING
Qty: 90 TABLET | Refills: 3 | Status: SHIPPED | OUTPATIENT
Start: 2024-09-18

## 2024-09-18 RX ORDER — ESCITALOPRAM OXALATE 10 MG/1
10 TABLET ORAL DAILY
Qty: 90 TABLET | Refills: 3 | Status: SHIPPED | OUTPATIENT
Start: 2024-09-18

## 2024-09-18 RX ORDER — CARVEDILOL 25 MG/1
25 TABLET ORAL 2 TIMES DAILY WITH MEALS
Qty: 180 TABLET | Refills: 3 | Status: SHIPPED | OUTPATIENT
Start: 2024-09-18
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
## 2024-09-24 DIAGNOSIS — R52 PAIN: Primary | ICD-10-CM

## 2024-09-25 ENCOUNTER — HOSPITAL ENCOUNTER (OUTPATIENT)
Dept: RADIOLOGY | Facility: HOSPITAL | Age: 54
Discharge: HOME | End: 2024-09-25
Payer: COMMERCIAL

## 2024-09-25 DIAGNOSIS — R52 PAIN: ICD-10-CM

## 2024-09-25 PROCEDURE — 73130 X-RAY EXAM OF HAND: CPT | Mod: 26,,, | Performed by: RADIOLOGY

## 2024-09-25 PROCEDURE — 73130 X-RAY EXAM OF HAND: CPT | Mod: TC,50,FY,PN

## 2024-09-26 ENCOUNTER — OFFICE VISIT (OUTPATIENT)
Dept: ORTHOPEDICS | Facility: CLINIC | Age: 54
End: 2024-09-26
Payer: COMMERCIAL

## 2024-09-26 DIAGNOSIS — M79.642 PAIN IN BOTH HANDS: ICD-10-CM

## 2024-09-26 DIAGNOSIS — M79.641 PAIN IN BOTH HANDS: ICD-10-CM

## 2024-09-26 DIAGNOSIS — M18.12 PRIMARY OSTEOARTHRITIS OF FIRST CARPOMETACARPAL JOINT OF LEFT HAND: Primary | ICD-10-CM

## 2024-09-26 PROCEDURE — 4010F ACE/ARB THERAPY RXD/TAKEN: CPT | Mod: CPTII,S$GLB,,

## 2024-09-26 PROCEDURE — 1160F RVW MEDS BY RX/DR IN RCRD: CPT | Mod: CPTII,S$GLB,,

## 2024-09-26 PROCEDURE — 99214 OFFICE O/P EST MOD 30 MIN: CPT | Mod: S$GLB,,,

## 2024-09-26 PROCEDURE — 99999 PR PBB SHADOW E&M-EST. PATIENT-LVL III: CPT | Mod: PBBFAC,,,

## 2024-09-26 PROCEDURE — 1159F MED LIST DOCD IN RCRD: CPT | Mod: CPTII,S$GLB,,

## 2024-09-26 PROCEDURE — 3066F NEPHROPATHY DOC TX: CPT | Mod: CPTII,S$GLB,,

## 2024-09-26 NOTE — PROGRESS NOTES
Subjective:      Patient ID: Toi Fernandez is a 54 y.o. female.    Chief Complaint: Pain of the Left Hand and Pain of the Right Hand      HPI: Toi Fernandez is a 54 y.o. right hand dominant female who presents to clinic for follow up of bilateral hand/thumb pain. Patient was last seen by Chelsi Boykin PA-C on 06/06/2023 for this and surgical intervention was discussed. She denies a history of trauma, but pain has been present for years. Pain is located over (points to) base of the thumb. She reports that the pain is a 10 /10 pain today and at its worst.  The pain is aggravated by use of the hands.  Associated symptoms include weakness with . Reports occasional numbness and tingling in her fingers. Previous treatments include bracing, corticosteroid injections, ice, NSAIDs, and activity modification without much relief. The pain is affecting ADLs and limiting desired level of activity.     Occupation: "[employer]       PAST MEDICAL HISTORY:    Past Medical History:   Diagnosis Date    Carpal tunnel syndrome, left     Chronic kidney disease, stage II (mild) 09/21/2012    Depression     H/O nephrotic syndrome, 2001. 04/21/2015    Hypertension     Obesity     Secondary hyperparathyroidism     Vitamin D deficiency disease      MEDICATIONS:   Current Outpatient Medications:     albuterol (PROVENTIL/VENTOLIN HFA) 90 mcg/actuation inhaler, Inhale 2 puffs into the lungs every 4 (four) hours as needed for Wheezing. Rescue (Patient not taking: Reported on 9/18/2024), Disp: 54 g, Rfl: 2    amLODIPine (NORVASC) 10 MG tablet, Take 1 tablet (10 mg total) by mouth once daily., Disp: 90 tablet, Rfl: 3    carvediloL (COREG) 25 MG tablet, Take 1 tablet (25 mg total) by mouth 2 (two) times daily with meals., Disp: 180 tablet, Rfl: 3    cetirizine (ZYRTEC) 10 MG tablet, TAKE 1 TO 2 TABLETS BY MOUTH EVERY DAY AS NEEDED FOR ITCHING, Disp: 180 tablet, Rfl: 1    cyclobenzaprine (FLEXERIL) 10 MG tablet, TAKE 1 TABLET BY MOUTH EVERY  DAY AS NEEDED FOR BACK PAIN, Disp: 30 tablet, Rfl: 1    EScitalopram oxalate (LEXAPRO) 10 MG tablet, Take 1 tablet (10 mg total) by mouth once daily., Disp: 90 tablet, Rfl: 3    irbesartan (AVAPRO) 300 MG tablet, Take 1 tablet (300 mg total) by mouth every morning., Disp: 90 tablet, Rfl: 3    topiramate (TOPAMAX) 50 MG tablet, Take 1 tablet (50 mg total) by mouth 2 (two) times daily., Disp: 180 tablet, Rfl: 0    torsemide (DEMADEX) 10 MG Tab, Take 1 tablet (10 mg total) by mouth once daily., Disp: 90 tablet, Rfl: 3    ALLERGIES:   Review of patient's allergies indicates:   Allergen Reactions    No known allergies        Review of Systems:  Constitution: Negative for chills, fever and night sweats.   HENT: Negative for congestion and headaches.    Eyes: Negative for blurred vision or vision loss.  Cardiovascular: Negative for chest pain and syncope.   Respiratory: Negative for cough and shortness of breath.    Endocrine: Negative for polydipsia, polyphagia and polyuria.   Hematologic/Lymphatic: Negative for bleeding problem. Does not bruise/bleed easily.   Skin: Negative for dry skin, itching and rash.   Musculoskeletal: See HPI.   Gastrointestinal: Negative for abdominal pain and bowel incontinence.   Genitourinary: Negative for bladder incontinence and nocturia.   Neurological: Negative for disturbances in coordination, loss of balance and seizures.   Psychiatric/Behavioral: Negative for depression. The patient does not have insomnia.    Allergic/Immunologic: Negative for hives and persistent infections.          Objective:        There were no vitals filed for this visit.    PHYSICAL EXAM:  General: Alert & oriented x3, well-developed and well-nourished, in no acute distress, sitting comfortably in the exam room.  Skin: Warm and dry. Capillary refill less than 2 seconds.   Head: Normocephalic and atraumatic.   Eyes: Sclera appear normal.   Nose: No deformities seen.   Ears: No deformities seen.   Neck: No tracheal  deviation present.   Pulmonary/Chest: Breathing unlabored.   Neurological: Alert and oriented to person, place, and time.   Psychiatric: Mood is pleasant and affect appropriate.     BILATERAL HAND/WRIST:        Observation/Inspection:    Moderate swelling.    Visible bony enlargement at the base of the thumb/CMC.  Otherwise, no evidence of discoloration, scars, or atrophy.         Palpation:   Tenderness to palpation to the base of the thumb, CMC joint.  Otherwise, negative tenderness to palpation to bony prominences and soft tissues throughout.        Range of Motion:  Wrist: Full to wrist flexion, extension, radial, and ulnar deviation.  Digits: Slightly limited to digit MCP, PIP, and DIP flexion and extension.        Special Tests:      Right     Left  Finkelstein's Test  Negative Negative  Tinel's Test - Carpal Tunnel  Positive  Positive  Tinel's Test - Cubital Tunnel Negative Negative  CMC Grind    Positive  Positive         Strength:    strength not tested today.          Neurovascular Exam:  Digits warm and well perfused, brisk capillary refill <3 seconds throughout  NVI motor/LTS to median, radial, and ulnar nerves, radial pulse 2+    Imaging:   X-Rays: 3 views of bilateral hands dated 09/25/2024 , and independently reviewed, show: Moderate to marked degenerative changes of the left 1st carpometacarpal joint with surrounding soft tissue calcifications. Mild degenerative changes of the right 1st carpometacarpal joint.       Assessment:       1. Primary osteoarthritis of first carpometacarpal joint of left hand    2. Pain in both hands        Plan:          I explained the nature of the problem to the patient.     I discussed at length with the patient all the different treatment options available for her bilateral hands/thumbs including anti-inflammatories, acetaminophen, bracing, rest, ice, heat, occupational therapy, and corticosteroid injections. I explained the potential role of surgery in the  treatment of this condition. The patient understands that if non-surgical measures do not adequately control symptoms, surgery will be considered in the future.     We had a lengthy discussion regarding surgical intervention. I did explain to the patient she has tried and failed conservative treatment up to this point, and the next step would be surgery. Patient states she is not ready to proceed with surgery. I offered patient repeat corticosteroid injection today however she declined.  Patient would like to call the office when she is ready to proceed with CMC arthroplasty and carpal tunnel release.    Follow-Up: When ready to schedule surgery or for repeat corticosteroid injection if patient would like.     All of the patient's questions were answered and the patient will contact us if they have any questions or concerns in the interim.      Martha French PA-C  [facility]  Orthopedic Surgery    Medical Dictation software was used during the dictation of portions or the entirety of this medical record.  Phonetic or grammatic errors may exist due to the use of this software. For clarification, refer to the author of the document.

## 2024-09-27 ENCOUNTER — CLINICAL SUPPORT (OUTPATIENT)
Dept: BEHAVIORAL HEALTH | Facility: CLINIC | Age: 54
End: 2024-09-27
Payer: COMMERCIAL

## 2024-09-27 DIAGNOSIS — F43.23 ADJUSTMENT DISORDER WITH MIXED ANXIETY AND DEPRESSED MOOD: Primary | ICD-10-CM

## 2024-09-27 NOTE — PROGRESS NOTES
Primary Care Behavioral Health Integration: Follow-up  Date:  9/27/2024  Patient Name: Toi Fernandez  Type of Visit:  Video Session  Site:  [facility]  Referral Source:  Estela Crawford MD    Visit type: Virtual visit with synchronous audio and video  The patient location is:  [place]  The patient location Parish is: [facility]  The patient phone number is: [phone]    Each patient to whom he or she provides medical services by telemedicine is:  (1) informed of the relationship between the physician and patient and the respective role of any other health care provider with respect to management of the patient; and (2) notified that he or she may decline to receive medical services by telemedicine and may withdraw from such care at any time.    History of Present Illness:  Toi Fernandez, a 54 y.o.  female with history of Adjustment disorders; with depressed mood [F43.21] referred by Estela Crawford MD.  Patient was seen, examined and chart was reviewed.    Met with patient.     Chief complaint/reason for encounter: depression and anxiety     Current session: Patient reported almost dropping her granddaughter. Got pain in hand trying to open a bottle. Pt decided to retire from her job due to corporal tunnel. Mentally she doesn't want to deal with the stress. Going to look into going out on disability. 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 Been out of work since July. Grief is ok for now and is just dealing with it. Is taking Lexapro every other day.     Treatment plan:  Target symptoms: depression, anxiety   Why chosen therapy is appropriate versus another modality: relevant to diagnosis  Outcome monitoring methods: self-report, observation  Therapeutic intervention type: supportive psychotherapy    Risk parameters:  Patient reports no suicidal ideation  Patient reports no homicidal ideation  Patient reports no self-injurious  behavior  Patient reports no violent behavior    Verbal deficits: None    Patient's response to intervention:  The patient's response to intervention is accepting.    Progress toward goals and other mental status changes:  The patient's progress toward goals is limited.    Patient advised to call [phone] or present the the nearest ED if they experience suicidal or homicidal ideation, plan or intent.           No data to display                    6/24/2024     3:04 PM   GAD7   1. Feeling nervous, anxious, or on edge? 1   2. Not being able to stop or control worrying? 2   3. Worrying too much about different things? 2   4. Trouble relaxing? 1   5. Being so restless that it is hard to sit still? 1   6. Becoming easily annoyed or irritable? 3   7. Feeling afraid as if something awful might happen? 2   8. If you checked off any problems, how difficult have these problems made it for you to do your work, take care of things at home, or get along with other people? 1   LINNETTE-7 Score 12       Diagnosis:     ICD-10-CM ICD-9-CM   1. Adjustment disorder with mixed anxiety and depressed mood  F43.23 309.28           Plan: Pt plans to continue individual therapy.    Return to clinic: 2 weeks    Length of Service (minutes): 30    Non face-to-face clinical time (minutes): 15

## 2024-10-01 ENCOUNTER — PATIENT MESSAGE (OUTPATIENT)
Dept: BARIATRICS | Facility: CLINIC | Age: 54
End: 2024-10-01
Payer: COMMERCIAL

## 2024-10-08 ENCOUNTER — PATIENT MESSAGE (OUTPATIENT)
Dept: BARIATRICS | Facility: CLINIC | Age: 54
End: 2024-10-08
Payer: COMMERCIAL

## 2024-11-02 ENCOUNTER — PATIENT MESSAGE (OUTPATIENT)
Dept: BARIATRICS | Facility: CLINIC | Age: 54
End: 2024-11-02
Payer: COMMERCIAL

## 2024-11-06 ENCOUNTER — PATIENT MESSAGE (OUTPATIENT)
Dept: BARIATRICS | Facility: CLINIC | Age: 54
End: 2024-11-06
Payer: COMMERCIAL

## 2024-11-12 ENCOUNTER — PATIENT MESSAGE (OUTPATIENT)
Dept: BARIATRICS | Facility: CLINIC | Age: 54
End: 2024-11-12
Payer: COMMERCIAL

## 2024-11-14 ENCOUNTER — PATIENT MESSAGE (OUTPATIENT)
Dept: INTERNAL MEDICINE | Facility: CLINIC | Age: 54
End: 2024-11-14
Payer: COMMERCIAL

## 2024-11-15 ENCOUNTER — LAB VISIT (OUTPATIENT)
Dept: LAB | Facility: HOSPITAL | Age: 54
End: 2024-11-15
Payer: COMMERCIAL

## 2024-11-15 ENCOUNTER — OFFICE VISIT (OUTPATIENT)
Dept: BARIATRICS | Facility: CLINIC | Age: 54
End: 2024-11-15
Payer: COMMERCIAL

## 2024-11-15 VITALS
TEMPERATURE: 98 F | HEART RATE: 59 BPM | WEIGHT: 183.63 LBS | BODY MASS INDEX: 27.83 KG/M2 | OXYGEN SATURATION: 98 % | HEIGHT: 68 IN | SYSTOLIC BLOOD PRESSURE: 111 MMHG | DIASTOLIC BLOOD PRESSURE: 69 MMHG

## 2024-11-15 DIAGNOSIS — F33.41 MDD (MAJOR DEPRESSIVE DISORDER), RECURRENT, IN PARTIAL REMISSION: ICD-10-CM

## 2024-11-15 DIAGNOSIS — Z90.3 S/P GASTRIC SLEEVE PROCEDURE: ICD-10-CM

## 2024-11-15 DIAGNOSIS — I10 PRIMARY HYPERTENSION: Primary | ICD-10-CM

## 2024-11-15 DIAGNOSIS — R63.5 WEIGHT GAIN: ICD-10-CM

## 2024-11-15 DIAGNOSIS — Z98.84 BARIATRIC SURGERY STATUS: ICD-10-CM

## 2024-11-15 DIAGNOSIS — I10 PRIMARY HYPERTENSION: ICD-10-CM

## 2024-11-15 DIAGNOSIS — Z71.3 ENCOUNTER FOR WEIGHT LOSS COUNSELING: ICD-10-CM

## 2024-11-15 DIAGNOSIS — E66.3 OVERWEIGHT (BMI 25.0-29.9): ICD-10-CM

## 2024-11-15 DIAGNOSIS — N18.2 CHRONIC KIDNEY DISEASE, STAGE II (MILD): ICD-10-CM

## 2024-11-15 LAB
25(OH)D3+25(OH)D2 SERPL-MCNC: 20 NG/ML (ref 30–96)
ALBUMIN SERPL BCP-MCNC: 4.3 G/DL (ref 3.5–5.2)
ALP SERPL-CCNC: 52 U/L (ref 38–126)
ALT SERPL W/O P-5'-P-CCNC: 22 U/L (ref 10–44)
ANION GAP SERPL CALC-SCNC: 7 MMOL/L (ref 8–16)
AST SERPL-CCNC: 26 U/L (ref 15–46)
BASOPHILS # BLD AUTO: 0.03 K/UL (ref 0–0.2)
BASOPHILS NFR BLD: 0.7 % (ref 0–1.9)
BILIRUB SERPL-MCNC: 1.2 MG/DL (ref 0.1–1)
CALCIUM SERPL-MCNC: 9.4 MG/DL (ref 8.7–10.5)
CHLORIDE SERPL-SCNC: 106 MMOL/L (ref 95–110)
CHOLEST SERPL-MCNC: 188 MG/DL (ref 120–199)
CHOLEST/HDLC SERPL: 3.4 {RATIO} (ref 2–5)
CO2 SERPL-SCNC: 29 MMOL/L (ref 23–29)
CREAT SERPL-MCNC: 1.3 MG/DL (ref 0.5–1.4)
DIFFERENTIAL METHOD BLD: ABNORMAL
EOSINOPHIL # BLD AUTO: 0.2 K/UL (ref 0–0.5)
EOSINOPHIL NFR BLD: 4 % (ref 0–8)
ERYTHROCYTE [DISTWIDTH] IN BLOOD BY AUTOMATED COUNT: 11.6 % (ref 11.5–14.5)
EST. GFR  (NO RACE VARIABLE): 49.2 ML/MIN/1.73 M^2
GLUCOSE SERPL-MCNC: 88 MG/DL (ref 70–110)
HCT VFR BLD AUTO: 42 % (ref 37–48.5)
HDLC SERPL-MCNC: 56 MG/DL (ref 40–75)
HDLC SERPL: 29.8 % (ref 20–50)
HGB BLD-MCNC: 13.6 G/DL (ref 12–16)
IMM GRANULOCYTES # BLD AUTO: 0 K/UL (ref 0–0.04)
IMM GRANULOCYTES NFR BLD AUTO: 0 % (ref 0–0.5)
LDLC SERPL CALC-MCNC: 111.8 MG/DL (ref 63–159)
LYMPHOCYTES # BLD AUTO: 2.1 K/UL (ref 1–4.8)
LYMPHOCYTES NFR BLD: 49.4 % (ref 18–48)
MCH RBC QN AUTO: 32.2 PG (ref 27–31)
MCHC RBC AUTO-ENTMCNC: 32.4 G/DL (ref 32–36)
MCV RBC AUTO: 99 FL (ref 82–98)
MONOCYTES # BLD AUTO: 0.2 K/UL (ref 0.3–1)
MONOCYTES NFR BLD: 4.7 % (ref 4–15)
NEUTROPHILS # BLD AUTO: 1.8 K/UL (ref 1.8–7.7)
NEUTROPHILS NFR BLD: 41.2 % (ref 38–73)
NONHDLC SERPL-MCNC: 132 MG/DL
NRBC BLD-RTO: 0 /100 WBC
PLATELET # BLD AUTO: 166 K/UL (ref 150–450)
PMV BLD AUTO: 11.1 FL (ref 9.2–12.9)
POTASSIUM SERPL-SCNC: 3.6 MMOL/L (ref 3.5–5.1)
PROT SERPL-MCNC: 7.9 G/DL (ref 6–8.4)
RBC # BLD AUTO: 4.23 M/UL (ref 4–5.4)
SODIUM SERPL-SCNC: 142 MMOL/L (ref 136–145)
TRIGL SERPL-MCNC: 101 MG/DL (ref 30–150)
UUN UR-MCNC: 23 MG/DL (ref 7–17)
VIT B12 SERPL-MCNC: 277 PG/ML (ref 210–950)
WBC # BLD AUTO: 4.27 K/UL (ref 3.9–12.7)

## 2024-11-15 PROCEDURE — 84425 ASSAY OF VITAMIN B-1: CPT | Mod: PN | Performed by: PHYSICIAN ASSISTANT

## 2024-11-15 PROCEDURE — 82607 VITAMIN B-12: CPT | Mod: PN | Performed by: PHYSICIAN ASSISTANT

## 2024-11-15 PROCEDURE — 99999 PR PBB SHADOW E&M-EST. PATIENT-LVL IV: CPT | Mod: PBBFAC,,, | Performed by: PHYSICIAN ASSISTANT

## 2024-11-15 PROCEDURE — 36415 COLL VENOUS BLD VENIPUNCTURE: CPT | Mod: PN | Performed by: PHYSICIAN ASSISTANT

## 2024-11-15 PROCEDURE — 85025 COMPLETE CBC W/AUTO DIFF WBC: CPT | Mod: PN | Performed by: PHYSICIAN ASSISTANT

## 2024-11-15 PROCEDURE — 80061 LIPID PANEL: CPT | Performed by: PHYSICIAN ASSISTANT

## 2024-11-15 PROCEDURE — 80053 COMPREHEN METABOLIC PANEL: CPT | Mod: PN | Performed by: PHYSICIAN ASSISTANT

## 2024-11-15 PROCEDURE — 82306 VITAMIN D 25 HYDROXY: CPT | Mod: PN | Performed by: PHYSICIAN ASSISTANT

## 2024-11-15 PROCEDURE — 84466 ASSAY OF TRANSFERRIN: CPT | Mod: PN | Performed by: PHYSICIAN ASSISTANT

## 2024-11-15 NOTE — PROGRESS NOTES
BARIATRIC POST-OPERATIVE FOLLOW UP:    HPI:  54 y.o.-year-old female presents for 4 year follow up .        Denies: nausea, vomiting, abdominal pain, changes in bowel movement pattern, fever, chills, dysphagia, chest pain, and shortness of breath.          ROS:    Review of Systems   Constitutional:  Negative for activity change and fatigue.   Respiratory:  Negative for cough and shortness of breath.    Cardiovascular:  Negative for chest pain, palpitations and leg swelling.   Gastrointestinal:  Negative for abdominal pain, nausea and vomiting.   Endocrine: Negative for polydipsia, polyphagia and polyuria.   Genitourinary:  Negative for dysuria.   Musculoskeletal:  Negative for gait problem.   Skin:  Negative for rash.   Allergic/Immunologic: Negative for immunocompromised state.   Neurological:  Negative for dizziness, syncope and weakness.   Hematological:  Does not bruise/bleed easily.   Psychiatric/Behavioral:  Negative for behavioral problems.        EXERCISE:  Not currently     VITAMINS:  Adherent vitamins     MEDICATIONS/ALLERGIES:  Have been reviewed.    DIET:  Bariatric Regular Diet    PHYSICAL EXAM:  GENERAL: 54 y.o.-year-old female in NAD, A&O x3  VITAL SIGNS:  BP: 111/69  CHEST: Clear to auscultation, regular effort.  HEART: Regular rate and rhythm. No murmurs, clicks, or gallops.  ABDOMEN: Bowel sounds present in all four quadrants. Soft, non-tender, non-distended. Well-healing surgical scars are clean, dry, and intact without signs of infection or bleeding.  EXTREMITIES: No clubbing, cyanosis, or edema.      ASSESSMENT:  - Morbid obesity s/p sleeve gastrectomy on 5/27/20.  - Co-morbidities: hypertension and CKD  - Weight loss 48 lbs  57% EWL  - Exercise regimen not currently   - Diet fair   - Vitamin Regimen good    PLAN:  - Emphasized the importance of regular exercise and adherence to bariatric diet to achieve maximum weight loss.  - Encouraged patient to begin OR continue regular exercise.  -  Follow-up with dietician to reinforce diet.  - Continue daily vitamins and medications.  - RTC in 12 months or sooner if needed.  - Call the office for any issues.  - Check labs today.

## 2024-11-15 NOTE — PATIENT INSTRUCTIONS
"Snacks: (100-200 calories; >5g protein)    - 1 low-fat cheese stick with 8 cherry tomatoes or 1 serving fresh fruit  - 4 thin slices fat-free turkey breast and 1 slice low-fat cheese  - 4 thin slices fat-free honey ham with wedge of melon  - 2 slices of turkey medina  - Boiled eggs (can buy at [employer] already boiled w/ shell removed)  - for convenience,  Bynum read, snack, go (deli meat and cheese rolls)  - P3 packets (Protein packs w/ cheese, nuts, lean deli meat)  - MHP Fit and Lean Protein Pudding (find at [employer] - per 1 cup serving = 100 calories, 15 g protein, 0 g sugar)  - 6-8 edamame pods (equivalent to about 1/4 cup edamame without pods).   - 1/4 cup unsalted nuts with ½ cup fruit  - 6-oz container Dannon Light n Fit vanilla yogurt, topped with 1oz unsalted nuts         - apple, celery or baby carrots spread with 2 Tbsp PB2  - apple slices with 1 oz slice low-fat cheese  - Apple slices dipped in 2 Tbsp of PB2  - 2 Tbsp PB2 mixed in light or greek yogurt or sugar-free pudding  - celery, cucumber, bell pepper or baby carrots dipped in ¼ cup hummus bean spread   - celery, cucumber, baby carrots dipped in high protein greek yogurt (Mix 16 oz plain greek yogurt + 1 packet of hidden valley ranch dip mix)  - Jackson Links Beef Steak - 14g protein! (similar to beef jerky but very lean)  - 2 wedges Laughing Cow - Light Herb & Garlic Cheese with sliced cucumber or green bell pepper  - 1/2 cup low-fat cottage cheese with ¼ cup fruit or ¼ cup salsa  - 1/2 cup low fat cottage cheese with 10-15 cherry tomatoes  - 8 oz glass of FAIRLIFE fat free milk (13 g protein)  - 8 oz glass of FAIRLIFE fat free milk + 1 packet of sugar-free hot cocoa  - Add Atkins advantage Cafe Caramel shake to decaf coffee. Serve hot or blend with ice for "frappaccino" like drink  - RTD Protein drinks: Atkins, Low Carb Slim Fast, EAS light, Muscle Milk Light, etc.  - Homemade Protein drinks: GNC Soy95, Isopure, Nectar, UNJURY, Whey " Gourmet, etc. Mix 1 scoop powder with 8oz skim/1% milk or light soymilk.  - Protein bars: Atkins, EAS, Pure Protein,  Quest, Think Thin, Detour, etc. Must have 0-4 grams sugar - Read the label.    ** Be CREATIVE. You can always snack on bites of grilled chicken or tuna salad made with low fat maurer, if needed!

## 2024-11-16 LAB
IRON SERPL-MCNC: 126 UG/DL (ref 30–160)
SATURATED IRON: 44 % (ref 20–50)
TOTAL IRON BINDING CAPACITY: 286 UG/DL (ref 250–450)
TRANSFERRIN SERPL-MCNC: 193 MG/DL (ref 200–375)

## 2024-11-21 ENCOUNTER — OFFICE VISIT (OUTPATIENT)
Dept: INTERNAL MEDICINE | Facility: CLINIC | Age: 54
End: 2024-11-21
Payer: COMMERCIAL

## 2024-11-21 DIAGNOSIS — F33.41 MDD (MAJOR DEPRESSIVE DISORDER), RECURRENT, IN PARTIAL REMISSION: Primary | ICD-10-CM

## 2024-11-21 DIAGNOSIS — F43.9 SITUATIONAL STRESS: ICD-10-CM

## 2024-11-21 DIAGNOSIS — G56.00 CARPAL TUNNEL SYNDROME, UNSPECIFIED LATERALITY: ICD-10-CM

## 2024-11-21 PROCEDURE — 99213 OFFICE O/P EST LOW 20 MIN: CPT | Mod: 95,,, | Performed by: INTERNAL MEDICINE

## 2024-11-21 PROCEDURE — 3066F NEPHROPATHY DOC TX: CPT | Mod: CPTII,95,, | Performed by: INTERNAL MEDICINE

## 2024-11-21 PROCEDURE — G2211 COMPLEX E/M VISIT ADD ON: HCPCS | Mod: 95,,, | Performed by: INTERNAL MEDICINE

## 2024-11-21 PROCEDURE — 4010F ACE/ARB THERAPY RXD/TAKEN: CPT | Mod: CPTII,95,, | Performed by: INTERNAL MEDICINE

## 2024-11-21 RX ORDER — CYCLOBENZAPRINE HCL 10 MG
TABLET ORAL
Qty: 30 TABLET | Refills: 2 | Status: SHIPPED | OUTPATIENT
Start: 2024-11-21

## 2024-11-21 NOTE — LETTER
November 21, 2024    Chasejuwan Fernandez  [address] [place] [address]             [facility]  [address]  [facility] [address]  Phone: [phone]  Fax: [phone] To whom it may concern:    Please extend Hemalatha Fernandez's medical leave from work through May 30, 2025 due to medical illness.      If you have any questions or concerns, please don't hesitate to call.    Sincerely,         Estela Crawford MD

## 2024-11-21 NOTE — Clinical Note
Alesha - could you possibly facilitate a psych appt? She is very depressed and seems to have PTSD.  Thanks for whatever help you can give NE

## 2024-11-21 NOTE — PROGRESS NOTES
"  The patient location is: [place]  The chief complaint leading to consultation is: leave extension    Visit type: audiovisual    Face to Face time with patient: 11  17 minutes of total time spent on the encounter, which includes face to face time and non-face to face time preparing to see the patient (eg, review of tests), Obtaining and/or reviewing separately obtained history, Documenting clinical information in the electronic or other health record, Independently interpreting results (not separately reported) and communicating results to the patient/family/caregiver, or Care coordination (not separately reported).         Each patient to whom he or she provides medical services by telemedicine is:  (1) informed of the relationship between the physician and patient and the respective role of any other health care provider with respect to management of the patient; and (2) notified that he or she may decline to receive medical services by telemedicine and may withdraw from such care at any time.    Notes:  Subjective     Patient ID: Toi Fernandez is a 54 y.o. female.    Chief Complaint: No chief complaint on file.    HPI  Needs a letter faxed to her work [phone]     She wishes to extend leave through end of May due to depression and stress.      Her present job, at a school, triggers PTSD.  She is thinking of looking for another job.       She was in therapy with CHAPINCITO Whalen, which apparently has ended.        She is taking lexapro qod, because felt "zoned out" when taking it daily.         She would like flexeril refill for back and hand pain.  She takes q hs prn.  She has CTS - injections not effective.  Review of Systems   Constitutional:  Positive for activity change.   HENT:  Negative for hearing loss, rhinorrhea and trouble swallowing.    Eyes:  Negative for discharge and visual disturbance.   Respiratory:  Negative for chest tightness and wheezing.    Cardiovascular:  Negative for chest pain and " palpitations.   Gastrointestinal:  Positive for constipation. Negative for blood in stool, diarrhea and vomiting.   Endocrine: Positive for polydipsia and polyuria.   Genitourinary:  Negative for difficulty urinating, dysuria, hematuria and menstrual problem.   Musculoskeletal:  Positive for arthralgias and joint swelling.   Neurological:  Positive for weakness and headaches.   Psychiatric/Behavioral:  Positive for dysphoric mood. Negative for confusion.           Objective     Physical Exam  Constitutional:       General: She is not in acute distress.     Appearance: Normal appearance. She is not ill-appearing, toxic-appearing or diaphoretic.   Neurological:      General: No focal deficit present.      Mental Status: She is alert and oriented to person, place, and time.   Psychiatric:         Mood and Affect: Mood normal.         Behavior: Behavior normal.         Thought Content: Thought content normal.         Judgment: Judgment normal.            Assessment and Plan     1. MDD (major depressive disorder), recurrent, in partial remission    2. Situational stress    3. Carpal tunnel syndrome, unspecified laterality    Other orders  -     cyclobenzaprine (FLEXERIL) 10 MG tablet; TAKE 1 TABLET BY MOUTH EVERY DAY AS NEEDED FOR BACK PAIN  Dispense: 30 tablet; Refill: 2             Call psychiatry to make an appointment [phone] .    She would benefit from more therapy and possibly a medication change.    Letter to extend disability    No follow-ups on file.

## 2024-11-22 ENCOUNTER — DOCUMENTATION ONLY (OUTPATIENT)
Dept: INTERNAL MEDICINE | Facility: CLINIC | Age: 54
End: 2024-11-22
Payer: COMMERCIAL

## 2024-11-25 ENCOUNTER — PATIENT OUTREACH (OUTPATIENT)
Dept: PSYCHIATRY | Facility: CLINIC | Age: 54
End: 2024-11-25
Payer: COMMERCIAL

## 2024-12-03 ENCOUNTER — PATIENT MESSAGE (OUTPATIENT)
Dept: BARIATRICS | Facility: CLINIC | Age: 54
End: 2024-12-03
Payer: COMMERCIAL

## 2024-12-10 ENCOUNTER — PATIENT MESSAGE (OUTPATIENT)
Dept: BARIATRICS | Facility: CLINIC | Age: 54
End: 2024-12-10
Payer: COMMERCIAL

## 2024-12-27 ENCOUNTER — TELEPHONE (OUTPATIENT)
Dept: ORTHOPEDICS | Facility: CLINIC | Age: 54
End: 2024-12-27
Payer: COMMERCIAL

## 2024-12-27 NOTE — TELEPHONE ENCOUNTER
----- Message from Kalia sent at 12/27/2024  2:31 PM CST -----  Pt Requesting to Schedule an Appointment     Pt is requesting to schedule an appointment that our scheduling dept cannot schedule.    Who called: pt  Best call back #: [phone]  When pt wants appt: only on 1/2/25 (due to being off from work)  Reason for appt: injection in right hand  Additional notes: pt said she is experiencing swelling in her hand due to carpal tunnel

## 2024-12-27 NOTE — TELEPHONE ENCOUNTER
Spoke with patient. Offered an appointment for 1/23 with Dr Monroy. Patient stated that it was too long and will call another physician.

## 2024-12-28 ENCOUNTER — OFFICE VISIT (OUTPATIENT)
Dept: URGENT CARE | Facility: CLINIC | Age: 54
End: 2024-12-28
Payer: COMMERCIAL

## 2024-12-28 VITALS
TEMPERATURE: 98 F | HEIGHT: 68 IN | WEIGHT: 183.63 LBS | DIASTOLIC BLOOD PRESSURE: 94 MMHG | HEART RATE: 58 BPM | BODY MASS INDEX: 27.83 KG/M2 | RESPIRATION RATE: 16 BRPM | SYSTOLIC BLOOD PRESSURE: 150 MMHG | OXYGEN SATURATION: 99 %

## 2024-12-28 DIAGNOSIS — G56.03 BILATERAL CARPAL TUNNEL SYNDROME: Primary | ICD-10-CM

## 2024-12-28 DIAGNOSIS — M79.641 BILATERAL HAND PAIN: ICD-10-CM

## 2024-12-28 DIAGNOSIS — M79.642 BILATERAL HAND PAIN: ICD-10-CM

## 2024-12-28 DIAGNOSIS — R20.2 PARESTHESIA OF BOTH HANDS: ICD-10-CM

## 2024-12-28 PROCEDURE — 99213 OFFICE O/P EST LOW 20 MIN: CPT | Mod: 25,S$GLB,, | Performed by: NURSE PRACTITIONER

## 2024-12-28 PROCEDURE — 96372 THER/PROPH/DIAG INJ SC/IM: CPT | Mod: S$GLB,,, | Performed by: NURSE PRACTITIONER

## 2024-12-28 RX ORDER — PHENTERMINE HYDROCHLORIDE 37.5 MG/1
TABLET ORAL
COMMUNITY
Start: 2024-10-28

## 2024-12-28 RX ORDER — PREDNISONE 20 MG/1
20 TABLET ORAL DAILY
Qty: 5 TABLET | Refills: 0 | Status: SHIPPED | OUTPATIENT
Start: 2024-12-28 | End: 2025-01-02

## 2024-12-28 RX ORDER — KETOROLAC TROMETHAMINE 30 MG/ML
30 INJECTION, SOLUTION INTRAMUSCULAR; INTRAVENOUS
Status: COMPLETED | OUTPATIENT
Start: 2024-12-28 | End: 2024-12-28

## 2024-12-28 RX ADMIN — KETOROLAC TROMETHAMINE 30 MG: 30 INJECTION, SOLUTION INTRAMUSCULAR; INTRAVENOUS at 05:12

## 2024-12-28 NOTE — PROGRESS NOTES
"Subjective:      Patient ID: Toi Fernandez is a 54 y.o. female.    Vitals:  height is 5' 8" (1.727 m) and weight is 83.3 kg (183 lb 10.3 oz). Her oral temperature is 97.9 °F (36.6 °C). Her blood pressure is 150/94 (abnormal) and her pulse is 58 (abnormal). Her respiration is 16 and oxygen saturation is 99%.     Chief Complaint: Other Misc (Both of my hands are in severe pain - Entered by patient) and Hand Pain    56 y/o female pt c/o of pain to both hands x2 weeks. She c/o burning sensation to fingertips, pain to anterior and posterior hands, and hands feeling tight and inflamed; numb and tingly. Right hand worse. Has h/o carpel tunnel.   Using OTC pain cream to hands, with no relief.   Has upcoming appt with hand doctor on 1/9.          Hand Pain   Her dominant hand is their right hand. The incident occurred more than 1 week ago. There was no injury mechanism. The pain is present in the right fingers, right hand, left fingers and left hand. The pain does not radiate. The pain is at a severity of 7/10. The pain is moderate. The pain has been Constant since the incident. Associated symptoms include numbness and tingling. Exacerbated by: OTC cream. The treatment provided no relief.       Constitution: Negative for fever.   Musculoskeletal:  Positive for pain and joint pain. Negative for trauma.   Skin:  Negative for rash, wound, skin thickening/induration and erythema.   Neurological:  Positive for numbness and tingling.      Objective:     Physical Exam   Constitutional: She is oriented to person, place, and time.  Non-toxic appearance. She does not appear ill. No distress.   HENT:   Head: Normocephalic.   Nose: Nose normal.   Mouth/Throat: Mucous membranes are moist.   Cardiovascular: Normal pulses. Bradycardia present.   Pulmonary/Chest: Effort normal.   Abdominal: Normal appearance.   Musculoskeletal: Normal range of motion.         General: Normal range of motion.      Comments: PT slow at making fist. No " significant edema noted. No erythema noted. Good bilateral  strength.    Neurological: She is alert and oriented to person, place, and time.   Skin: Skin is warm, dry, not diaphoretic, not pale and no rash. Capillary refill takes less than 2 seconds. No bruising, No erythema and No lesion   Psychiatric: Her behavior is normal. Mood normal.   Nursing note and vitals reviewed.      Assessment:     1. Bilateral carpal tunnel syndrome    2. Paresthesia of both hands    3. Bilateral hand pain        Plan:       Bilateral carpal tunnel syndrome  -     predniSONE (DELTASONE) 20 MG tablet; Take 1 tablet (20 mg total) by mouth once daily. for 5 days  Dispense: 5 tablet; Refill: 0    Paresthesia of both hands    Bilateral hand pain  -     ketorolac injection 30 mg  -     predniSONE (DELTASONE) 20 MG tablet; Take 1 tablet (20 mg total) by mouth once daily. for 5 days  Dispense: 5 tablet; Refill: 0      Patient Instructions   Follow up with hand doctor as scheduled

## 2025-01-02 ENCOUNTER — OFFICE VISIT (OUTPATIENT)
Dept: NEPHROLOGY | Facility: CLINIC | Age: 55
End: 2025-01-02
Payer: COMMERCIAL

## 2025-01-02 VITALS
OXYGEN SATURATION: 99 % | DIASTOLIC BLOOD PRESSURE: 94 MMHG | WEIGHT: 191.13 LBS | HEART RATE: 55 BPM | BODY MASS INDEX: 29.06 KG/M2 | SYSTOLIC BLOOD PRESSURE: 158 MMHG

## 2025-01-02 DIAGNOSIS — I12.9 BENIGN HYPERTENSION WITH CHRONIC KIDNEY DISEASE, STAGE II: ICD-10-CM

## 2025-01-02 DIAGNOSIS — N18.30 STAGE 3 CHRONIC KIDNEY DISEASE, UNSPECIFIED WHETHER STAGE 3A OR 3B CKD: Primary | ICD-10-CM

## 2025-01-02 DIAGNOSIS — N25.81 SECONDARY HYPERPARATHYROIDISM OF RENAL ORIGIN: ICD-10-CM

## 2025-01-02 DIAGNOSIS — N18.2 BENIGN HYPERTENSION WITH CHRONIC KIDNEY DISEASE, STAGE II: ICD-10-CM

## 2025-01-02 PROCEDURE — 99999 PR PBB SHADOW E&M-EST. PATIENT-LVL III: CPT | Mod: PBBFAC,,, | Performed by: STUDENT IN AN ORGANIZED HEALTH CARE EDUCATION/TRAINING PROGRAM

## 2025-01-02 RX ORDER — VIT C/E/ZN/COPPR/LUTEIN/ZEAXAN 250MG-90MG
5000 CAPSULE ORAL DAILY
Qty: 90 CAPSULE | Refills: 2 | Status: SHIPPED | OUTPATIENT
Start: 2025-01-02 | End: 2025-04-02

## 2025-01-03 NOTE — PROGRESS NOTES
Nephrology Clinic Note   1/2/2025    Chief Complaint   Patient presents with    Follow-up      History of present illness:    Patient is a 54 y.o. female with Past medical history of CKD 3A, post bariatric surgery, hypertension, secular hyperparathyroidism, and depression, history of nephrotic syndrome diagnosis in 2001 who presented to clinic to follow up with her chronic kidney disease, current serum creatinine 1.3, stable Estimated GFR 49.2  blood pressure is not very well controlled, 158/94 mmHg; currently on irbesartan, Norvasc and coreg. Negative review systems, denied recent ER visit or hospitalization, labs Includes hemoglobin 13.6, WBC 4.2,sodium of 42, potassium 3.6, chloride 104, CO2 29, BUN 23, serum creatinine 1.3, vitamin B12 277, vitamin D 20, , uric acid 5.5, bilirubin 1.2, albumin 4.3.iron study reviewed,  iron 26, transfer 193, TID C 286, saturated I 44.  Medication reviewed      Creatinine   Date Value Ref Range Status   11/15/2024 1.30 0.50 - 1.40 mg/dL Final   05/24/2024 1.26 0.50 - 1.40 mg/dL Final   02/09/2024 1.39 0.50 - 1.40 mg/dL Final   02/09/2024 1.39 0.50 - 1.40 mg/dL Final     BUN   Date Value Ref Range Status   11/15/2024 23 (H) 7 - 17 mg/dL Final   05/24/2024 15 7 - 17 mg/dL Final   02/09/2024 25 (H) 7 - 17 mg/dL Final   02/09/2024 25 (H) 7 - 17 mg/dL Final     CO2   Date Value Ref Range Status   11/15/2024 29 23 - 29 mmol/L Final   05/24/2024 25 23 - 29 mmol/L Final   02/09/2024 25 23 - 29 mmol/L Final   02/09/2024 25 23 - 29 mmol/L Final         Proteinuria:   Prot/Creat Ratio, Urine   Date Value Ref Range Status   02/09/2024 0.07 0.00 - 0.20 Final   10/21/2019 0.92 (H) 0.00 - 0.20 Final   11/20/2017 1.59 (H) 0.00 - 0.20 Final         CKD anemia  Hemoglobin   Date Value Ref Range Status   11/15/2024 13.6 12.0 - 16.0 g/dL Final   05/24/2024 12.2 12.0 - 16.0 g/dL Final   11/01/2023 13.1 12.0 - 16.0 g/dL Final     Saturated Iron   Date Value Ref Range Status   11/15/2024 44 20 -  50 % Final   11/01/2023 41 20 - 50 % Final   12/29/2022 47 20 - 50 % Final     Ferritin   Date Value Ref Range Status   03/03/2022 343 (H) 20.0 - 300.0 ng/mL Final   06/10/2016 256 20.0 - 300.0 ng/mL Final   09/14/2012 174 20 - 300 ng/ml Final     Iron   Date Value Ref Range Status   11/15/2024 126 30 - 160 ug/dL Final   11/01/2023 109 30 - 160 ug/dL Final   12/29/2022 125 30 - 160 ug/dL Final     TIBC   Date Value Ref Range Status   11/15/2024 286 250 - 450 ug/dL Final   11/01/2023 269 250 - 450 ug/dL Final     Vitamin B-12   Date Value Ref Range Status   11/15/2024 277 210 - 950 pg/mL Final   11/01/2023 388 210 - 950 pg/mL Final     Folate   Date Value Ref Range Status   11/15/2019 10.3 4.0 - 24.0 ng/mL Final       MBD  PTH, Intact   Date Value Ref Range Status   05/24/2024 105.4 (H) 9.0 - 77.0 pg/mL Final   10/17/2019 109.0 (H) 9.0 - 77.0 pg/mL Final     Calcium   Date Value Ref Range Status   11/15/2024 9.4 8.7 - 10.5 mg/dL Final   05/24/2024 9.4 8.7 - 10.5 mg/dL Final     Phosphorus   Date Value Ref Range Status   02/09/2024 3.8 2.7 - 4.5 mg/dL Final   05/28/2020 2.4 (L) 2.7 - 4.5 mg/dL Final     Vit D, 25-Hydroxy   Date Value Ref Range Status   11/15/2024 20 (L) 30 - 96 ng/mL Final     Comment:     Vitamin D deficiency.........<10 ng/mL                              Vitamin D insufficiency......10-29 ng/mL       Vitamin D sufficiency........> or equal to 30 ng/mL  Vitamin D toxicity............>100 ng/mL           #HTN  No results found for this or any previous visit.        #DM  Lab Results   Component Value Date    HGBA1C 4.7 06/01/2023         Review of Systems   Respiratory:  Negative for cough, hemoptysis and wheezing.    Cardiovascular:  Negative for chest pain, palpitations, orthopnea, claudication and leg swelling.   Gastrointestinal:  Negative for abdominal pain and diarrhea.   Genitourinary:  Negative for dysuria and hematuria.   Neurological:  Negative for dizziness and seizures.  as per HPI  above    History:  Past Medical History:   Diagnosis Date    Carpal tunnel syndrome, left     Chronic kidney disease, stage II (mild) 09/21/2012    Depression     H/O nephrotic syndrome, 2001. 04/21/2015    Hypertension     Obesity     Secondary hyperparathyroidism     Vitamin D deficiency disease       Past Surgical History:   Procedure Laterality Date    DILATION AND CURETTAGE OF UTERUS      ENDOMETRIAL ABLATION  2004    HYSTERECTOMY  2011    DLH/BS (AUB/Fibroids) KB    LAPAROSCOPIC SLEEVE GASTRECTOMY N/A 05/27/2020    Procedure: GASTRECTOMY, SLEEVE, LAPAROSCOPIC, with intraop EGD;  Surgeon: Leodan Burk MD;  Location: [facility] OR [address] [place] [address];  Service: General;  Laterality: N/A;    TUBAL LIGATION          Current Outpatient Medications:     albuterol (PROVENTIL/VENTOLIN HFA) 90 mcg/actuation inhaler, Inhale 2 puffs into the lungs every 4 (four) hours as needed for Wheezing. Rescue, Disp: 54 g, Rfl: 2    amLODIPine (NORVASC) 10 MG tablet, Take 1 tablet (10 mg total) by mouth once daily., Disp: 90 tablet, Rfl: 3    carvediloL (COREG) 25 MG tablet, Take 1 tablet (25 mg total) by mouth 2 (two) times daily with meals., Disp: 180 tablet, Rfl: 3    cetirizine (ZYRTEC) 10 MG tablet, TAKE 1 TO 2 TABLETS BY MOUTH EVERY DAY AS NEEDED FOR ITCHING, Disp: 180 tablet, Rfl: 1    cyclobenzaprine (FLEXERIL) 10 MG tablet, TAKE 1 TABLET BY MOUTH EVERY DAY AS NEEDED FOR BACK PAIN, Disp: 30 tablet, Rfl: 2    EScitalopram oxalate (LEXAPRO) 10 MG tablet, Take 1 tablet (10 mg total) by mouth once daily., Disp: 90 tablet, Rfl: 3    irbesartan (AVAPRO) 300 MG tablet, Take 1 tablet (300 mg total) by mouth every morning., Disp: 90 tablet, Rfl: 3    phentermine (ADIPEX-P) 37.5 mg tablet, Take 1/2 of a tablet by mouth twice daily (take with breakfast and before 330pm), Disp: , Rfl:     predniSONE (DELTASONE) 20 MG tablet, Take 1 tablet (20 mg total) by mouth once daily. for 5 days, Disp: 5 tablet, Rfl: 0    torsemide (DEMADEX) 10 MG Tab,  Take 1 tablet (10 mg total) by mouth once daily., Disp: 90 tablet, Rfl: 3    cholecalciferol, vitamin D3, (VITAMIN D3) 25 mcg (1,000 unit) capsule, Take 5 capsules (5,000 Units total) by mouth once daily. for 90 doses, Disp: 90 capsule, Rfl: 2    topiramate (TOPAMAX) 50 MG tablet, Take 1 tablet (50 mg total) by mouth 2 (two) times daily., Disp: 180 tablet, Rfl: 0  Review of patient's allergies indicates:   Allergen Reactions    No known allergies       Social History     Tobacco Use    Smoking status: Never    Smokeless tobacco: Never   Substance Use Topics    Alcohol use: Not Currently     Comment: less than weekly.      Family History   Problem Relation Name Age of Onset    Diabetes Mother Dee Dee Heredia         None    Hypertension Mother Dee Dee Heredia     Colon cancer Father      Hypertension Father      Hypertension Sister      Hypertension Sister      Epilepsy Son      Diabetes Maternal Aunt Alka luis         None    Diabetes Maternal Grandmother Natasha Sarabia         None    Breast cancer Neg Hx      Ovarian cancer Neg Hx          Physical Exam :  Vitals:    01/02/25 1333   BP: (!) 158/94   Pulse: (!) 55     Physical Exam  Constitutional:       General: She is not in acute distress.     Appearance: She is not ill-appearing or toxic-appearing.   HENT:      Head: Normocephalic and atraumatic.   Eyes:      Extraocular Movements: Extraocular movements intact.      Pupils: Pupils are equal, round, and reactive to light.   Cardiovascular:      Rate and Rhythm: Bradycardia present.      Heart sounds: No murmur heard.  Pulmonary:      Effort: No respiratory distress.      Breath sounds: No wheezing or rales.   Abdominal:      General: There is no distension.      Tenderness: There is no abdominal tenderness. There is no guarding.   Musculoskeletal:      Right lower leg: No edema.      Left lower leg: No edema.   Skin:     Coloration: Skin is pale. Skin is not jaundiced.   Neurological:      Mental Status: She is alert  and oriented to person, place, and time.   Psychiatric:         Behavior: Behavior normal.       Assessment:    1. CKD (chronic kidney disease) stage 3, GFR 90 ml/min or greater    2. Hypertension with ckd stage   3. Status post bariatric surgery  4. Depression  5. Vitamin D deficiency  6. Secondly hyperparathyroidism of renal origin     Plan:    - CKD 3 a, Scr 1.3 stable, encouraged to strictly control blood pressure, chronic blood pressure 158/94 mmHg (on irbesartan, Coreg and Norvasc)  - We will obtain blood pressure diary for one week, share blood pressure readings with clinic  - lifestyle modification, including exercise, stress reduction, sold reduction (less than 2 g per day),  - Vitamin D deficiency treated with 5000 international units per day, last vitamin D level 20, , I 126, transfer and 193, TID C 286, saturated iron 44  - Kidney ultrasound reviewed, revealed None obstructive 3 mm stone in the right kidney, 26 mm cyst on the right kidney  - We will obtain CBC, renal function panel, urinalysis  - return to clinic in 3 months    No problem-specific Assessment & Plan notes found for this encounter.    No follow-ups on file.     Orders Placed This Encounter   Procedures    CBC Without Differential    RENAL FUNCTION PANEL     There are no discontinued medications.   Future Appointments   Date Time Provider Department Center   1/9/2025  1:10 PM Patrick Monroy Jr., MD [facility] ORTHO [facility]   3/19/2025  9:00 AM Estela Crawford MD [facility] IM [facility]   3/27/2025 10:00 AM LAB, [facility] RVPH LAB [facility]   4/3/2025  1:00 PM Martina Abarca MD [facility] NEPHRO [facility]

## 2025-01-06 ENCOUNTER — PATIENT MESSAGE (OUTPATIENT)
Dept: BARIATRICS | Facility: CLINIC | Age: 55
End: 2025-01-06
Payer: COMMERCIAL

## 2025-01-09 ENCOUNTER — TELEPHONE (OUTPATIENT)
Dept: ORTHOPEDICS | Facility: CLINIC | Age: 55
End: 2025-01-09
Payer: COMMERCIAL

## 2025-01-09 NOTE — TELEPHONE ENCOUNTER
----- Message from Patrick Monroy MD sent at 1/9/2025  2:56 PM CST -----  Regarding: FW: Please Schedule Patient MRN: [id number]  Please call patient for appointment in 1-2 weeks  ----- Message -----  From: Kaela Veras MA  Sent: 1/9/2025   2:34 PM CST  To: Patrick Monroy Jr., MD; #  Subject: Please Schedule Patient MRN: [id number]             Hi,    We had to cancel this pt who self scheduled an appointment with us for bilateral hand pain as she has previously seen by Chelsi Boykin PA-C. When I spoke with her on the phone to cancel, I told her someone from your office would reach out to get her rescheduled.    Please call to schedule when you are able!    Thank you,  Kaela Veras MA  [facility] -- Aniya Kenny PA-C  P: [phone]  F: [phone]  ----- Message -----  From: Kalia Mendieta  Sent: 1/9/2025   1:56 PM CST  To: Zoya Kwan Staff          Orders request  Who called: pt  Request of orders for: hand xray  Reason for request: pt have 1/17/25 appt scheduled  Call back number:  [phone]

## 2025-01-09 NOTE — TELEPHONE ENCOUNTER
Left message to contact clinic re: appointment. Appointment has been tentatively scheduled with Patti Patel PA-C on 01/22/25.

## 2025-01-09 NOTE — TELEPHONE ENCOUNTER
Called pt to cancel appointment as she is a patient of Dr. Monroy / Chelsi Boykin PA-C . Explained that pts are unable to see multiple providers within the hand clinic and that I would pass her information along to their clinic to get her scheduled.    Kaela Veras MA  [facility]  P: [phone]  F: [phone]

## 2025-01-09 NOTE — TELEPHONE ENCOUNTER
LVM for patient that Raymon does not see hand injury.  Referred to hand clinic with . Appointment today canceled.

## 2025-01-14 ENCOUNTER — PATIENT MESSAGE (OUTPATIENT)
Dept: BARIATRICS | Facility: CLINIC | Age: 55
End: 2025-01-14
Payer: COMMERCIAL

## 2025-01-20 ENCOUNTER — PATIENT MESSAGE (OUTPATIENT)
Dept: ORTHOPEDICS | Facility: CLINIC | Age: 55
End: 2025-01-20
Payer: COMMERCIAL

## 2025-01-28 DIAGNOSIS — M79.642 PAIN IN BOTH HANDS: Primary | ICD-10-CM

## 2025-01-28 DIAGNOSIS — M79.641 PAIN IN BOTH HANDS: Primary | ICD-10-CM

## 2025-01-31 ENCOUNTER — HOSPITAL ENCOUNTER (OUTPATIENT)
Dept: RADIOLOGY | Facility: HOSPITAL | Age: 55
Discharge: HOME | End: 2025-01-31
Payer: COMMERCIAL

## 2025-01-31 ENCOUNTER — OFFICE VISIT (OUTPATIENT)
Dept: ORTHOPEDICS | Facility: CLINIC | Age: 55
End: 2025-01-31
Payer: COMMERCIAL

## 2025-01-31 VITALS — WEIGHT: 191.13 LBS | HEIGHT: 68 IN | BODY MASS INDEX: 28.97 KG/M2

## 2025-01-31 DIAGNOSIS — G56.03 BILATERAL CARPAL TUNNEL SYNDROME: Primary | ICD-10-CM

## 2025-01-31 DIAGNOSIS — M79.641 PAIN IN BOTH HANDS: ICD-10-CM

## 2025-01-31 DIAGNOSIS — M79.642 BILATERAL HAND PAIN: ICD-10-CM

## 2025-01-31 DIAGNOSIS — M18.12 OSTEOARTHRITIS OF CARPOMETACARPAL (CMC) JOINT OF LEFT THUMB, UNSPECIFIED OSTEOARTHRITIS TYPE: ICD-10-CM

## 2025-01-31 DIAGNOSIS — M65.4 DE QUERVAIN'S TENOSYNOVITIS, BILATERAL: ICD-10-CM

## 2025-01-31 DIAGNOSIS — M79.642 PAIN IN BOTH HANDS: ICD-10-CM

## 2025-01-31 DIAGNOSIS — M79.641 BILATERAL HAND PAIN: ICD-10-CM

## 2025-01-31 PROCEDURE — 3008F BODY MASS INDEX DOCD: CPT | Mod: CPTII,S$GLB,,

## 2025-01-31 PROCEDURE — 20550 NJX 1 TENDON SHEATH/LIGAMENT: CPT | Mod: 50,S$GLB,,

## 2025-01-31 PROCEDURE — 73130 X-RAY EXAM OF HAND: CPT | Mod: TC,50,PN

## 2025-01-31 PROCEDURE — 1160F RVW MEDS BY RX/DR IN RCRD: CPT | Mod: CPTII,S$GLB,,

## 2025-01-31 PROCEDURE — 1159F MED LIST DOCD IN RCRD: CPT | Mod: CPTII,S$GLB,,

## 2025-01-31 PROCEDURE — 73130 X-RAY EXAM OF HAND: CPT | Mod: 26,,, | Performed by: RADIOLOGY

## 2025-01-31 PROCEDURE — 99999 PR PBB SHADOW E&M-EST. PATIENT-LVL III: CPT | Mod: PBBFAC,,,

## 2025-01-31 PROCEDURE — 3066F NEPHROPATHY DOC TX: CPT | Mod: CPTII,S$GLB,,

## 2025-01-31 PROCEDURE — 99214 OFFICE O/P EST MOD 30 MIN: CPT | Mod: 25,S$GLB,,

## 2025-01-31 RX ORDER — BETAMETHASONE SODIUM PHOSPHATE AND BETAMETHASONE ACETATE 3; 3 MG/ML; MG/ML
12 INJECTION, SUSPENSION INTRA-ARTICULAR; INTRALESIONAL; INTRAMUSCULAR; SOFT TISSUE
Status: DISCONTINUED | OUTPATIENT
Start: 2025-01-31 | End: 2025-01-31 | Stop reason: HOSPADM

## 2025-01-31 RX ADMIN — BETAMETHASONE SODIUM PHOSPHATE AND BETAMETHASONE ACETATE 12 MG: 3; 3 INJECTION, SUSPENSION INTRA-ARTICULAR; INTRALESIONAL; INTRAMUSCULAR; SOFT TISSUE at 10:01

## 2025-01-31 NOTE — PROCEDURES
Tendon Sheath    Date/Time: 1/31/2025 10:30 AM    Performed by: Patti Patel PA-C  Authorized by: Patti Patel PA-C    Consent Done?:  Yes (Verbal)  Indications:  Pain and joint swelling  Site marked: the procedure site was marked    Timeout: prior to procedure the correct patient, procedure, and site was verified    Prep: patient was prepped and draped in usual sterile fashion      Location:  Thumb  Site:  R thumb extension tendon sheath and L thumb extension tendon sheath  Needle size:  25 G  Approach:  Radial  Medications:  12 mg betamethasone acetate-betamethasone sodium phosphate 6 mg/mL  Patient tolerance:  Patient tolerated the procedure well with no immediate complications    Additional Comments: 6 mg betamethasone injected to each wrist

## 2025-01-31 NOTE — PROGRESS NOTES
SUBJECTIVE:      Chief Complaint: Hand Pain (Bryson handpain)      History of Present Illness:  Patient is a RHD 54 y.o. who presents today with complaints of bilateral hand pain.   Pain began multiple years  ago. no history of trauma.  Pain is intermittent located to thumbs (sharp) as well as numbness, tingling, and burning to the fingertips.  Associated weakness in the hands  Symptoms are aggravated by activity and gripping .  Symptoms are alleviated by rest.  Attempted treatment(s) and/or interventions include rest, bracing, NSAIDs, Tylenol, CSI without adequate response.    Is affecting ADLs and limiting desired level of activity. Denies neck pain, numbness, and tingling in fingertips.    Mechanical symptoms:  None  Subjective instability: (--)   Nocturnal symptoms: (+)    No previous surgeries or trauma on bilateral hands     Smoking: No  DM: No  Occupation:  [employer]  Handedness: RHD    Past Medical History:   Diagnosis Date    Carpal tunnel syndrome, left     Chronic kidney disease, stage II (mild) 09/21/2012    Depression     H/O nephrotic syndrome, 2001. 04/21/2015    Hypertension     Obesity     Secondary hyperparathyroidism     Vitamin D deficiency disease      Past Surgical History:   Procedure Laterality Date    DILATION AND CURETTAGE OF UTERUS      ENDOMETRIAL ABLATION  2004    HYSTERECTOMY  2011    DLH/BS (AUB/Fibroids) KB    LAPAROSCOPIC SLEEVE GASTRECTOMY N/A 05/27/2020    Procedure: GASTRECTOMY, SLEEVE, LAPAROSCOPIC, with intraop EGD;  Surgeon: Leodan Burk MD;  Location: [facility] OR [address] [place] [address];  Service: General;  Laterality: N/A;    TUBAL LIGATION       Review of patient's allergies indicates:   Allergen Reactions    No known allergies      Social History     Social History Narrative    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      Also owns a catering business        Exercise:  none     Family History   Problem Relation Name Age of Onset    Diabetes Mother Dee Dee Heredia         None     Hypertension Mother Dee Dee Heredia     Colon cancer Father      Hypertension Father      Hypertension Sister      Hypertension Sister      Epilepsy Son      Diabetes Maternal Aunt Alka luis         None    Diabetes Maternal Grandmother Natasha Sarabia         None    Breast cancer Neg Hx      Ovarian cancer Neg Hx           Current Outpatient Medications:     albuterol (PROVENTIL/VENTOLIN HFA) 90 mcg/actuation inhaler, Inhale 2 puffs into the lungs every 4 (four) hours as needed for Wheezing. Rescue, Disp: 54 g, Rfl: 2    amLODIPine (NORVASC) 10 MG tablet, Take 1 tablet (10 mg total) by mouth once daily., Disp: 90 tablet, Rfl: 3    carvediloL (COREG) 25 MG tablet, Take 1 tablet (25 mg total) by mouth 2 (two) times daily with meals., Disp: 180 tablet, Rfl: 3    cetirizine (ZYRTEC) 10 MG tablet, TAKE 1 TO 2 TABLETS BY MOUTH EVERY DAY AS NEEDED FOR ITCHING, Disp: 180 tablet, Rfl: 1    cholecalciferol, vitamin D3, (VITAMIN D3) 25 mcg (1,000 unit) capsule, Take 5 capsules (5,000 Units total) by mouth once daily. for 90 doses, Disp: 90 capsule, Rfl: 2    cyclobenzaprine (FLEXERIL) 10 MG tablet, TAKE 1 TABLET BY MOUTH EVERY DAY AS NEEDED FOR BACK PAIN, Disp: 30 tablet, Rfl: 2    EScitalopram oxalate (LEXAPRO) 10 MG tablet, Take 1 tablet (10 mg total) by mouth once daily., Disp: 90 tablet, Rfl: 3    irbesartan (AVAPRO) 300 MG tablet, Take 1 tablet (300 mg total) by mouth every morning., Disp: 90 tablet, Rfl: 3    phentermine (ADIPEX-P) 37.5 mg tablet, Take 1/2 of a tablet by mouth twice daily (take with breakfast and before 330pm), Disp: , Rfl:     torsemide (DEMADEX) 10 MG Tab, Take 1 tablet (10 mg total) by mouth once daily., Disp: 90 tablet, Rfl: 3    topiramate (TOPAMAX) 50 MG tablet, Take 1 tablet (50 mg total) by mouth 2 (two) times daily., Disp: 180 tablet, Rfl: 0    Review of Systems   Musculoskeletal:  Positive for arthritis, joint pain, joint swelling, muscle weakness, myalgias and stiffness.  "  Neurological:  Positive for numbness and paresthesias.       OBJECTIVE:      Vital Signs (Most Recent):  Vitals:    01/31/25 1039   Weight: 86.7 kg (191 lb 2.2 oz)   Height: 5' 8" (1.727 m)     Body mass index is 29.06 kg/m².      Physical Exam:  Bilateral Hand/Wrist Examination:  Observation/Inspection:  Swelling  mild    Deformity  none  Discoloration  none     Scars   none    Atrophy  none  Strength  4/5, limited secondary to pain  TTP   1st dorsal compartment bilaterally    ROM hand full, painless  ROM wrist full, painless  ROM elbow full, painless    HAND/WRIST EXAMINATION:  Finkelstein's Test   +  WHAT Test    +  Snuff box tenderness   Neg  Forte's Test    Neg  Hook of Hamate Tenderness  Neg  CMC grind    + (L)  CMC crepitus    + (L)  Circumduction test   Neg  TFCC Compression Test    Neg     Neurovascular Exam:  Digits WWP, brisk CR < 3s throughout  NVI motor/LTS to M/R/U nerves, radial pulse 2+  Tinel's Test - Carpal Tunnel  -  Tinel's Test - Cubital Tunnel  Neg  Phalen's Test    mildly positive  Durkan's Test    mildly positive  Median Nerve Compression Test Neg  Table top Test    Neg    Diagnostic Results:  Imaging - I independently interpreted the patient's imaging. Xrays of the patient's bilateral hands and wrists  demonstrate CMC OA worst on L hand with joint space narrowing, osteophytes, sclerosis, and geodes  no evidence of any acute fractures or dislocations    ASSESSMENT/PLAN:      1. Bilateral carpal tunnel syndrome        54 y.o. y/o female with bilateral de Quervain's tenosynovitis, left thumb CMC OA, and possible bilateral CTS  Plan: The patient and I had a thorough discussion today.  We discussed the working diagnosis as well as several other potential alternative diagnoses.    We discussed conservative and surgical treatment options. Conservative options include rest, activity modifications, workplace modifications, po and topical analgesia (OTC and rx), formal or home PT, and others. " Surgical treatment was also mentioned.    At this time, the patient would like to proceed with the following, which I agree with.    Pain Management:  Continue current pain regimen  Procedures:  CSI given today for bilateral de Quervain's tenosynovitis  Orders:  EMG ordered today for bilateral UE  DME:  Continue wearing braces as needed  Work status:  [employer] with limitation secondary to pain  Follow up: to review EMG results with Patti Patel PA-C    All of the patient's questions were answered and the patient will contact us if they have any questions or concerns in the interim.        Patti Patel PA-C  [facility]  Orthopedic Surgery

## 2025-02-03 ENCOUNTER — PATIENT MESSAGE (OUTPATIENT)
Dept: ORTHOPEDICS | Facility: CLINIC | Age: 55
End: 2025-02-03
Payer: COMMERCIAL

## 2025-02-10 ENCOUNTER — PATIENT MESSAGE (OUTPATIENT)
Dept: BARIATRICS | Facility: CLINIC | Age: 55
End: 2025-02-10
Payer: COMMERCIAL

## 2025-02-14 ENCOUNTER — TELEPHONE (OUTPATIENT)
Dept: ORTHOPEDICS | Facility: CLINIC | Age: 55
End: 2025-02-14
Payer: COMMERCIAL

## 2025-02-14 NOTE — TELEPHONE ENCOUNTER
Patient was called to inform them that their referral was sent again to [facility]. KAVIN

## 2025-02-24 NOTE — PROGRESS NOTES
[facility]  Physical Medicine and Rehabilitation Clinic  [address]  [place] [address]         Full Name: Toi Fernandez Gender: Female  Patient ID: [id number] YOB: 1970      Visit Date: 2/28/2025 9:06 AM  Age: 54 Years  Examining Physician: Arabella Rios MD  Referring Physician: Merlene Patel PA-C  Height: 5 feet 8 inch  Weight: 191 lbs  Patient History: 54-year-old female with several year history of bilateral hand pain and numbness and tingling affecting the right-greater-than-left hand.  Feels it in all the fingers.  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 Wore brace during the day for few months, has not worn splint at night consistently.  Exam: 5/5 strength bilateral shoulder abduction, elbow flexion, elbow extension, wrist extension, finger flexion, finger abduction, 4/5 thumb abduction on the right      Sensory NCS      Nerve / Sites Rec. Site Onset Lat Peak Lat NP Amp PP Amp Segments Distance Velocity Comment     ms ms µV µV  mm m/s    R Median - Dig II (Antidromic)      Wrist Index 6.82 8.18 8.2 8.4 Wrist - Index 140 21    R Ulnar - Dig V (Antidromic)      Wrist Dig V 3.07 3.96 10.7 16.3 Wrist - Dig V 140 46    L Median - Dig II (Antidromic)      Wrist Index 3.44 4.32 22.3 21.1 Wrist - Index 140 41    L Ulnar - Dig V (Antidromic)      Wrist Dig V 2.76 3.49 12.4 19.1 Wrist - Dig V 140 51        Motor NCS      Nerve / Sites Muscle Latency Amplitude Segments Dist. Lat Diff Velocity Comments     ms mV  mm ms m/s    R Median - APB      Wrist APB 9.50 5.1 Wrist - APB 80         Elbow APB 11.35 3.4 Elbow - Wrist  1.85     R Ulnar - ADM      Wrist ADM 3.19 11.9 Wrist - ADM 80         B.Elbow ADM 6.88 8.7 B.Elbow - Wrist 203 3.69 55.1       A.Elbow ADM 9.08 10.2 A.Elbow - B.Elbow 106 2.21 48.0    L Median - APB      Wrist APB 4.98 9.0 Wrist - APB 80         Elbow APB 9.31 8.7 Elbow - Wrist  4.33     L Ulnar - ADM      Wrist ADM 3.00 9.0 Wrist - ADM 80           EMG Summary Table     Spontaneous MUAP Recruitment   Muscle IA Fib PSW Fasc H.F. Amp Dur. PPP Pattern   R. Deltoid N None None None None N N N N   R. Biceps brachii N None None None None N N N N   R. Triceps brachii N None None None None N N N N   R. Pronator teres N None None None None N N N N   R. Abductor pollicis brevis N 1+ None None None 1+ 1+ N N   R. First dorsal interosseous N None None None None N N N N   L. Deltoid N None None None None N N N N   L. Biceps brachii N None None None None N N N N   L. Triceps brachii N None None None None N N N N   L. Pronator teres N None None None None N N N N   L. Abductor pollicis brevis N None None None None N N N N       Summary    The sensory conduction test was performed on 4 nerve(s). The results were normal in 2 nerve(s): R Ulnar - Dig V (Antidromic), L Ulnar - Dig V (Antidromic). Results outside the specified normal range were found in 2 nerve(s), as follows:  In the R Median - Dig II (Antidromic) study  the peak latency result was increased for Wrist stimulation  In the L Median - Dig II (Antidromic) study  the peak latency result was increased for Wrist stimulation    Motor: The right median compound motor action potential to abductor pollicis brevis demonstrated significantly prolonged latency with technically normal amplitude but lower than the left side.  The left median compound motor action potential to abductor pollicis brevis demonstrated prolonged latency with normal amplitude. The bilateral ulnar motor responses were normal.     The needle EMG examination was performed in 11 muscles. It was normal in 10 muscle(s): R. Deltoid, R. Biceps brachii, R. Triceps brachii, R. Pronator teres, R. First dorsal interosseous, L. Deltoid, L. Biceps brachii, L. Triceps brachii, L. Pronator teres, L. Abductor pollicis brevis. The study was abnormal in 1 muscle(s), with the following distribution:  The R. Abductor pollicis brevis had abnormal spontaneous activity,  abnormal MUP waveforms.      Conclusion: Abnormal Study    There is electrodiagnostic evidence of a moderate to severe right median mononeuropathy at the wrist (carpal tunnel syndrome) with demyelination some mild axonal loss and signs of active denervation in the abductor pollicis brevis.  There is electrodiagnostic evidence of a moderate left median mononeuropathy at the wrist (carpal tunnel syndrome) with demyelinating features and no active/ongoing motor denervation.  There is no electrodiagnostic evidence of a cervical radiculopathy, brachial plexopathy, or ulnar mononeuropathy on either side.    The findings were explained to the patient.  Provided with bilateral neutral wrist splints and instructed on use.  Recommended following up with the referring provider, she is interested in surgery for the carpal tunnel syndrome.    ________________________  Arabella Rios MD

## 2025-02-25 DIAGNOSIS — E55.9 VITAMIN D DEFICIENCY: ICD-10-CM

## 2025-02-25 DIAGNOSIS — E66.811 CLASS 1 OBESITY WITH SERIOUS COMORBIDITY AND BODY MASS INDEX (BMI) OF 32.0 TO 32.9 IN ADULT, UNSPECIFIED OBESITY TYPE: ICD-10-CM

## 2025-02-26 NOTE — TELEPHONE ENCOUNTER
Refill Routing Note   Medication(s) are not appropriate for processing by [facility] for the following reason(s):        Outside of protocol  Required vitals abnormal    [facility] action(s):  Defer  Route      Medication Therapy Plan: VITAMIN D2-OOP      Appointments  past 12m or future 3m with PCP    Date Provider   Last Visit   11/21/2024 Estela Crawford MD   Next Visit   3/20/2025 Estela Crawford MD   ED visits in past 90 days: 0        Note composed:11:54 PM 02/25/2025

## 2025-02-26 NOTE — TELEPHONE ENCOUNTER
No care due was identified.  [facility] Embedded Care Due Messages. Reference number: [id number].   2/25/2025 8:32:23 PM CST

## 2025-02-28 ENCOUNTER — OFFICE VISIT (OUTPATIENT)
Dept: PHYSICAL MEDICINE AND REHAB | Facility: CLINIC | Age: 55
End: 2025-02-28
Payer: COMMERCIAL

## 2025-02-28 VITALS — WEIGHT: 191.13 LBS | BODY MASS INDEX: 28.97 KG/M2 | HEIGHT: 68 IN

## 2025-02-28 DIAGNOSIS — G56.03 BILATERAL CARPAL TUNNEL SYNDROME: ICD-10-CM

## 2025-02-28 PROCEDURE — 99999 PR PBB SHADOW E&M-EST. PATIENT-LVL III: CPT | Mod: PBBFAC,,, | Performed by: STUDENT IN AN ORGANIZED HEALTH CARE EDUCATION/TRAINING PROGRAM

## 2025-03-03 RX ORDER — ERGOCALCIFEROL 1.25 MG/1
50000 CAPSULE ORAL
Qty: 3 CAPSULE | Refills: 0 | Status: SHIPPED | OUTPATIENT
Start: 2025-03-03

## 2025-03-03 RX ORDER — TORSEMIDE 10 MG/1
10 TABLET ORAL
Qty: 90 TABLET | Refills: 0 | Status: SHIPPED | OUTPATIENT
Start: 2025-03-03

## 2025-03-05 ENCOUNTER — OFFICE VISIT (OUTPATIENT)
Dept: ORTHOPEDICS | Facility: CLINIC | Age: 55
End: 2025-03-05
Payer: COMMERCIAL

## 2025-03-05 DIAGNOSIS — M65.4 DE QUERVAIN'S TENOSYNOVITIS, BILATERAL: ICD-10-CM

## 2025-03-05 DIAGNOSIS — G56.03 BILATERAL CARPAL TUNNEL SYNDROME: Primary | ICD-10-CM

## 2025-03-05 DIAGNOSIS — M79.641 BILATERAL HAND PAIN: ICD-10-CM

## 2025-03-05 DIAGNOSIS — M79.642 BILATERAL HAND PAIN: ICD-10-CM

## 2025-03-05 NOTE — PROGRESS NOTES
Established Patient - Audio Only Telehealth Visit     The patient location is: Home  The chief complaint leading to consultation is:  Bilateral EMG result review  Visit type: Virtual visit with audio only (telephone)  Total time spent with patient: 10 minutes       The reason for the audio only service rather than synchronous audio and video virtual visit was related to technical difficulties or patient preference/necessity.     Each patient to whom I provide medical services by telemedicine is:  (1) informed of the relationship between the physician and patient and the respective role of any other health care provider with respect to management of the patient; and (2) notified that they may decline to receive medical services by telemedicine and may withdraw from such care at any time. Patient verbally consented to receive this service via voice-only telephone call.      HPI:   Pt is a 54 y.o. female with a hx of bilateral CTS symptoms and bilateral de Quervain's tenosynovitis  Last saw Patti Patel PA-C on 1/31/2025  Given bilateral CSI for de Quervain's tenosynovitis, which provided full bilateral hand symptomatic relief x2 weeks  Current tx:  CTS gel wrist braces QHS  Today, pt reports symptoms have not improved and is interested in discussing further tx options      EMG- I independently interpreted the patient's imaging. EMG of the patient's bilateral UE  demonstrates bilateral CTS (moderate to severe right, moderate left)  No cervical radiculopathy        Assessment and Plan:    I explained the nature of the problem to the patient.     We discussed at length with the patient all the different treatment options available including anti-inflammatories, acetaminophen, bracing, rest, ice, heat, physical therapy, and corticosteroid injections. I explained the potential role of surgery in the treatment of this condition. The patient understands that if non-surgical measures do not adequately control symptoms, surgery  will be considered in the future.     Pain Management: Continue current pain regimen. ice 20 minutes on, 20 minutes off prn  Surgery: due to severity of symptoms and minimal symptomatic relief with nonoperative options, patient would like to pursue surgical treatment.  This will be for discussed in clinic as well as signing consents.  She is interested in pursuing bilateral CTR, but I explained to the patient that I we will determine unilateral versus bilateral at upcoming appointment in clinic.  She expressed understanding  DME:  Continue wearing gel CTS wrist braces   Work Status: WBAT with limitation secondary to pain  Follow up:  for B CTS CSI  with Patti Patel PA-C    All of the patient's questions were answered and the patient will contact us if they have any questions or concerns in the interim.      Patti Patel PA-C  [facility]  Orthopedic Surgery            Medical Dictation software was used during the dictation of portions or the entirety of this medical record.  Phonetic or grammatic errors may exist due to the use of this software. For clarification, refer to the author of the document.    This service was not originating from a related E/M service provided within the previous 7 days nor will  to an E/M service or procedure within the next 24 hours or my soonest available appointment.  Prevailing standard of care was able to be met in this audio-only visit.

## 2025-03-07 ENCOUNTER — OFFICE VISIT (OUTPATIENT)
Dept: ORTHOPEDICS | Facility: CLINIC | Age: 55
End: 2025-03-07
Payer: COMMERCIAL

## 2025-03-07 ENCOUNTER — HOSPITAL ENCOUNTER (OUTPATIENT)
Dept: RADIOLOGY | Facility: HOSPITAL | Age: 55
Discharge: HOME | End: 2025-03-07
Attending: INTERNAL MEDICINE
Payer: COMMERCIAL

## 2025-03-07 DIAGNOSIS — Z12.31 ENCOUNTER FOR SCREENING MAMMOGRAM FOR BREAST CANCER: ICD-10-CM

## 2025-03-07 DIAGNOSIS — G56.03 CARPAL TUNNEL SYNDROME, BILATERAL: ICD-10-CM

## 2025-03-07 DIAGNOSIS — G56.03 BILATERAL CARPAL TUNNEL SYNDROME: Primary | ICD-10-CM

## 2025-03-07 PROCEDURE — 77067 SCR MAMMO BI INCL CAD: CPT | Mod: 26,,, | Performed by: RADIOLOGY

## 2025-03-07 PROCEDURE — 99999 PR PBB SHADOW E&M-EST. PATIENT-LVL IV: CPT | Mod: PBBFAC,,,

## 2025-03-07 PROCEDURE — 77063 BREAST TOMOSYNTHESIS BI: CPT | Mod: 26,,, | Performed by: RADIOLOGY

## 2025-03-07 PROCEDURE — 77067 SCR MAMMO BI INCL CAD: CPT | Mod: TC

## 2025-03-07 RX ORDER — TRIAMCINOLONE ACETONIDE 40 MG/ML
40 INJECTION, SUSPENSION INTRA-ARTICULAR; INTRAMUSCULAR
Status: DISCONTINUED | OUTPATIENT
Start: 2025-03-07 | End: 2025-03-07 | Stop reason: HOSPADM

## 2025-03-07 RX ORDER — MUPIROCIN 20 MG/G
OINTMENT TOPICAL
OUTPATIENT
Start: 2025-03-07

## 2025-03-07 RX ADMIN — TRIAMCINOLONE ACETONIDE 40 MG: 40 INJECTION, SUSPENSION INTRA-ARTICULAR; INTRAMUSCULAR at 02:03

## 2025-03-07 NOTE — PROCEDURES
Carpal Tunnel: R carpal tunnel, L carpal tunnel    Date/Time: 3/7/2025 2:00 PM    Performed by: Patti Patel PA-C  Authorized by: Patti Patel PA-C    Consent Done?:  Yes (Verbal)  Indications:  Pain  Site marked: the procedure site was marked    Timeout: prior to procedure the correct patient, procedure, and site was verified    Prep: patient was prepped and draped in usual sterile fashion      Location:  Wrist  Site:  R carpal tunnel and L carpal tunnel  Ultrasonic Guidance for Needle Placement?: No    Needle size:  25 G  Approach:  Volar  Medications:  40 mg triamcinolone acetonide 40 mg/mL  Patient tolerance:  Patient tolerated the procedure well with no immediate complications     Additional Comments: 20 mg Kenalog injected per wrist

## 2025-03-07 NOTE — PROGRESS NOTES
SUBJECTIVE:      Chief Complaint: Pain of the Right Wrist and Pain of the Left Wrist      History of Present Illness:  Pt is a 54 y.o. female who presents for follow up of bilateral hand pain   Last saw Patti Patel PA-C on 1/31/2025  Given bilateral CSI for de Quervain's tenosynovitis, which provided hand symptomatic relief x2 weeks, although denies de Quervain's tenosynovitis symptoms at today's visit  Current tx:  CTS gel wrist braces QHS  Today, pt reports symptoms have not improved and is interested in discussing surgical management  Interested in bilateral CTS CSI in the interim      Previous hx:  Patient is a RHD 54 y.o. who presents today with complaints of bilateral hand pain.   Pain began multiple years  ago. no history of trauma.  Pain is intermittent located to thumbs (sharp) as well as numbness, tingling, and burning to the fingertips.  Associated weakness in the hands  Symptoms are aggravated by activity and gripping .  Symptoms are alleviated by rest.  Attempted treatment(s) and/or interventions include rest, bracing, NSAIDs, Tylenol, CSI without adequate response.    Is affecting ADLs and limiting desired level of activity. Denies neck pain, numbness, and tingling in fingertips.    Mechanical symptoms:  None  Subjective instability: (--)   Nocturnal symptoms: (+)    No previous surgeries or trauma on bilateral hands     Smoking: No  DM: No  Occupation:    Handedness: RHD    Past Medical History:   Diagnosis Date    Carpal tunnel syndrome, left     Chronic kidney disease, stage II (mild) 09/21/2012    Depression     H/O nephrotic syndrome, 2001. 04/21/2015    Hypertension     Obesity     Secondary hyperparathyroidism     Vitamin D deficiency disease      Past Surgical History:   Procedure Laterality Date    DILATION AND CURETTAGE OF UTERUS      ENDOMETRIAL ABLATION  2004    HYSTERECTOMY  2011    DLH/BS (AUB/Fibroids) KB    LAPAROSCOPIC SLEEVE GASTRECTOMY N/A 05/27/2020    Procedure:  GASTRECTOMY, SLEEVE, LAPAROSCOPIC, with intraop EGD;  Surgeon: Leodan Burk MD;  Location: [facility] OR [address] [place] [address];  Service: General;  Laterality: N/A;    TUBAL LIGATION       Review of patient's allergies indicates:   Allergen Reactions    No known allergies      Social History     Social History Narrative    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      Also owns a [email or web address] business        Exercise:  none     Family History   Problem Relation Name Age of Onset    Diabetes Mother Dee Dee Heredia         None    Hypertension Mother De eDee Heredia     Colon cancer Father      Hypertension Father      Hypertension Sister      Hypertension Sister      Epilepsy Son      Diabetes Maternal Aunt Alka luis         None    Diabetes Maternal Grandmother Natasha Sarabia         None    Breast cancer Neg Hx      Ovarian cancer Neg Hx           Current Outpatient Medications:     albuterol (PROVENTIL/VENTOLIN HFA) 90 mcg/actuation inhaler, Inhale 2 puffs into the lungs every 4 (four) hours as needed for Wheezing. Rescue, Disp: 54 g, Rfl: 2    amLODIPine (NORVASC) 10 MG tablet, Take 1 tablet (10 mg total) by mouth once daily., Disp: 90 tablet, Rfl: 3    carvediloL (COREG) 25 MG tablet, Take 1 tablet (25 mg total) by mouth 2 (two) times daily with meals., Disp: 180 tablet, Rfl: 3    cetirizine (ZYRTEC) 10 MG tablet, TAKE 1 TO 2 TABLETS BY MOUTH EVERY DAY AS NEEDED FOR ITCHING, Disp: 180 tablet, Rfl: 1    cholecalciferol, vitamin D3, (VITAMIN D3) 25 mcg (1,000 unit) capsule, Take 5 capsules (5,000 Units total) by mouth once daily. for 90 doses, Disp: 90 capsule, Rfl: 2    cyclobenzaprine (FLEXERIL) 10 MG tablet, TAKE 1 TABLET BY MOUTH EVERY DAY AS NEEDED FOR BACK PAIN, Disp: 30 tablet, Rfl: 2    ergocalciferol (ERGOCALCIFEROL) 50,000 unit Cap, TAKE 1 CAPSULE BY MOUTH MONTHLY, Disp: 3 capsule, Rfl: 0    EScitalopram oxalate (LEXAPRO) 10 MG tablet, Take 1 tablet (10 mg total) by mouth once daily., Disp: 90 tablet, Rfl: 3    irbesartan (AVAPRO) 300 MG  tablet, Take 1 tablet (300 mg total) by mouth every morning., Disp: 90 tablet, Rfl: 3    phentermine (ADIPEX-P) 37.5 mg tablet, Take 1/2 of a tablet by mouth twice daily (take with breakfast and before 330pm), Disp: , Rfl:     torsemide (DEMADEX) 10 MG Tab, TAKE 1 TABLET(10 MG) BY MOUTH EVERY DAY, Disp: 90 tablet, Rfl: 0    topiramate (TOPAMAX) 50 MG tablet, Take 1 tablet (50 mg total) by mouth 2 (two) times daily., Disp: 180 tablet, Rfl: 0    Review of Systems   Musculoskeletal:  Positive for arthritis, joint pain, joint swelling, muscle weakness, myalgias and stiffness.   Neurological:  Positive for numbness and paresthesias.       OBJECTIVE:      Vital Signs (Most Recent):  There were no vitals filed for this visit.    There is no height or weight on file to calculate BMI.      Physical Exam:  Bilateral Hand/Wrist Examination:  Observation/Inspection:  Swelling  mild    Deformity  none  Discoloration  none     Scars   none    Atrophy  none  Strength  4/5, limited secondary to pain  TTP   1st dorsal compartment bilaterally    ROM hand full, painless  ROM wrist full, painless  ROM elbow full, painless    HAND/WRIST EXAMINATION:  Finkelstein's Test   Neg  WHAT Test    Neg  Snuff box tenderness   Neg  Forte's Test    Neg  Hook of Hamate Tenderness  Neg  CMC grind    + (L)  CMC crepitus    + (L)  Circumduction test   Neg  TFCC Compression Test    Neg     Neurovascular Exam:  Digits WWP, brisk CR < 3s throughout  NVI motor/LTS to M/R/U nerves, radial pulse 2+  Tinel's Test - Carpal Tunnel  -  Tinel's Test - Cubital Tunnel  Neg  Phalen's Test    mildly positive  Durkan's Test    mildly positive  Median Nerve Compression Test Neg  Table top Test    Neg    Physical Exam  Vitals reviewed.   Constitutional:       General: She is not in acute distress.     Appearance: Normal appearance. She is not ill-appearing or toxic-appearing.   HENT:      Head: Normocephalic and atraumatic.   Eyes:      Extraocular Movements:  Extraocular movements intact.      Conjunctiva/sclera: Conjunctivae normal.      Pupils: Pupils are equal, round, and reactive to light.   Cardiovascular:      Rate and Rhythm: Normal rate and regular rhythm.      Pulses: Normal pulses.      Heart sounds: Normal heart sounds. No murmur heard.     No friction rub. No gallop.   Pulmonary:      Effort: Pulmonary effort is normal.      Breath sounds: Normal breath sounds. No stridor. No wheezing, rhonchi or rales.   Abdominal:      General: Abdomen is flat. Bowel sounds are normal.      Palpations: Abdomen is soft.   Musculoskeletal:         General: No swelling or tenderness. Normal range of motion.      Cervical back: Normal range of motion and neck supple. No tenderness.      Right lower leg: No edema.      Left lower leg: No edema.   Skin:     General: Skin is warm and dry.      Capillary Refill: Capillary refill takes less than 2 seconds.      Coloration: Skin is not jaundiced or pale.   Neurological:      General: No focal deficit present.      Mental Status: She is alert and oriented to person, place, and time.   Psychiatric:         Mood and Affect: Mood normal.         Behavior: Behavior normal.         Thought Content: Thought content normal.         Judgment: Judgment normal.          Diagnostic Results:  Imaging - I independently interpreted the patient's imaging. Xrays of the patient's bilateral hands and wrists  demonstrate CMC OA worst on L hand with joint space narrowing, osteophytes, sclerosis, and geodes  no evidence of any acute fractures or dislocations    ASSESSMENT/PLAN:      1. Bilateral carpal tunnel syndrome    2. Carpal tunnel syndrome, bilateral        54 y.o. y/o female with bilateral de Quervain's tenosynovitis, left thumb CMC OA, and possible bilateral CTS  Plan: The patient and I had a thorough discussion today.  We discussed the working diagnosis as well as several other potential alternative diagnoses.    We discussed conservative and  surgical treatment options. Conservative options include rest, activity modifications, workplace modifications, po and topical analgesia (OTC and rx), formal or home PT, and others. Surgical treatment was also mentioned.    At this time, the patient would like to proceed with the following, which I agree with.    Surgery: Pt has failed conservative tx including rest, ice, NSAIDs, PT, CSI. Pt has elected to proceed with surgical intervention. The surgery was explained as well as risks and benefits, and consents were signed for bilateral CTR.   Pain Management:  Continue current pain regimen  Procedures:  CSI given today for bilateral CTS  DME:  Continue wearing braces as needed  Work status:  WBAT with limitation secondary to pain    All of the patient's questions were answered and the patient will contact us if they have any questions or concerns in the interim.        Patti Patel PA-C  [facility]  Orthopedic Surgery

## 2025-03-10 ENCOUNTER — PATIENT MESSAGE (OUTPATIENT)
Dept: BARIATRICS | Facility: CLINIC | Age: 55
End: 2025-03-10
Payer: COMMERCIAL

## 2025-03-20 ENCOUNTER — OFFICE VISIT (OUTPATIENT)
Dept: INTERNAL MEDICINE | Facility: CLINIC | Age: 55
End: 2025-03-20
Payer: COMMERCIAL

## 2025-03-20 DIAGNOSIS — F33.41 MDD (MAJOR DEPRESSIVE DISORDER), RECURRENT, IN PARTIAL REMISSION: Primary | ICD-10-CM

## 2025-03-20 DIAGNOSIS — N18.31 STAGE 3A CHRONIC KIDNEY DISEASE: ICD-10-CM

## 2025-03-20 PROCEDURE — G2211 COMPLEX E/M VISIT ADD ON: HCPCS | Mod: 95,,, | Performed by: INTERNAL MEDICINE

## 2025-03-20 PROCEDURE — 4010F ACE/ARB THERAPY RXD/TAKEN: CPT | Mod: CPTII,95,, | Performed by: INTERNAL MEDICINE

## 2025-03-20 PROCEDURE — 3066F NEPHROPATHY DOC TX: CPT | Mod: CPTII,95,, | Performed by: INTERNAL MEDICINE

## 2025-03-20 PROCEDURE — 98005 SYNCH AUDIO-VIDEO EST LOW 20: CPT | Mod: 95,,, | Performed by: INTERNAL MEDICINE

## 2025-03-20 RX ORDER — SEMAGLUTIDE 0.68 MG/ML
0.5 INJECTION, SOLUTION SUBCUTANEOUS
Qty: 3 ML | Refills: 0 | Status: SHIPPED | OUTPATIENT
Start: 2025-03-20

## 2025-03-20 NOTE — PROGRESS NOTES
"The patient location is: [place]  The chief complaint leading to consultation is: depresion f/u    Visit type: audiovisual    Face to Face time with patient: 17  22 minutes of total time spent on the encounter, which includes face to face time and non-face to face time preparing to see the patient (eg, review of tests), Obtaining and/or reviewing separately obtained history, Documenting clinical information in the electronic or other health record, Independently interpreting results (not separately reported) and communicating results to the patient/family/caregiver, or Care coordination (not separately reported).         Each patient to whom he or she provides medical services by telemedicine is:  (1) informed of the relationship between the physician and patient and the respective role of any other health care provider with respect to management of the patient; and (2) notified that he or she may decline to receive medical services by telemedicine and may withdraw from such care at any time.    Notes:  Subjective     Patient ID: Toi Fernandez is a 54 y.o. female.    Chief Complaint: No chief complaint on file.    HPI  Depression persists.  In bed a lot of the time.      She takes lexapro only 3 days a week.          SIster states she is "doped up" when she takes daily.    She never called psychiatry.   She was in therapy with behavioral health     Some time ago.      She has chronic uncontrolled MDD.  She is out on FMLA due to depression.    Carpal tunnel surgery June 6     She is eating more.     Wt in 190's.      S/p gastric sleeve.       She would to see if ozempic is covered due to chronic kidney disease.       She has chronic htn with ckd 3a.      Review of Systems   Constitutional:  Positive for activity change and unexpected weight change.   HENT:  Negative for hearing loss, rhinorrhea and trouble swallowing.    Eyes:  Negative for discharge and visual disturbance.   Respiratory:  Negative for chest tightness " and wheezing.    Cardiovascular:  Positive for palpitations. Negative for chest pain.   Gastrointestinal:  Positive for constipation. Negative for blood in stool, diarrhea and vomiting.   Endocrine: Positive for polydipsia and polyuria.   Genitourinary:  Negative for difficulty urinating, dysuria, hematuria and menstrual problem.   Musculoskeletal:  Positive for arthralgias and joint swelling. Negative for neck pain.   Neurological:  Positive for headaches. Negative for weakness.   Psychiatric/Behavioral:  Positive for confusion and dysphoric mood.           Objective     Physical Exam  Constitutional:       General: She is not in acute distress.     Appearance: Normal appearance. She is not ill-appearing, toxic-appearing or diaphoretic.      Comments: In bed   Neurological:      Mental Status: She is alert.   Psychiatric:         Mood and Affect: Mood normal.         Behavior: Behavior normal.            Assessment and Plan     1. MDD (major depressive disorder), recurrent, in partial remission  -     Ambulatory referral/consult to Behavioral Health; Future; Expected date: 03/27/2025    2. Stage 3a chronic kidney disease    3. BMI 29.0-29.9,adult             TRy half a tab  Lexapro every day.     Call psychiatry to make an appointment [phone]     Follow up in about 4 weeks (around 4/17/2025) for virtual visit.

## 2025-03-20 NOTE — PATIENT INSTRUCTIONS
Start escitalopram (lexapro) 5 mg EVERY day -     Take half of a 10 mg pill    Call psychiatry to make an appointment [phone]

## 2025-03-31 DIAGNOSIS — N18.30 STAGE 3 CHRONIC KIDNEY DISEASE, UNSPECIFIED WHETHER STAGE 3A OR 3B CKD: Primary | ICD-10-CM

## 2025-04-01 ENCOUNTER — LAB VISIT (OUTPATIENT)
Dept: LAB | Facility: HOSPITAL | Age: 55
End: 2025-04-01
Attending: STUDENT IN AN ORGANIZED HEALTH CARE EDUCATION/TRAINING PROGRAM
Payer: COMMERCIAL

## 2025-04-01 DIAGNOSIS — I10 PRIMARY HYPERTENSION: ICD-10-CM

## 2025-04-01 DIAGNOSIS — N18.30 STAGE 3 CHRONIC KIDNEY DISEASE, UNSPECIFIED WHETHER STAGE 3A OR 3B CKD: ICD-10-CM

## 2025-04-01 LAB
ANION GAP (OHS): 10 MMOL/L (ref 8–16)
BILIRUB UR QL STRIP.AUTO: NEGATIVE
BUN SERPL-MCNC: 16 MG/DL (ref 7–17)
CALCIUM SERPL-MCNC: 9.6 MG/DL (ref 8.7–10.5)
CHLORIDE SERPL-SCNC: 105 MMOL/L (ref 95–110)
CLARITY UR: CLEAR
CO2 SERPL-SCNC: 26 MMOL/L (ref 23–29)
COLOR UR AUTO: YELLOW
CREAT SERPL-MCNC: 1.3 MG/DL (ref 0.5–1.4)
GFR SERPLBLD CREATININE-BSD FMLA CKD-EPI: 49 ML/MIN/1.73/M2
GLUCOSE SERPL-MCNC: 111 MG/DL (ref 70–110)
GLUCOSE UR QL STRIP: NEGATIVE
HGB UR QL STRIP: NEGATIVE
KETONES UR QL STRIP: NEGATIVE
LEUKOCYTE ESTERASE UR QL STRIP: NEGATIVE
NITRITE UR QL STRIP: NEGATIVE
PH UR STRIP: 6 [PH]
POTASSIUM SERPL-SCNC: 3.9 MMOL/L (ref 3.5–5.1)
PROT UR QL STRIP: ABNORMAL
SODIUM SERPL-SCNC: 141 MMOL/L (ref 136–145)
SP GR UR STRIP: 1.02
UROBILINOGEN UR STRIP-ACNC: 1 EU/DL

## 2025-04-01 PROCEDURE — 36415 COLL VENOUS BLD VENIPUNCTURE: CPT | Mod: PN

## 2025-04-01 PROCEDURE — 81003 URINALYSIS AUTO W/O SCOPE: CPT | Mod: PN

## 2025-04-01 PROCEDURE — 80048 BASIC METABOLIC PNL TOTAL CA: CPT | Mod: PN

## 2025-04-03 ENCOUNTER — RESULTS FOLLOW-UP (OUTPATIENT)
Dept: INTERNAL MEDICINE | Facility: CLINIC | Age: 55
End: 2025-04-03

## 2025-04-03 ENCOUNTER — OFFICE VISIT (OUTPATIENT)
Dept: NEPHROLOGY | Facility: CLINIC | Age: 55
End: 2025-04-03
Payer: COMMERCIAL

## 2025-04-03 VITALS
HEART RATE: 56 BPM | SYSTOLIC BLOOD PRESSURE: 125 MMHG | DIASTOLIC BLOOD PRESSURE: 84 MMHG | WEIGHT: 179.44 LBS | BODY MASS INDEX: 27.29 KG/M2 | OXYGEN SATURATION: 100 %

## 2025-04-03 DIAGNOSIS — N18.1 CKD (CHRONIC KIDNEY DISEASE) STAGE 1, GFR 90 ML/MIN OR GREATER: Primary | ICD-10-CM

## 2025-04-03 PROCEDURE — 99999 PR PBB SHADOW E&M-EST. PATIENT-LVL IV: CPT | Mod: PBBFAC,,, | Performed by: STUDENT IN AN ORGANIZED HEALTH CARE EDUCATION/TRAINING PROGRAM

## 2025-04-04 NOTE — PROGRESS NOTES
Nephrology Clinic Note   4/3/2025    Chief Complaint   Patient presents with    Follow-up      History of present illness:  Patient is a 54 y.o. female with Past medical history of CKD 3A, post bariatric surgery, hypertension, Secondary hyperparathyroidism, and depression, history of nephrotic syndrome diagnosis in 2001, Post pregnancy nephrotic range proteinuria, who presented to clinic to follow up with her chronic kidney disease, current serum creatinine 1.3, stable Estimated GFR 49, Patient has brought her blood pressure diary with her, systolic blood pressure ranges from 123-140 mmHg, and diastolic ranges from 80-90 mmHg,currently on irbesartan, Norvasc and coreg. Negative review systems, denied recent ER visit or hospitalization. Patient stated that her ozempic had been recently denied. Her laboratories Values reviewed        Creatinine   Date Value Ref Range Status   04/01/2025 1.3 0.5 - 1.4 mg/dL Final   04/01/2025 1.3 0.5 - 1.4 mg/dL Final   11/15/2024 1.30 0.50 - 1.40 mg/dL Final     BUN   Date Value Ref Range Status   04/01/2025 16 7 - 17 mg/dL Final   04/01/2025 16 7 - 17 mg/dL Final   11/15/2024 23 (H) 7 - 17 mg/dL Final     CO2   Date Value Ref Range Status   04/01/2025 28 23 - 29 mmol/L Final   04/01/2025 26 23 - 29 mmol/L Final   11/15/2024 29 23 - 29 mmol/L Final         Proteinuria:   Prot/Creat Ratio, Urine   Date Value Ref Range Status   02/09/2024 0.07 0.00 - 0.20 Final   10/21/2019 0.92 (H) 0.00 - 0.20 Final   11/20/2017 1.59 (H) 0.00 - 0.20 Final         CKD anemia  Hemoglobin   Date Value Ref Range Status   11/15/2024 13.6 12.0 - 16.0 g/dL Final   05/24/2024 12.2 12.0 - 16.0 g/dL Final     HGB   Date Value Ref Range Status   04/01/2025 12.8 12.0 - 16.0 gm/dL Final     Saturated Iron   Date Value Ref Range Status   11/15/2024 44 20 - 50 % Final   11/01/2023 41 20 - 50 % Final   12/29/2022 47 20 - 50 % Final     Ferritin   Date Value Ref Range Status   03/03/2022 343 (H) 20.0 - 300.0 ng/mL  Final   06/10/2016 256 20.0 - 300.0 ng/mL Final   09/14/2012 174 20 - 300 ng/ml Final     Iron   Date Value Ref Range Status   11/15/2024 126 30 - 160 ug/dL Final   11/01/2023 109 30 - 160 ug/dL Final   12/29/2022 125 30 - 160 ug/dL Final     TIBC   Date Value Ref Range Status   11/15/2024 286 250 - 450 ug/dL Final   11/01/2023 269 250 - 450 ug/dL Final     Vitamin B-12   Date Value Ref Range Status   11/15/2024 277 210 - 950 pg/mL Final   11/01/2023 388 210 - 950 pg/mL Final     Folate   Date Value Ref Range Status   11/15/2019 10.3 4.0 - 24.0 ng/mL Final       MBD  PTH, Intact   Date Value Ref Range Status   05/24/2024 105.4 (H) 9.0 - 77.0 pg/mL Final   10/17/2019 109.0 (H) 9.0 - 77.0 pg/mL Final     Calcium   Date Value Ref Range Status   04/01/2025 9.6 8.7 - 10.5 mg/dL Final   04/01/2025 9.6 8.7 - 10.5 mg/dL Final     Phosphorus Level   Date Value Ref Range Status   04/01/2025 3.1 2.7 - 4.5 mg/dL Final     Phosphorus   Date Value Ref Range Status   02/09/2024 3.8 2.7 - 4.5 mg/dL Final     Vit D, 25-Hydroxy   Date Value Ref Range Status   11/15/2024 20 (L) 30 - 96 ng/mL Final     Comment:     Vitamin D deficiency.........<10 ng/mL                              Vitamin D insufficiency......10-29 ng/mL       Vitamin D sufficiency........> or equal to 30 ng/mL  Vitamin D toxicity............>100 ng/mL           #HTN  No results found for this or any previous visit.        #DM  Lab Results   Component Value Date    HGBA1C 4.7 06/01/2023         Review of Systems   Respiratory:  Negative for cough, hemoptysis, sputum production and wheezing.    Cardiovascular:  Negative for chest pain, palpitations, claudication and leg swelling.   Gastrointestinal:  Negative for diarrhea and nausea.   Genitourinary:  Negative for dysuria, flank pain, hematuria and urgency.   Neurological:  Negative for dizziness and seizures.  as per HPI above    History:  Past Medical History:   Diagnosis Date    Carpal tunnel syndrome, left      Chronic kidney disease, stage II (mild) 09/21/2012    Depression     H/O nephrotic syndrome, 2001. 04/21/2015    Hypertension     Obesity     Secondary hyperparathyroidism     Vitamin D deficiency disease       Past Surgical History:   Procedure Laterality Date    DILATION AND CURETTAGE OF UTERUS      ENDOMETRIAL ABLATION  2004    HYSTERECTOMY  2011    DLH/BS (AUB/Fibroids) KB    LAPAROSCOPIC SLEEVE GASTRECTOMY N/A 05/27/2020    Procedure: GASTRECTOMY, SLEEVE, LAPAROSCOPIC, with intraop EGD;  Surgeon: Leodan Burk MD;  Location: [facility] OR [address] [place] [address];  Service: General;  Laterality: N/A;    TUBAL LIGATION        Current Medications[1]  Review of patient's allergies indicates:   Allergen Reactions    No known allergies       Social History     Tobacco Use    Smoking status: Never    Smokeless tobacco: Never   Substance Use Topics    Alcohol use: Not Currently     Comment: less than weekly.      Family History   Problem Relation Name Age of Onset    Diabetes Mother Dee Dee Heredia         None    Hypertension Mother Dee Dee Heredia     Colon cancer Father      Hypertension Father      Hypertension Sister      Hypertension Sister      Epilepsy Son      Diabetes Maternal Aunt Alka luis         None    Diabetes Maternal Grandmother Natasha Sarabia         None    Breast cancer Neg Hx      Ovarian cancer Neg Hx          Physical Exam :  Vitals:    04/03/25 1301   BP: 125/84   Pulse: (!) 56     Physical Exam  Constitutional:       General: She is not in acute distress.     Appearance: She is not ill-appearing or toxic-appearing.   HENT:      Head: Normocephalic and atraumatic.   Cardiovascular:      Rate and Rhythm: Bradycardia present.      Heart sounds: No murmur heard.  Pulmonary:      Effort: No respiratory distress.      Breath sounds: No wheezing or rales.   Musculoskeletal:      Right lower leg: No edema.      Left lower leg: No edema.   Skin:     Coloration: Skin is pale. Skin is not jaundiced.   Neurological:       Mental Status: She is oriented to person, place, and time.       Assessment:    1. CKD (chronic kidney disease) stage 1, GFR 90 ml/min or greater    2. Hypertension with ckd stage   3. Status post bariatric surgery  4. Depression  5. Vitamin D deficiency  6. Secondly hyperparathyroidism of renal origin      Plan:     - CKD 3 a, Scr 1.3 stable, encouraged to strictly control blood pressure, Today's clinic blood pressure 125/84  mmHg (on irbesartan, Coreg and Norvasc)  - Lifestyle modification, including exercise, stress reduction, sold reduction (less than 2 g per day),discussed with patient   - Vitamin D deficiency treated with 5000 international units per day  - Kidney ultrasound reviewed, revealed None obstructive 3 mm stone in the right kidney, 26 mm cyst on the right kidney, We will repeat new kidney ultrasound, new urine Protein/Creatinine ratio  - We will obtain CBC, renal function panel, urinalysis, Vitamin D, PTH  - Return to clinic in 3 months           Orders Placed This Encounter   Procedures    US Kidney Only    Protein / creatinine ratio, urine    SUHA    Anti-DNA Ab, Double-Stranded    CBC Auto Differential    Renal Function Panel    PTH, intact    Vitamin D    Urinalysis     Medications Discontinued During This Encounter   Medication Reason    albuterol (PROVENTIL/VENTOLIN HFA) 90 mcg/actuation inhaler     phentermine (ADIPEX-P) 37.5 mg tablet       Future Appointments   Date Time Provider Department Center   4/17/2025  9:45 AM Estela Crawford MD [facility]   6/16/2025  2:00 PM Patti Patel PAK[facility]by[facility] NINA Sinclair             [1]   Current Outpatient Medications:     amLODIPine (NORVASC) 10 MG tablet, Take 1 tablet (10 mg total) by mouth once daily., Disp: 90 tablet, Rfl: 3    carvediloL (COREG) 25 MG tablet, Take 1 tablet (25 mg total) by mouth 2 (two) times daily with meals., Disp: 180 tablet, Rfl: 3    cetirizine (ZYRTEC) 10 MG tablet, TAKE 1 TO 2 TABLETS BY MOUTH EVERY DAY AS  NEEDED FOR ITCHING, Disp: 180 tablet, Rfl: 1    cyclobenzaprine (FLEXERIL) 10 MG tablet, TAKE 1 TABLET BY MOUTH EVERY DAY AS NEEDED FOR BACK PAIN, Disp: 30 tablet, Rfl: 2    ergocalciferol (ERGOCALCIFEROL) 50,000 unit Cap, TAKE 1 CAPSULE BY MOUTH MONTHLY, Disp: 3 capsule, Rfl: 0    EScitalopram oxalate (LEXAPRO) 10 MG tablet, Take 1 tablet (10 mg total) by mouth once daily., Disp: 90 tablet, Rfl: 3    irbesartan (AVAPRO) 300 MG tablet, Take 1 tablet (300 mg total) by mouth every morning., Disp: 90 tablet, Rfl: 3    semaglutide (OZEMPIC) 0.25 mg or 0.5 mg (2 mg/3 mL) pen injector, Inject 0.5 mg into the skin every 7 days., Disp: 3 mL, Rfl: 0    torsemide (DEMADEX) 10 MG Tab, TAKE 1 TABLET(10 MG) BY MOUTH EVERY DAY, Disp: 90 tablet, Rfl: 0    topiramate (TOPAMAX) 50 MG tablet, Take 1 tablet (50 mg total) by mouth 2 (two) times daily., Disp: 180 tablet, Rfl: 0

## 2025-04-08 ENCOUNTER — PATIENT MESSAGE (OUTPATIENT)
Dept: BARIATRICS | Facility: CLINIC | Age: 55
End: 2025-04-08
Payer: COMMERCIAL

## 2025-04-17 ENCOUNTER — E-CONSULT (OUTPATIENT)
Dept: PHARMACY | Facility: CLINIC | Age: 55
End: 2025-04-17
Payer: COMMERCIAL

## 2025-04-17 ENCOUNTER — HOSPITAL ENCOUNTER (OUTPATIENT)
Dept: RADIOLOGY | Facility: HOSPITAL | Age: 55
Discharge: HOME | End: 2025-04-17
Attending: STUDENT IN AN ORGANIZED HEALTH CARE EDUCATION/TRAINING PROGRAM
Payer: COMMERCIAL

## 2025-04-17 ENCOUNTER — OFFICE VISIT (OUTPATIENT)
Dept: INTERNAL MEDICINE | Facility: CLINIC | Age: 55
End: 2025-04-17
Payer: COMMERCIAL

## 2025-04-17 DIAGNOSIS — N18.2 CHRONIC KIDNEY DISEASE, STAGE II (MILD): Primary | ICD-10-CM

## 2025-04-17 DIAGNOSIS — N18.1 CKD (CHRONIC KIDNEY DISEASE) STAGE 1, GFR 90 ML/MIN OR GREATER: ICD-10-CM

## 2025-04-17 DIAGNOSIS — F33.41 MDD (MAJOR DEPRESSIVE DISORDER), RECURRENT, IN PARTIAL REMISSION: Primary | ICD-10-CM

## 2025-04-17 DIAGNOSIS — I10 ESSENTIAL HYPERTENSION: ICD-10-CM

## 2025-04-17 DIAGNOSIS — N18.31 STAGE 3A CHRONIC KIDNEY DISEASE: ICD-10-CM

## 2025-04-17 PROCEDURE — 76775 US EXAM ABDO BACK WALL LIM: CPT | Mod: 26,,, | Performed by: RADIOLOGY

## 2025-04-17 PROCEDURE — 76775 US EXAM ABDO BACK WALL LIM: CPT | Mod: TC,PN

## 2025-04-17 NOTE — CONSULTS
[facility] - Pharmacy/Wellness  Response for E-Consult     Patient Name: Toi Fernandez  MRN: [id number]  Primary Care Provider: Estela Crawford MD   Requesting Provider: Estela Crawford MD  E-Consult to Pharmacy  Consult performed by: Mark Avalos PharmD  Consult ordered by: Estela Crawford MD  Reason for consult: Are any GLP-1's, including Tirzepatide,  covered for any diagnoses besides DM?  Assessment/Recommendations: Current FDA approved indications for selected GLP1s that are not T2DM    Ozempic (semaglutide)  · To reduce the risk of major adverse cardiovascular events in diabetic adults with cardiovascular disease  · To reduce the risk of worsening kidney disease in diabetic adults with chronic kidney disease  Saxenda (liraglutide)  · Chronic weight management in obese patients ages 12 years and older  · Chronic weight management in overweight adults with at least one weight-related comorbid condition, such as hypertension, Type 2 diabetes or dyslipidemia  Wegovy (semaglutide)   · Long-term weight loss in obese patients ages 12 years and older  · Long-term weight loss in overweight adults with at least one weight-related comorbid condition  · To reduce the risk of major adverse cardiovascular events in adults with cardiovascular disease and are either obese or overweight  Zepbound (tirzepatide)  · Long-term weight loss in obese or overweight adults with at least one weight-related comorbid condition  · To treat moderate to severe obstructive sleep apnea in obese adults    https://[email or web address].Hum/glp-1s/glp-1-drug-vdztqevzu-f-elnmudqao/            Recommendation: see above    Additional future steps to consider: No    Total time of Consultation: 10 minute    I did not speak to the requesting provider verbally about this.     *This eConsult is based on the clinical data available to me and is furnished without benefit of a physical examination. The eConsult will need to be interpreted in light  of any clinical issues or changes in patient status not available to me at the time of filing this eConsults. Significant changes in patient condition or level of acuity should result in immediate formal consultation and reevaluation. Please alert me if you have further questions.    Thank you for this eConsult referral.     Mark Avalos, PharmD  [facility] - Pharmacy/Wellness

## 2025-04-17 NOTE — PROGRESS NOTES
The patient location is: [facility]  The chief complaint leading to consultation is: depression f/u    Visit type: audiovisual    Face to Face time with patient: 10  13 minutes of total time spent on the encounter, which includes face to face time and non-face to face time preparing to see the patient (eg, review of tests), Obtaining and/or reviewing separately obtained history, Documenting clinical information in the electronic or other health record, Independently interpreting results (not separately reported) and communicating results to the patient/family/caregiver, or Care coordination (not separately reported).         Each patient to whom he or she provides medical services by telemedicine is:  (1) informed of the relationship between the physician and patient and the respective role of any other health care provider with respect to management of the patient; and (2) notified that he or she may decline to receive medical services by telemedicine and may withdraw from such care at any time.    Notes:  Subjective     Patient ID: Toi Fernandez is a 54 y.o. female.    Chief Complaint: No chief complaint on file.    Hypertension  This is a chronic problem. The current episode started more than 1 year ago. The problem has been waxing and waning since onset. The problem is resistant. Associated symptoms include anxiety, headaches and sweats. There are no associated agents to hypertension. Risk factors for coronary artery disease include obesity, post-menopausal state and stress. Past treatments include beta blockers and diuretics. The current treatment provides mild improvement. Compliance problems include diet.      She is taking a half a tab of lexapro.     Mood is better!     She would like to continue same dose as didn't like the way she felt on 10 mg in past.    Nephrology note reviewed. 125/84 then.      Stll running 145-147 at times.    Ozempic was not covered for ckd.      Her nephrologist thought it would be  helpful for kidney disease, however.    Review of Systems   Neurological:  Positive for headaches.          Objective     Physical Exam  Constitutional:       Appearance: Normal appearance. She is obese.      Comments: Not in bed, as she was last visit.   Neurological:      Mental Status: She is alert.   Psychiatric:         Mood and Affect: Mood normal.         Behavior: Behavior normal.         Thought Content: Thought content normal.            Assessment and Plan     1. MDD (major depressive disorder), recurrent, in partial remission    2. Stage 3a chronic kidney disease    3. BMI 27.0-27.9,adult             Consult pharmacy.- are glp-1's covered under any circumstance?    No follow-ups on file.

## 2025-04-22 ENCOUNTER — PATIENT MESSAGE (OUTPATIENT)
Dept: INTERNAL MEDICINE | Facility: CLINIC | Age: 55
End: 2025-04-22
Payer: COMMERCIAL

## 2025-04-22 NOTE — TELEPHONE ENCOUNTER
LOV with Estela Crawford MD , 4/17/2025    Patient asking for review of completed E-consult to pharmacy on 4/17/25 in regard to GLP1's and FDA approved indications

## 2025-04-24 ENCOUNTER — PATIENT MESSAGE (OUTPATIENT)
Dept: NEPHROLOGY | Facility: CLINIC | Age: 55
End: 2025-04-24
Payer: COMMERCIAL

## 2025-05-13 ENCOUNTER — PATIENT MESSAGE (OUTPATIENT)
Dept: BARIATRICS | Facility: CLINIC | Age: 55
End: 2025-05-13
Payer: COMMERCIAL

## 2025-05-16 RX ORDER — CYCLOBENZAPRINE HCL 10 MG
TABLET ORAL
Qty: 30 TABLET | Refills: 2 | Status: SHIPPED | OUTPATIENT
Start: 2025-05-16

## 2025-05-16 NOTE — TELEPHONE ENCOUNTER
Care Due:                  Date            Visit Type   Department     Provider  --------------------------------------------------------------------------------                                ESTABLISHED                              PATIENT -    [facility] INTER[facility]  Last Visit: 04-      [facility]      MEDICINE       Estela Crawford  Next Visit: None Scheduled  None         None Found                                                            Last  Test          Frequency    Reason                     Performed    Due Date  --------------------------------------------------------------------------------    HBA1C.......  6 months...  semaglutide..............  06- 11-    [facility] Embedded Care Due Messages. Reference number: [id number].   5/16/2025 10:51:41 AM CDT

## 2025-05-16 NOTE — TELEPHONE ENCOUNTER
Refill Routing Note   Medication(s) are not appropriate for processing by [facility] for the following reason(s):        Outside of protocol    [facility] action(s):  Route   Requires labs : Yes               Appointments  past 12m or future 3m with PCP    Date Provider   Last Visit   4/17/2025 Estela Crawford MD   Next Visit   Visit date not found Estela Crawford MD   ED visits in past 90 days: 0        Note composed:12:18 PM 05/16/2025

## 2025-05-20 ENCOUNTER — TELEPHONE (OUTPATIENT)
Dept: ORTHOPEDICS | Facility: CLINIC | Age: 55
End: 2025-05-20
Payer: COMMERCIAL

## 2025-05-20 NOTE — TELEPHONE ENCOUNTER
Advised pt that 6/6 is not available. Pt states she will keep 6/3.   ----- Message from [employer] sent at 5/20/2025  3:39 PM CDT -----  Type: General Call Back Name of Caller:Pt Symptoms:procedureWould the patient rather a call back or a response via [facility]? Call Renetta Call Back Number:[phone] Additional Information: She has a procedure on the 3rd with Dr Monroy. She said she was offered the 6th as well and would to know if she can still have the 6th for the procedure.

## 2025-05-21 ENCOUNTER — HOSPITAL ENCOUNTER (OUTPATIENT)
Dept: CARDIOLOGY | Facility: HOSPITAL | Age: 55
Discharge: HOME | End: 2025-05-21
Attending: NURSE PRACTITIONER
Payer: COMMERCIAL

## 2025-05-21 DIAGNOSIS — Z01.818 PRE-OP TESTING: ICD-10-CM

## 2025-05-21 DIAGNOSIS — I10 PRIMARY HYPERTENSION: ICD-10-CM

## 2025-05-21 LAB
OHS QRS DURATION: 64 MS
OHS QTC CALCULATION: 422 MS

## 2025-05-21 PROCEDURE — 93005 ELECTROCARDIOGRAM TRACING: CPT | Mod: PN

## 2025-05-21 PROCEDURE — 93010 ELECTROCARDIOGRAM REPORT: CPT | Mod: ,,, | Performed by: INTERNAL MEDICINE

## 2025-05-22 ENCOUNTER — ANESTHESIA EVENT (OUTPATIENT)
Dept: SURGERY | Facility: HOSPITAL | Age: 55
End: 2025-05-22
Payer: COMMERCIAL

## 2025-05-22 ENCOUNTER — RESULTS FOLLOW-UP (OUTPATIENT)
Dept: ANESTHESIOLOGY | Facility: HOSPITAL | Age: 55
End: 2025-05-22

## 2025-05-22 ENCOUNTER — PATIENT MESSAGE (OUTPATIENT)
Dept: INTERNAL MEDICINE | Facility: CLINIC | Age: 55
End: 2025-05-22
Payer: COMMERCIAL

## 2025-05-22 DIAGNOSIS — R94.31 ABNORMAL EKG: Primary | ICD-10-CM

## 2025-05-22 NOTE — ANESTHESIA PREPROCEDURE EVALUATION
"                                                                                                             05/22/2025  Toi Fernandez is a 54 y.o., female with CKD, HTN, depression, and history of gastric sleeve 5/27/2020 on Mounjaro (BMI 28.43). Scheduled for  RELEASE, CARPAL TUNNEL (Bilateral: Hand) 6/3/25.    Per cardiology Dr. Manzano, "Low risk for moderate risk non cardiovascular surgery".     Previous Airway: 5/27/20      Intubation  Performed by: Janelle Atwood CRNA  Authorized by: Calvin Montes MD      Intubation:     Induction:  Intravenous    Intubated:  Postinduction    Mask Ventilation:  Easy mask    Attempts:  1    Attempted By:  CRNA    Method of Intubation:  Direct    Blade:  [place] 2    Laryngeal View Grade: Grade I - full view of chords      Difficult Airway Encountered?: No      Complications:  None    Airway Device:  Oral endotracheal tube    Airway Device Size:  7.0    Style/Cuff Inflation:  Cuffed    Inflation Amount (mL):  6    Tube secured:  22    Secured at:  The lips    Placement Verified By:  Capnometry    Complicating Factors:  None    Findings Post-Intubation:  BS equal bilateral and atraumatic/condition of teeth unchanged     Echo 7/24/25    Left Ventricle: The left ventricle is normal in size. Normal wall thickness. Normal wall motion. There is normal systolic function. Quantitated ejection fraction is 71%. Grade I diastolic dysfunction. E/A ratio is 0.68.    Right Ventricle: The right ventricle is normal in size measuring 3.9 cm. Systolic function is normal.    Tricuspid Valve: There is mild regurgitation.    Pulmonary Artery: The estimated pulmonary artery systolic pressure is 21 mmHg.    Stress echo 11/2019  Normal left ventricular systolic function. The estimated ejection fraction is 60%  Normal right ventricular systolic function.  Normal LV diastolic function.  Mild biatrial enlargement.  Normal central venous pressure (3 mm Hg).  There were no arrhythmias during " stress.  The patient's exercise capacity was above average for age/sex (12 METs). No angina elicited.  The EKG portion of this study is negative for myocardial ischemia.  The stress echo portion of this study is negative for myocardial ischemia.  Overall, this study is negative for myocardial ischemia.     - EKG 05/29/2025 sinus rhythm, low voltage    Past Medical History:   Diagnosis Date    Carpal tunnel syndrome, left     Chronic kidney disease, stage II (mild) 09/21/2012    Depression     H/O nephrotic syndrome, 2001. 04/21/2015    Hypertension     Obesity     Secondary hyperparathyroidism     Vitamin D deficiency disease      Past Surgical History:   Procedure Laterality Date    DILATION AND CURETTAGE OF UTERUS      ENDOMETRIAL ABLATION  2004    HYSTERECTOMY  2011    DLH/BS (AUB/Fibroids) KB    LAPAROSCOPIC SLEEVE GASTRECTOMY N/A 05/27/2020    Procedure: GASTRECTOMY, SLEEVE, LAPAROSCOPIC, with intraop EGD;  Surgeon: Leodan Burk MD;  Location: [facility] OR [address] [place] [address];  Service: General;  Laterality: N/A;    TUBAL LIGATION       Review of patient's allergies indicates:   Allergen Reactions    No known allergies      Current Outpatient Medications   Medication Instructions    amLODIPine (NORVASC) 10 mg, Oral, Daily    carvediloL (COREG) 25 mg, Oral, 2 times daily with meals    cetirizine (ZYRTEC) 10 MG tablet TAKE 1 TO 2 TABLETS BY MOUTH EVERY DAY AS NEEDED FOR ITCHING    cyclobenzaprine (FLEXERIL) 10 MG tablet TAKE 1 TABLET BY MOUTH EVERY DAY AS NEEDED FOR BACK PAIN    ergocalciferol (ERGOCALCIFEROL) 50,000 Units, Oral, Every 30 days    EScitalopram oxalate (LEXAPRO) 10 mg, Oral, Daily    irbesartan (AVAPRO) 300 mg, Oral, Every morning    OZEMPIC 0.5 mg, Subcutaneous, Every 7 days    topiramate (TOPAMAX) 50 mg, Oral, 2 times daily    torsemide (DEMADEX) 10 mg, Oral       Pre-op Assessment    I have reviewed the Patient Summary Reports.     I have reviewed the Nursing Notes.    I have  reviewed the Medications.     Review of Systems  Anesthesia Hx:  No problems with previous Anesthesia   History of prior surgery of interest to airway management or planning:            Denies Personal Hx of Anesthesia complications.                    Social:  No Alcohol Use, Non-Smoker       Cardiovascular:  Exercise tolerance: good   Denies Pacemaker. Hypertension       Denies Angina.                              Hypertension         Pulmonary:       Denies Shortness of breath.                  Renal/:  Chronic Renal Disease, CKD        Kidney Function/Disease             Hepatic/GI:         Taking GLP-1 Agonists Instructed to Hold for 7 Days           Neurological:  Denies TIA.  Denies CVA. Neuromuscular Disease,   Denies Seizures.                              Neuromuscular Disease   Endocrine:  Endocrine Normal       Parathyroid Disease, Hyperparathyroidism            Psych:  Psychiatric History anxiety depression              Physical Exam  General: Cooperative and Oriented    Airway:  Mallampati: II   Mouth Opening: Normal  Neck ROM: Normal ROM    Dental:  Intact    Lab Results   Component Value Date    WBC 5.85 04/01/2025    HGB 12.8 04/01/2025    HCT 38.8 04/01/2025     04/01/2025    CHOL 188 11/15/2024    TRIG 101 11/15/2024    HDL 56 11/15/2024    ALT 22 11/15/2024    AST 26 11/15/2024     04/01/2025    K 3.8 04/01/2025     04/01/2025    CREATININE 1.3 04/01/2025    BUN 16 04/01/2025    CO2 28 04/01/2025    TSH 3.250 03/03/2022    HGBA1C 4.7 06/01/2023     Results for orders placed or performed during the hospital encounter of 05/21/25   EKG 12-lead    Collection Time: 05/21/25  9:02 AM   Result Value Ref Range    QRS Duration 64 ms    OHS QTC Calculation 422 ms    Narrative    Test Reason : Z01.818,I10,    Vent. Rate :  65 BPM     Atrial Rate :  65 BPM     P-R Int : 150 ms          QRS Dur :  64 ms      QT Int : 406 ms       P-R-T Axes :  81  63  32 degrees    QTcB Int : 422  ms    Normal sinus rhythm  Low voltage QRS  Septal infarct ,age undetermined  Abnormal ECG  No previous ECGs available  Confirmed by Anupam Manzano ([id number]) on 5/21/2025 6:42:42 PM    Referred By: DEBBIE FIGUEROA           Confirmed By: Anupam Manzano         Anesthesia Plan  Type of Anesthesia, risks & benefits discussed:    Anesthesia Type: Gen Natural Airway  Intra-op Monitoring Plan: Standard ASA Monitors  Post Op Pain Control Plan: multimodal analgesia  Induction:  IV  Informed Consent: Informed consent signed with the Patient and all parties understand the risks and agree with anesthesia plan.  All questions answered.   ASA Score: 2  Anesthesia Plan Notes: Anesthesia consent to be signed prior to surgery 6/3/25      Ready For Surgery From Anesthesia Perspective.     .

## 2025-05-23 ENCOUNTER — HOSPITAL ENCOUNTER (OUTPATIENT)
Dept: PREADMISSION TESTING | Facility: HOSPITAL | Age: 55
Discharge: HOME | End: 2025-05-23
Attending: NURSE PRACTITIONER
Payer: COMMERCIAL

## 2025-05-23 ENCOUNTER — PATIENT MESSAGE (OUTPATIENT)
Dept: ANESTHESIOLOGY | Facility: HOSPITAL | Age: 55
End: 2025-05-23
Payer: COMMERCIAL

## 2025-05-23 NOTE — TELEPHONE ENCOUNTER
" LOV with Estela Crawford MD , 4/17/2025  EKG performed 5/21/25:    Pt scheduled for carpal tunnel release with Dr. Monroy 6/3/25.    F/u with cardiology 5/29/25.  Spoke with patient. Pt speaking clearly and coherently, but seems extremely anxious. Pt denies any symptoms of chest pain, discomfort, pain, SOB, sweating, nausea, dizziness. Pt stated that she has been "freaking out" since they told her that she had a heart attack. Reassured pt that if she was not experiencing any symptoms, she did not need to go to emergency room at this time. Patient was educated on signs/symptoms that would warrant emergent medical attention. Pt verbalized understanding and agreement.    Reassured patient that it was possible this was a false positive. Pt has scheduled appt with cardiology, and also requested appt with Dr. Crawford for after surgery. Appt with Dr. Crawford scheduled for 7/2/25.     Pt verbalized appreciation for the call.              "

## 2025-05-26 ENCOUNTER — TELEPHONE (OUTPATIENT)
Dept: ORTHOPEDICS | Facility: CLINIC | Age: 55
End: 2025-05-26
Payer: COMMERCIAL

## 2025-05-26 NOTE — TELEPHONE ENCOUNTER
----- Message from [employer] sent at 5/26/2025  1:49 PM CDT -----  Type: Reschedule Procedure Request Name of Caller:PtWhen is the first available appointment?No accessSymptoms:upcoming procedureWould the patient rather a call back or a response via [facility]? Call [facility] Call Back Number:[phone] Additional Information: Pt will not have any transportation to and from the procedure. Please reach out to reschedule.

## 2025-05-27 NOTE — PROGRESS NOTES
Subjective:   @Patient ID:  Toi Fernandez is a 54 y.o. female who presents for evaluation of an abnormal EKG    HPI:   5/29/2025:  Initial evaluation for abnormal EKG.  EKG preop for carpal tunnel surgery showed septal infarction.  Repeat EKG today sinus rhythm without any evidence of infarction.  She is completely asymptomatic.  She is very active without any CP or RUIZ.  Climbs flights of stairs without any issues.  BP well-controlled on current regimen.    SH: No tobacco abuse. Occasional etoh     FH: No premature CAD. Mother had PAD.     PMH: HTN, Nephrotic syndrome   Past Medical History:   Diagnosis Date    Carpal tunnel syndrome, left     Chronic kidney disease, stage II (mild) 09/21/2012    Depression     H/O nephrotic syndrome, 2001. 04/21/2015    Hypertension     Obesity     Secondary hyperparathyroidism     Vitamin D deficiency disease           Prior cardiovascular  Hx  --------------------------------    Stress echo 11/2019  Normal left ventricular systolic function. The estimated ejection fraction is 60%  Normal right ventricular systolic function.  Normal LV diastolic function.  Mild biatrial enlargement.  Normal central venous pressure (3 mm Hg).  There were no arrhythmias during stress.  The patient's exercise capacity was above average for age/sex (12 METs). No angina elicited.  The EKG portion of this study is negative for myocardial ischemia.  The stress echo portion of this study is negative for myocardial ischemia.  Overall, this study is negative for myocardial ischemia.     - EKG 05/29/2025 sinus rhythm, low voltage           The 10-year ASCVD risk score (Rafa DK, et al., 2019) is: 3.1%    Values used to calculate the score:      Age: 54 years      Sex: Female      Is Non- : Yes      Diabetic: No      Tobacco smoker: No      Systolic Blood Pressure: 120 mmHg      Is BP treated: Yes      HDL Cholesterol: 56 mg/dL      Total Cholesterol: 188 mg/dL      Patient Active  Problem List    Diagnosis Date Noted    MDD (major depressive disorder), recurrent, in partial remission 2024    Adjustment disorder with mixed anxiety and depressed mood 2024    Weight gain 2023    Hot flushes, perimenopausal 2023    Bariatric surgery status 2020    Stage 3a chronic kidney disease 2020    Family history of colon cancer 10/21/2019    Hyperparathyroidism due to renal insufficiency 10/21/2019    Chronic rhinitis 2018    Insomnia 2018    Reactive depression 2018    Vitamin D deficiency 2018    H/O nephrotic syndrome, 2001. 2015    HTN (hypertension) 2015    Chronic kidney disease, stage II (mild) 2012           Right Arm BP - Sittin/76  Left Arm BP - Sittin/78        LAST HbA1c  Lab Results   Component Value Date    HGBA1C 4.7 2023       Lipid panel  Lab Results   Component Value Date    CHOL 188 11/15/2024    CHOL 157 2023    CHOL 161 2022     Lab Results   Component Value Date    HDL 56 11/15/2024    HDL 50 2023    HDL 59 2022     Lab Results   Component Value Date    LDLCALC 111.8 11/15/2024    LDLCALC 92.6 2023    LDLCALC 92.2 2022     Lab Results   Component Value Date    TRIG 101 11/15/2024    TRIG 72 2023    TRIG 49 2022     Lab Results   Component Value Date    CHOLHDL 29.8 11/15/2024    CHOLHDL 31.8 2023    CHOLHDL 36.6 2022            Review of Systems   Constitutional: Negative for chills and fever.   HENT:  Negative for hearing loss and nosebleeds.    Eyes:  Negative for blurred vision.   Cardiovascular:         As in HPI    Respiratory:  Negative for cough, hemoptysis and shortness of breath.    Endocrine: Negative for cold intolerance and polyuria.   Hematologic/Lymphatic: Negative for bleeding problem.   Skin:  Negative for itching.   Musculoskeletal:  Negative for falls.   Gastrointestinal:  Negative for abdominal pain and  hematochezia.   Genitourinary:  Negative for hematuria.   Neurological:  Negative for dizziness and loss of balance.   Psychiatric/Behavioral:  Negative for altered mental status and depression.        Objective:   Physical Exam  Constitutional:       Appearance: She is well-developed.   HENT:      Head: Normocephalic and atraumatic.   Eyes:      Conjunctiva/sclera: Conjunctivae normal.   Neck:      Vascular: No carotid bruit or JVD.   Cardiovascular:      Rate and Rhythm: Normal rate and regular rhythm.      Pulses:           Carotid pulses are 2+ on the right side and 2+ on the left side.       Radial pulses are 2+ on the right side and 2+ on the left side.      Heart sounds: Normal heart sounds. No murmur heard.     No friction rub. No gallop.   Pulmonary:      Effort: Pulmonary effort is normal. No respiratory distress.      Breath sounds: Normal breath sounds. No stridor. No wheezing.   Abdominal:      General: Abdomen is flat.      Palpations: Abdomen is soft.   Musculoskeletal:      Cervical back: Neck supple.      Right lower leg: No edema.      Left lower leg: No edema.   Skin:     General: Skin is warm and dry.   Neurological:      Mental Status: She is alert and oriented to person, place, and time.   Psychiatric:         Behavior: Behavior normal.         Assessment:     1. Primary hypertension    2. Chronic kidney disease, stage II (mild)    3. H/O nephrotic syndrome, 2001.        Plan:   1. Preop cardiovascular risk stratification    I have reviewed her EKG.  I do not believe there is a true septal infarction.  Given repeat today is normal.  I think it is related to lead position.  Her Mets more than 4.  We will get echocardiogram.  If no major abnormalities in the echocardiogram then okay to proceed with planned carpal tunnel surgery.  Low risk for moderate risk non cardiovascular surgery    2. Hypertension  Managed by primary care team  Well-controlled    3. Nephrotic syndrome  Stable      Pertinent  cardiac images and EKG reviewed independently.    Continue with current medical plan and lifestyle changes.  Return sooner for concerns or questions. If symptoms persist go to the ED  I have reviewed all pertinent data including patient's medical history in detail and updated the computerized patient record.     Orders Placed This Encounter   Procedures    Echo     Standing Status:   Future     Expiration Date:   5/29/2026     Release to patient:   Immediate       Follow up as scheduled.       -In today's visit, monitoring for drug toxicity was accomplished.     Greater than 50% of the visit was spent counseling, educating, and coordinating the care of the patient.     She expressed verbal understanding and agreed with the plan    Patient's Medications   New Prescriptions    No medications on file   Previous Medications    AMLODIPINE (NORVASC) 10 MG TABLET    Take 1 tablet (10 mg total) by mouth once daily.    CARVEDILOL (COREG) 25 MG TABLET    Take 1 tablet (25 mg total) by mouth 2 (two) times daily with meals.    CETIRIZINE (ZYRTEC) 10 MG TABLET    TAKE 1 TO 2 TABLETS BY MOUTH EVERY DAY AS NEEDED FOR ITCHING    CYCLOBENZAPRINE (FLEXERIL) 10 MG TABLET    TAKE 1 TABLET BY MOUTH EVERY DAY AS NEEDED FOR BACK PAIN    ERGOCALCIFEROL (ERGOCALCIFEROL) 50,000 UNIT CAP    TAKE 1 CAPSULE BY MOUTH MONTHLY    ESCITALOPRAM OXALATE (LEXAPRO) 10 MG TABLET    Take 1 tablet (10 mg total) by mouth once daily.    IRBESARTAN (AVAPRO) 300 MG TABLET    Take 1 tablet (300 mg total) by mouth every morning.    SEMAGLUTIDE (OZEMPIC) 0.25 MG OR 0.5 MG (2 MG/3 ML) PEN INJECTOR    Inject 0.5 mg into the skin every 7 days.    TOPIRAMATE (TOPAMAX) 50 MG TABLET    Take 1 tablet (50 mg total) by mouth 2 (two) times daily.    TORSEMIDE (DEMADEX) 10 MG TAB    TAKE 1 TABLET(10 MG) BY MOUTH EVERY DAY   Modified Medications    No medications on file   Discontinued Medications    No medications on file        Anupam Manzano MD, FACC, RPVI  Heart and  Vascular Interventional Cardiology

## 2025-05-28 DIAGNOSIS — R07.9 CHEST PAIN, UNSPECIFIED TYPE: Primary | ICD-10-CM

## 2025-05-28 DIAGNOSIS — Z98.84 BARIATRIC SURGERY STATUS: ICD-10-CM

## 2025-05-28 DIAGNOSIS — I10 PRIMARY HYPERTENSION: ICD-10-CM

## 2025-05-29 ENCOUNTER — OFFICE VISIT (OUTPATIENT)
Dept: CARDIOLOGY | Facility: CLINIC | Age: 55
End: 2025-05-29
Payer: COMMERCIAL

## 2025-05-29 VITALS
WEIGHT: 187 LBS | HEIGHT: 68 IN | OXYGEN SATURATION: 98 % | SYSTOLIC BLOOD PRESSURE: 120 MMHG | DIASTOLIC BLOOD PRESSURE: 78 MMHG | BODY MASS INDEX: 28.34 KG/M2 | HEART RATE: 72 BPM

## 2025-05-29 DIAGNOSIS — R07.9 CHEST PAIN, UNSPECIFIED TYPE: ICD-10-CM

## 2025-05-29 DIAGNOSIS — I10 PRIMARY HYPERTENSION: Primary | ICD-10-CM

## 2025-05-29 DIAGNOSIS — Z87.441 H/O NEPHROTIC SYNDROME: ICD-10-CM

## 2025-05-29 DIAGNOSIS — N18.2 CHRONIC KIDNEY DISEASE, STAGE II (MILD): ICD-10-CM

## 2025-05-29 PROCEDURE — 99999 PR PBB SHADOW E&M-EST. PATIENT-LVL III: CPT | Mod: PBBFAC,,, | Performed by: INTERNAL MEDICINE

## 2025-05-29 PROCEDURE — 4010F ACE/ARB THERAPY RXD/TAKEN: CPT | Mod: CPTII,S$GLB,, | Performed by: INTERNAL MEDICINE

## 2025-05-29 PROCEDURE — 1159F MED LIST DOCD IN RCRD: CPT | Mod: CPTII,S$GLB,, | Performed by: INTERNAL MEDICINE

## 2025-05-29 PROCEDURE — 3008F BODY MASS INDEX DOCD: CPT | Mod: CPTII,S$GLB,, | Performed by: INTERNAL MEDICINE

## 2025-05-29 PROCEDURE — 3078F DIAST BP <80 MM HG: CPT | Mod: CPTII,S$GLB,, | Performed by: INTERNAL MEDICINE

## 2025-05-29 PROCEDURE — 3066F NEPHROPATHY DOC TX: CPT | Mod: CPTII,S$GLB,, | Performed by: INTERNAL MEDICINE

## 2025-05-29 PROCEDURE — 3074F SYST BP LT 130 MM HG: CPT | Mod: CPTII,S$GLB,, | Performed by: INTERNAL MEDICINE

## 2025-05-29 PROCEDURE — 99204 OFFICE O/P NEW MOD 45 MIN: CPT | Mod: S$GLB,,, | Performed by: INTERNAL MEDICINE

## 2025-06-03 ENCOUNTER — PATIENT MESSAGE (OUTPATIENT)
Dept: BARIATRICS | Facility: CLINIC | Age: 55
End: 2025-06-03
Payer: COMMERCIAL

## 2025-06-03 ENCOUNTER — ANESTHESIA (OUTPATIENT)
Dept: SURGERY | Facility: HOSPITAL | Age: 55
End: 2025-06-03
Payer: COMMERCIAL

## 2025-06-13 ENCOUNTER — PATIENT MESSAGE (OUTPATIENT)
Dept: ORTHOPEDICS | Facility: CLINIC | Age: 55
End: 2025-06-13
Payer: COMMERCIAL

## 2025-07-07 ENCOUNTER — PATIENT MESSAGE (OUTPATIENT)
Dept: BARIATRICS | Facility: CLINIC | Age: 55
End: 2025-07-07
Payer: COMMERCIAL

## 2025-07-08 ENCOUNTER — PATIENT MESSAGE (OUTPATIENT)
Dept: BARIATRICS | Facility: CLINIC | Age: 55
End: 2025-07-08
Payer: COMMERCIAL

## 2025-07-21 ENCOUNTER — PATIENT MESSAGE (OUTPATIENT)
Dept: ADMINISTRATIVE | Facility: HOSPITAL | Age: 55
End: 2025-07-21
Payer: COMMERCIAL

## 2025-07-22 ENCOUNTER — PATIENT MESSAGE (OUTPATIENT)
Dept: PREADMISSION TESTING | Facility: HOSPITAL | Age: 55
End: 2025-07-22

## 2025-07-22 ENCOUNTER — HOSPITAL ENCOUNTER (OUTPATIENT)
Dept: PREADMISSION TESTING | Facility: HOSPITAL | Age: 55
Discharge: HOME | End: 2025-07-22
Attending: NURSE PRACTITIONER
Payer: COMMERCIAL

## 2025-07-22 DIAGNOSIS — E66.811 CLASS 1 OBESITY WITH SERIOUS COMORBIDITY AND BODY MASS INDEX (BMI) OF 32.0 TO 32.9 IN ADULT, UNSPECIFIED OBESITY TYPE: ICD-10-CM

## 2025-07-22 DIAGNOSIS — L29.9 ITCHING: ICD-10-CM

## 2025-07-22 DIAGNOSIS — N18.30 STAGE 3 CHRONIC KIDNEY DISEASE, UNSPECIFIED WHETHER STAGE 3A OR 3B CKD: ICD-10-CM

## 2025-07-22 DIAGNOSIS — I10 ESSENTIAL HYPERTENSION: ICD-10-CM

## 2025-07-22 DIAGNOSIS — E55.9 VITAMIN D DEFICIENCY: ICD-10-CM

## 2025-07-22 RX ORDER — IRBESARTAN 300 MG/1
300 TABLET ORAL EVERY MORNING
Qty: 90 TABLET | Refills: 2 | Status: SHIPPED | OUTPATIENT
Start: 2025-07-22

## 2025-07-22 RX ORDER — CARVEDILOL 25 MG/1
25 TABLET ORAL 2 TIMES DAILY WITH MEALS
Qty: 180 TABLET | Refills: 2 | Status: SHIPPED | OUTPATIENT
Start: 2025-07-22

## 2025-07-22 RX ORDER — CETIRIZINE HYDROCHLORIDE 10 MG/1
TABLET ORAL
Qty: 180 TABLET | Refills: 2 | Status: SHIPPED | OUTPATIENT
Start: 2025-07-22

## 2025-07-22 RX ORDER — ERGOCALCIFEROL 1.25 MG/1
50000 CAPSULE ORAL
Qty: 3 CAPSULE | Refills: 2 | Status: SHIPPED | OUTPATIENT
Start: 2025-07-22

## 2025-07-22 RX ORDER — AMLODIPINE BESYLATE 10 MG/1
10 TABLET ORAL DAILY
Qty: 90 TABLET | Refills: 2 | Status: SHIPPED | OUTPATIENT
Start: 2025-07-22

## 2025-07-22 RX ORDER — CYCLOBENZAPRINE HCL 10 MG
TABLET ORAL
Qty: 30 TABLET | Refills: 2 | Status: SHIPPED | OUTPATIENT
Start: 2025-07-22

## 2025-07-22 RX ORDER — TORSEMIDE 10 MG/1
10 TABLET ORAL DAILY
Qty: 90 TABLET | Refills: 2 | Status: SHIPPED | OUTPATIENT
Start: 2025-07-22

## 2025-07-22 RX ORDER — TIRZEPATIDE 7.5 MG/.5ML
7.5 INJECTION, SOLUTION SUBCUTANEOUS
COMMUNITY

## 2025-07-22 RX ORDER — ESCITALOPRAM OXALATE 10 MG/1
10 TABLET ORAL DAILY
Qty: 90 TABLET | Refills: 2 | Status: SHIPPED | OUTPATIENT
Start: 2025-07-22

## 2025-07-22 NOTE — PRE-PROCEDURE INSTRUCTIONS
.    Allergies, medical, surgical, family and psychosocial histories reviewed with patient. Periop plan of care reviewed. Preop instructions given, including medications to take and to hold. Hibiclens soap and instructions on use given. Time allotted for questions to be addressed.      Arrival time 0530.       Please take Amlodipine, Carvedilol, Irbesartan, and Lexapro the morning of surgery. Mounjaro last dose on 7/8/25.      Surgery instructions reviewed and surgery booklet sent or emailed to the patient via the portal.

## 2025-07-22 NOTE — DISCHARGE INSTRUCTIONS
Your surgery is scheduled for 8/1/25.    Please report to [facility] on the 1st Floor at 0530 a.m.    THIS TIME IS SUBJECT TO CHANGE.  YOU WILL RECEIVE A PHONE CALL THE DAY BEFORE SURGERY BY 3:30 PM TO CONFIRM YOUR TIME OF ARRIVAL.  IF YOU HAVE NOT RECEIVED A PHONE CALL BY 3:30 PM THE DAY BEFORE YOUR SURGERY PLEASE CALL [phone]     INSTRUCTIONS IMPORTANT!!!  ¨Stop full meals 8 hours prior to arrival, but you can consume clear liquids up to 2 hours prior to arrival time. Clear liquids include Gatorade, water, soda, black coffee or tea (no milk or creamer), and clear juices only.        Please take Amlodipine, Carvedilol, Irbesartan, and Lexapro the morning of surgery. Mounjaro last dose on 7/8/25.          ____  Do not wear makeup, including mascara.  ____  No powder, lotions or creams to surgical area.  ____  Please remove all jewelry, including piercings and leave at home.  ____  No money or valuables needed. Please leave at home.  ____  Please bring any documents given by your doctor.  ____  If going home the same day, arrange for a ride home. You will not be able to             drive if Anesthesia was used.  ____  Children under 18 years require a parent / guardian present the entire time             they are in surgery / recovery.  ____  Wear loose fitting clothing. Allow for dressings, bandages.  ____  Stop Aspirin and blood thinners as directed by prescribing provider.  ____  Do not take any herbal, vitamins, and or supplements 2 weeks prior to surgery.  ____  Stop NSAIDS (Naproxen, Aleve, Voltaren, Celebrex, Melxoicam), Goody's, and BC powder 7 days prior to surgery.  ____  Wash the surgical area with Hibiclens or Dial Antibacterial bar soap the night before surgery, and again the             morning of surgery.  Be sure to rinse hibiclens or Dial Antibacterial bar soap off completely (if instructed by   nurse).  ____  If you take diabetic medication including Metformin, Glimepiride,  Glipizide, Glyburide, Byetta, Januvia, Actos, do not take am of surgery unless            instructed by Doctor.  ____  Call MD for temperature above 101 degrees or any other signs of infection such as Urinary (bladder) infection, Upper respiratory infection, skin boils,             etc.  ____ Do Not wear your contact lenses the day of your procedure.  You may wear your glasses.      ____ Do not shave surgical site for 3 days prior to surgery.  ____ Practice Good hand washing before, during, and after procedure.  ____ Hold the following medication for 3 days prior to surgery:    Invokana (Canagliflozin)     Synjardy XR (jardiance + metformin)  Farxiga (Dapagliflozin)     Segluroment (steglarto + metformin)  Jardiance (Empagliflozin)     Qtern (farxiga + saxagliptin)   Steglatro (Ertugliflozin)     Glyxambi (jardiance + linagliptin)  Benzavvy (Bexagliflozin)     Steglujan (steglarto + sitagliptin)  Inpefa (Sotagliflozin)      Xigduo (farxiga + metformin)   Invokamet/Invokamet XR (invokana + Metformin)       I have read or had read and explained to me, and understand the above information.  Additional comments or instructions:  For additional questions call [phone]      ANESTHESIA SIDE EFFECTS  -For the first 24 hours after surgery:  Do not drive, use heavy equipment, make important decisions, or drink alcohol  -It is normal to feel sleepy for several hours.  Rest until you are more awake.  -Have someone stay with you, if needed.  They can watch for problems and help keep you safe.  -Some possible post anesthesia side effects include: nausea and vomiting, sore throat and hoarseness, sleepiness, and dizziness.        Pre-Op Bathing Instructions    Before surgery, you can play an important role in your own health.    Because skin is not sterile, we need to be sure that your skin is as free of germs as possible. By following the instructions below, you can reduce the number of germs on your skin before  surgery.    IMPORTANT: You will need to shower with a special soap called Hibiclens*, available at any pharmacy.  If you are allergic to Chlorhexidine (the antiseptic in Hibiclens), use an antibacterial soap such as Dial Soap for your preoperative shower.  You will shower with Hibiclens both the night before your surgery and the morning of your surgery.  Do not use Hibiclens on the head, face or genitals to avoid injury to those areas.    STEP #1: THE NIGHT BEFORE YOUR SURGERY     Do not shave the area of your body where your surgery will be performed.  Shower and wash your hair and body as usual with your normal soap and shampoo.  Rinse your hair and body thoroughly after you shower to remove all soap residue.  With your hand, apply one packet of Hibiclens soap to the surgical site.   Wash the site gently for five (5) minutes. Do not scrub your skin too hard.   Do not wash with your regular soap after Hibiclens is used.  Rinse your body thoroughly.  Pat yourself dry with a clean, soft towel.  Do not use lotion, cream, or powder.  Wear clean clothes.    STEP #2: THE MORNING OF YOUR SURGERY     Repeat Step #1.    * Not to be used by people allergic to Chlorhexidine.

## 2025-07-22 NOTE — TELEPHONE ENCOUNTER
Refill Routing Note   Medication(s) are not appropriate for processing by [facility] for the following reason(s):        Outside of protocol    [facility] action(s):  Approve  Route     Requires labs : Yes             Appointments  past 12m or future 3m with PCP    Date Provider   Last Visit   4/17/2025 Estela Crawford MD   Next Visit   Visit date not found Estela Crawford MD   ED visits in past 90 days: 0        Note composed:3:43 PM 07/22/2025

## 2025-07-22 NOTE — TELEPHONE ENCOUNTER
Care Due:                  Date            Visit Type   Department     Provider  --------------------------------------------------------------------------------                                ESTABLISHED                              PATIENT -    [facility]  Last Visit: 04-      [facility]      MEDICINE       Estela Crawford  Next Visit: None Scheduled  None         None Found                                                            Last  Test          Frequency    Reason                     Performed    Due Date  --------------------------------------------------------------------------------    HBA1C.......  6 months...  semaglutide..............  06- 11-    [facility] Embedded Care Due Messages. Reference number: [id number].   7/22/2025 9:33:08 AM CDT

## 2025-07-23 ENCOUNTER — PATIENT OUTREACH (OUTPATIENT)
Dept: ADMINISTRATIVE | Facility: HOSPITAL | Age: 55
End: 2025-07-23
Payer: COMMERCIAL

## 2025-07-23 DIAGNOSIS — I10 PRIMARY HYPERTENSION: Primary | ICD-10-CM

## 2025-07-23 DIAGNOSIS — Z12.11 ENCOUNTER FOR COLORECTAL CANCER SCREENING: ICD-10-CM

## 2025-07-23 DIAGNOSIS — Z12.12 ENCOUNTER FOR COLORECTAL CANCER SCREENING: ICD-10-CM

## 2025-07-23 NOTE — PROGRESS NOTES
Chart reviewed and updated. Reconciled immunizations, health maintenance and care everywhere.  Placed refendo referral and microalbumin(scheduled tomorrow when pt go to Echo appt) and responded to campaign msg informing.      Carole Lawton [facility]  Panel Care Coordinator  [facility]  [phone]  For Estela Crawford MD

## 2025-07-24 ENCOUNTER — HOSPITAL ENCOUNTER (OUTPATIENT)
Dept: CARDIOLOGY | Facility: HOSPITAL | Age: 55
Discharge: HOME | End: 2025-07-24
Attending: INTERNAL MEDICINE
Payer: COMMERCIAL

## 2025-07-24 VITALS — BODY MASS INDEX: 28.34 KG/M2 | HEIGHT: 68 IN | WEIGHT: 187 LBS

## 2025-07-24 DIAGNOSIS — I10 PRIMARY HYPERTENSION: ICD-10-CM

## 2025-07-24 DIAGNOSIS — N18.2 CHRONIC KIDNEY DISEASE, STAGE II (MILD): ICD-10-CM

## 2025-07-24 DIAGNOSIS — Z87.441 H/O NEPHROTIC SYNDROME: ICD-10-CM

## 2025-07-24 PROCEDURE — 93306 TTE W/DOPPLER COMPLETE: CPT | Mod: PN

## 2025-07-24 PROCEDURE — 93306 TTE W/DOPPLER COMPLETE: CPT | Mod: 26,,, | Performed by: INTERNAL MEDICINE

## 2025-07-25 LAB
AORTIC SIZE INDEX (SOV): 1.8 CM/M2
AORTIC SIZE INDEX: 1.5 CM/M2
APICAL FOUR CHAMBER EJECTION FRACTION: 72 %
APICAL TWO CHAMBER EJECTION FRACTION: 68 %
ASCENDING AORTA: 2.9 CM
AV INDEX (PROSTH): 1.04
AV MEAN GRADIENT: 3 MMHG
AV PEAK GRADIENT: 7 MMHG
AV VALVE AREA BY VELOCITY RATIO: 2.4 CM²
AV VALVE AREA: 3 CM²
AV VELOCITY RATIO: 0.85
BSA FOR ECHO PROCEDURE: 2.02 M2
CV ECHO LV RWT: 0.24 CM
DOP CALC AO PEAK VEL: 1.3 M/S
DOP CALC AO VTI: 23.5 CM
DOP CALC LVOT AREA: 2.8 CM2
DOP CALC LVOT DIAMETER: 1.9 CM
DOP CALC LVOT PEAK VEL: 1.1 M/S
DOP CALC LVOT STROKE VOLUME: 69.4 CM3
DOP CALC MV VTI: 27.9 CM
DOP CALCLVOT PEAK VEL VTI: 24.5 CM
E WAVE DECELERATION TIME: 227 MSEC
E/A RATIO: 0.68
E/E' RATIO: 10 M/S
ECHO LV POSTERIOR WALL: 0.6 CM (ref 0.6–1.1)
FRACTIONAL SHORTENING: 42 % (ref 28–44)
INTERVENTRICULAR SEPTUM: 0.7 CM (ref 0.6–1.1)
IVC DIAMETER: 1.44 CM
IVRT: 103 MSEC
LEFT ATRIUM AREA SYSTOLIC (APICAL 2 CHAMBER): 14.71 CM2
LEFT ATRIUM AREA SYSTOLIC (APICAL 4 CHAMBER): 13.62 CM2
LEFT ATRIUM VOLUME INDEX MOD: 17 ML/M2
LEFT ATRIUM VOLUME MOD: 33 ML
LEFT INTERNAL DIMENSION IN SYSTOLE: 2.9 CM (ref 2.1–4)
LEFT VENTRICLE DIASTOLIC VOLUME INDEX: 59.8 ML/M2
LEFT VENTRICLE DIASTOLIC VOLUME: 119 ML
LEFT VENTRICLE END DIASTOLIC VOLUME APICAL 2 CHAMBER: 50.42 ML
LEFT VENTRICLE END DIASTOLIC VOLUME APICAL 4 CHAMBER: 54.47 ML
LEFT VENTRICLE END SYSTOLIC VOLUME APICAL 2 CHAMBER: 32.99 ML
LEFT VENTRICLE END SYSTOLIC VOLUME APICAL 4 CHAMBER: 30.14 ML
LEFT VENTRICLE MASS INDEX: 52.6 G/M2
LEFT VENTRICLE SYSTOLIC VOLUME INDEX: 15.6 ML/M2
LEFT VENTRICLE SYSTOLIC VOLUME: 31 ML
LEFT VENTRICULAR INTERNAL DIMENSION IN DIASTOLE: 5 CM (ref 3.5–6)
LEFT VENTRICULAR MASS: 104.6 G
LV LATERAL E/E' RATIO: 13.2 M/S
LV SEPTAL E/E' RATIO: 8.3 M/S
LVED V (TEICH): 119.28 ML
LVES V (TEICH): 30.91 ML
LVOT MG: 2.49 MMHG
LVOT MV: 0.75 CM/S
Lab: 1.7 CM/M
Lab: 2 CM/M
MV A" WAVE DURATION": 102.76 MSEC
MV MEAN GRADIENT: 2 MMHG
MV PEAK A VEL: 0.97 M/S
MV PEAK E VEL: 0.66 M/S
MV PEAK GRADIENT: 5 MMHG
MV STENOSIS PRESSURE HALF TIME: 65.94 MS
MV VALVE AREA BY CONTINUITY EQUATION: 2.49 CM2
MV VALVE AREA P 1/2 METHOD: 3.34 CM2
OHS CV CPX PATIENT HEIGHT IN: 68
OHS CV RV/LV RATIO: 0.78 CM
OHS LV EJECTION FRACTION SIMPSONS BIPLANE MOD: 71 %
PISA TR MAX VEL: 2.1 M/S
PULM VEIN S/D RATIO: 1.52
PV PEAK D VEL: 0.27 M/S
PV PEAK GRADIENT: 3 MMHG
PV PEAK S VEL: 0.41 M/S
PV PEAK VELOCITY: 0.84 M/S
RA PRESSURE ESTIMATED: 3 MMHG
RA VOL SYS: 41.41 ML
RIGHT ATRIAL AREA: 15.4 CM2
RIGHT ATRIUM END SYSTOLIC VOLUME APICAL 4 CHAMBER INDEX BSA: 19.51 ML/M2
RIGHT ATRIUM VOLUME AREA LENGTH APICAL 4 CHAMBER: 38.82 ML
RIGHT VENTRICLE DIASTOLIC BASEL DIMENSION: 3.9 CM
RV TB RVSP: 5 MMHG
RV TISSUE DOPPLER FREE WALL SYSTOLIC VELOCITY 1 (APICAL 4 CHAMBER VIEW): 19.21 CM/S
SINUS: 3.5 CM
STJ: 2.7 CM
TDI LATERAL: 0.05 M/S
TDI SEPTAL: 0.08 M/S
TDI: 0.07 M/S
TR MAX PG: 17 MMHG
TRICUSPID ANNULAR PLANE SYSTOLIC EXCURSION: 2.5 CM
TV REST PULMONARY ARTERY PRESSURE: 21 MMHG
Z-SCORE OF LEFT VENTRICULAR DIMENSION IN END DIASTOLE: -1.44
Z-SCORE OF LEFT VENTRICULAR DIMENSION IN END SYSTOLE: -1.59

## 2025-07-28 ENCOUNTER — TELEPHONE (OUTPATIENT)
Dept: ENDOSCOPY | Facility: HOSPITAL | Age: 55
End: 2025-07-28

## 2025-07-28 ENCOUNTER — CLINICAL SUPPORT (OUTPATIENT)
Dept: ENDOSCOPY | Facility: HOSPITAL | Age: 55
End: 2025-07-28
Attending: INTERNAL MEDICINE
Payer: COMMERCIAL

## 2025-07-28 VITALS — BODY MASS INDEX: 28.34 KG/M2 | WEIGHT: 187 LBS | HEIGHT: 68 IN

## 2025-07-28 DIAGNOSIS — Z12.11 SPECIAL SCREENING FOR MALIGNANT NEOPLASMS, COLON: Primary | ICD-10-CM

## 2025-07-28 DIAGNOSIS — Z12.12 ENCOUNTER FOR COLORECTAL CANCER SCREENING: ICD-10-CM

## 2025-07-28 DIAGNOSIS — Z12.11 ENCOUNTER FOR COLORECTAL CANCER SCREENING: ICD-10-CM

## 2025-07-28 RX ORDER — SODIUM, POTASSIUM,MAG SULFATES 17.5-3.13G
1 SOLUTION, RECONSTITUTED, ORAL ORAL DAILY
Qty: 1 KIT | Refills: 0 | Status: SHIPPED | OUTPATIENT
Start: 2025-09-05 | End: 2025-09-07

## 2025-07-28 NOTE — PATIENT INSTRUCTIONS
[facility] Colon[facility] Prep and Procedure Instructions  SUPREP  Sodium Sulfate, Potassium Sulfate, and Magnesium Sulfate Oral Solution    Date of procedure: 09/12/2025 - Arrive at 11:30 AM  Location of Department:   [facility] [address] [place] [address]   Stop in the hospital lobby on the 1st floor to get registered, then take the hospital elevators to the 2nd floor waiting room    Getting ready for your procedure:    If your procedure requires the administration of anesthesia, it is necessary for a responsible adult to drive you home. The designated adult is strongly encouraged to remain in the endoscopy area until the patient is discharged. (Medical Transportation, Uber, Lyft, Taxi, etc. may ONLY be used if a responsible adult is present to accompany you home. The responsible adult CANNOT be the  of the service.)   person must be available to return to pick you up within 15 minutes of being notified of discharge.  Please bring a picture ID, insurance card, & co-pay.  Leave all jewelry and valuables at home on the day of the procedure.  Do not follow the instructions that come in the prep box - use these instructions instead.    Medications:    If you begin taking any new blood thinning, weight loss, or diabetic medications please call Endoscopy Scheduling as soon as possible at [phone].  If you take heart, blood pressure, seizure, pain, (including inhalers/nebulizers), anti-rejection (transplant patients) or mental health medications, please take at your regular times with a sip of water.  If you are taking diabetic or weight loss medications, see the attached instructions.  Take last dose of Mounjaro / Zepbound (tirzepatide) on or before 09/04/2025.      COLONOSCOPY PREP TIMELINE    ONE WEEK PRIOR TO PROCEDURE   the prep kit and Dulcolax laxative tablets (bisacodyl 5mg - available over the counter) from your local pharmacy.  Purchase clear liquids for  use during the prep referencing the chart below.    CLEAR LIQUIDS NOT CLEAR LIQUIDS (DO NOT DRINK)   Water X Milk   Clear broth (chicken, beef, or vegetable) X Smoothies   Apple Juice (no pulp) X Hot Chocolate   White grape juice X Juices with pulp (orange juice, prune juice)   Sports drinks (AVOID red, purple, or blue) X Coffee WITH cream or milk   Clear soda X Beverages with particles (shakes, milkshakes)   Tea or coffee (black, NO MILK)    Clear gelatin (Jell-o, AVOID red, purple, or blue)      THE DAY BEFORE YOUR PROCEDURE: 09/11/2025    Consume ONLY CLEAR LIQUIDS for the entire day.  Stay hydrated.  NO SOLID FOOD    2:00 PM - Take four (4) Dulcolax laxative (bisacodyl 5mg) tablets with at least one full glass of clear liquids.      6:00 - 7:00 PM  Pour ONE (1) 6-ounce bottle of SUPREP liquid into the mixing container.  Add 10 ounces of cool drinking water to the 16-ounce line on the container and mix.  Drink ALL the liquid in the container.  You must drink two (2) more 16-ounce containers of water over the next 1 hour.  If you experience nausea, bloating, or cramping, slow down drinking until your symptoms decrease.    THE DAY OF YOUR PROCEDURE: 09/12/2025    2:00 - 3:00 AM  Pour ONE (1) 6-ounce bottle of SUPREP liquid into the mixing container.  Add 10 ounces of cool drinking water to the 16-ounce line on the container and mix.  Drink ALL the liquid in the container.  You must drink two (2) more 16-ounce containers of water over the next 1 hour.    ** May continue to drink clear liquids until 4 hours prior to arrival time for procedure **  Who to call if you have questions:    To reschedule / cancel, or for prep questions: [phone] / Hours of operation Monday-Friday 8:00 AM-4:30 PM  Insurance or financial information: [phone] or [phone]  After hours concerns, call [facility] at [phone] or [phone]      Please watch this informational video:   [email or web address]              Please contact your Diabetes Care Provider if you have questions or are preparing for more than one day before your procedure. For any scheduling questions, contact the Endoscopy Schedulers at [phone]. Please disregard this guide if you do not take any of these medications.   Weight loss and Diabetes Medication Holding Instructions:         Oral Medicine   Day before procedure Day of Procedure            Glyburide       Do NOT take morning of procedure. If you        Glucotrol (Glipizide)   Do NOT take   take twice daily, take with dinner.        Amaryl (Glimepiride)                                     Glucophage       Do NOT take morning of procedure. Take        Glumetza   Take as   when you start eating again.          Fortamet (Metformin)   prescribed                                 Januvia (Sitaglipitin)       Do NOT take morning of procedure. Take        Nesina (Aloglipitin)   Take as   when you start eating again.          Onglyza (Saxaglipitin)   prescribed                  Tradjenta (Linaglipitin)                                     Invokana (canagliflozin)                    Farxiga (dapagliflozin)   see prep    Do NOT take morning of procedure. Take        Jardiance (empagliflozin) instructions   when you start eating again.          Steglatro (ertugliflozin)   for date of                  Brenzavvy (bexagliflozin) last dose                  Inpefa (sotagliflozin)                      Invokamet/Invokamet XR (Invokana + metformin)                    Xigduo (Farxiga + metformin)                    Synjardy  XR (Jardiance + metformin)                    Segluromet (Steglatro + metformin)                    Qtern (Farxiga + saxagliptin)                    Glyxambi (Jardiance + linagliptin)                    Steglujan (Steglatro + sitagliptin)                                   Actos (Pioglitazone)   Take as   Do NOT take morning of procedure. Take             prescribed.   when you start eating again.                         Rybelsus (semaglutide) Take as    Do NOT take morning of procedure. Take             prescribed.   when you start eating again.                          Adipex/Lomaria (phentermine) see prep   Do NOT take morning of procedure. Take        Qsymia (phentermine  + topiramate) instructions   when you start eating again.              for date of                      last dose                               Tenuate/Tepanil (diethylpropion) see prep   Do NOT take morning of procedure. Take           instructions   when you start eating again.             for date of                     last dose                              Xenical (orilstat)   see prep    Do NOT take morning of procedure. Take           instructions   when you start eating again.             for date of                     last dose                                Injectable & Combination Day before Procedure Day of Procedure            Medicine (taken daily)                      Victoza/Saxenda (liragluide) Take as prescribed Do NOT take morning of procedure. Take        Byetta (exenatide)       when you start eating again.          Gattex (teduglutide)                      Adlyxin (lixisenatide) *                      Soliqua (lixisenatide + insulin glargine)                    Xultophy (liraglutide + insulin degludec)                                    Injectable  Medicine   Day before Procedure Day of Procedure             (taken weekly)                      Bydureon bcise (exenatide ER) see prep   Do NOT take morning of procedure. Take         Mounjaro/Zepbound (tirzepatide) instructions   when you start eating again.          Ozempic/Wegovy (semaglutide) for date of                  Tanzeum (albiglutide) * last dose                  Trulicity (dulaglutide)                      It is important to monitor your blood sugar while doing the bowel preparation. On the day of your  bowel prep, when you are on a clear liquid diet, you may drink beverages with sugar as your source of glucose. Be sure to mix the prep with water or sugar free liquid only. Below are instructions on how to adjust your diabetic medications prior to your scheduled procedure. Call the healthcare provider who manages your diabetes if you have questions.      Insulin for Type 1 Diabetes Mellitus   Day before Procedure                                        Day of procedure         Basaglar                         If you use in the morning, take as prescribed.    If you take in the morning,       Lantus                         If you use in the evening, inject 70% of dose.   inject 80% of dose. If you take      Levemir           in the evenings, inject usual       Toujeo           dose.           Tresiba                      Semgllam Velazquezrezza                      Count carbs and adjust dose           Do NOT take morining of procedure.       Apidra                      accordingly. If not carb counting,         Take when you start eating again.       Humalog 100                      take 25% of usual meal dose.                  Humalog 200                      May use correction dose every                 Novolog                      4 hours. Do NOT use once sugar-free                                       liquid prep is started.                                 Insulin Pump                     SEE BELOW PUMP GUIDANCE                                                                                                                Insulin for Type 2 Diabetes Mellitus   Day before Procedure                                        Day of procedure         Basaglar                       If you use in the morning, take as prescribed.    If you take in the morning,       Lantus                       If you use in the evening, inject 70% of dose.   inject 80% of dose. If you take      Levemir            in the evenings, inject usual       Toujeo           dose.           Tresiba                                                            Afrezza                       Inject 50 % of dose with clear liquid diet.      Do NOT take morning of       Apidra                      Do NOT use once sugar-free clear liquid prep   procedure. Take when you       Fiasp                      is started. If you are using a correction scale,    start eating meals again.       Humalog 100                      take dose every 4 hours as needed.                 Humalog 200                      Novolog                                     NPH                        Inject 50% of breakfast and dinner      Do NOT take morning of                             doses with clear liquid diet.        procedure. Take usual dose                  when you eat dinner.                      Regular                       Inject 50% of breakfast dose and do NOT take   Do NOT take morning of                             dinner dose once sugar-free liquid prep has    procedure. Take usual dose                             started. If using correction scale, may take dose   when you eat dinner.                             every 6 hours as needed.                                  Novolog Mix 70/30                     Inject 50% of breakfast dose. Inject 25% of dinner dose.     Do NOT take breakfast dose.       Humolog Mix 75/25                              Take usual dose when you eat       Humolog Mix 50/50           dinner.                          U500                      Take 50% of AM or breakfast unit pierce dose.   Do NOT take mornining of                              Take 25% of lunch or dinner or PM unit pierce dose.    procedure. Take when you                   start eating again.                        V-Go                       Continue to wear your V-Go device. Do NOT click once sugar-free   clear liquid prep is started.   Continue to wear your V-Go     "                            device. Resume clicks with                   meals.                                         INSULIN PUMP GUIDANCE Day before Procedure Day of Procedure   Pump and CGM do not communicate      Count carbs and bolus for carbs and correction as needed. May use temp basal rate of 70% once sugar-free clear prep is started. Continue until after procedure the following day Continue use of 70% temp basal rate until procedure complete and you are eating normally        Pump and CGM do communicate     Tandem Tslim and Mobi Count carbs and bolus for carbs and correction as needed. Start "Exercise" mode once sugar-free clear prep is started. Continue until after procedure the following day Continue use of "Exercise" mode until procedure complete and you are eating normally        Omnipod 5 Count carbs and bolus for carbs and correction as needed. Start "Activity" mode once sugar-free clear prep is started. Continue until after procedure the following day Continue use of "Activity" mode until procedure complete and you are eating normally        Medtronic 670g/770g/780g Count carbs and bolus for carbs and correction as needed. Start "Temp target" mode once sugar-free clear prep is started. Continue until after procedure the following day Continue use of temp target until procedure complete and you are eating normally             Beta Bionic ilet Count carbs and bolus for carbs and correction as needed. Let pump run as you usually would Let pump run as you usually would       "

## 2025-07-28 NOTE — PLAN OF CARE
Patient is scheduled for a Colonoscopy on 09/12/2025 with Dr. CHARLES Hooper  Referral for procedure from PAT appointment

## 2025-07-30 ENCOUNTER — PATIENT MESSAGE (OUTPATIENT)
Dept: PSYCHIATRY | Facility: CLINIC | Age: 55
End: 2025-07-30
Payer: COMMERCIAL

## 2025-07-31 RX ORDER — ACETAMINOPHEN 500 MG
1000 TABLET ORAL
OUTPATIENT
Start: 2025-08-01 | End: 2025-08-01

## 2025-08-01 ENCOUNTER — HOSPITAL ENCOUNTER (OUTPATIENT)
Facility: HOSPITAL | Age: 55
Discharge: HOME | End: 2025-08-01
Attending: ORTHOPAEDIC SURGERY | Admitting: ORTHOPAEDIC SURGERY
Payer: COMMERCIAL

## 2025-08-01 VITALS
HEART RATE: 56 BPM | SYSTOLIC BLOOD PRESSURE: 147 MMHG | HEIGHT: 68 IN | TEMPERATURE: 98 F | DIASTOLIC BLOOD PRESSURE: 89 MMHG | RESPIRATION RATE: 15 BRPM | BODY MASS INDEX: 28.34 KG/M2 | WEIGHT: 187 LBS | OXYGEN SATURATION: 96 %

## 2025-08-01 DIAGNOSIS — Z01.818 PRE-OP TESTING: Primary | ICD-10-CM

## 2025-08-01 DIAGNOSIS — I10 PRIMARY HYPERTENSION: ICD-10-CM

## 2025-08-01 DIAGNOSIS — G56.03 BILATERAL CARPAL TUNNEL SYNDROME: ICD-10-CM

## 2025-08-01 DIAGNOSIS — G56.03 CARPAL TUNNEL SYNDROME, BILATERAL: ICD-10-CM

## 2025-08-01 PROCEDURE — 63600175 PHARM REV CODE 636 W HCPCS: Performed by: ORTHOPAEDIC SURGERY

## 2025-08-01 PROCEDURE — 71000016 HC POSTOP RECOV ADDL HR: Performed by: ORTHOPAEDIC SURGERY

## 2025-08-01 PROCEDURE — 36000707: Performed by: ORTHOPAEDIC SURGERY

## 2025-08-01 PROCEDURE — 37000008 HC ANESTHESIA 1ST 15 MINUTES: Performed by: ORTHOPAEDIC SURGERY

## 2025-08-01 PROCEDURE — 36000706: Performed by: ORTHOPAEDIC SURGERY

## 2025-08-01 PROCEDURE — 64721 CARPAL TUNNEL SURGERY: CPT | Mod: 50,,, | Performed by: ORTHOPAEDIC SURGERY

## 2025-08-01 PROCEDURE — 63600175 PHARM REV CODE 636 W HCPCS

## 2025-08-01 PROCEDURE — 37000009 HC ANESTHESIA EA ADD 15 MINS: Performed by: ORTHOPAEDIC SURGERY

## 2025-08-01 PROCEDURE — 71000015 HC POSTOP RECOV 1ST HR: Performed by: ORTHOPAEDIC SURGERY

## 2025-08-01 RX ORDER — GLUCAGON 1 MG
1 KIT INJECTION
Status: DISCONTINUED | OUTPATIENT
Start: 2025-08-01 | End: 2025-08-01 | Stop reason: HOSPADM

## 2025-08-01 RX ORDER — ONDANSETRON 8 MG/1
8 TABLET, ORALLY DISINTEGRATING ORAL EVERY 8 HOURS PRN
Status: DISCONTINUED | OUTPATIENT
Start: 2025-08-01 | End: 2025-08-01 | Stop reason: HOSPADM

## 2025-08-01 RX ORDER — OXYCODONE HYDROCHLORIDE 10 MG/1
10 TABLET ORAL EVERY 4 HOURS PRN
Status: DISCONTINUED | OUTPATIENT
Start: 2025-08-01 | End: 2025-08-01 | Stop reason: HOSPADM

## 2025-08-01 RX ORDER — ACETAMINOPHEN 325 MG/1
650 TABLET ORAL EVERY 4 HOURS PRN
Status: DISCONTINUED | OUTPATIENT
Start: 2025-08-01 | End: 2025-08-01 | Stop reason: HOSPADM

## 2025-08-01 RX ORDER — KETOROLAC TROMETHAMINE 30 MG/ML
30 INJECTION, SOLUTION INTRAMUSCULAR; INTRAVENOUS ONCE
Status: DISCONTINUED | OUTPATIENT
Start: 2025-08-01 | End: 2025-08-01 | Stop reason: HOSPADM

## 2025-08-01 RX ORDER — PROPOFOL 10 MG/ML
VIAL (ML) INTRAVENOUS CONTINUOUS PRN
Status: DISCONTINUED | OUTPATIENT
Start: 2025-08-01 | End: 2025-08-01

## 2025-08-01 RX ORDER — LIDOCAINE HYDROCHLORIDE 10 MG/ML
INJECTION, SOLUTION EPIDURAL; INFILTRATION; INTRACAUDAL; PERINEURAL
Status: DISCONTINUED | OUTPATIENT
Start: 2025-08-01 | End: 2025-08-01 | Stop reason: HOSPADM

## 2025-08-01 RX ORDER — LIDOCAINE HYDROCHLORIDE 20 MG/ML
INJECTION, SOLUTION EPIDURAL; INFILTRATION; INTRACAUDAL; PERINEURAL
Status: DISCONTINUED | OUTPATIENT
Start: 2025-08-01 | End: 2025-08-01

## 2025-08-01 RX ORDER — SODIUM CHLORIDE 0.9 % (FLUSH) 0.9 %
2 SYRINGE (ML) INJECTION EVERY 6 HOURS PRN
Status: DISCONTINUED | OUTPATIENT
Start: 2025-08-01 | End: 2025-08-01 | Stop reason: HOSPADM

## 2025-08-01 RX ORDER — CEFAZOLIN SODIUM 1 G/3ML
INJECTION, POWDER, FOR SOLUTION INTRAMUSCULAR; INTRAVENOUS
Status: DISCONTINUED | OUTPATIENT
Start: 2025-08-01 | End: 2025-08-01

## 2025-08-01 RX ORDER — HYDROMORPHONE HYDROCHLORIDE 2 MG/ML
0.2 INJECTION, SOLUTION INTRAMUSCULAR; INTRAVENOUS; SUBCUTANEOUS EVERY 5 MIN PRN
Status: DISCONTINUED | OUTPATIENT
Start: 2025-08-01 | End: 2025-08-01 | Stop reason: HOSPADM

## 2025-08-01 RX ORDER — BUPIVACAINE HYDROCHLORIDE 5 MG/ML
INJECTION, SOLUTION EPIDURAL; INTRACAUDAL; PERINEURAL
Status: DISCONTINUED | OUTPATIENT
Start: 2025-08-01 | End: 2025-08-01 | Stop reason: HOSPADM

## 2025-08-01 RX ORDER — HYDROCODONE BITARTRATE AND ACETAMINOPHEN 5; 325 MG/1; MG/1
1 TABLET ORAL EVERY 4 HOURS PRN
Qty: 20 TABLET | Refills: 0 | Status: SHIPPED | OUTPATIENT
Start: 2025-08-01

## 2025-08-01 RX ORDER — LIDOCAINE HYDROCHLORIDE 10 MG/ML
1 INJECTION, SOLUTION EPIDURAL; INFILTRATION; INTRACAUDAL; PERINEURAL ONCE
Status: DISCONTINUED | OUTPATIENT
Start: 2025-08-01 | End: 2025-08-01 | Stop reason: HOSPADM

## 2025-08-01 RX ADMIN — LIDOCAINE HYDROCHLORIDE 80 MG: 20 INJECTION, SOLUTION EPIDURAL; INFILTRATION; INTRACAUDAL; PERINEURAL at 07:08

## 2025-08-01 RX ADMIN — PROPOFOL 100 MCG/KG/MIN: 10 INJECTION, EMULSION INTRAVENOUS at 07:08

## 2025-08-01 RX ADMIN — PROPOFOL 80 MG: 10 INJECTION, EMULSION INTRAVENOUS at 07:08

## 2025-08-01 RX ADMIN — PROPOFOL 30 MG: 10 INJECTION, EMULSION INTRAVENOUS at 07:08

## 2025-08-01 RX ADMIN — CEFAZOLIN 2 G: 330 INJECTION, POWDER, FOR SOLUTION INTRAMUSCULAR; INTRAVENOUS at 07:08

## 2025-08-01 RX ADMIN — SODIUM CHLORIDE, SODIUM LACTATE, POTASSIUM CHLORIDE, AND CALCIUM CHLORIDE: .6; .31; .03; .02 INJECTION, SOLUTION INTRAVENOUS at 06:08

## 2025-08-01 NOTE — OPERATIVE NOTE ADDENDUM
Certification of Assistant at Surgery       Surgery Date: 8/1/2025     Participating Surgeons:  Surgeons and Role:     * Patrick Monroy Jr., MD - Primary    Procedures:  Procedure(s) (LRB):  CARPAL TUNNEL RELEASE (Bilateral)    Assistant Surgeon's Certification of Necessity:  I understand that section 1842 (b) (6) (d) of the Social Security Act generally prohibits Medicare Part B reasonable charge payment for the services of assistants at surgery in teaching hospitals when qualified residents are available to furnish such services. I certify that the services for which payment is claimed were medically necessary, and that no qualified resident was available to perform the services. I further understand that these services are subject to post-payment review by the Medicare carrier.      Martha French PA-C    08/01/2025  8:33 AM

## 2025-08-01 NOTE — OP NOTE
[facility] - Surgery (Hospital)  Operative Note      Date of Procedure: 8/1/2025     Procedure: Procedure(s) (LRB):  CARPAL TUNNEL RELEASE (Bilateral)     Surgeons and Role:     * Patrick Monroy Jr., MD - Primary    Assisting Surgeon: None    Pre-Operative Diagnosis: Bilateral carpal tunnel syndrome [G56.03]    Post-Operative Diagnosis: Post-Op Diagnosis Codes:     * Bilateral carpal tunnel syndrome [G56.03]    Anesthesia: Local MAC    Operative Findings (including complications, if any):  Bilateral carpal tunnel syndrome    Description of Technical Procedures:     Preop diagnosis:  Bilateral carpal tunnel syndrome.      Postop diagnosis: Same.      Operative procedure:  Bilateral carpal tunnel release (modifier 59).      Surgeon: Porfirio.      First assistant: Gillian.      Anesthesia: Mac.      EBL: Minimal.      Specimen: None.      Complications: None.      Operative procedure in detail as follows:     After operative consent was obtained patient brought the operating room placed supine operating table.  Anesthesia by IV sedation followed by injection of xylocaine Marcaine combination into each wrist.  Tourniquet applied to the right arm but not used on the left arm because of the IV site.  Both upper extremities then prepped and draped out in the normal sterile fashion.  The right arm was approached 1st and the Esmarch used to exsanguinated the limb tourniquet inflated 225 mmHg.      2.5 cm incision made right thenar crease with a 15 blade.  Palmar fascia divided deep retractors placed transverse carpal ligament identified carefully divided with the Pickens blade.  The median nerve protected with the [place] elevator and division of the ligament continued proximally and distally under direct visualization.  After complete release the contents of the canal inspected and noted to be intact including the recurrent branch.  The wound irrigated and closed with interrupted 4-0 Monocryl in subcutaneous layer Dermabond on  the skin.  A light wrap sterile dressing a light wrap applied tourniquet deflated.      Attention then turned to the left arm where the Esmarch was used to create a forearm tourniquet.  A similar 2.5 cm incision made in the left thenar crease with a 15 blade.  Palmar fascia divided deep retractors placed transverse carpal ligament identified carefully divided with the Warrick blade.  The median nerve protected with the [place] elevator and division of the ligament continued proximally and distally under direct visualization.  After complete release the contents inspected and noted to be intact.  The wound irrigated and closed with interrupted 4-0 Monocryl on the subcutaneous layer Dermabond on the skin.  Sterile dressing applied followed by light wrap and the tourniquet removed.  Patient brought to the recovery room in stable condition.  All sponge needle counts reported as correct no complications    Significant Surgical Tasks Conducted by the Assistant(s), if Applicable: retraction    Estimated Blood Loss (EBL): * No values recorded between 8/1/2025  7:04 AM and 8/1/2025  8:14 AM *           Implants: * No implants in log *    Specimens:   Specimen (24h ago, onward)      None           * No specimens in log *           Condition: Good    Disposition: PACU - hemodynamically stable.    Attestation: I was present and scrubbed for the entire procedure.    Discharge Note    OUTCOME: Patient tolerated treatment/procedure well without complication and is now ready for discharge.    DISPOSITION: Home or Self Care    FINAL DIAGNOSIS:  Bilateral carpal tunnel syndrome    FOLLOWUP: In clinic    DISCHARGE INSTRUCTIONS:    Discharge Procedure Orders   Diet general     Call MD for:  temperature >100.4     Call MD for:  persistent nausea and vomiting     Call MD for:  severe uncontrolled pain     Keep surgical extremity elevated     Remove dressing in 24 hours     EKG 12-lead   Standing Status: Future Number of Occurrences: 1  Standing Exp. Date: 04/24/26        Clinical Reference Documents Added to Patient Instructions         Document    CARPAL TUNNEL SURGERY - DISCHARGE INSTRUCTIONS (ENGLISH)

## 2025-08-01 NOTE — TRANSFER OF CARE
"Anesthesia Transfer of Care Note    Patient: Toi Fernandez    Procedure(s) Performed: Procedure(s) (LRB):  CARPAL TUNNEL RELEASE (Bilateral)    Patient location: [facility]    Anesthesia Type: MAC    Transport from OR: Transported from OR on 6-10 L/min O2 by face mask with adequate spontaneous ventilation    Post pain: adequate analgesia    Post assessment: no apparent anesthetic complications    Post vital signs: stable    Level of consciousness: awake, alert and oriented    Nausea/Vomiting: no nausea/vomiting    Complications: none    Transfer of care protocol was followed      Last vitals: Visit Vitals  /83 (BP Location: Right arm, Patient Position: Lying)   Pulse (!) 53   Temp 36.6 °C (97.9 °F) (Tympanic)   Resp 16   Ht 5' 8" (1.727 m)   Wt 84.8 kg (187 lb)   LMP  (LMP Unknown)   SpO2 97%   Breastfeeding No   BMI 28.43 kg/m²     "

## 2025-08-01 NOTE — H&P
Chief Complaint: Pain of the Right Wrist and Pain of the Left Wrist        History of Present Illness:  Pt is a 54 y.o. female who presents for follow up of bilateral hand pain   Last saw Patti Patel PA-C on 1/31/2025  Given bilateral CSI for de Quervain's tenosynovitis, which provided hand symptomatic relief x2 weeks, although denies de Quervain's tenosynovitis symptoms at today's visit  Current tx:  CTS gel wrist braces QHS  Today, pt reports symptoms have not improved and is interested in discussing surgical management  Interested in bilateral CTS CSI in the interim        Previous hx:  Patient is a RHD 54 y.o. who presents today with complaints of bilateral hand pain.   Pain began multiple years  ago. no history of trauma.  Pain is intermittent located to thumbs (sharp) as well as numbness, tingling, and burning to the fingertips.  Associated weakness in the hands  Symptoms are aggravated by activity and gripping.  Symptoms are alleviated by rest.  Attempted treatment(s) and/or interventions include rest, bracing, NSAIDs, Tylenol, CSI without adequate response.     Is affecting ADLs and limiting desired level of activity. Denies neck pain, numbness, and tingling in fingertips.     Mechanical symptoms:  None  Subjective instability: (--)   Nocturnal symptoms: (+)     No previous surgeries or trauma on bilateral hands     Smoking: No  DM: No  Occupation:    Handedness: RHD          Past Medical History:   Diagnosis Date    Carpal tunnel syndrome, left      Chronic kidney disease, stage II (mild) 09/21/2012    Depression      H/O nephrotic syndrome, 2001. 04/21/2015    Hypertension      Obesity      Secondary hyperparathyroidism      Vitamin D deficiency disease              Past Surgical History:   Procedure Laterality Date    DILATION AND CURETTAGE OF UTERUS        ENDOMETRIAL ABLATION   2004    HYSTERECTOMY   2011     DLH/BS (AUB/Fibroids) KB    LAPAROSCOPIC SLEEVE GASTRECTOMY N/A 05/27/2020      Procedure: GASTRECTOMY, SLEEVE, LAPAROSCOPIC, with intraop EGD;  Surgeon: Leodan Burk MD;  Location: [facility] OR [address] [place] [address];  Service: General;  Laterality: N/A;    TUBAL LIGATION               Review of patient's allergies indicates:   Allergen Reactions    No known allergies        Social History          Social History Narrative     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       Also owns a [employer] business           Exercise:  none             Family History   Problem Relation Name Age of Onset    Diabetes Mother Dee Dee Heredia           None    Hypertension Mother Dee Dee Heredia      Colon cancer Father        Hypertension Father        Hypertension Sister        Hypertension Sister        Epilepsy Son        Diabetes Maternal Aunt Alka luis           None    Diabetes Maternal Grandmother Natasha Sarabia           None    Breast cancer Neg Hx        Ovarian cancer Neg Hx               Current Medications      Current Outpatient Medications:     albuterol (PROVENTIL/VENTOLIN HFA) 90 mcg/actuation inhaler, Inhale 2 puffs into the lungs every 4 (four) hours as needed for Wheezing. Rescue, Disp: 54 g, Rfl: 2    amLODIPine (NORVASC) 10 MG tablet, Take 1 tablet (10 mg total) by mouth once daily., Disp: 90 tablet, Rfl: 3    carvediloL (COREG) 25 MG tablet, Take 1 tablet (25 mg total) by mouth 2 (two) times daily with meals., Disp: 180 tablet, Rfl: 3    cetirizine (ZYRTEC) 10 MG tablet, TAKE 1 TO 2 TABLETS BY MOUTH EVERY DAY AS NEEDED FOR ITCHING, Disp: 180 tablet, Rfl: 1    cholecalciferol, vitamin D3, (VITAMIN D3) 25 mcg (1,000 unit) capsule, Take 5 capsules (5,000 Units total) by mouth once daily. for 90 doses, Disp: 90 capsule, Rfl: 2    cyclobenzaprine (FLEXERIL) 10 MG tablet, TAKE 1 TABLET BY MOUTH EVERY DAY AS NEEDED FOR BACK PAIN, Disp: 30 tablet, Rfl: 2    ergocalciferol (ERGOCALCIFEROL) 50,000 unit Cap, TAKE 1 CAPSULE BY MOUTH MONTHLY, Disp: 3 capsule, Rfl: 0    EScitalopram oxalate (LEXAPRO) 10 MG tablet, Take 1 tablet (10  mg total) by mouth once daily., Disp: 90 tablet, Rfl: 3    irbesartan (AVAPRO) 300 MG tablet, Take 1 tablet (300 mg total) by mouth every morning., Disp: 90 tablet, Rfl: 3    phentermine (ADIPEX-P) 37.5 mg tablet, Take 1/2 of a tablet by mouth twice daily (take with breakfast and before 330pm), Disp: , Rfl:     torsemide (DEMADEX) 10 MG Tab, TAKE 1 TABLET(10 MG) BY MOUTH EVERY DAY, Disp: 90 tablet, Rfl: 0    topiramate (TOPAMAX) 50 MG tablet, Take 1 tablet (50 mg total) by mouth 2 (two) times daily., Disp: 180 tablet, Rfl: 0        Review of Systems   Musculoskeletal:  Positive for arthritis, joint pain, joint swelling, muscle weakness, myalgias and stiffness.   Neurological:  Positive for numbness and paresthesias.         OBJECTIVE:       Vital Signs (Most Recent):  Vitals   There were no vitals filed for this visit.        There is no height or weight on file to calculate BMI.        Physical Exam:  Bilateral Hand/Wrist Examination:  Observation/Inspection:  Swelling                       mild                    Deformity                     none  Discoloration               none                  Scars                           none                  Atrophy                        none  Strength                      4/5, limited secondary to pain  TTP                             1st dorsal compartment bilaterally     ROM hand full, painless  ROM wrist full, painless  ROM elbow full, painless     HAND/WRIST EXAMINATION:  Finkelstein's Test                                Neg  WHAT Test                                         Neg  Snuff box tenderness                          Neg  Forte's Test                                     Neg  Hook of Hamate Tenderness              Neg  CMC grind                                           + (L)  CMC crepitus                                      + (L)  Circumduction test                              Neg  TFCC Compression Test                    Neg      Neurovascular  Exam:  Digits WWP, brisk CR < 3s throughout  NVI motor/LTS to M/R/U nerves, radial pulse 2+  Tinel's Test - Carpal Tunnel                -  Tinel's Test - Cubital Tunnel               Neg  Phalen's Test                                      mildly positive  Durkan's Test                                      mildly positive  Median Nerve Compression TestNeg  Table top Test                                     Neg     Physical Exam  Vitals reviewed.   Constitutional:       General: She is not in acute distress.     Appearance: Normal appearance. She is not ill-appearing or toxic-appearing.   HENT:      Head: Normocephalic and atraumatic.   Eyes:      Extraocular Movements: Extraocular movements intact.      Conjunctiva/sclera: Conjunctivae normal.      Pupils: Pupils are equal, round, and reactive to light.   Cardiovascular:      Rate and Rhythm: Normal rate and regular rhythm.      Pulses: Normal pulses.      Heart sounds: Normal heart sounds. No murmur heard.     No friction rub. No gallop.   Pulmonary:      Effort: Pulmonary effort is normal.      Breath sounds: Normal breath sounds. No stridor. No wheezing, rhonchi or rales.   Abdominal:      General: Abdomen is flat. Bowel sounds are normal.      Palpations: Abdomen is soft.   Musculoskeletal:         General: No swelling or tenderness. Normal range of motion.      Cervical back: Normal range of motion and neck supple. No tenderness.      Right lower leg: No edema.      Left lower leg: No edema.   Skin:     General: Skin is warm and dry.      Capillary Refill: Capillary refill takes less than 2 seconds.      Coloration: Skin is not jaundiced or pale.   Neurological:      General: No focal deficit present.      Mental Status: She is alert and oriented to person, place, and time.   Psychiatric:         Mood and Affect: Mood normal.         Behavior: Behavior normal.         Thought Content: Thought content normal.         Judgment: Judgment normal.             Diagnostic Results:  Imaging - I independently interpreted the patient's imaging. Xrays of the patient's bilateral hands and wrists  demonstrate CMC OA worst on L hand with joint space narrowing, osteophytes, sclerosis, and geodes  no evidence of any acute fractures or dislocations     ASSESSMENT/PLAN:       1. Bilateral carpal tunnel syndrome    2. Carpal tunnel syndrome, bilateral          54 y.o. y/o female with bilateral de Quervain's tenosynovitis, left thumb CMC OA, and possible bilateral CTS  Plan: The patient and I had a thorough discussion today.  We discussed the working diagnosis as well as several other potential alternative diagnoses.    We discussed conservative and surgical treatment options. Conservative options include rest, activity modifications, workplace modifications, po and topical analgesia (OTC and rx), formal or home PT, and others. Surgical treatment was also mentioned.     At this time, the patient would like to proceed with the following, which I agree with.     Surgery: Pt has failed conservative tx including rest, ice, NSAIDs, PT, CSI. Pt has elected to proceed with surgical intervention. The surgery was explained as well as risks and benefits, and consents were signed for bilateral CTR.   Pain Management:  Continue current pain regimen  Procedures:  CSI given today for bilateral CTS  DME:  Continue wearing braces as needed  Work status:  WBAT with limitation secondary to pain     All of the patient's questions were answered and the patient will contact us if they have any questions or concerns in the interim.

## 2025-08-01 NOTE — ANESTHESIA POSTPROCEDURE EVALUATION
Anesthesia Post Evaluation    Patient: Toi Fernandez    Procedure(s) Performed: Procedure(s) (LRB):  CARPAL TUNNEL RELEASE (Bilateral)    Final Anesthesia Type: general      Patient location during evaluation: PACU  Patient participation: Yes- Able to Participate  Level of consciousness: awake and alert  Post-procedure vital signs: reviewed and stable  Pain management: adequate  Airway patency: patent    PONV status at discharge: No PONV  Anesthetic complications: no      Cardiovascular status: blood pressure returned to baseline  Respiratory status: unassisted  Hydration status: euvolemic            Vitals Value Taken Time   /87 08/01/25 08:15   Temp 36.4 °C (97.5 °F) 08/01/25 08:15   Pulse 52 08/01/25 08:30   Resp 16 08/01/25 08:30   SpO2 99 % 08/01/25 08:30         No case tracking events are documented in the log.      Pain/Nallely Score: Nallely Score: 10 (8/1/2025  8:30 AM)

## 2025-08-07 ENCOUNTER — PATIENT MESSAGE (OUTPATIENT)
Dept: ORTHOPEDICS | Facility: CLINIC | Age: 55
End: 2025-08-07
Payer: COMMERCIAL

## 2025-08-07 ENCOUNTER — NURSE TRIAGE (OUTPATIENT)
Dept: ADMINISTRATIVE | Facility: CLINIC | Age: 55
End: 2025-08-07
Payer: COMMERCIAL

## 2025-08-07 NOTE — TELEPHONE ENCOUNTER
"Pt had carpel tunnel surgery on 8/1 with Dr. Monroy. Reporting pus coming from incision. It looks yellow. No spreading redness or red streaks. No longer has a dressing, she just took it off. No fever. A small amount of drainage. Incision looks kind of "puffy" but the stitches are intact. No fever. Minimal pain.    Dispo- see surgeon within 24 hours. Pt advised and VU. Aware a high priority message will be routed to surgeon's staff requesting they reach out to pt in the morning as I am unable to sched with specialty. Also suggested pt send a picture of her incision through [employer] so they can review. Care advice given. She VU.  Reason for Disposition   [1] Clear or blood-tinged fluid draining from wound AND [2] no fever    Additional Information   Negative: [1] Major abdominal surgical incision AND [2] wound gaping open AND [3] visible internal organs   Negative: Sounds like a life-threatening emergency to the triager   Negative: [1] Bleeding from incision AND [2] won't stop after 10 minutes of direct pressure   Negative: [1] Bleeding (more than a few drops) from incision AND [2] tracheostomy or blood vessel surgery (e.g., carotid endarterectomy, femoral bypass graft, kidney dialysis fistula)   Negative: [1] Widespread rash AND [2] bright red, sunburn-like   Negative: SEVERE pain (e.g., excruciating) in the incision   Negative: [1] Incision gaping open AND [2] < 48 hours since wound re-opened   Negative: [1] Incision gaping open AND [2] length of opening > 2 inches (5 cm)   Negative: Patient sounds very sick or weak to the triager   Negative: Sounds like a serious complication to the triager   Negative: Fever > 100.4 F (38.0 C)   Negative: [1] Incision looks infected (e.g., spreading redness, pus) AND [2] fever > 99.5 F (37.5 C)   Negative: [1] Incision looks infected (e.g., spreading redness, pus) AND [2] large red area (> 2 inches or 5 cm)   Negative: [1] Incision looks infected (e.g., spreading redness, pus) AND " [2] face wound   Negative: [1] Red streak runs from the incision AND [2] longer than 1 inch (2.5 cm)   Negative: [1] Pus or bad-smelling fluid draining from incision AND [2] no fever   Negative: [1] Post-op pain AND [2] not controlled with pain medications   Negative: Dressing soaked with blood or body fluid (e.g., drainage)   Negative: [1] Scant bleeding (e.g., few drops) from incision AND [2] tracheostomy or blood vessel surgery (e.g., carotid endarterectomy, femoral bypass graft, kidney dialysis fistula)   Negative: [1] Raised bruise AND [2] size > 2 inches (5 cm) and getting bigger   Negative: [1] Caller has URGENT question AND [2] triager unable to answer question   Negative: [1] INCREASING pain in incision AND [2] > 2 days (48 hours) since surgery   Negative: [1] Small red area or streak AND [2] no fever    Protocols used: Post-Op Incision Symptoms and [id number]

## 2025-08-08 ENCOUNTER — PATIENT MESSAGE (OUTPATIENT)
Dept: ORTHOPEDICS | Facility: CLINIC | Age: 55
End: 2025-08-08
Payer: COMMERCIAL

## 2025-08-08 DIAGNOSIS — Z98.890 S/P CARPAL TUNNEL RELEASE: Primary | ICD-10-CM

## 2025-08-08 RX ORDER — CEPHALEXIN 500 MG/1
500 CAPSULE ORAL 4 TIMES DAILY
Qty: 28 CAPSULE | Refills: 0 | Status: SHIPPED | OUTPATIENT
Start: 2025-08-08 | End: 2025-08-15

## 2025-08-11 ENCOUNTER — OFFICE VISIT (OUTPATIENT)
Dept: ORTHOPEDICS | Facility: CLINIC | Age: 55
End: 2025-08-11
Payer: COMMERCIAL

## 2025-08-11 DIAGNOSIS — Z98.890 S/P CARPAL TUNNEL RELEASE: Primary | ICD-10-CM

## 2025-08-11 DIAGNOSIS — G56.03 BILATERAL CARPAL TUNNEL SYNDROME: ICD-10-CM

## 2025-08-11 PROCEDURE — 3066F NEPHROPATHY DOC TX: CPT | Mod: CPTII,S$GLB,,

## 2025-08-11 PROCEDURE — 99024 POSTOP FOLLOW-UP VISIT: CPT | Mod: S$GLB,,,

## 2025-08-11 PROCEDURE — 3061F NEG MICROALBUMINURIA REV: CPT | Mod: CPTII,S$GLB,,

## 2025-08-11 PROCEDURE — 1159F MED LIST DOCD IN RCRD: CPT | Mod: CPTII,S$GLB,,

## 2025-08-11 PROCEDURE — 99999 PR PBB SHADOW E&M-EST. PATIENT-LVL III: CPT | Mod: PBBFAC,,,

## 2025-08-11 PROCEDURE — 1160F RVW MEDS BY RX/DR IN RCRD: CPT | Mod: CPTII,S$GLB,,

## 2025-08-11 PROCEDURE — 4010F ACE/ARB THERAPY RXD/TAKEN: CPT | Mod: CPTII,S$GLB,,

## 2025-08-12 ENCOUNTER — PATIENT MESSAGE (OUTPATIENT)
Dept: BARIATRICS | Facility: CLINIC | Age: 55
End: 2025-08-12
Payer: COMMERCIAL

## 2025-08-12 ENCOUNTER — CLINICAL SUPPORT (OUTPATIENT)
Dept: REHABILITATION | Facility: HOSPITAL | Age: 55
End: 2025-08-12
Payer: COMMERCIAL

## 2025-08-12 DIAGNOSIS — M25.60 STIFFNESS IN JOINT: ICD-10-CM

## 2025-08-12 DIAGNOSIS — G56.03 BILATERAL CARPAL TUNNEL SYNDROME: ICD-10-CM

## 2025-08-12 DIAGNOSIS — M79.642 PAIN IN BOTH HANDS: Primary | ICD-10-CM

## 2025-08-12 DIAGNOSIS — Z98.890 S/P CARPAL TUNNEL RELEASE: ICD-10-CM

## 2025-08-12 DIAGNOSIS — R53.1 WEAKNESS: ICD-10-CM

## 2025-08-12 DIAGNOSIS — M79.641 PAIN IN BOTH HANDS: Primary | ICD-10-CM

## 2025-08-12 PROCEDURE — 97165 OT EVAL LOW COMPLEX 30 MIN: CPT | Mod: PN

## 2025-08-12 PROCEDURE — 97110 THERAPEUTIC EXERCISES: CPT | Mod: PN

## 2025-08-13 ENCOUNTER — PATIENT MESSAGE (OUTPATIENT)
Dept: REHABILITATION | Facility: HOSPITAL | Age: 55
End: 2025-08-13
Payer: COMMERCIAL

## 2025-08-14 ENCOUNTER — CLINICAL SUPPORT (OUTPATIENT)
Dept: REHABILITATION | Facility: HOSPITAL | Age: 55
End: 2025-08-14
Payer: COMMERCIAL

## 2025-08-14 DIAGNOSIS — M79.641 PAIN IN BOTH HANDS: Primary | ICD-10-CM

## 2025-08-14 DIAGNOSIS — M25.60 STIFFNESS IN JOINT: ICD-10-CM

## 2025-08-14 DIAGNOSIS — R53.1 WEAKNESS: ICD-10-CM

## 2025-08-14 DIAGNOSIS — M79.642 PAIN IN BOTH HANDS: Primary | ICD-10-CM

## 2025-08-14 PROCEDURE — 97110 THERAPEUTIC EXERCISES: CPT | Mod: PN

## 2025-08-14 PROCEDURE — 97140 MANUAL THERAPY 1/> REGIONS: CPT | Mod: PN

## 2025-08-14 PROCEDURE — 97530 THERAPEUTIC ACTIVITIES: CPT | Mod: PN

## 2025-08-19 ENCOUNTER — PATIENT MESSAGE (OUTPATIENT)
Dept: ORTHOPEDICS | Facility: CLINIC | Age: 55
End: 2025-08-19
Payer: COMMERCIAL

## 2025-08-19 ENCOUNTER — CLINICAL SUPPORT (OUTPATIENT)
Dept: REHABILITATION | Facility: HOSPITAL | Age: 55
End: 2025-08-19
Payer: COMMERCIAL

## 2025-08-19 DIAGNOSIS — R53.1 WEAKNESS: ICD-10-CM

## 2025-08-19 DIAGNOSIS — M79.642 PAIN IN BOTH HANDS: Primary | ICD-10-CM

## 2025-08-19 DIAGNOSIS — M79.641 PAIN IN BOTH HANDS: Primary | ICD-10-CM

## 2025-08-19 DIAGNOSIS — G56.03 BILATERAL CARPAL TUNNEL SYNDROME: ICD-10-CM

## 2025-08-19 DIAGNOSIS — M25.60 STIFFNESS IN JOINT: ICD-10-CM

## 2025-08-19 DIAGNOSIS — Z98.890 S/P CARPAL TUNNEL RELEASE: Primary | ICD-10-CM

## 2025-08-19 PROCEDURE — 97140 MANUAL THERAPY 1/> REGIONS: CPT | Mod: PN

## 2025-08-19 PROCEDURE — 97530 THERAPEUTIC ACTIVITIES: CPT | Mod: PN

## 2025-08-19 PROCEDURE — 97110 THERAPEUTIC EXERCISES: CPT | Mod: PN

## 2025-08-19 PROCEDURE — 97018 PARAFFIN BATH THERAPY: CPT | Mod: PN

## 2025-08-19 RX ORDER — DICLOFENAC SODIUM 10 MG/G
2 GEL TOPICAL 3 TIMES DAILY
Qty: 100 G | Refills: 1 | Status: SHIPPED | OUTPATIENT
Start: 2025-08-19

## 2025-08-20 ENCOUNTER — PATIENT MESSAGE (OUTPATIENT)
Dept: ORTHOPEDICS | Facility: CLINIC | Age: 55
End: 2025-08-20
Payer: COMMERCIAL

## 2025-08-20 ENCOUNTER — PATIENT MESSAGE (OUTPATIENT)
Dept: REHABILITATION | Facility: HOSPITAL | Age: 55
End: 2025-08-20
Payer: COMMERCIAL

## 2025-08-22 ENCOUNTER — PATIENT MESSAGE (OUTPATIENT)
Dept: ORTHOPEDICS | Facility: CLINIC | Age: 55
End: 2025-08-22
Payer: COMMERCIAL

## 2025-08-26 ENCOUNTER — CLINICAL SUPPORT (OUTPATIENT)
Dept: REHABILITATION | Facility: HOSPITAL | Age: 55
End: 2025-08-26
Payer: COMMERCIAL

## 2025-08-26 DIAGNOSIS — M79.641 PAIN IN BOTH HANDS: Primary | ICD-10-CM

## 2025-08-26 DIAGNOSIS — R53.1 WEAKNESS: ICD-10-CM

## 2025-08-26 DIAGNOSIS — M79.642 PAIN IN BOTH HANDS: Primary | ICD-10-CM

## 2025-08-26 DIAGNOSIS — M25.60 STIFFNESS IN JOINT: ICD-10-CM

## 2025-08-26 PROCEDURE — 97110 THERAPEUTIC EXERCISES: CPT | Mod: PN

## 2025-08-26 PROCEDURE — 97018 PARAFFIN BATH THERAPY: CPT | Mod: PN

## 2025-08-26 PROCEDURE — 97140 MANUAL THERAPY 1/> REGIONS: CPT | Mod: PN

## 2025-08-26 PROCEDURE — 97530 THERAPEUTIC ACTIVITIES: CPT | Mod: PN

## 2025-09-03 ENCOUNTER — OFFICE VISIT (OUTPATIENT)
Dept: ORTHOPEDICS | Facility: CLINIC | Age: 55
End: 2025-09-03
Payer: COMMERCIAL

## 2025-09-03 ENCOUNTER — CLINICAL SUPPORT (OUTPATIENT)
Dept: REHABILITATION | Facility: HOSPITAL | Age: 55
End: 2025-09-03
Payer: COMMERCIAL

## 2025-09-03 VITALS — BODY MASS INDEX: 28.33 KG/M2 | HEIGHT: 68 IN | WEIGHT: 186.94 LBS

## 2025-09-03 DIAGNOSIS — M25.60 STIFFNESS IN JOINT: ICD-10-CM

## 2025-09-03 DIAGNOSIS — M79.642 PAIN IN BOTH HANDS: Primary | ICD-10-CM

## 2025-09-03 DIAGNOSIS — R53.1 WEAKNESS: ICD-10-CM

## 2025-09-03 DIAGNOSIS — G56.03 BILATERAL CARPAL TUNNEL SYNDROME: ICD-10-CM

## 2025-09-03 DIAGNOSIS — Z98.890 S/P CARPAL TUNNEL RELEASE: Primary | ICD-10-CM

## 2025-09-03 DIAGNOSIS — M79.641 PAIN IN BOTH HANDS: Primary | ICD-10-CM

## 2025-09-03 PROCEDURE — 97018 PARAFFIN BATH THERAPY: CPT | Mod: PN

## 2025-09-03 PROCEDURE — 97140 MANUAL THERAPY 1/> REGIONS: CPT | Mod: PN

## 2025-09-03 PROCEDURE — 97530 THERAPEUTIC ACTIVITIES: CPT | Mod: PN

## 2025-09-03 PROCEDURE — 99999 PR PBB SHADOW E&M-EST. PATIENT-LVL III: CPT | Mod: PBBFAC,,,

## 2025-09-03 PROCEDURE — 97110 THERAPEUTIC EXERCISES: CPT | Mod: PN

## (undated) DEVICE — Device

## (undated) DEVICE — SEE MEDLINE ITEM 146313

## (undated) DEVICE — CANNULA ENDOPATH XCEL 5X100MM

## (undated) DEVICE — SPONGE COTTON TRAY 4X4IN

## (undated) DEVICE — APPLICATOR CHLORAPREP ORN 26ML

## (undated) DEVICE — BANDAGE ESMARK ELASTIC ST 4X9

## (undated) DEVICE — ELECTRODE REM PLYHSV RETURN 9

## (undated) DEVICE — BLADE SCALP OPHTL BEVEL STR

## (undated) DEVICE — POWDER ARISTA AH 3G

## (undated) DEVICE — SEE MEDLINE ITEM 152487

## (undated) DEVICE — SHEARS HARMONIC 5CM 36CM

## (undated) DEVICE — NDL HYPO REG 25G X 1 1/2

## (undated) DEVICE — GLOVE SIGNATURE ESSNTL LTX 7.5

## (undated) DEVICE — BLADE SURG #15 CARBON STEEL

## (undated) DEVICE — STAPLER ECHELON FLEX 60MM 44CM

## (undated) DEVICE — SPLINT WRIST FOAM 8.5IN LG/RT

## (undated) DEVICE — SYR 10CC LUER LOCK

## (undated) DEVICE — ADHESIVE DERMABOND ADVANCED

## (undated) DEVICE — GOWN POLY REINF BRTH SLV LG

## (undated) DEVICE — SUT MONOCRYL 4-0 PS-2

## (undated) DEVICE — BANDAGE MATRIX HK LOOP 2IN 5YD

## (undated) DEVICE — SUT GUT PL. 4-0 27 FS-2

## (undated) DEVICE — TROCAR ENDOPATH XCEL 12MM 10CM

## (undated) DEVICE — ELECTRODE PENCIL W/ROCKER NDL

## (undated) DEVICE — ALCOHOL 70% ISOP W/GREEN 16OZ

## (undated) DEVICE — SOL IRR 0.9% NACL 500ML PB

## (undated) DEVICE — RELOAD ECHELON FLEX BLU 60MM

## (undated) DEVICE — APPLICATOR ARISTA FLEX XL

## (undated) DEVICE — TROCAR ENDOPATH XCEL 12X100MM

## (undated) DEVICE — SUT MCRYL PLUS 4-0 PS2 27IN

## (undated) DEVICE — RELOAD ECHELON FLEX GRN 60MM

## (undated) DEVICE — STOCKINET 4INX48

## (undated) DEVICE — SUT STRATAFIX PDO 2-0 SH

## (undated) DEVICE — PAD CAST SPECIALIST 2X4

## (undated) DEVICE — BANDAGE ADHESIVE

## (undated) DEVICE — ADHESIVE MASTISOL VIAL 48/BX

## (undated) DEVICE — PAD PREP CUFFED NS 24X48IN

## (undated) DEVICE — SUT 0 VICRYL / UR6 (J603)

## (undated) DEVICE — TOURNIQUET SB QC DP 18X4IN

## (undated) DEVICE — LUBRICANT SURGILUBE 2 OZ

## (undated) DEVICE — COVER LIGHT HANDLE 80/CA

## (undated) DEVICE — TUBING HF INSUFFLATION W/ FLTR

## (undated) DEVICE — GOWN POLY REINF BRTH SLV XL

## (undated) DEVICE — PACK SURGERY START

## (undated) DEVICE — MANIFOLD 4 PORT

## (undated) DEVICE — CLOSURE SKIN STERI STRIP 1/2X4

## (undated) DEVICE — BLADE SURG CARBON STEEL SZ11

## (undated) DEVICE — PACK ECLIPSE BASIC III SURG

## (undated) DEVICE — DRAPE HAND STERILE

## (undated) DEVICE — TROCAR ENDOPATH XCEL 5X100MM

## (undated) DEVICE — WARMER DRAPE STERILE LF